# Patient Record
Sex: FEMALE | Race: WHITE | NOT HISPANIC OR LATINO | Employment: FULL TIME | URBAN - METROPOLITAN AREA
[De-identification: names, ages, dates, MRNs, and addresses within clinical notes are randomized per-mention and may not be internally consistent; named-entity substitution may affect disease eponyms.]

---

## 2017-03-06 ENCOUNTER — HOSPITAL ENCOUNTER (OUTPATIENT)
Dept: ONCOLOGY | Facility: CLINIC | Age: 62
Discharge: HOME | End: 2017-03-06
Attending: NURSE PRACTITIONER | Admitting: NURSE PRACTITIONER

## 2017-03-06 LAB
ALBUMIN SERPL-MCNC: 3.9 G/DL (ref 3.5–4.8)
ALBUMIN/GLOB SERPL: 1.2 {RATIO} (ref 1–1.7)
ALP SERPL-CCNC: 62 IU/L (ref 32–91)
ALT SERPL-CCNC: 37 IU/L (ref 14–54)
ANION GAP SERPL CALC-SCNC: 15.8 MMOL/L (ref 10–20)
AST SERPL-CCNC: 28 IU/L (ref 15–41)
BILIRUB SERPL-MCNC: 0.4 MG/DL (ref 0.3–1.2)
BUN SERPL-MCNC: 22 MG/DL (ref 8–20)
BUN/CREAT SERPL: 44 (ref 5.4–26.2)
CALCIUM SERPL-MCNC: 9.4 MG/DL (ref 8.9–10.3)
CHLORIDE SERPL-SCNC: 103 MMOL/L (ref 101–111)
CONV CO2: 28 MMOL/L (ref 22–32)
CONV TOTAL PROTEIN: 7.2 G/DL (ref 6.1–7.9)
CREAT UR-MCNC: 0.5 MG/DL (ref 0.4–1)
GLOBULIN UR ELPH-MCNC: 3.3 G/DL (ref 2.5–3.8)
GLUCOSE SERPL-MCNC: 117 MG/DL (ref 65–99)
POTASSIUM SERPL-SCNC: 3.8 MMOL/L (ref 3.6–5.1)
SODIUM SERPL-SCNC: 143 MMOL/L (ref 136–144)

## 2017-03-07 LAB
KAPPA LC SERPL-MCNC: 0.35 MG/DL (ref 0.33–1.94)
KAPPA LC/LAMBDA SER: 0.06 {RATIO} (ref 0.26–1.65)
LAMBDA LC FREE SERPL-MCNC: 6.31 MG/DL (ref 0.57–2.63)

## 2017-06-05 ENCOUNTER — HOSPITAL ENCOUNTER (OUTPATIENT)
Dept: ONCOLOGY | Facility: CLINIC | Age: 62
Discharge: HOME | End: 2017-06-05
Attending: INTERNAL MEDICINE | Admitting: INTERNAL MEDICINE

## 2017-06-07 LAB
ANA SER QL IA: NORMAL
IGA1 MFR SER: 74 MG/DL (ref 50–400)
IGG1 SER-MCNC: 1580 MG/DL (ref 600–1500)
IGM SERPL-MCNC: 33 MG/DL (ref 40–300)

## 2017-10-02 ENCOUNTER — HOSPITAL ENCOUNTER (OUTPATIENT)
Dept: ONCOLOGY | Facility: HOSPITAL | Age: 62
Discharge: HOME | End: 2017-10-02
Attending: NURSE PRACTITIONER | Admitting: NURSE PRACTITIONER

## 2017-10-02 ENCOUNTER — HOSPITAL ENCOUNTER (OUTPATIENT)
Dept: ONCOLOGY | Facility: CLINIC | Age: 62
Setting detail: INFUSION SERIES
Discharge: HOME | End: 2017-10-02
Attending: NURSE PRACTITIONER | Admitting: NURSE PRACTITIONER

## 2017-10-02 ENCOUNTER — CLINICAL SUPPORT (OUTPATIENT)
Dept: ONCOLOGY | Facility: HOSPITAL | Age: 62
End: 2017-10-02

## 2017-10-02 NOTE — PROGRESS NOTES
PATIENTS ONCOLOGY RECORD LOCATED IN [facility]      Subjective     Name:  SHANNAN SIMENTAL     Date:  10/02/2017  Address:  [facility] [facility] [place] [address]  Home: [phone]  :  1955 AGE:  62 y.o.        RECORDS OBTAINED:  Patients Oncology Record is located in [facility]

## 2018-02-06 ENCOUNTER — HOSPITAL ENCOUNTER (OUTPATIENT)
Dept: ONCOLOGY | Facility: HOSPITAL | Age: 63
Discharge: HOME | End: 2018-02-06
Attending: NURSE PRACTITIONER | Admitting: NURSE PRACTITIONER

## 2018-02-06 ENCOUNTER — HOSPITAL ENCOUNTER (OUTPATIENT)
Dept: ONCOLOGY | Facility: CLINIC | Age: 63
Setting detail: INFUSION SERIES
Discharge: HOME | End: 2018-02-06
Attending: NURSE PRACTITIONER | Admitting: NURSE PRACTITIONER

## 2018-02-06 ENCOUNTER — CLINICAL SUPPORT (OUTPATIENT)
Dept: ONCOLOGY | Facility: HOSPITAL | Age: 63
End: 2018-02-06

## 2018-02-06 LAB
IRON SATN MFR SERPL: 21 % (ref 15–50)
IRON SERPL-MCNC: 90 UG/DL (ref 28–170)
MAGNESIUM UR-MCNC: 0.9 % (ref 0.5–1.5)
RETICS/RBC NFR MANUAL: 0.03 10*6/UL
TIBC SERPL-MCNC: 430 UG/DL (ref 228–428)

## 2018-02-06 NOTE — PROGRESS NOTES
PATIENTS ONCOLOGY RECORD LOCATED IN [facility]      Subjective     Name:  SHANNAN SIMENTAL     Date:  2018  Address:  [address] [place] [address] [place] [address]  Home: [phone]  :  1955 AGE:  62 y.o.        RECORDS OBTAINED:  Patients Oncology Record is located in [facility]

## 2018-02-07 LAB
HAPTOGLOB SERPL-MCNC: 139 MG/DL (ref 36–195)
IGA1 MFR SER: 67 MG/DL (ref 50–400)
IGG1 SER-MCNC: 1590 MG/DL (ref 600–1500)
IGM SERPL-MCNC: 35 MG/DL (ref 40–300)

## 2018-05-30 ENCOUNTER — HOSPITAL ENCOUNTER (OUTPATIENT)
Dept: RHEUMATOLOGY | Facility: CLINIC | Age: 63
Discharge: HOME | End: 2018-05-30
Attending: NURSE PRACTITIONER | Admitting: NURSE PRACTITIONER

## 2018-10-10 ENCOUNTER — HOSPITAL ENCOUNTER (OUTPATIENT)
Dept: ONCOLOGY | Facility: HOSPITAL | Age: 63
Discharge: HOME | End: 2018-10-10
Attending: INTERNAL MEDICINE | Admitting: INTERNAL MEDICINE

## 2018-10-10 ENCOUNTER — CLINICAL SUPPORT (OUTPATIENT)
Dept: ONCOLOGY | Facility: HOSPITAL | Age: 63
End: 2018-10-10

## 2018-10-10 ENCOUNTER — HOSPITAL ENCOUNTER (OUTPATIENT)
Dept: ONCOLOGY | Facility: CLINIC | Age: 63
Setting detail: INFUSION SERIES
Discharge: HOME | End: 2018-10-10
Attending: INTERNAL MEDICINE | Admitting: INTERNAL MEDICINE

## 2018-10-10 NOTE — PROGRESS NOTES
PATIENTS ONCOLOGY RECORD LOCATED IN [facility]      Subjective     Name:  SHANNAN SIMENTAL     Date:  10/10/2018  Address:  [facility] [facility] [place] [address]  Home: [phone]  :  1955 AGE:  63 y.o.        RECORDS OBTAINED:  Patients Oncology Record is located in [facility]

## 2018-10-15 LAB — INTERPRETATION UR IFE-IMP: NORMAL

## 2019-02-15 ENCOUNTER — CLINICAL SUPPORT (OUTPATIENT)
Dept: ONCOLOGY | Facility: HOSPITAL | Age: 64
End: 2019-02-15

## 2019-02-15 ENCOUNTER — HOSPITAL ENCOUNTER (OUTPATIENT)
Dept: ONCOLOGY | Facility: HOSPITAL | Age: 64
Discharge: HOME | End: 2019-02-15
Attending: NURSE PRACTITIONER | Admitting: NURSE PRACTITIONER

## 2019-02-15 ENCOUNTER — HOSPITAL ENCOUNTER (OUTPATIENT)
Dept: ONCOLOGY | Facility: CLINIC | Age: 64
Setting detail: INFUSION SERIES
Discharge: HOME | End: 2019-02-15
Attending: NURSE PRACTITIONER | Admitting: NURSE PRACTITIONER

## 2019-02-15 LAB
IGA1 MFR SER: 74 MG/DL (ref 50–400)
IGG1 SER-MCNC: 1650 MG/DL (ref 600–1500)
IGM SERPL-MCNC: 33 MG/DL (ref 40–300)

## 2019-02-15 NOTE — PROGRESS NOTES
PATIENTS ONCOLOGY RECORD LOCATED IN [facility]      Subjective     Name:  SHANNAN SIMENTAL     Date:  02/15/2019  Address:  [address] [place] [address]  Home: [unfilled]  :  1955 AGE:  64 y.o.        RECORDS OBTAINED:  Patients Oncology Record is located in [facility]

## 2019-02-19 LAB — PROT PATTERN SERPL IFE-IMP: NORMAL

## 2019-05-20 ENCOUNTER — HOSPITAL ENCOUNTER (OUTPATIENT)
Dept: RHEUMATOLOGY | Facility: CLINIC | Age: 64
Discharge: HOME | End: 2019-05-20
Attending: INTERNAL MEDICINE | Admitting: INTERNAL MEDICINE

## 2019-06-14 ENCOUNTER — HOSPITAL ENCOUNTER (OUTPATIENT)
Dept: ONCOLOGY | Facility: HOSPITAL | Age: 64
Discharge: HOME | End: 2019-06-14
Attending: NURSE PRACTITIONER | Admitting: NURSE PRACTITIONER

## 2019-06-14 LAB
ANION GAP SERPL CALC-SCNC: 13.8 MMOL/L (ref 10–20)
BUN SERPL-MCNC: 10 MG/DL (ref 8–20)
BUN/CREAT SERPL: 20 (ref 5.4–26.2)
CALCIUM SERPL-MCNC: 9.3 MG/DL (ref 8.9–10.3)
CHLORIDE SERPL-SCNC: 103 MMOL/L (ref 101–111)
CONV CO2: 27 MMOL/L (ref 22–32)
CREAT UR-MCNC: 0.5 MG/DL (ref 0.4–1)
GLUCOSE SERPL-MCNC: 113 MG/DL (ref 65–99)
POTASSIUM SERPL-SCNC: 3.8 MMOL/L (ref 3.6–5.1)
SODIUM SERPL-SCNC: 140 MMOL/L (ref 136–144)

## 2019-06-18 LAB
KAPPA LC SERPL-MCNC: 0.77 MG/DL (ref 0.33–1.94)
KAPPA LC/LAMBDA SER: 0.07 {RATIO} (ref 0.26–1.65)
LAMBDA LC FREE SERPL-MCNC: 11.1 MG/DL (ref 0.57–2.63)

## 2019-10-15 PROBLEM — M25.552 BILATERAL HIP PAIN: Status: ACTIVE | Noted: 2019-10-15

## 2019-10-15 PROBLEM — R76.8 ANA POSITIVE: Status: ACTIVE | Noted: 2019-10-15

## 2019-10-15 PROBLEM — M25.551 BILATERAL HIP PAIN: Status: ACTIVE | Noted: 2019-10-15

## 2019-10-15 PROBLEM — M25.571 RIGHT ANKLE PAIN: Status: ACTIVE | Noted: 2019-10-15

## 2019-10-15 PROBLEM — D72.819 LEUKOPENIA: Status: ACTIVE | Noted: 2019-10-15

## 2019-10-15 PROBLEM — D47.2 MGUS (MONOCLONAL GAMMOPATHY OF UNKNOWN SIGNIFICANCE): Status: ACTIVE | Noted: 2019-10-15

## 2019-10-15 NOTE — PROGRESS NOTES
Hematology/Oncology Outpatient Follow Up    PATIENT NAME:Phuong Altamirano  :1955  MRN: [id number]  PRIMARY CARE PHYSICIAN: Reva Jaeger MD  REFERRING PHYSICIAN: Reva Jaeger MD    Chief Complaint   Patient presents with   • Follow-up     IgG lambda monoclonal gammopathy        HISTORY OF PRESENT ILLNESS:   1. IgG lambda monoclonal gammopathy diagnosed in 2014.  • She was having significant migratory arthritis and was referred by her primary care physician Dread Yoder M.D. to Mohsen Ehsan, M.D. for a positive ALEE test.  Dr. Hickman’s further workup revealed her to have normal complements and chemistry profile.  The date of collection is 12/15/14.  CBC had revealed the hemoglobin of 11.6 g/dL with MCV 85.4 fl.  Serum protein electrophoresis and abnormal protein band in the gamma region.  Serum immunofixation revealed this to be an IgG lambda monoclonal immunoglobulin and hence the patient was referred here for further evaluation for the presence of multiple myeloma.  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  She denies having had any paresthesias, lightheadedness or chest pain, but has occasional sensation of passing out though she has never passed out personally.  She denies having had any nosebleed, gum bleed, blood in her urine or stool.  She has no family history of anemia.  She claims that she was anemic many years ago.  She is on normal diet and has not lost any weight.  • 2/9/15 - Patient was seen in initial consultation.  • 2/9/15 - Bone survey showed mild scattered degenerative change.  There were no lytic or blastic abnormalities.  • 2/9/15 - Haptoglobin 175 (N), IgA 125 (N), IgG 1398 (N), IgM 49 (N), reticulocyte count 1.34 (N), lambda free light chain 45.48 (H), lambda free light chain 5.37 (N), kappa lambda ratio 0.12 (L), ferritin 52.9 (N), folic acid 22.7 (N), iron 71 (N),  TIBC 380 (N), iron saturation 19% (N) vitamin B12 382 (N), peripheral blood smear showed hypochromia and microcytosis in the RBCs.  WBC and platelet count normal.  • 2/20/15 - Patient underwent bone marrow aspiration.  Pathology showed nondiagnostic morphologic findings due to sampling bias.  Trace clonal plasma cell population by flow cytometry (IgG lambda, less than 1% of total events).  Immunophenotypic analysis showed a trace monoclonal IgG lambda plasma cell (CD38 bright) population (<1%) was present with the polytypic background.  The B cells (1%) appeared polytypic.  Cytogenetics (N).  FISH negative.   • 3/6/17 - Kappa/lambda FLC ratio 0.06 (0.26-1.65). Comprehensive metabolic panel with no significant abnormality.    • 3/9/15 -  ().  Hemoglobin 13, MCV 89.1, WBC 5.4, platelet count 263,000.    • 1/25/16 - WBC 5.5, hemoglobin 12, MCV 90.5, platelet count 265,000. Discussed repeat of bone marrow with patient. She would rather wait six months from today. IgA 90 (), IgG 1740 (600-1500), IgM 41 (). Comprehensive metabolic panel with no significant abnormality. Immunoglobulin free light chain kappa/lambda ratio 0.15 (0.26-1.65). Serum immunofixation with IgG lambda monoclonal gammopathy.       • 4/20/16 - Patient had a DEXA scan that was normal.   • 8/8/16 - WBC 4.7, hemoglobin 12.2, platelet count 253,000. Beta-2 microglobulin 1.48 (<2.16).     • 8/16/16 - Skeletal survey revealed no suspicious lesions.   • 8/19/16 - Patient underwent bone marrow aspiration revealing limited normocellular bone marrow (50%) showing trilineage hematopoiesis and a 20% plasmacytosis with excess plasma cell expression suggestive of a plasma cell neoplasm. Flow cytometry had revealed 1% population of plasma cells exhibiting monoclonal restriction for immunocytoplasmic lambda immunoglobulin light chain. Cytogenetics revealed a normal karyotype. Myeloma profile (inter FISH) negative study.   • 9/7/16 - IgA 86  (), IgG 1660 (600-1500), IgM 39 (). Comprehensive metabolic panel with no significant abnormality and creatinine 0.6 (0.4-1.0).       • 12/5/16 - WBC 4.7, hemoglobin 12.6, platelet count 240,000. Patient complained of fatigue. Patient prescribed American Ginseng. TSH 1.86 (N).   • 3/6/17 - WBC 5.07, hemoglobin 12.2, MCV 93.1, platelets 257,000, ANC 3.16. Light chain ratio and CMP ordered. Patient ordered to start American Ginseng. Kappa/lambda FLC ration 0.06 (0.26-1.65). Comprehensive metabolic panel with no significant abnormality.    • 6/5/17 - ALEE negative. IgA 74 (), IgG 1580 (600-1500), IgM 33 ().   • 10/2/17 - WBC 4.51, hemoglobin 12.1, MCV 91.7, platelets 240,000, ANC 3.09.      • 1/25/18 - Labs performed by MARA Martinez through Dr. Hickman’s group showed ALEE positive with reflex homogenous associated with a SLE drug-induced lupus and juvenile idiopathic arthritis. ALEE titer 1:40 (H). Rheumatoid factor <14 (N). C-reactive protein was 1.7 (<8). Vitamin D 24 (insufficiency 20-29).     • 2/6/18 - Retic 0.9 (0.5-1.5), iron 90 (), TIBC 430 (228-428), iron saturation 21 (15-50), ferritin 44 (), haptoglobin 139 (). IgA  67 (), IgG 1590 (600-1500), IgM 35 ().          • 10/10/18 - WBC 3.8 with 60% neutrophils, hemoglobin 12.2, MCV 92.2. Low WBC thought secondary to Plaquenil and Methotrexate. Urine immunofixation showed IgG lambda monoclonal gammopathy.   • 2/15/19 - SIFE showed IgG lambda monoclonal gammopathy. IgA 74 (N), IgG 1650 (H), IgM 33 (L).   • 5/20/19 - Patient seen by Dr. Vick. Methotrexate was discontinued. Continued on Plaquenil and Mobic. Right hip x-ray showed osteoarthritis. Left hip x-ray showed early degenerative changes.   • 6/14/19 - Prescribed Zantac 150 mg p.o. daily for GE reflux disease. Free light chain, kappa 0.77 (0.33-1.94). Free lambda light chains 11.10 (0.57-2.63). Kappa/lambda ratio 0.07 (0.26-1.65).     Past Medical  History:   Diagnosis Date   • Arthritis 2010   • Depression 2010   • High blood pressure    • High cholesterol 2005   • Hypothyroidism    • Lupus (CMS/HCC) 2010       Past Surgical History:   Procedure Laterality Date   • BREAST LUMPECTOMY  1983    benign   • DILATATION AND CURETTAGE  1995   • LAPAROSCOPIC TUBAL LIGATION  1979   • OVARY SURGERY  1985    remoal of left ovary         Current Outpatient Medications:   •  amLODIPine (NORVASC) 10 MG tablet, Take 10 mg by mouth Daily., Disp: , Rfl: 1  •  atenolol (TENORMIN) 50 MG tablet, Take 50 mg by mouth 2 (Two) Times a Day., Disp: , Rfl: 1  •  cholecalciferol (VITAMIN D3) 400 units tablet, Take 400 Units by mouth Daily., Disp: , Rfl:   •  Cyanocobalamin (VITAMIN B-12 PO), Take  by mouth., Disp: , Rfl:   •  folic acid (FOLVITE) 1 MG tablet, Take 1,000 mcg by mouth Daily., Disp: , Rfl: 3  •  hydrALAZINE (APRESOLINE) 25 MG tablet, TAKE 1 TABLET BY MOUTH TWICE A DAY: CALL OFFICE NEED CHECK UP BY 10/2/19, Disp: , Rfl: 0  •  hydroCHLOROthiazide (HYDRODIURIL) 25 MG tablet, TAKE 1 TABLET BY MOUTH EVERY DAY: NEEDS APPT WILL BE A YEAR 10-2-18, Disp: , Rfl: 0  •  hydroxychloroquine (PLAQUENIL) 200 MG tablet, Every 12 (Twelve) Hours., Disp: , Rfl:   •  levothyroxine (SYNTHROID, LEVOTHROID) 100 MCG tablet, Take 100 mcg by mouth Every Morning Before Breakfast., Disp: , Rfl: 1  •  meloxicam (MOBIC) 15 MG tablet, TAKE ONE (1) TABLET BY MOUTH ONCE A DAY., Disp: , Rfl: 1  •  pravastatin (PRAVACHOL) 40 MG tablet, Take 40 mg by mouth every night at bedtime., Disp: , Rfl: 2  •  sertraline (ZOLOFT) 100 MG tablet, Take 100 mg by mouth Daily., Disp: , Rfl: 1  •  acetaminophen (TYLENOL) 325 MG tablet, Take 650 mg by mouth Every 6 (Six) Hours As Needed for Mild Pain ., Disp: , Rfl:   •  Aspirin-Acetaminophen-Caffeine (EXCEDRIN MIGRAINE PO), Take  by mouth., Disp: , Rfl:   •  ibuprofen (ADVIL,MOTRIN) 200 MG tablet, Take 200 mg by mouth Every 6 (Six) Hours As Needed for Mild Pain ., Disp: , Rfl:  "    No Known Allergies    Family History   Family history unknown: Yes       Family history is unknown by patient.    Social History     Tobacco Use   • Smoking status: Never Smoker   • Smokeless tobacco: Never Used   Substance Use Topics   • Alcohol use: Yes     Frequency: 2-4 times a month     Drinks per session: 1 or 2     Binge frequency: Never   • Drug use: No       I have reviewed the history of present illness, past medical history, family history, social history, lab results, all notes and other records since the patient was last seen on 6/14/19.    SUBJECTIVE: Patient is here to follow up on IgG la,bda MGUS, leukopenia, and ALEE positivity. She forgot to get her bone scan but states that [facility] will [facility]. She does see a new rheumatologist and she has been diagnosed with Lupus.         OSWALD Rodas present during office visit.     REVIEW OF SYSTEMS:  Review of Systems   Constitutional: Negative for chills and fever.   HENT: Negative for ear pain, mouth sores, nosebleeds and sore throat.    Eyes: Negative for photophobia and visual disturbance.   Respiratory: Negative for wheezing and stridor.    Cardiovascular: Negative for chest pain and palpitations.   Gastrointestinal: Negative for abdominal pain, diarrhea, nausea and vomiting.   Endocrine: Negative for cold intolerance and heat intolerance.   Genitourinary: Negative for dysuria and hematuria.   Musculoskeletal: Negative for joint swelling and neck stiffness.   Skin: Negative for color change and rash.   Neurological: Negative for seizures and syncope.   Hematological: Negative for adenopathy.        No obvious bleeding   Psychiatric/Behavioral: Negative for agitation, confusion and hallucinations.       OBJECTIVE:    Vitals:    10/17/19 0940   BP: 172/83   Pulse: 68   Resp: 18   Temp: 97.9 °F (36.6 °C)   Weight: 90.8 kg (200 lb 3.2 oz)   Height: 156.2 cm (61.5\")   PainSc: 0-No pain       ECOG  (0) Fully active, able to carry " on all predisease performance without restriction    Physical Exam   Constitutional: She is oriented to person, place, and time. No distress.   HENT:   Head: Normocephalic and atraumatic.   Eyes: Conjunctivae and EOM are normal. Right eye exhibits no discharge. Left eye exhibits no discharge. No scleral icterus.   Neck: Normal range of motion. Neck supple. No thyromegaly present.   Cardiovascular: Normal rate, regular rhythm and normal heart sounds. Exam reveals no gallop and no friction rub.   Pulmonary/Chest: Effort normal. No stridor. No respiratory distress. She has no wheezes.   Abdominal: Soft. Bowel sounds are normal. She exhibits no mass. There is no tenderness. There is no rebound and no guarding.   Musculoskeletal: Normal range of motion. She exhibits edema (trace BLE ). She exhibits no tenderness.   Lymphadenopathy:     She has no cervical adenopathy.   Neurological: She is alert and oriented to person, place, and time. She exhibits normal muscle tone.   Skin: Skin is warm. No rash noted. She is not diaphoretic. No erythema.   Psychiatric: She has a normal mood and affect. Her behavior is normal.   Nursing note and vitals reviewed.      RECENT LABS  WBC   Date Value Ref Range Status   05/20/2019 4.5 THOUSAND/UL 3.8 - 10.8 K/uL Final     RBC   Date Value Ref Range Status   05/20/2019 3.93 MILLION/UL 3.80 - 5.10 10*6/mm3 Final     Hemoglobin   Date Value Ref Range Status   05/20/2019 11.7 11.7 - 15.5 g/dL Final     Hematocrit   Date Value Ref Range Status   05/20/2019 34.4 (L) 35.0 - 45.0 % Final     MCV   Date Value Ref Range Status   05/20/2019 87.5 80.0 - 100.0 fL Final     MCH   Date Value Ref Range Status   05/20/2019 29.8 27.0 - 33.0 pg Final     MCHC   Date Value Ref Range Status   05/20/2019 34.0 G/DL 32.0 - 36.0 % Final     RDW   Date Value Ref Range Status   05/20/2019 14.3 11.0 - 15.0 % Final     MPV   Date Value Ref Range Status   05/20/2019 10.5 7.5 - 12.5 fL Final     Platelets   Date Value  Ref Range Status   05/20/2019 285 THOUSAND/ - 400 10*3/mm3 Final     Lymphocyte Rel %   Date Value Ref Range Status   05/20/2019 21.8 % Final     Monocyte Rel %   Date Value Ref Range Status   05/20/2019 9.4 % Final     Eosinophil Rel %   Date Value Ref Range Status   05/20/2019 1.8 % Final     Basophil Rel %   Date Value Ref Range Status   05/20/2019 1.1 % Final     Neutrophils Absolute   Date Value Ref Range Status   05/20/2019 2966 CELLS/UL 1500 - 7800 10*3/mm3 Final     Lymphocytes Absolute   Date Value Ref Range Status   05/20/2019 981 CELLS/ - 3900 10*3/mm3 Final     Monocytes Absolute   Date Value Ref Range Status   05/20/2019 423 CELLS/ - 950 10*3/microliter Final     Eosinophils Absolute   Date Value Ref Range Status   05/20/2019 81 CELLS/UL 15 - 500 10*3/mm3 Final     Basophils Absolute   Date Value Ref Range Status   05/20/2019 50 CELLS/UL 0 - 200 10*3/mm3 Final       Lab Results   Component Value Date    GLUCOSE 113 (H) 06/14/2019    BUN 10 06/14/2019    CREATININE 0.5 06/14/2019    EGFRIFNONA 116 05/20/2019    EGFRIFAFRI 100 05/20/2019    BCR 20.0 06/14/2019    K 3.8 06/14/2019    CO2 27 06/14/2019    CALCIUM 9.3 06/14/2019    ALBUMIN 4.0 05/20/2019    LABIL2 1.2 (calc) 05/20/2019    AST 17 05/20/2019    ALT 17 05/20/2019         Assessment/Plan     IgG lambda MGUS  - IgG  - IgM  - IgA  - CBC & Differential    Leukopenia, unspecified type    ALEE positive      ASSESSMENT:  Patient claims to have forgotten to get a skeletal survey performed again and I have encouraged her to get that done.  She has been taken off the methotrexate by Dr. Hickman and is seeing a new rheumatologist now.  I have asked her to see if she can come off the folic acid now that she is not taking methotrexate.  She is also taking oral vitamin B12 replacement but does not remember history of low vitamin B12.  Her WBC had been running low because of methotrexate and Plaquenil and this will be repeated  today.      PLAN:  Skeletal survey.   CBC today.  Immunoglobulins next visit.         I have reviewed labs results, imaging, vitals, and medications with the patient today. Will follow up in four and eight months with the nurse practitioner and a CBC.     Patient verbalized understanding and is in agreement of the above plan.    I have reviewed and validated the information above.   Arcenio Ferreira M.D., F.A.C.P.    Much of the above report is an electronic transcription/translation of the spoken language to printed text using Dragon Software. As such, the subtleties and finesse of the spoken language may permit erroneous, or at times, nonsensical words or phrases to be inadvertently transcribed; thus changes may be made at a later date to rectify these errors.

## 2019-10-17 ENCOUNTER — OFFICE VISIT (OUTPATIENT)
Dept: ONCOLOGY | Facility: CLINIC | Age: 64
End: 2019-10-17

## 2019-10-17 ENCOUNTER — OFFICE VISIT (OUTPATIENT)
Dept: LAB | Facility: HOSPITAL | Age: 64
End: 2019-10-17

## 2019-10-17 VITALS
BODY MASS INDEX: 36.84 KG/M2 | HEART RATE: 68 BPM | DIASTOLIC BLOOD PRESSURE: 83 MMHG | SYSTOLIC BLOOD PRESSURE: 172 MMHG | TEMPERATURE: 97.9 F | RESPIRATION RATE: 18 BRPM | WEIGHT: 200.2 LBS | HEIGHT: 62 IN

## 2019-10-17 DIAGNOSIS — D47.2 MGUS (MONOCLONAL GAMMOPATHY OF UNKNOWN SIGNIFICANCE): ICD-10-CM

## 2019-10-17 DIAGNOSIS — D47.2 MGUS (MONOCLONAL GAMMOPATHY OF UNKNOWN SIGNIFICANCE): Primary | ICD-10-CM

## 2019-10-17 DIAGNOSIS — D72.819 LEUKOPENIA, UNSPECIFIED TYPE: ICD-10-CM

## 2019-10-17 DIAGNOSIS — R76.8 ANA POSITIVE: ICD-10-CM

## 2019-10-17 LAB
BASOPHILS # BLD AUTO: 0.02 10*3/MM3 (ref 0–0.2)
BASOPHILS NFR BLD AUTO: 0.5 % (ref 0–1.5)
DEPRECATED RDW RBC AUTO: 41.5 FL (ref 37–54)
EOSINOPHIL # BLD AUTO: 0.1 10*3/MM3 (ref 0–0.4)
EOSINOPHIL NFR BLD AUTO: 2.3 % (ref 0.3–6.2)
ERYTHROCYTE [DISTWIDTH] IN BLOOD BY AUTOMATED COUNT: 13.4 % (ref 12.3–15.4)
HCT VFR BLD AUTO: 35.8 % (ref 34–46.6)
HGB BLD-MCNC: 12.1 G/DL (ref 12–15.9)
IGA1 MFR SER: 73 MG/DL (ref 70–400)
IGG1 SER-MCNC: 1602 MG/DL (ref 700–1600)
IGM SERPL-MCNC: 28 MG/DL (ref 40–230)
LYMPHOCYTES # BLD AUTO: 1.04 10*3/MM3 (ref 0.7–3.1)
LYMPHOCYTES NFR BLD AUTO: 24.2 % (ref 19.6–45.3)
MCH RBC QN AUTO: 29.6 PG (ref 26.6–33)
MCHC RBC AUTO-ENTMCNC: 33.8 G/DL (ref 31.5–35.7)
MCV RBC AUTO: 87.5 FL (ref 79–97)
MONOCYTES # BLD AUTO: 0.39 10*3/MM3 (ref 0.1–0.9)
MONOCYTES NFR BLD AUTO: 9.1 % (ref 5–12)
NEUTROPHILS # BLD AUTO: 2.75 10*3/MM3 (ref 1.7–7)
NEUTROPHILS NFR BLD AUTO: 63.9 % (ref 42.7–76)
PLATELET # BLD AUTO: 232 10*3/MM3 (ref 140–450)
PMV BLD AUTO: 9.1 FL (ref 6–12)
RBC # BLD AUTO: 4.09 10*6/MM3 (ref 3.77–5.28)
WBC NRBC COR # BLD: 4.3 10*3/MM3 (ref 3.4–10.8)

## 2019-10-17 PROCEDURE — 36415 COLL VENOUS BLD VENIPUNCTURE: CPT

## 2019-10-17 PROCEDURE — 82784 ASSAY IGA/IGD/IGG/IGM EACH: CPT | Performed by: INTERNAL MEDICINE

## 2019-10-17 PROCEDURE — 99214 OFFICE O/P EST MOD 30 MIN: CPT | Performed by: INTERNAL MEDICINE

## 2019-10-17 PROCEDURE — G0463 HOSPITAL OUTPT CLINIC VISIT: HCPCS | Performed by: INTERNAL MEDICINE

## 2019-10-17 PROCEDURE — 85025 COMPLETE CBC W/AUTO DIFF WBC: CPT

## 2019-10-17 RX ORDER — AMLODIPINE BESYLATE 10 MG/1
10 TABLET ORAL DAILY
Refills: 1 | COMMUNITY
Start: 2019-10-01

## 2019-10-17 RX ORDER — PRAVASTATIN SODIUM 40 MG
40 TABLET ORAL
Refills: 2 | COMMUNITY
Start: 2019-07-29

## 2019-10-17 RX ORDER — IBUPROFEN 200 MG
200 TABLET ORAL EVERY 6 HOURS PRN
COMMUNITY

## 2019-10-17 RX ORDER — ACETAMINOPHEN 325 MG/1
650 TABLET ORAL EVERY 6 HOURS PRN
COMMUNITY

## 2019-10-17 RX ORDER — OMEGA-3S/DHA/EPA/FISH OIL/D3 300MG-1000
400 CAPSULE ORAL DAILY
COMMUNITY

## 2019-10-17 RX ORDER — HYDROXYCHLOROQUINE SULFATE 200 MG/1
TABLET, FILM COATED ORAL EVERY 12 HOURS
COMMUNITY
Start: 2014-12-29

## 2019-10-17 RX ORDER — FOLIC ACID 1 MG/1
1000 TABLET ORAL DAILY
Refills: 3 | COMMUNITY
Start: 2019-08-31 | End: 2021-06-08

## 2019-10-17 RX ORDER — ATENOLOL 50 MG/1
50 TABLET ORAL 2 TIMES DAILY
Refills: 1 | COMMUNITY
Start: 2019-08-12

## 2019-10-17 RX ORDER — LEVOTHYROXINE SODIUM 0.1 MG/1
100 TABLET ORAL
Refills: 1 | COMMUNITY
Start: 2019-09-15

## 2019-10-17 RX ORDER — HYDROCHLOROTHIAZIDE 25 MG/1
TABLET ORAL
Refills: 0 | COMMUNITY
Start: 2019-09-21 | End: 2021-06-08

## 2019-10-17 RX ORDER — MELOXICAM 15 MG/1
TABLET ORAL
Refills: 1 | COMMUNITY
Start: 2019-08-24

## 2019-10-17 RX ORDER — HYDRALAZINE HYDROCHLORIDE 25 MG/1
TABLET, FILM COATED ORAL
Refills: 0 | COMMUNITY
Start: 2019-09-10

## 2020-01-21 ENCOUNTER — TELEPHONE (OUTPATIENT)
Dept: ONCOLOGY | Facility: CLINIC | Age: 65
End: 2020-01-21

## 2020-01-21 NOTE — TELEPHONE ENCOUNTER
Called and spoke with patient. She stated to cancel her appts and that she was going to follow martha

## 2020-04-30 RX ORDER — HYDROXYCHLOROQUINE SULFATE 200 MG/1
TABLET, FILM COATED ORAL
Qty: 180 TABLET | Refills: 1 | OUTPATIENT
Start: 2020-04-30

## 2020-08-19 ENCOUNTER — ON CAMPUS - OUTPATIENT (OUTPATIENT)
Dept: URBAN - METROPOLITAN AREA HOSPITAL 2 | Facility: HOSPITAL | Age: 65
End: 2020-08-19

## 2020-08-19 ENCOUNTER — OFFICE (OUTPATIENT)
Dept: URBAN - METROPOLITAN AREA PATHOLOGY 4 | Facility: PATHOLOGY | Age: 65
End: 2020-08-19

## 2020-08-19 VITALS
HEART RATE: 80 BPM | SYSTOLIC BLOOD PRESSURE: 129 MMHG | TEMPERATURE: 97.7 F | RESPIRATION RATE: 18 BRPM | OXYGEN SATURATION: 99 % | HEART RATE: 82 BPM | DIASTOLIC BLOOD PRESSURE: 79 MMHG | HEART RATE: 77 BPM | DIASTOLIC BLOOD PRESSURE: 64 MMHG | SYSTOLIC BLOOD PRESSURE: 134 MMHG | OXYGEN SATURATION: 97 % | SYSTOLIC BLOOD PRESSURE: 157 MMHG | DIASTOLIC BLOOD PRESSURE: 84 MMHG | DIASTOLIC BLOOD PRESSURE: 75 MMHG | DIASTOLIC BLOOD PRESSURE: 52 MMHG | WEIGHT: 199 LBS | OXYGEN SATURATION: 96 % | OXYGEN SATURATION: 100 % | DIASTOLIC BLOOD PRESSURE: 60 MMHG | SYSTOLIC BLOOD PRESSURE: 117 MMHG | RESPIRATION RATE: 16 BRPM | SYSTOLIC BLOOD PRESSURE: 115 MMHG | HEART RATE: 86 BPM | HEART RATE: 81 BPM | HEIGHT: 61 IN | DIASTOLIC BLOOD PRESSURE: 82 MMHG | SYSTOLIC BLOOD PRESSURE: 123 MMHG

## 2020-08-19 DIAGNOSIS — K22.2 ESOPHAGEAL OBSTRUCTION: ICD-10-CM

## 2020-08-19 DIAGNOSIS — K29.50 UNSPECIFIED CHRONIC GASTRITIS WITHOUT BLEEDING: ICD-10-CM

## 2020-08-19 DIAGNOSIS — B96.81 HELICOBACTER PYLORI [H. PYLORI] AS THE CAUSE OF DISEASES CLA: ICD-10-CM

## 2020-08-19 DIAGNOSIS — R13.10 DYSPHAGIA, UNSPECIFIED: ICD-10-CM

## 2020-08-19 PROBLEM — K31.89 OTHER DISEASES OF STOMACH AND DUODENUM: Status: ACTIVE | Noted: 2020-08-19

## 2020-08-19 LAB
GI HISTOLOGY: A. SELECT: (no result)
GI HISTOLOGY: PDF REPORT: (no result)

## 2020-08-19 PROCEDURE — 43239 EGD BIOPSY SINGLE/MULTIPLE: CPT | Performed by: INTERNAL MEDICINE

## 2020-08-19 PROCEDURE — 88342 IMHCHEM/IMCYTCHM 1ST ANTB: CPT | Performed by: INTERNAL MEDICINE

## 2020-08-19 PROCEDURE — 43450 DILATE ESOPHAGUS 1/MULT PASS: CPT | Performed by: INTERNAL MEDICINE

## 2020-08-19 PROCEDURE — 88305 TISSUE EXAM BY PATHOLOGIST: CPT | Performed by: INTERNAL MEDICINE

## 2020-08-19 RX ORDER — OMEPRAZOLE 40 MG/1
40 CAPSULE, DELAYED RELEASE ORAL
Qty: 30 | Refills: 11 | Status: ACTIVE
Start: 2020-08-19

## 2021-06-08 ENCOUNTER — OFFICE VISIT (OUTPATIENT)
Dept: CARDIOLOGY | Facility: CLINIC | Age: 66
End: 2021-06-08

## 2021-06-08 VITALS
HEART RATE: 77 BPM | OXYGEN SATURATION: 97 % | DIASTOLIC BLOOD PRESSURE: 71 MMHG | SYSTOLIC BLOOD PRESSURE: 143 MMHG | BODY MASS INDEX: 37.21 KG/M2 | HEIGHT: 62 IN

## 2021-06-08 DIAGNOSIS — E78.00 PURE HYPERCHOLESTEROLEMIA: ICD-10-CM

## 2021-06-08 DIAGNOSIS — I10 ESSENTIAL HYPERTENSION: Primary | ICD-10-CM

## 2021-06-08 DIAGNOSIS — R01.1 HEART MURMUR: ICD-10-CM

## 2021-06-08 DIAGNOSIS — E78.5 HYPERLIPIDEMIA LDL GOAL <100: ICD-10-CM

## 2021-06-08 DIAGNOSIS — R06.02 SHORTNESS OF BREATH: ICD-10-CM

## 2021-06-08 PROCEDURE — 99204 OFFICE O/P NEW MOD 45 MIN: CPT | Performed by: INTERNAL MEDICINE

## 2021-06-08 RX ORDER — OMEPRAZOLE 20 MG/1
20 CAPSULE, DELAYED RELEASE ORAL DAILY
COMMUNITY

## 2021-06-08 RX ORDER — FUROSEMIDE 20 MG/1
20 TABLET ORAL 2 TIMES DAILY
COMMUNITY

## 2021-06-08 NOTE — PROGRESS NOTES
"    Subjective:     Encounter Date:06/08/2021      Patient ID: Phuong Altamirano is a 66 y.o. female.    Chief Complaint:  History of Present Illness 66-year-old white female with history of hypertension hyperlipidemia and strong family stay of coronary disease presents to my office for new consultation.  Patient has been having occasional shortness of breath with exertion.  No complaint of any chest pain.  No PND orthopnea.  She has occasional palpitation.  No dizziness syncope she has some swelling swelling of the feet.  She does not know if she has sleep apnea but she snores at nighttime.  She is taking her medicine regularly.  She is also started on diuretics.    The following portions of the patient's history were reviewed and updated as appropriate: allergies, current medications, past family history, past medical history, past social history, past surgical history and problem list.  Past Medical History:   Diagnosis Date   • Arthritis 2010   • Depression 2010   • High blood pressure    • High cholesterol 2005   • Hypothyroidism    • Lupus (CMS/HCC) 2010     Past Surgical History:   Procedure Laterality Date   • BREAST LUMPECTOMY  1983    benign   • DILATATION AND CURETTAGE  1995   • LAPAROSCOPIC TUBAL LIGATION  1979   • OVARY SURGERY  1985    remoal of left ovary     /71   Pulse 77   Ht 156.2 cm (61.5\")   LMP 10/17/2014   SpO2 97%   BMI 37.21 kg/m²   Family History   Problem Relation Age of Onset   • Heart disease Father        Current Outpatient Medications:   •  acetaminophen (TYLENOL) 325 MG tablet, Take 650 mg by mouth Every 6 (Six) Hours As Needed for Mild Pain ., Disp: , Rfl:   •  amLODIPine (NORVASC) 10 MG tablet, Take 10 mg by mouth Daily., Disp: , Rfl: 1  •  atenolol (TENORMIN) 50 MG tablet, Take 50 mg by mouth 2 (Two) Times a Day., Disp: , Rfl: 1  •  cholecalciferol (VITAMIN D3) 400 units tablet, Take 400 Units by mouth Daily., Disp: , Rfl:   •  Cyanocobalamin (VITAMIN B-12 PO), Take  by " mouth., Disp: , Rfl:   •  furosemide (LASIX) 20 MG tablet, Take 20 mg by mouth 2 (Two) Times a Day., Disp: , Rfl:   •  hydrALAZINE (APRESOLINE) 25 MG tablet, TAKE 1 TABLET BY MOUTH TWICE A DAY: CALL OFFICE NEED CHECK UP BY 10/2/19, Disp: , Rfl: 0  •  hydroxychloroquine (PLAQUENIL) 200 MG tablet, Every 12 (Twelve) Hours., Disp: , Rfl:   •  ibuprofen (ADVIL,MOTRIN) 200 MG tablet, Take 200 mg by mouth Every 6 (Six) Hours As Needed for Mild Pain ., Disp: , Rfl:   •  levothyroxine (SYNTHROID, LEVOTHROID) 100 MCG tablet, Take 100 mcg by mouth Every Morning Before Breakfast., Disp: , Rfl: 1  •  meloxicam (MOBIC) 15 MG tablet, TAKE ONE (1) TABLET BY MOUTH ONCE A DAY., Disp: , Rfl: 1  •  omeprazole (priLOSEC) 20 MG capsule, Take 20 mg by mouth Daily., Disp: , Rfl:   •  pravastatin (PRAVACHOL) 40 MG tablet, Take 40 mg by mouth every night at bedtime., Disp: , Rfl: 2  •  sertraline (ZOLOFT) 50 MG tablet, Take 50 mg by mouth Daily., Disp: , Rfl: 1  •  Aspirin-Acetaminophen-Caffeine (EXCEDRIN MIGRAINE PO), Take  by mouth., Disp: , Rfl:   •  folic acid (FOLVITE) 1 MG tablet, Take 1,000 mcg by mouth Daily., Disp: , Rfl: 3  •  hydroCHLOROthiazide (HYDRODIURIL) 25 MG tablet, TAKE 1 TABLET BY MOUTH EVERY DAY: NEEDS APPT WILL BE A YEAR 10-2-18, Disp: , Rfl: 0  No Known Allergies  Social History     Socioeconomic History   • Marital status:      Spouse name: Not on file   • Number of children: Not on file   • Years of education: Not on file   • Highest education level: Not on file   Tobacco Use   • Smoking status: Former Smoker   • Smokeless tobacco: Never Used   Substance and Sexual Activity   • Alcohol use: Yes   • Drug use: No   • Sexual activity: Defer     Review of Systems   Constitutional: Negative for fever and malaise/fatigue.   HENT: Negative for ear pain and nosebleeds.    Eyes: Negative for blurred vision and double vision.   Cardiovascular: Positive for leg swelling and palpitations. Negative for chest pain and  dyspnea on exertion.   Respiratory: Positive for shortness of breath. Negative for cough.    Skin: Negative for rash.   Musculoskeletal: Negative for joint pain.   Gastrointestinal: Negative for abdominal pain, nausea and vomiting.   Neurological: Positive for numbness. Negative for focal weakness, headaches and light-headedness.   Psychiatric/Behavioral: Negative for depression. The patient is not nervous/anxious.    All other systems reviewed and are negative.             Objective:     Constitutional:       Appearance: Well-developed.   Eyes:      General: No scleral icterus.     Conjunctiva/sclera: Conjunctivae normal.      Pupils: Pupils are equal, round, and reactive to light.   HENT:      Head: Normocephalic and atraumatic.   Neck:      Vascular: No carotid bruit or JVD.   Pulmonary:      Effort: Pulmonary effort is normal.      Breath sounds: Normal breath sounds. No wheezing. No rales.   Cardiovascular:      Normal rate. Regular rhythm.      Murmurs: There is a systolic murmur.   Pulses:     Intact distal pulses.   Abdominal:      General: Bowel sounds are normal.      Palpations: Abdomen is soft.   Musculoskeletal: Normal range of motion.      Cervical back: Normal range of motion and neck supple. Skin:     General: Skin is warm and dry.      Findings: No rash.   Neurological:      Mental Status: Alert.      Comments: No focal deficits       Procedures    Lab Review:         MDM  1.  Shortness of breath with swelling of the feet  Patient has been having the symptoms and hence I will perform an echocardiogram to assess her LV function valvular abnormalities  Patient also should have a sleep study done  2.  Heart murmur  Patient has a systolic murmur and hence I will perform an echocardiogram  3.  Hypertension  Patient blood pressure currently stable on medications  4.  Hyperlipidemia  Patient lipid levels are followed with the primary care doctor

## 2021-06-11 PROCEDURE — 93306 TTE W/DOPPLER COMPLETE: CPT | Performed by: INTERNAL MEDICINE

## 2021-06-15 ENCOUNTER — OUTSIDE FACILITY SERVICE (OUTPATIENT)
Dept: CARDIOLOGY | Facility: CLINIC | Age: 66
End: 2021-06-15

## 2021-06-17 ENCOUNTER — TELEPHONE (OUTPATIENT)
Dept: CARDIOLOGY | Facility: CLINIC | Age: 66
End: 2021-06-17

## 2024-01-19 ENCOUNTER — OFFICE (OUTPATIENT)
Dept: URBAN - METROPOLITAN AREA CLINIC 64 | Facility: CLINIC | Age: 69
End: 2024-01-19

## 2024-01-19 VITALS
DIASTOLIC BLOOD PRESSURE: 103 MMHG | SYSTOLIC BLOOD PRESSURE: 174 MMHG | WEIGHT: 203 LBS | HEART RATE: 75 BPM | HEIGHT: 61 IN

## 2024-01-19 DIAGNOSIS — R13.10 DYSPHAGIA, UNSPECIFIED: ICD-10-CM

## 2024-01-19 PROCEDURE — 99203 OFFICE O/P NEW LOW 30 MIN: CPT

## 2024-02-28 ENCOUNTER — ON CAMPUS - OUTPATIENT (OUTPATIENT)
Dept: URBAN - METROPOLITAN AREA HOSPITAL 2 | Facility: HOSPITAL | Age: 69
End: 2024-02-28

## 2024-02-28 VITALS
SYSTOLIC BLOOD PRESSURE: 119 MMHG | RESPIRATION RATE: 16 BRPM | OXYGEN SATURATION: 99 % | DIASTOLIC BLOOD PRESSURE: 61 MMHG | SYSTOLIC BLOOD PRESSURE: 137 MMHG | DIASTOLIC BLOOD PRESSURE: 62 MMHG | DIASTOLIC BLOOD PRESSURE: 72 MMHG | SYSTOLIC BLOOD PRESSURE: 110 MMHG | HEART RATE: 95 BPM | RESPIRATION RATE: 18 BRPM | HEART RATE: 76 BPM | HEART RATE: 71 BPM | SYSTOLIC BLOOD PRESSURE: 135 MMHG | WEIGHT: 200 LBS | DIASTOLIC BLOOD PRESSURE: 66 MMHG | TEMPERATURE: 97.1 F | DIASTOLIC BLOOD PRESSURE: 69 MMHG | HEART RATE: 75 BPM | HEIGHT: 61 IN | SYSTOLIC BLOOD PRESSURE: 114 MMHG | OXYGEN SATURATION: 100 % | OXYGEN SATURATION: 98 % | RESPIRATION RATE: 14 BRPM | HEART RATE: 80 BPM | HEART RATE: 74 BPM | RESPIRATION RATE: 17 BRPM

## 2024-02-28 DIAGNOSIS — R13.10 DYSPHAGIA, UNSPECIFIED: ICD-10-CM

## 2024-02-28 DIAGNOSIS — K22.2 ESOPHAGEAL OBSTRUCTION: ICD-10-CM

## 2024-02-28 DIAGNOSIS — K44.9 DIAPHRAGMATIC HERNIA WITHOUT OBSTRUCTION OR GANGRENE: ICD-10-CM

## 2024-02-28 PROCEDURE — 43450 DILATE ESOPHAGUS 1/MULT PASS: CPT | Performed by: INTERNAL MEDICINE

## 2024-02-28 PROCEDURE — 43235 EGD DIAGNOSTIC BRUSH WASH: CPT | Performed by: INTERNAL MEDICINE

## 2025-03-20 ENCOUNTER — APPOINTMENT (OUTPATIENT)
Dept: LAB | Facility: HOSPITAL | Age: 70
End: 2025-03-20
Payer: MEDICARE

## 2025-03-20 ENCOUNTER — CONSULT (OUTPATIENT)
Dept: ONCOLOGY | Facility: CLINIC | Age: 70
End: 2025-03-20
Payer: MEDICARE

## 2025-03-20 ENCOUNTER — TELEPHONE (OUTPATIENT)
Dept: ONCOLOGY | Facility: CLINIC | Age: 70
End: 2025-03-20
Payer: MEDICARE

## 2025-03-20 VITALS
RESPIRATION RATE: 17 BRPM | BODY MASS INDEX: 37.69 KG/M2 | OXYGEN SATURATION: 95 % | HEIGHT: 61 IN | TEMPERATURE: 98.4 F | DIASTOLIC BLOOD PRESSURE: 72 MMHG | HEART RATE: 78 BPM | SYSTOLIC BLOOD PRESSURE: 124 MMHG | WEIGHT: 199.6 LBS

## 2025-03-20 DIAGNOSIS — D47.2 MONOCLONAL GAMMOPATHY: ICD-10-CM

## 2025-03-20 DIAGNOSIS — D47.2 MGUS (MONOCLONAL GAMMOPATHY OF UNKNOWN SIGNIFICANCE): Primary | ICD-10-CM

## 2025-03-20 LAB
ALBUMIN SERPL-MCNC: 4.1 G/DL (ref 3.5–5.2)
ALBUMIN/GLOB SERPL: 1.1 G/DL
ALP SERPL-CCNC: 73 U/L (ref 39–117)
ALT SERPL W P-5'-P-CCNC: 17 U/L (ref 1–33)
ANION GAP SERPL CALCULATED.3IONS-SCNC: 9.8 MMOL/L (ref 5–15)
AST SERPL-CCNC: 17 U/L (ref 1–32)
BILIRUB SERPL-MCNC: 0.3 MG/DL (ref 0–1.2)
BUN SERPL-MCNC: 17 MG/DL (ref 8–23)
BUN/CREAT SERPL: 29.3 (ref 7–25)
CALCIUM SPEC-SCNC: 9.6 MG/DL (ref 8.6–10.5)
CHLORIDE SERPL-SCNC: 101 MMOL/L (ref 98–107)
CO2 SERPL-SCNC: 30.2 MMOL/L (ref 22–29)
CREAT SERPL-MCNC: 0.58 MG/DL (ref 0.57–1)
EGFRCR SERPLBLD CKD-EPI 2021: 97.5 ML/MIN/1.73
GLOBULIN UR ELPH-MCNC: 3.7 GM/DL
GLUCOSE SERPL-MCNC: 107 MG/DL (ref 65–99)
POTASSIUM SERPL-SCNC: 3.9 MMOL/L (ref 3.5–5.2)
PROT SERPL-MCNC: 7.8 G/DL (ref 6–8.5)
SODIUM SERPL-SCNC: 141 MMOL/L (ref 136–145)

## 2025-03-20 PROCEDURE — 1160F RVW MEDS BY RX/DR IN RCRD: CPT | Performed by: INTERNAL MEDICINE

## 2025-03-20 PROCEDURE — 36415 COLL VENOUS BLD VENIPUNCTURE: CPT | Performed by: INTERNAL MEDICINE

## 2025-03-20 PROCEDURE — 1126F AMNT PAIN NOTED NONE PRSNT: CPT | Performed by: INTERNAL MEDICINE

## 2025-03-20 PROCEDURE — 99204 OFFICE O/P NEW MOD 45 MIN: CPT | Performed by: INTERNAL MEDICINE

## 2025-03-20 PROCEDURE — 80053 COMPREHEN METABOLIC PANEL: CPT | Performed by: INTERNAL MEDICINE

## 2025-03-20 PROCEDURE — 1159F MED LIST DOCD IN RCRD: CPT | Performed by: INTERNAL MEDICINE

## 2025-03-20 RX ORDER — LISINOPRIL 10 MG/1
1 TABLET ORAL DAILY
COMMUNITY
Start: 2023-04-18

## 2025-03-20 RX ORDER — HYDROCHLOROTHIAZIDE 12.5 MG/1
1 TABLET ORAL DAILY
COMMUNITY
Start: 2023-04-18

## 2025-03-20 NOTE — PROGRESS NOTES
HEMATOLOGY ONCOLOGY OUTPATIENT CONSULTATION       Patient name: Phuong Altamirano  : 1955  MRN: [id number]  Primary Care Physician: Reva Jaeger MD  Referring Physician: Chula Sanchez APRN  Reason For Consult: Monoclonal gammopathy.     History of Present Illness:    History of Present Illness  3/20/2025: Ms. 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 In early , she had a bone marrow biopsy and aspiration that revealed equivocal results, leading to a repeat bone marrow biopsy in . This disclosed a normocellular bone marrow with trilineage hematopoiesis and 20% plasma cells. On flow cytometry, only 1% of the plasma cells revealed monoclonal restriction. Continued observation at [facility] showed no evidence of progression. She was last seen sometime in  without new problems.Today, she reports being largely asymptomatic. She remains active and energetic, has a good appetite, and her weight has been stable. She is not experiencing fevers or diaphoresis. She does not report any chest pain. She does experience dyspnea with exertion, which she attributes to her lack of physical activity. She is not experiencing abdominal pain, diarrhea, or dysuria. On exam she is conversant and oriented. No jaundice and no distress but she appears chronically ill. No oral lesions. Respirations not labored. Lungs clear and heart regular. Abdomen protuberant and soft; the liver and spleen don't seem enlarged. No edema. Reviewed all the records and all laboratory exams. Discussed with her. 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 She might need a bone marrow  aspiration and biopsy. She will have additional studies today and will see me with the results.     Past Medical History:   Diagnosis Date    Arthritis 2010    Depression 2010    High blood pressure     High cholesterol 2005    Hypothyroidism     Lupus 2010     Past Surgical History:   Procedure Laterality Date    BREAST LUMPECTOMY Right 1983    benign    DILATATION AND CURETTAGE  1995    LAPAROSCOPIC TUBAL LIGATION  1979    OVARY SURGERY  1985    remoal of left ovary       Current Outpatient Medications:     amLODIPine (NORVASC) 10 MG tablet, Take 1 tablet by mouth Daily., Disp: , Rfl: 1    atenolol (TENORMIN) 50 MG tablet, Take 1 tablet by mouth 2 (Two) Times a Day., Disp: , Rfl: 1    cholecalciferol (VITAMIN D3) 400 units tablet, Take 1 tablet by mouth Daily., Disp: , Rfl:     hydrALAZINE (APRESOLINE) 25 MG tablet, TAKE 1 TABLET BY MOUTH TWICE A DAY: CALL OFFICE NEED CHECK UP BY 10/2/19, Disp: , Rfl: 0    hydroCHLOROthiazide 12.5 MG tablet, Take 1 tablet by mouth Daily., Disp: , Rfl:     hydroxychloroquine (PLAQUENIL) 200 MG tablet, Every 12 (Twelve) Hours., Disp: , Rfl:     ibuprofen (ADVIL,MOTRIN) 200 MG tablet, Take 1 tablet by mouth Every 6 (Six) Hours As Needed for Mild Pain., Disp: , Rfl:     levothyroxine (SYNTHROID, LEVOTHROID) 100 MCG tablet, Take 1 tablet by mouth Every Morning Before Breakfast., Disp: , Rfl: 1    lisinopril (PRINIVIL,ZESTRIL) 10 MG tablet, Take 1 tablet by mouth Daily., Disp: , Rfl:     meloxicam (MOBIC) 15 MG tablet, TAKE ONE (1) TABLET BY MOUTH ONCE A DAY., Disp: , Rfl: 1    metFORMIN (GLUCOPHAGE) 500 MG tablet, , Disp: , Rfl:     omeprazole (priLOSEC) 20 MG capsule, Take 1 capsule by mouth Daily., Disp: , Rfl:     sertraline (ZOLOFT) 50 MG tablet, Take 1 tablet by mouth Daily., Disp: , Rfl: 1    acetaminophen (TYLENOL) 325 MG tablet, Take 2 tablets by mouth Every 6 (Six) Hours As Needed for Mild Pain., Disp: , Rfl:     No Known Allergies    Family History   Problem Relation Age of  Onset    Heart disease Father     Pancreatic cancer Sister 59    Heart disease Sister     Stroke Sister     Pneumonia Brother      Cancer-related family history includes Pancreatic cancer (age of onset: 59) in her sister.    Social History     Tobacco Use    Smoking status: Former     Current packs/day: 0.00     Average packs/day: 1 pack/day for 21.0 years (21.0 ttl pk-yrs)     Types: Cigarettes     Start date:      Quit date:      Years since quittin.2    Smokeless tobacco: Never   Substance Use Topics    Alcohol use: Yes    Drug use: No     Social History     Social History Narrative    Not on file     ROS:   Review of Systems   Constitutional:  Positive for fatigue. Negative for activity change, appetite change, chills, diaphoresis, fever and unexpected weight change.   HENT:  Negative for congestion, dental problem, drooling, ear discharge, ear pain, facial swelling, hearing loss, mouth sores, nosebleeds, postnasal drip, rhinorrhea, sinus pressure, sinus pain, sneezing, sore throat, tinnitus, trouble swallowing and voice change.    Eyes:  Negative for photophobia, pain, discharge, redness, itching and visual disturbance.   Respiratory:  Positive for shortness of breath. Negative for apnea, cough, choking, chest tightness, wheezing and stridor.    Cardiovascular:  Negative for chest pain, palpitations and leg swelling.   Gastrointestinal:  Negative for abdominal distention, abdominal pain, anal bleeding, blood in stool, constipation, diarrhea, nausea, rectal pain and vomiting.   Endocrine: Negative for cold intolerance, heat intolerance, polydipsia and polyuria.   Genitourinary:  Negative for decreased urine volume, difficulty urinating, dysuria, flank pain, frequency, genital sores, hematuria and urgency.   Musculoskeletal:  Negative for arthralgias, back pain, gait problem, joint swelling, myalgias, neck pain and neck stiffness.   Skin:  Negative for color change, pallor and rash.   Neurological:   "Negative for dizziness, tremors, seizures, syncope, facial asymmetry, speech difficulty, weakness, light-headedness, numbness and headaches.   Hematological:  Negative for adenopathy. Does not bruise/bleed easily.   Psychiatric/Behavioral:  Negative for agitation, behavioral problems, confusion, decreased concentration, hallucinations, self-injury, sleep disturbance and suicidal ideas. The patient is not nervous/anxious.      Objective:    Vital Signs:  Vitals:    03/20/25 1227   BP: 124/72   Pulse: 78   Resp: 17   Temp: 98.4 °F (36.9 °C)   SpO2: 95%   Weight: 90.5 kg (199 lb 9.6 oz)   Height: 154.9 cm (61\")   PainSc: 0-No pain     Body mass index is 37.71 kg/m².    ECOG  (0) Fully active, able to carry on all predisease performance without restriction    Physical Exam:   Physical Exam  Constitutional:       General: She is not in acute distress.     Appearance: She is ill-appearing. She is not toxic-appearing or diaphoretic.   HENT:      Head: Normocephalic and atraumatic.      Right Ear: External ear normal.      Left Ear: External ear normal.      Nose: Nose normal.      Mouth/Throat:      Mouth: Mucous membranes are moist.      Pharynx: Oropharynx is clear. No oropharyngeal exudate or posterior oropharyngeal erythema.   Eyes:      General: No scleral icterus.        Right eye: No discharge.         Left eye: No discharge.      Conjunctiva/sclera: Conjunctivae normal.      Pupils: Pupils are equal, round, and reactive to light.   Cardiovascular:      Rate and Rhythm: Normal rate and regular rhythm.      Pulses: Normal pulses.      Heart sounds: No murmur heard.     No friction rub. No gallop.   Pulmonary:      Effort: No respiratory distress.      Breath sounds: No stridor. No wheezing, rhonchi or rales.   Abdominal:      General: Bowel sounds are normal. There is no distension.      Palpations: Abdomen is soft. There is no mass.      Tenderness: There is no abdominal tenderness. There is no right CVA tenderness, " left CVA tenderness, guarding or rebound.      Hernia: No hernia is present.      Comments: Protuberant, soft and not tender. No hepatomegaly or splenomegaly.    Musculoskeletal:         General: No tenderness, deformity or signs of injury.      Cervical back: No rigidity.      Right lower leg: No edema.      Left lower leg: No edema.   Lymphadenopathy:      Cervical: No cervical adenopathy.   Skin:     Coloration: Skin is not jaundiced or pale.      Findings: No bruising, lesion or rash.   Neurological:      General: No focal deficit present.      Mental Status: She is alert and oriented to person, place, and time.      Cranial Nerves: No cranial nerve deficit.   Psychiatric:         Mood and Affect: Mood normal.         Behavior: Behavior normal.         Thought Content: Thought content normal.         Judgment: Judgment normal.     BRADLEY Barrett MD performed the physical exam on 3/20/2025 as documented above.     Lab Results - Last 18 Months   Lab Units 03/20/25  1221   WBC 10*3/mm3 5.79   HEMOGLOBIN g/dL 11.5*   HEMATOCRIT % 35.7   PLATELETS 10*3/mm3 219   MCV fL 91.8     Lab Results - Last 18 Months   Lab Units 03/20/25  1329   SODIUM mmol/L 141   POTASSIUM mmol/L 3.9   CHLORIDE mmol/L 101   CO2 mmol/L 30.2*   BUN mg/dL 17   CREATININE mg/dL 0.58   CALCIUM mg/dL 9.6   BILIRUBIN mg/dL 0.3   ALK PHOS U/L 73   ALT (SGPT) U/L 17   AST (SGOT) U/L 17   GLUCOSE mg/dL 107*     Lab Results   Component Value Date    GLUCOSE 107 (H) 03/20/2025    BUN 17 03/20/2025    CREATININE 0.58 03/20/2025    EGFRIFNONA 116 05/20/2019    EGFRIFAFRI 100 05/20/2019    BCR 29.3 (H) 03/20/2025    K 3.9 03/20/2025    CO2 30.2 (H) 03/20/2025    CALCIUM 9.6 03/20/2025    ALBUMIN 4.1 03/20/2025    AST 17 03/20/2025    ALT 17 03/20/2025     Lab Results   Component Value Date    IRON 90 02/06/2018    TIBC 430 (H) 02/06/2018     Lab Results   Component Value Date    RETICCTPCT 0.90 02/06/2018     Lab Results   Component Value Date    ALEE   06/05/2017     NEGATIVE This result is designed to aid in the diagnosis of many of the    ALEE  06/05/2017     systemic autoimmune disorders and is not diagnostic by itself.  Test results    ALEE  06/05/2017     should be interpreted in conjunction with the clinical evaluation of the    ALEE  06/05/2017     patient.  SLE patients undergoing steroid therapy may have negative results.    SEDRATE 19 02/18/2019     Lab Results   Component Value Date    HAPTOGLOBIN 139 02/06/2018     Lab Results   Component Value Date    SEDRATE 19 02/18/2019      Assessment & Plan     Assessment & Plan  1. Smoldering myeloma.  She was previously diagnosed with monoclonal gammopathy of undetermined significance, but current findings suggest smoldering myeloma. 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 Recent blood count shows mild anemia, which is new. The most recent protein electrophoresis from 2019 showed slightly increased free lambda light chains and an elevated immunoglobulin with a monoclonal band. Additional investigations will be obtained to confirm the diagnosis.  2. I have reviewed all the records, including notes from [facility] and [facility]. I have reviewed all the laboratory exams and discussed with her.   3. I've asked her to see me with laboratory exams and new imaging of bones and skull.     Neftali Barrett MD on 3/20/2025 at 16:20

## 2025-03-21 LAB
KAPPA LC FREE SER-MCNC: 6.6 MG/L (ref 3.3–19.4)
KAPPA LC FREE/LAMBDA FREE SER: 0.02 {RATIO} (ref 0.26–1.65)
LAMBDA LC FREE SERPL-MCNC: 309 MG/L (ref 5.7–26.3)

## 2025-03-24 LAB
ALBUMIN SERPL ELPH-MCNC: 3.4 G/DL (ref 2.9–4.4)
ALBUMIN/GLOB SERPL: 0.9 {RATIO} (ref 0.7–1.7)
ALPHA1 GLOB SERPL ELPH-MCNC: 0.3 G/DL (ref 0–0.4)
ALPHA2 GLOB SERPL ELPH-MCNC: 0.9 G/DL (ref 0.4–1)
B-GLOBULIN SERPL ELPH-MCNC: 1 G/DL (ref 0.7–1.3)
GAMMA GLOB SERPL ELPH-MCNC: 1.7 G/DL (ref 0.4–1.8)
GLOBULIN SER-MCNC: 3.9 G/DL (ref 2.2–3.9)
IGA SERPL-MCNC: 50 MG/DL (ref 87–352)
IGG SERPL-MCNC: 1761 MG/DL (ref 586–1602)
IGM SERPL-MCNC: 35 MG/DL (ref 26–217)
INTERPRETATION SERPL IEP-IMP: ABNORMAL
LABORATORY COMMENT REPORT: ABNORMAL
M PROTEIN SERPL ELPH-MCNC: 1.4 G/DL
PROT SERPL-MCNC: 7.3 G/DL (ref 6–8.5)

## 2025-03-26 ENCOUNTER — TELEPHONE (OUTPATIENT)
Dept: ONCOLOGY | Facility: CLINIC | Age: 70
End: 2025-03-26
Payer: MEDICARE

## 2025-03-26 NOTE — TELEPHONE ENCOUNTER
Called to offer Pt an appt today at 1 as Dr Barrett wants Pt to come in in 2 wks but Pt is not able to today. I v/u and will find another option

## 2025-04-01 NOTE — PROGRESS NOTES
HEMATOLOGY ONCOLOGY OUTPATIENT FOLLOW UP       Patient name: Phuong Altamirano  : 1955  MRN: [id number]  Primary Care Physician: Chula Sanchez APRN  Referring Physician: Reva Jaeger MD  Reason For Consult: Monoclonal gammopathy.     History of Present Illness:    History of Present Illness  3/20/2025: Ms. 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 In early , she had a bone marrow biopsy and aspiration that revealed equivocal results, leading to a repeat bone marrow biopsy in . This disclosed a normocellular bone marrow with trilineage hematopoiesis and 20% plasma cells. On flow cytometry, only 1% of the plasma cells revealed monoclonal restriction. Continued observation at [facility] showed no evidence of progression. She was last seen sometime in  without new problems.Today, she reports being largely asymptomatic. She remains active and energetic, has a good appetite, and her weight has been stable. She is not experiencing fevers or diaphoresis. She does not report any chest pain. She does experience dyspnea with exertion, which she attributes to her lack of physical activity. She is not experiencing abdominal pain, diarrhea, or dysuria. On exam she is conversant and oriented. No jaundice and no distress but she appears chronically ill. No oral lesions. Respirations not labored. Lungs clear and heart regular. Abdomen protuberant and soft; the liver and spleen don't seem enlarged. No edema. Reviewed all the records and all laboratory exams. Discussed with her. 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 She might need a bone marrow  aspiration and biopsy. She will have additional studies today and will see me with the results.     4/4/2025: In the office sooner than scheduled.  Her protein electrophoresis revealed a small increase in the M spike from 1.2 g/dL at the previous measurement available to 1.4 g/dL.  In addition the free lambda light chains increased from 11.1 mg/L to 309 mg/L, changing the ratio significantly.  She feels as well as she did at the time of the last visit.  On exam she is well-oriented and conversant.  She does not seem in any distress and she is not jaundiced.  The lungs are clear bilaterally and the heart regular.  The abdomen is protuberant and soft.  There is no edema.  Reviewed and discussed the laboratory exams with her.  I have asked her to allow me to obtain a bone marrow biopsy and aspiration as well as long bone survey and a 24-hour urine protein quantification and electrophoresis.  She will see me with results.    Past Medical History:   Diagnosis Date    Arthritis 2010    Depression 2010    High blood pressure     High cholesterol 2005    Hypothyroidism     Lupus 2010     Past Surgical History:   Procedure Laterality Date    BREAST LUMPECTOMY Right 1983    benign    DILATATION AND CURETTAGE  1995    LAPAROSCOPIC TUBAL LIGATION  1979    OVARY SURGERY  1985    remoal of left ovary       Current Outpatient Medications:     acetaminophen (TYLENOL) 325 MG tablet, Take 2 tablets by mouth Every 6 (Six) Hours As Needed for Mild Pain., Disp: , Rfl:     amLODIPine (NORVASC) 10 MG tablet, Take 1 tablet by mouth Daily., Disp: , Rfl: 1    atenolol (TENORMIN) 50 MG tablet, Take 1 tablet by mouth 2 (Two) Times a Day., Disp: , Rfl: 1    cholecalciferol (VITAMIN D3) 400 units tablet, Take 1 tablet by mouth Daily., Disp: , Rfl:     hydrALAZINE (APRESOLINE) 25 MG tablet, TAKE 1 TABLET BY MOUTH TWICE A DAY: CALL OFFICE NEED CHECK UP BY 10/2/19, Disp: , Rfl: 0    hydroCHLOROthiazide 12.5 MG tablet, Take 1 tablet by mouth Daily.,  Disp: , Rfl:     hydroxychloroquine (PLAQUENIL) 200 MG tablet, Every 12 (Twelve) Hours., Disp: , Rfl:     ibuprofen (ADVIL,MOTRIN) 200 MG tablet, Take 1 tablet by mouth Every 6 (Six) Hours As Needed for Mild Pain., Disp: , Rfl:     levothyroxine (SYNTHROID, LEVOTHROID) 100 MCG tablet, Take 1 tablet by mouth Every Morning Before Breakfast., Disp: , Rfl: 1    lisinopril (PRINIVIL,ZESTRIL) 10 MG tablet, Take 1 tablet by mouth Daily., Disp: , Rfl:     meloxicam (MOBIC) 15 MG tablet, TAKE ONE (1) TABLET BY MOUTH ONCE A DAY., Disp: , Rfl: 1    metFORMIN (GLUCOPHAGE) 500 MG tablet, , Disp: , Rfl:     omeprazole (priLOSEC) 20 MG capsule, Take 1 capsule by mouth Daily., Disp: , Rfl:     pravastatin (PRAVACHOL) 40 MG tablet, Take 1 tablet by mouth every night at bedtime., Disp: , Rfl:     sertraline (ZOLOFT) 50 MG tablet, Take 1 tablet by mouth Daily., Disp: , Rfl: 1    No Known Allergies    Family History   Problem Relation Age of Onset    Heart disease Father     Pancreatic cancer Sister 59    Heart disease Sister     Stroke Sister     Pneumonia Brother      Cancer-related family history includes Pancreatic cancer (age of onset: 59) in her sister.    Social History     Tobacco Use    Smoking status: Former     Current packs/day: 0.00     Average packs/day: 1 pack/day for 21.0 years (21.0 ttl pk-yrs)     Types: Cigarettes     Start date:      Quit date:      Years since quittin.2    Smokeless tobacco: Never   Substance Use Topics    Alcohol use: Yes    Drug use: No     Social History     Social History Narrative    Not on file     ROS:   Review of Systems   Constitutional:  Positive for fatigue. Negative for activity change, appetite change, chills, diaphoresis, fever and unexpected weight change.   HENT:  Negative for congestion, dental problem, drooling, ear discharge, ear pain, facial swelling, hearing loss, mouth sores, nosebleeds, postnasal drip, rhinorrhea, sinus pressure, sinus pain, sneezing, sore  "throat, tinnitus, trouble swallowing and voice change.    Eyes:  Negative for photophobia, pain, discharge, redness, itching and visual disturbance.   Respiratory:  Positive for shortness of breath. Negative for apnea, cough, choking, chest tightness, wheezing and stridor.    Cardiovascular:  Negative for chest pain, palpitations and leg swelling.   Gastrointestinal:  Negative for abdominal distention, abdominal pain, anal bleeding, blood in stool, constipation, diarrhea, nausea, rectal pain and vomiting.   Endocrine: Negative for cold intolerance, heat intolerance, polydipsia and polyuria.   Genitourinary:  Negative for decreased urine volume, difficulty urinating, dysuria, flank pain, frequency, genital sores, hematuria and urgency.   Musculoskeletal:  Negative for arthralgias, back pain, gait problem, joint swelling, myalgias, neck pain and neck stiffness.   Skin:  Negative for color change, pallor and rash.   Neurological:  Negative for dizziness, tremors, seizures, syncope, facial asymmetry, speech difficulty, weakness, light-headedness, numbness and headaches.   Hematological:  Negative for adenopathy. Does not bruise/bleed easily.   Psychiatric/Behavioral:  Negative for agitation, behavioral problems, confusion, decreased concentration, hallucinations, self-injury, sleep disturbance and suicidal ideas. The patient is not nervous/anxious.      Objective:    Vital Signs:  Vitals:    04/04/25 0845   BP: 136/80   Pulse: 85   Resp: 17   Temp: 98.7 °F (37.1 °C)   SpO2: 93%   Weight: 91.5 kg (201 lb 12.8 oz)   Height: 154.9 cm (61\")   PainSc: 0-No pain     Body mass index is 38.13 kg/m².    ECOG  (0) Fully active, able to carry on all predisease performance without restriction    Physical Exam:   Physical Exam  Constitutional:       General: She is not in acute distress.     Appearance: She is ill-appearing. She is not toxic-appearing or diaphoretic.   HENT:      Head: Normocephalic and atraumatic.      Right Ear: " External ear normal.      Left Ear: External ear normal.      Nose: Nose normal.      Mouth/Throat:      Mouth: Mucous membranes are moist.      Pharynx: Oropharynx is clear. No oropharyngeal exudate or posterior oropharyngeal erythema.   Eyes:      General: No scleral icterus.        Right eye: No discharge.         Left eye: No discharge.      Conjunctiva/sclera: Conjunctivae normal.      Pupils: Pupils are equal, round, and reactive to light.   Cardiovascular:      Rate and Rhythm: Normal rate and regular rhythm.      Pulses: Normal pulses.      Heart sounds: No murmur heard.     No friction rub. No gallop.   Pulmonary:      Effort: No respiratory distress.      Breath sounds: No stridor. No wheezing, rhonchi or rales.   Abdominal:      General: Bowel sounds are normal. There is no distension.      Palpations: Abdomen is soft. There is no mass.      Tenderness: There is no abdominal tenderness. There is no right CVA tenderness, left CVA tenderness, guarding or rebound.      Hernia: No hernia is present.      Comments: Protuberant, soft and not tender. No hepatomegaly or splenomegaly.    Musculoskeletal:         General: No tenderness, deformity or signs of injury.      Cervical back: No rigidity.      Right lower leg: No edema.      Left lower leg: No edema.   Lymphadenopathy:      Cervical: No cervical adenopathy.   Skin:     Coloration: Skin is not jaundiced or pale.      Findings: No bruising, lesion or rash.   Neurological:      General: No focal deficit present.      Mental Status: She is alert and oriented to person, place, and time.      Cranial Nerves: No cranial nerve deficit.   Psychiatric:         Mood and Affect: Mood normal.         Behavior: Behavior normal.         Thought Content: Thought content normal.         Judgment: Judgment normal.     I Neftali Barrett MD performed the physical exam on 4/4/2025 as documented above.    Lab Results - Last 18 Months   Lab Units 04/04/25  0834 03/20/25  1221    WBC 10*3/mm3 5.04 5.79   HEMOGLOBIN g/dL 11.3* 11.5*   HEMATOCRIT % 36.4 35.7   PLATELETS 10*3/mm3 238 219   MCV fL 93.1 91.8     Lab Results - Last 18 Months   Lab Units 03/20/25  1329   SODIUM mmol/L 141   POTASSIUM mmol/L 3.9   CHLORIDE mmol/L 101   CO2 mmol/L 30.2*   BUN mg/dL 17   CREATININE mg/dL 0.58   CALCIUM mg/dL 9.6   BILIRUBIN mg/dL 0.3   ALK PHOS U/L 73   ALT (SGPT) U/L 17   AST (SGOT) U/L 17   GLUCOSE mg/dL 107*     Lab Results   Component Value Date    GLUCOSE 107 (H) 03/20/2025    BUN 17 03/20/2025    CREATININE 0.58 03/20/2025    EGFRIFNONA 116 05/20/2019    EGFRIFAFRI 100 05/20/2019    BCR 29.3 (H) 03/20/2025    K 3.9 03/20/2025    CO2 30.2 (H) 03/20/2025    CALCIUM 9.6 03/20/2025    ALBUMIN 4.1 03/20/2025    ALBUMIN 3.4 03/20/2025    AST 17 03/20/2025    ALT 17 03/20/2025     Lab Results   Component Value Date    IRON 90 02/06/2018    TIBC 430 (H) 02/06/2018     Lab Results   Component Value Date    RETICCTPCT 0.90 02/06/2018     Lab Results   Component Value Date    ALEE  06/05/2017     NEGATIVE This result is designed to aid in the diagnosis of many of the    ALEE  06/05/2017     systemic autoimmune disorders and is not diagnostic by itself.  Test results    ALEE  06/05/2017     should be interpreted in conjunction with the clinical evaluation of the    ALEE  06/05/2017     patient.  SLE patients undergoing steroid therapy may have negative results.    SEDRATE 19 02/18/2019     Lab Results   Component Value Date    HAPTOGLOBIN 139 02/06/2018     Lab Results   Component Value Date    SEDRATE 19 02/18/2019      Assessment & Plan     Assessment & Plan  1. Smoldering myeloma.  She was previously diagnosed with monoclonal gammopathy of undetermined significance, but current findings suggest smoldering myeloma.  Laboratory exams reviewed and discussed with her.  To have additional testing and see me with results.  2.  I reviewed the blood counts, the chemistry, the protein studies and light chains.  3.   She will see me with additional results.    Neftali Barrett MD on 4/4/2025 at 0909 AM

## 2025-04-04 ENCOUNTER — OFFICE VISIT (OUTPATIENT)
Dept: ONCOLOGY | Facility: CLINIC | Age: 70
End: 2025-04-04
Payer: MEDICARE

## 2025-04-04 ENCOUNTER — LAB (OUTPATIENT)
Dept: LAB | Facility: HOSPITAL | Age: 70
End: 2025-04-04
Payer: MEDICARE

## 2025-04-04 VITALS
OXYGEN SATURATION: 93 % | SYSTOLIC BLOOD PRESSURE: 136 MMHG | RESPIRATION RATE: 17 BRPM | TEMPERATURE: 98.7 F | HEART RATE: 85 BPM | BODY MASS INDEX: 38.1 KG/M2 | DIASTOLIC BLOOD PRESSURE: 80 MMHG | HEIGHT: 61 IN | WEIGHT: 201.8 LBS

## 2025-04-04 DIAGNOSIS — D47.2 MGUS (MONOCLONAL GAMMOPATHY OF UNKNOWN SIGNIFICANCE): Primary | ICD-10-CM

## 2025-04-04 DIAGNOSIS — D47.2 MONOCLONAL GAMMOPATHY: ICD-10-CM

## 2025-04-04 LAB
BASOPHILS # BLD AUTO: 0.03 10*3/MM3 (ref 0–0.2)
BASOPHILS NFR BLD AUTO: 0.6 % (ref 0–1.5)
DEPRECATED RDW RBC AUTO: 43 FL (ref 37–54)
EOSINOPHIL # BLD AUTO: 0.01 10*3/MM3 (ref 0–0.4)
EOSINOPHIL NFR BLD AUTO: 0.2 % (ref 0.3–6.2)
ERYTHROCYTE [DISTWIDTH] IN BLOOD BY AUTOMATED COUNT: 13 % (ref 12.3–15.4)
HCT VFR BLD AUTO: 36.4 % (ref 34–46.6)
HGB BLD-MCNC: 11.3 G/DL (ref 12–15.9)
HOLD SPECIMEN: NORMAL
LYMPHOCYTES # BLD AUTO: 0.93 10*3/MM3 (ref 0.7–3.1)
LYMPHOCYTES NFR BLD AUTO: 18.5 % (ref 19.6–45.3)
MCH RBC QN AUTO: 28.9 PG (ref 26.6–33)
MCHC RBC AUTO-ENTMCNC: 31 G/DL (ref 31.5–35.7)
MCV RBC AUTO: 93.1 FL (ref 79–97)
MONOCYTES # BLD AUTO: 0.41 10*3/MM3 (ref 0.1–0.9)
MONOCYTES NFR BLD AUTO: 8.1 % (ref 5–12)
NEUTROPHILS NFR BLD AUTO: 3.66 10*3/MM3 (ref 1.7–7)
NEUTROPHILS NFR BLD AUTO: 72.6 % (ref 42.7–76)
PLATELET # BLD AUTO: 238 10*3/MM3 (ref 140–450)
PMV BLD AUTO: 9.6 FL (ref 6–12)
RBC # BLD AUTO: 3.91 10*6/MM3 (ref 3.77–5.28)
WBC NRBC COR # BLD AUTO: 5.04 10*3/MM3 (ref 3.4–10.8)

## 2025-04-04 PROCEDURE — 1160F RVW MEDS BY RX/DR IN RCRD: CPT | Performed by: INTERNAL MEDICINE

## 2025-04-04 PROCEDURE — 99214 OFFICE O/P EST MOD 30 MIN: CPT | Performed by: INTERNAL MEDICINE

## 2025-04-04 PROCEDURE — 1159F MED LIST DOCD IN RCRD: CPT | Performed by: INTERNAL MEDICINE

## 2025-04-04 PROCEDURE — 1126F AMNT PAIN NOTED NONE PRSNT: CPT | Performed by: INTERNAL MEDICINE

## 2025-04-04 PROCEDURE — 85025 COMPLETE CBC W/AUTO DIFF WBC: CPT | Performed by: INTERNAL MEDICINE

## 2025-04-04 PROCEDURE — 36415 COLL VENOUS BLD VENIPUNCTURE: CPT | Performed by: INTERNAL MEDICINE

## 2025-04-04 RX ORDER — PRAVASTATIN SODIUM 40 MG
1 TABLET ORAL
COMMUNITY
Start: 2025-02-11

## 2025-04-11 ENCOUNTER — HOSPITAL ENCOUNTER (OUTPATIENT)
Dept: CT IMAGING | Facility: HOSPITAL | Age: 70
Discharge: HOME | End: 2025-04-11
Payer: MEDICARE

## 2025-04-11 VITALS
WEIGHT: 201 LBS | DIASTOLIC BLOOD PRESSURE: 39 MMHG | TEMPERATURE: 98.6 F | HEIGHT: 61 IN | HEART RATE: 75 BPM | OXYGEN SATURATION: 96 % | SYSTOLIC BLOOD PRESSURE: 93 MMHG | RESPIRATION RATE: 16 BRPM | BODY MASS INDEX: 37.95 KG/M2

## 2025-04-11 DIAGNOSIS — D47.2 MGUS (MONOCLONAL GAMMOPATHY OF UNKNOWN SIGNIFICANCE): ICD-10-CM

## 2025-04-11 DIAGNOSIS — D47.2 MONOCLONAL GAMMOPATHY: ICD-10-CM

## 2025-04-11 LAB
APTT PPP: 24.9 SECONDS (ref 22.7–35.4)
BASOPHILS # BLD AUTO: 0.04 10*3/MM3 (ref 0–0.2)
BASOPHILS NFR BLD AUTO: 0.6 % (ref 0–1.5)
DEPRECATED RDW RBC AUTO: 43.4 FL (ref 37–54)
EOSINOPHIL # BLD AUTO: 0.03 10*3/MM3 (ref 0–0.4)
EOSINOPHIL NFR BLD AUTO: 0.4 % (ref 0.3–6.2)
ERYTHROCYTE [DISTWIDTH] IN BLOOD BY AUTOMATED COUNT: 13.2 % (ref 12.3–15.4)
HCT VFR BLD AUTO: 34.7 % (ref 34–46.6)
HGB BLD-MCNC: 11.1 G/DL (ref 12–15.9)
IMM GRANULOCYTES # BLD AUTO: 0.01 10*3/MM3 (ref 0–0.05)
IMM GRANULOCYTES NFR BLD AUTO: 0.1 % (ref 0–0.5)
INR PPP: 1.1 (ref 0.9–1.1)
LYMPHOCYTES # BLD AUTO: 0.8 10*3/MM3 (ref 0.7–3.1)
LYMPHOCYTES NFR BLD AUTO: 11.1 % (ref 19.6–45.3)
MCH RBC QN AUTO: 28.7 PG (ref 26.6–33)
MCHC RBC AUTO-ENTMCNC: 32 G/DL (ref 31.5–35.7)
MCV RBC AUTO: 89.7 FL (ref 79–97)
MONOCYTES # BLD AUTO: 0.64 10*3/MM3 (ref 0.1–0.9)
MONOCYTES NFR BLD AUTO: 8.9 % (ref 5–12)
NEUTROPHILS NFR BLD AUTO: 5.69 10*3/MM3 (ref 1.7–7)
NEUTROPHILS NFR BLD AUTO: 78.9 % (ref 42.7–76)
NRBC BLD AUTO-RTO: 0 /100 WBC (ref 0–0.2)
PLATELET # BLD AUTO: 219 10*3/MM3 (ref 140–450)
PMV BLD AUTO: 10.1 FL (ref 6–12)
PROTHROMBIN TIME: 14.2 SECONDS (ref 11.7–14.2)
RBC # BLD AUTO: 3.87 10*6/MM3 (ref 3.77–5.28)
WBC NRBC COR # BLD AUTO: 7.21 10*3/MM3 (ref 3.4–10.8)

## 2025-04-11 PROCEDURE — C1830 POWER BONE MARROW BX NEEDLE: HCPCS

## 2025-04-11 PROCEDURE — 99152 MOD SED SAME PHYS/QHP 5/>YRS: CPT

## 2025-04-11 PROCEDURE — 85730 THROMBOPLASTIN TIME PARTIAL: CPT | Performed by: RADIOLOGY

## 2025-04-11 PROCEDURE — 77012 CT SCAN FOR NEEDLE BIOPSY: CPT

## 2025-04-11 PROCEDURE — 25810000003 SODIUM CHLORIDE 0.9 % SOLUTION: Performed by: RADIOLOGY

## 2025-04-11 PROCEDURE — 85025 COMPLETE CBC W/AUTO DIFF WBC: CPT | Performed by: RADIOLOGY

## 2025-04-11 PROCEDURE — 25010000002 MIDAZOLAM PER 1 MG

## 2025-04-11 PROCEDURE — 25010000002 FENTANYL CITRATE (PF) 50 MCG/ML SOLUTION

## 2025-04-11 PROCEDURE — 85610 PROTHROMBIN TIME: CPT | Performed by: RADIOLOGY

## 2025-04-11 PROCEDURE — 25010000002 LIDOCAINE 1 % SOLUTION

## 2025-04-11 PROCEDURE — 25010000002 ONDANSETRON PER 1 MG

## 2025-04-11 RX ORDER — SODIUM CHLORIDE 9 MG/ML
75 INJECTION, SOLUTION INTRAVENOUS CONTINUOUS
Status: DISPENSED | OUTPATIENT
Start: 2025-04-11 | End: 2025-04-11

## 2025-04-11 RX ORDER — SODIUM CHLORIDE 0.9 % (FLUSH) 0.9 %
10 SYRINGE (ML) INJECTION AS NEEDED
Status: DISCONTINUED | OUTPATIENT
Start: 2025-04-11 | End: 2025-04-12 | Stop reason: HOSPADM

## 2025-04-11 RX ORDER — LIDOCAINE HYDROCHLORIDE 10 MG/ML
INJECTION, SOLUTION INFILTRATION; PERINEURAL AS NEEDED
Status: COMPLETED | OUTPATIENT
Start: 2025-04-11 | End: 2025-04-11

## 2025-04-11 RX ORDER — SODIUM CHLORIDE 0.9 % (FLUSH) 0.9 %
10 SYRINGE (ML) INJECTION EVERY 12 HOURS SCHEDULED
Status: DISCONTINUED | OUTPATIENT
Start: 2025-04-11 | End: 2025-04-12 | Stop reason: HOSPADM

## 2025-04-11 RX ORDER — ONDANSETRON 2 MG/ML
INJECTION INTRAMUSCULAR; INTRAVENOUS AS NEEDED
Status: COMPLETED | OUTPATIENT
Start: 2025-04-11 | End: 2025-04-11

## 2025-04-11 RX ORDER — FENTANYL CITRATE 50 UG/ML
INJECTION, SOLUTION INTRAMUSCULAR; INTRAVENOUS AS NEEDED
Status: COMPLETED | OUTPATIENT
Start: 2025-04-11 | End: 2025-04-11

## 2025-04-11 RX ORDER — MIDAZOLAM HYDROCHLORIDE 1 MG/ML
INJECTION, SOLUTION INTRAMUSCULAR; INTRAVENOUS AS NEEDED
Status: COMPLETED | OUTPATIENT
Start: 2025-04-11 | End: 2025-04-11

## 2025-04-11 RX ADMIN — SODIUM CHLORIDE 75 ML/HR: 9 INJECTION, SOLUTION INTRAVENOUS at 08:18

## 2025-04-11 RX ADMIN — MIDAZOLAM 1 MG: 1 INJECTION INTRAMUSCULAR; INTRAVENOUS at 09:00

## 2025-04-11 RX ADMIN — FENTANYL CITRATE 100 MCG: 50 INJECTION, SOLUTION INTRAMUSCULAR; INTRAVENOUS at 09:00

## 2025-04-11 RX ADMIN — Medication 10 ML: at 08:18

## 2025-04-11 RX ADMIN — FENTANYL CITRATE 100 MCG: 50 INJECTION, SOLUTION INTRAMUSCULAR; INTRAVENOUS at 08:50

## 2025-04-11 RX ADMIN — ONDANSETRON 4 MG: 2 INJECTION INTRAMUSCULAR; INTRAVENOUS at 08:49

## 2025-04-11 RX ADMIN — LIDOCAINE HYDROCHLORIDE 10 ML: 10 INJECTION, SOLUTION INFILTRATION; PERINEURAL at 09:02

## 2025-04-11 RX ADMIN — MIDAZOLAM 1 MG: 1 INJECTION INTRAMUSCULAR; INTRAVENOUS at 08:50

## 2025-04-11 NOTE — H&P
[facility]   Interventional Radiology H&P    Patient Name: Phuong Altamirano  : 1955  MRN: [id number]  Primary Care Physician:  Chula Sanchez APRN  Referring Physician: Neftali Barrett MD  Date of admission: 2025    Subjective   Subjective     HPI:  Phuong Altamirano is a 70 y.o. female with monoclonal gammopathy.    Review of Systems:   Constitutional no fever,  no weight loss       Otolaryngeal no difficulty swallowing   Cardiovascular no chest pain   Pulmonary no cough, no sputum production   Gastrointestinal no constipation, no diarrhea                         Personal History       Past Medical/Surgical History:   Past Medical History:   Diagnosis Date    Arthritis     Depression     High blood pressure     High cholesterol     Hypothyroidism     Lupus      Past Surgical History:   Procedure Laterality Date    BREAST LUMPECTOMY Right     benign    DILATATION AND CURETTAGE      LAPAROSCOPIC TUBAL LIGATION  1979    OVARY SURGERY      remoal of left ovary       Social History:  reports that she quit smoking about 30 years ago. Her smoking use included cigarettes. She started smoking about 51 years ago. She has a 21 pack-year smoking history. She has never used smokeless tobacco. She reports current alcohol use. She reports that she does not use drugs.    Medications:  (Not in a hospital admission)    Current medications:  sodium chloride, 10 mL, Intravenous, Q12H      Current IV drips:  sodium chloride, 75 mL/hr, Last Rate: 75 mL/hr (2518)        Allergies:  No Known Allergies    Objective    Objective     Vitals:   Temp:  [98.6 °F (37 °C)] 98.6 °F (37 °C)  Heart Rate:  [77-79] 79  Resp:  [13-18] 13  BP: (111-122)/(63) 111/63      Physical Exam:   Constitutional: Awake, alert, No acute distress    Respiratory: Clear to auscultation bilaterally, nonlabored respirations    Cardiovascular: RRR, no murmurs, rubs, or gallops, palpable pedal pulses  "bilaterally   Gastrointestinal: Positive bowel sounds, soft, nontender, nondistended        ASA SCALE ASSESSMENT:  2-Mild to moderate systemic disease, medically well controlled, with no functional limitation    MALLAMPATI CLASSIFICATION:  2-Able to visualize the soft palate, fauces, uvula. The anterior & posterior tonsilar pillars are hidden by the tongue.       Result Review        Result Review:     No results found for: \"NA\"    No results found for: \"K\"    No results found for: \"CL\"    No results found for: \"PLASMABICARB\"    No results found for: \"BUN\"    No results found for: \"CREATININE\"    No results found for: \"CALCIUM\"        No components found for: \"GLUCOSE.*\"  Results from last 7 days   Lab Units 04/11/25  0759   WBC 10*3/mm3 7.21   HEMOGLOBIN g/dL 11.1*   HEMATOCRIT % 34.7   PLATELETS 10*3/mm3 219      Results from last 7 days   Lab Units 04/11/25  0759   INR  1.10           Assessment / Plan     Assesment:   Monoclonal gammopathy.      Plan:   CT guided bone marrow aspiration and biopsy.     The risks and benefits of the procedure were discussed with the patient.    Electronically signed by STANLEY Clayton, 04/11/25, 8:54 AM EDT.  "

## 2025-04-12 LAB — Lab: NORMAL

## 2025-04-18 LAB
CYTOGENETICS RESULT: NORMAL
PLASMA CELL MYELOMA TARGETGENE PANEL RESULT: NORMAL

## 2025-04-21 LAB
CYTO UR: NORMAL
LAB AP CASE REPORT: NORMAL
LAB AP FLOW CYTOMETRY SUMMARY: NORMAL
LAB AP SPECIAL STAINS: NORMAL
PATH REPORT.FINAL DX SPEC: NORMAL
PATH REPORT.GROSS SPEC: NORMAL

## 2025-04-23 LAB — CCV RESULT: NORMAL

## 2025-04-23 NOTE — PROGRESS NOTES
HEMATOLOGY ONCOLOGY OUTPATIENT FOLLOW UP       Patient name: Phuong Altamirano  : 1955  MRN: [id number]  Primary Care Physician: Chula Sanchez APRN  Referring Physician: Chula Sanchez APRN  Reason For Consult: Monoclonal gammopathy.     History of Present Illness:    History of Present Illness  3/20/2025: Ms. 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 In early , she had a bone marrow biopsy and aspiration that revealed equivocal results, leading to a repeat bone marrow biopsy in . This disclosed a normocellular bone marrow with trilineage hematopoiesis and 20% plasma cells. On flow cytometry, only 1% of the plasma cells revealed monoclonal restriction. Continued observation at [facility] showed no evidence of progression. She was last seen sometime in  without new problems.Today, she reports being largely asymptomatic. She remains active and energetic, has a good appetite, and her weight has been stable. She is not experiencing fevers or diaphoresis. She does not report any chest pain. She does experience dyspnea with exertion, which she attributes to her lack of physical activity. She is not experiencing abdominal pain, diarrhea, or dysuria. On exam she is conversant and oriented. No jaundice and no distress but she appears chronically ill. No oral lesions. Respirations not labored. Lungs clear and heart regular. Abdomen protuberant and soft; the liver and spleen don't seem enlarged. No edema. Reviewed all the records and all laboratory exams. Discussed with her. 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 She might need a bone marrow  aspiration and biopsy. She will have additional studies today and will see me with the results.     4/4/2025: In the office sooner than scheduled.  Her protein electrophoresis revealed a small increase in the M spike from 1.2 g/dL at the previous measurement available to 1.4 g/dL.  In addition the free lambda light chains increased from 11.1 mg/L to 309 mg/L, changing the ratio significantly.  She feels as well as she did at the time of the last visit.  On exam she is well-oriented and conversant.  She does not seem in any distress and she is not jaundiced.  The lungs are clear bilaterally and the heart regular.  The abdomen is protuberant and soft.  There is no edema.  Reviewed and discussed the laboratory exams with her.  I have asked her to allow me to obtain a bone marrow biopsy and aspiration as well as long bone survey and a 24-hour urine protein quantification and electrophoresis.  She will see me with results.    4/28/2025: She underwent a bone marrow biopsy and aspiration without any complications. The results indicate that her bone marrow is hypercellular, with 65% of nucleated precursors being plasma cells exhibiting an abnormal immunophenotype and lambda light chain restriction. The karyotype reveals a normal female complement; however, the FISH panel indicates a complex picture with gain of chromosome 1q21, deletion of 13q, loss of MAF/16q, and aneuploidy with gain of chromosomes 7, 9, and 15.On the basis of the above, Ms. Altamirano can be considered to have multiple myeloma, despite the relatively lower monoclonal protein in the blood. In addition the gain of chromosome 1q and the loss of MAF/16q ae considered poor prognostic markers. Under the new guidelines her condition can no longer be considered only smoldering myeloma and treatment is well justified. She's to have a PET scan for staging and new immunofixation and electrophoresis as well as light chain quantification. She's to see me with results.      Past Medical History:   Diagnosis Date    Arthritis 2010    Depression 2010    High blood pressure     High cholesterol 2005    Hypothyroidism     Lupus 2010     Past Surgical History:   Procedure Laterality Date    BREAST LUMPECTOMY Right 1983    benign    DILATATION AND CURETTAGE  1995    LAPAROSCOPIC TUBAL LIGATION  1979    OVARY SURGERY  1985    remoal of left ovary       Current Outpatient Medications:     acetaminophen (TYLENOL) 325 MG tablet, Take 2 tablets by mouth Every 6 (Six) Hours As Needed for Mild Pain., Disp: , Rfl:     amLODIPine (NORVASC) 10 MG tablet, Take 1 tablet by mouth Daily., Disp: , Rfl: 1    atenolol (TENORMIN) 50 MG tablet, Take 1 tablet by mouth 2 (Two) Times a Day., Disp: , Rfl: 1    cholecalciferol (VITAMIN D3) 400 units tablet, Take 1 tablet by mouth Daily., Disp: , Rfl:     hydrALAZINE (APRESOLINE) 25 MG tablet, TAKE 1 TABLET BY MOUTH TWICE A DAY: CALL OFFICE NEED CHECK UP BY 10/2/19, Disp: , Rfl: 0    hydroCHLOROthiazide 12.5 MG tablet, Take 1 tablet by mouth Daily., Disp: , Rfl:     hydroxychloroquine (PLAQUENIL) 200 MG tablet, Every 12 (Twelve) Hours., Disp: , Rfl:     ibuprofen (ADVIL,MOTRIN) 200 MG tablet, Take 1 tablet by mouth Every 6 (Six) Hours As Needed for Mild Pain., Disp: , Rfl:     levothyroxine (SYNTHROID, LEVOTHROID) 100 MCG tablet, Take 1 tablet by mouth Every Morning Before Breakfast., Disp: , Rfl: 1    lisinopril (PRINIVIL,ZESTRIL) 10 MG tablet, Take 1 tablet by mouth Daily., Disp: , Rfl:     meloxicam (MOBIC) 15 MG tablet, TAKE ONE (1) TABLET BY MOUTH ONCE A DAY., Disp: , Rfl: 1    metFORMIN (GLUCOPHAGE) 500 MG tablet, , Disp: , Rfl:     nitrofurantoin, macrocrystal-monohydrate, (MACROBID) 100 MG capsule, , Disp: , Rfl:     omeprazole (priLOSEC) 20 MG capsule, Take 1 capsule by mouth Daily., Disp: , Rfl:     pravastatin (PRAVACHOL) 40 MG tablet, Take 1 tablet by mouth every night at bedtime., Disp: , Rfl:     sertraline (ZOLOFT) 50 MG tablet, Take 1 tablet by  mouth Daily., Disp: , Rfl: 1    No Known Allergies    Family History   Problem Relation Age of Onset    Heart disease Father     Pancreatic cancer Sister 59    Heart disease Sister     Stroke Sister     Pneumonia Brother      Cancer-related family history includes Pancreatic cancer (age of onset: 59) in her sister.    Social History     Tobacco Use    Smoking status: Former     Current packs/day: 0.00     Average packs/day: 1 pack/day for 21.0 years (21.0 ttl pk-yrs)     Types: Cigarettes     Start date:      Quit date:      Years since quittin.3    Smokeless tobacco: Never   Substance Use Topics    Alcohol use: Yes    Drug use: No     Social History     Social History Narrative    Not on file     ROS:   Review of Systems   Constitutional:  Positive for fatigue. Negative for activity change, appetite change, chills, diaphoresis, fever and unexpected weight change.   HENT:  Negative for congestion, dental problem, drooling, ear discharge, ear pain, facial swelling, hearing loss, mouth sores, nosebleeds, postnasal drip, rhinorrhea, sinus pressure, sinus pain, sneezing, sore throat, tinnitus, trouble swallowing and voice change.    Eyes:  Negative for photophobia, pain, discharge, redness, itching and visual disturbance.   Respiratory:  Positive for shortness of breath. Negative for apnea, cough, choking, chest tightness, wheezing and stridor.    Cardiovascular:  Negative for chest pain, palpitations and leg swelling.   Gastrointestinal:  Negative for abdominal distention, abdominal pain, anal bleeding, blood in stool, constipation, diarrhea, nausea, rectal pain and vomiting.   Endocrine: Negative for cold intolerance, heat intolerance, polydipsia and polyuria.   Genitourinary:  Negative for decreased urine volume, difficulty urinating, dysuria, flank pain, frequency, genital sores, hematuria and urgency.   Musculoskeletal:  Negative for arthralgias, back pain, gait problem, joint swelling, myalgias, neck  "pain and neck stiffness.   Skin:  Negative for color change, pallor and rash.   Neurological:  Negative for dizziness, tremors, seizures, syncope, facial asymmetry, speech difficulty, weakness, light-headedness, numbness and headaches.   Hematological:  Negative for adenopathy. Does not bruise/bleed easily.   Psychiatric/Behavioral:  Negative for agitation, behavioral problems, confusion, decreased concentration, hallucinations, self-injury, sleep disturbance and suicidal ideas. The patient is not nervous/anxious.      Objective:    Vital Signs:  Vitals:    04/28/25 1341   BP: 120/71   Pulse: 85   Resp: 19   Temp: 98.2 °F (36.8 °C)   SpO2: 94%   Weight: 88 kg (194 lb)   Height: 154.9 cm (61\")   PainSc: 0-No pain     Body mass index is 36.66 kg/m².    ECOG  (0) Fully active, able to carry on all predisease performance without restriction    Physical Exam:   Physical Exam  Constitutional:       General: She is not in acute distress.     Appearance: She is ill-appearing. She is not toxic-appearing or diaphoretic.   HENT:      Head: Normocephalic and atraumatic.      Right Ear: External ear normal.      Left Ear: External ear normal.      Nose: Nose normal.      Mouth/Throat:      Mouth: Mucous membranes are moist.      Pharynx: Oropharynx is clear. No oropharyngeal exudate or posterior oropharyngeal erythema.   Eyes:      General: No scleral icterus.        Right eye: No discharge.         Left eye: No discharge.      Conjunctiva/sclera: Conjunctivae normal.      Pupils: Pupils are equal, round, and reactive to light.   Cardiovascular:      Rate and Rhythm: Normal rate and regular rhythm.      Pulses: Normal pulses.      Heart sounds: No murmur heard.     No friction rub. No gallop.   Pulmonary:      Effort: No respiratory distress.      Breath sounds: No stridor. No wheezing, rhonchi or rales.   Abdominal:      General: Bowel sounds are normal. There is no distension.      Palpations: Abdomen is soft. There is no " mass.      Tenderness: There is no abdominal tenderness. There is no right CVA tenderness, left CVA tenderness, guarding or rebound.      Hernia: No hernia is present.      Comments: Protuberant, soft and not tender. No hepatomegaly or splenomegaly.    Musculoskeletal:         General: No tenderness, deformity or signs of injury.      Cervical back: No rigidity.      Right lower leg: No edema.      Left lower leg: No edema.   Lymphadenopathy:      Cervical: No cervical adenopathy.   Skin:     Coloration: Skin is not jaundiced or pale.      Findings: No bruising, lesion or rash.   Neurological:      General: No focal deficit present.      Mental Status: She is alert and oriented to person, place, and time.      Cranial Nerves: No cranial nerve deficit.   Psychiatric:         Mood and Affect: Mood normal.         Behavior: Behavior normal.         Thought Content: Thought content normal.         Judgment: Judgment normal.     BRADLEY Barrett MD performed the physical exam on 4/28/2025 as documented above.     Lab Results - Last 18 Months   Lab Units 04/28/25  1317 04/11/25  0759 04/04/25  0834   WBC 10*3/mm3 5.93 7.21 5.04   HEMOGLOBIN g/dL 11.2* 11.1* 11.3*   HEMATOCRIT % 35.9 34.7 36.4   PLATELETS 10*3/mm3 233 219 238   MCV fL 91.8 89.7 93.1     Lab Results - Last 18 Months   Lab Units 04/28/25  1439 03/20/25  1329   SODIUM mmol/L 140 141   POTASSIUM mmol/L 3.9 3.9   CHLORIDE mmol/L 101 101   CO2 mmol/L 30.2* 30.2*   BUN mg/dL 18 17   CREATININE mg/dL 0.53* 0.58   CALCIUM mg/dL 9.8 9.6   BILIRUBIN mg/dL 0.2 0.3   ALK PHOS U/L 75 73   ALT (SGPT) U/L 19 17   AST (SGOT) U/L 18 17   GLUCOSE mg/dL 99 107*     Lab Results   Component Value Date    GLUCOSE 99 04/28/2025    BUN 18 04/28/2025    CREATININE 0.53 (L) 04/28/2025    EGFRIFNONA 116 05/20/2019    EGFRIFAFRI 100 05/20/2019    BCR 34.0 (H) 04/28/2025    K 3.9 04/28/2025    CO2 30.2 (H) 04/28/2025    CALCIUM 9.8 04/28/2025    ALBUMIN 4.2 04/28/2025    AST 18  04/28/2025    ALT 19 04/28/2025     Lab Results   Component Value Date    IRON 90 02/06/2018    TIBC 430 (H) 02/06/2018     Lab Results   Component Value Date    RETICCTPCT 0.90 02/06/2018     Lab Results   Component Value Date    ALEE  06/05/2017     NEGATIVE This result is designed to aid in the diagnosis of many of the    ALEE  06/05/2017     systemic autoimmune disorders and is not diagnostic by itself.  Test results    ALEE  06/05/2017     should be interpreted in conjunction with the clinical evaluation of the    ALEE  06/05/2017     patient.  SLE patients undergoing steroid therapy may have negative results.    SEDRATE 19 02/18/2019     Lab Results   Component Value Date    HAPTOGLOBIN 139 02/06/2018     Lab Results   Component Value Date    SEDRATE 19 02/18/2019      Assessment & Plan     Assessment & Plan  1. Multiple myeloma. Greater than 60% plasma cells is enough to change the diagnosis to multiple myeloma, despite the relatively low levels of monoclonal protein. As well, given the high risk factors in her cytogenetics I believe that treatment is well justified.   2.  I have reviewed and discussed with her the results of the bone marrow biopsy and aspiration as well as the cytogenetics and karyotype.  Discussed laboratory exams.  3.  PET scan and new protein electrophoresis and immunofixation.  See me with results.    Neftali Barrett MD on 4/28/2025 at 1646.

## 2025-04-28 ENCOUNTER — OFFICE VISIT (OUTPATIENT)
Dept: ONCOLOGY | Facility: CLINIC | Age: 70
End: 2025-04-28
Payer: MEDICARE

## 2025-04-28 ENCOUNTER — APPOINTMENT (OUTPATIENT)
Dept: LAB | Facility: HOSPITAL | Age: 70
End: 2025-04-28
Payer: MEDICARE

## 2025-04-28 VITALS
SYSTOLIC BLOOD PRESSURE: 120 MMHG | HEIGHT: 61 IN | HEART RATE: 85 BPM | OXYGEN SATURATION: 94 % | BODY MASS INDEX: 36.63 KG/M2 | DIASTOLIC BLOOD PRESSURE: 71 MMHG | WEIGHT: 194 LBS | RESPIRATION RATE: 19 BRPM | TEMPERATURE: 98.2 F

## 2025-04-28 DIAGNOSIS — D47.2 MGUS (MONOCLONAL GAMMOPATHY OF UNKNOWN SIGNIFICANCE): ICD-10-CM

## 2025-04-28 DIAGNOSIS — C90.00 MULTIPLE MYELOMA, REMISSION STATUS UNSPECIFIED: Primary | ICD-10-CM

## 2025-04-28 DIAGNOSIS — D47.2 MONOCLONAL GAMMOPATHY: ICD-10-CM

## 2025-04-28 LAB
ALBUMIN SERPL-MCNC: 4.2 G/DL (ref 3.5–5.2)
ALBUMIN/GLOB SERPL: 1.1 G/DL
ALP SERPL-CCNC: 75 U/L (ref 39–117)
ALT SERPL W P-5'-P-CCNC: 19 U/L (ref 1–33)
ANION GAP SERPL CALCULATED.3IONS-SCNC: 8.8 MMOL/L (ref 5–15)
AST SERPL-CCNC: 18 U/L (ref 1–32)
BASOPHILS # BLD AUTO: 0.02 10*3/MM3 (ref 0–0.2)
BASOPHILS NFR BLD AUTO: 0.3 % (ref 0–1.5)
BILIRUB SERPL-MCNC: 0.2 MG/DL (ref 0–1.2)
BUN SERPL-MCNC: 18 MG/DL (ref 8–23)
BUN/CREAT SERPL: 34 (ref 7–25)
CALCIUM SPEC-SCNC: 9.8 MG/DL (ref 8.6–10.5)
CHLORIDE SERPL-SCNC: 101 MMOL/L (ref 98–107)
CO2 SERPL-SCNC: 30.2 MMOL/L (ref 22–29)
CREAT SERPL-MCNC: 0.53 MG/DL (ref 0.57–1)
DEPRECATED RDW RBC AUTO: 42.2 FL (ref 37–54)
EGFRCR SERPLBLD CKD-EPI 2021: 99.6 ML/MIN/1.73
EOSINOPHIL # BLD AUTO: 0.03 10*3/MM3 (ref 0–0.4)
EOSINOPHIL NFR BLD AUTO: 0.5 % (ref 0.3–6.2)
ERYTHROCYTE [DISTWIDTH] IN BLOOD BY AUTOMATED COUNT: 12.9 % (ref 12.3–15.4)
GLOBULIN UR ELPH-MCNC: 3.9 GM/DL
GLUCOSE SERPL-MCNC: 99 MG/DL (ref 65–99)
HCT VFR BLD AUTO: 35.9 % (ref 34–46.6)
HGB BLD-MCNC: 11.2 G/DL (ref 12–15.9)
LDH SERPL-CCNC: 200 U/L (ref 135–214)
LYMPHOCYTES # BLD AUTO: 1.11 10*3/MM3 (ref 0.7–3.1)
LYMPHOCYTES NFR BLD AUTO: 18.7 % (ref 19.6–45.3)
MCH RBC QN AUTO: 28.6 PG (ref 26.6–33)
MCHC RBC AUTO-ENTMCNC: 31.2 G/DL (ref 31.5–35.7)
MCV RBC AUTO: 91.8 FL (ref 79–97)
MONOCYTES # BLD AUTO: 0.45 10*3/MM3 (ref 0.1–0.9)
MONOCYTES NFR BLD AUTO: 7.6 % (ref 5–12)
NEUTROPHILS NFR BLD AUTO: 4.32 10*3/MM3 (ref 1.7–7)
NEUTROPHILS NFR BLD AUTO: 72.9 % (ref 42.7–76)
PLATELET # BLD AUTO: 233 10*3/MM3 (ref 140–450)
PMV BLD AUTO: 10.1 FL (ref 6–12)
POTASSIUM SERPL-SCNC: 3.9 MMOL/L (ref 3.5–5.2)
PROT SERPL-MCNC: 8.1 G/DL (ref 6–8.5)
RBC # BLD AUTO: 3.91 10*6/MM3 (ref 3.77–5.28)
SODIUM SERPL-SCNC: 140 MMOL/L (ref 136–145)
WBC NRBC COR # BLD AUTO: 5.93 10*3/MM3 (ref 3.4–10.8)

## 2025-04-28 PROCEDURE — 85025 COMPLETE CBC W/AUTO DIFF WBC: CPT | Performed by: INTERNAL MEDICINE

## 2025-04-28 PROCEDURE — 80053 COMPREHEN METABOLIC PANEL: CPT | Performed by: INTERNAL MEDICINE

## 2025-04-28 PROCEDURE — 1160F RVW MEDS BY RX/DR IN RCRD: CPT | Performed by: INTERNAL MEDICINE

## 2025-04-28 PROCEDURE — 1126F AMNT PAIN NOTED NONE PRSNT: CPT | Performed by: INTERNAL MEDICINE

## 2025-04-28 PROCEDURE — 1159F MED LIST DOCD IN RCRD: CPT | Performed by: INTERNAL MEDICINE

## 2025-04-28 PROCEDURE — 36415 COLL VENOUS BLD VENIPUNCTURE: CPT | Performed by: INTERNAL MEDICINE

## 2025-04-28 PROCEDURE — 83615 LACTATE (LD) (LDH) ENZYME: CPT | Performed by: INTERNAL MEDICINE

## 2025-04-28 PROCEDURE — 99214 OFFICE O/P EST MOD 30 MIN: CPT | Performed by: INTERNAL MEDICINE

## 2025-04-28 RX ORDER — NITROFURANTOIN 25; 75 MG/1; MG/1
CAPSULE ORAL
COMMUNITY
Start: 2025-04-23 | End: 2025-04-30

## 2025-04-29 LAB
KAPPA LC FREE SER-MCNC: 7.7 MG/L (ref 3.3–19.4)
KAPPA LC FREE/LAMBDA FREE SER: 0.02 {RATIO} (ref 0.26–1.65)
LAMBDA LC FREE SERPL-MCNC: 322 MG/L (ref 5.7–26.3)

## 2025-04-30 LAB
ALBUMIN 24H MFR UR ELPH: 58.5 %
ALBUMIN SERPL ELPH-MCNC: 3.4 G/DL (ref 2.9–4.4)
ALBUMIN/GLOB SERPL: 0.9 {RATIO} (ref 0.7–1.7)
ALPHA1 GLOB 24H MFR UR ELPH: 2.8 %
ALPHA1 GLOB SERPL ELPH-MCNC: 0.3 G/DL (ref 0–0.4)
ALPHA2 GLOB 24H MFR UR ELPH: 5.6 %
ALPHA2 GLOB SERPL ELPH-MCNC: 1 G/DL (ref 0.4–1)
B-GLOBULIN MFR UR ELPH: 9.7 %
B-GLOBULIN SERPL ELPH-MCNC: 1.1 G/DL (ref 0.7–1.3)
CREAT 24H UR-MRATE: 1250 MG/24 HR (ref 800–1800)
CREAT UR-MCNC: 113.6 MG/DL
GAMMA GLOB 24H MFR UR ELPH: 23.4 %
GAMMA GLOB SERPL ELPH-MCNC: 1.8 G/DL (ref 0.4–1.8)
GLOBULIN SER-MCNC: 4.2 G/DL (ref 2.2–3.9)
IGA SERPL-MCNC: 53 MG/DL (ref 87–352)
IGG SERPL-MCNC: 1949 MG/DL (ref 586–1602)
IGM SERPL-MCNC: 25 MG/DL (ref 26–217)
INTERPRETATION SERPL IEP-IMP: ABNORMAL
INTERPRETATION UR IFE-IMP: ABNORMAL
LABORATORY COMMENT REPORT: ABNORMAL
Lab: ABNORMAL
M PROTEIN 24H MFR UR ELPH: 19.9 %
M PROTEIN SERPL ELPH-MCNC: 1.5 G/DL
PROT SERPL-MCNC: 7.6 G/DL (ref 6–8.5)
PROT UR-MCNC: 136.5 MG/DL

## 2025-05-06 ENCOUNTER — HOSPITAL ENCOUNTER (OUTPATIENT)
Dept: PET IMAGING | Facility: HOSPITAL | Age: 70
Discharge: HOME | End: 2025-05-06
Admitting: INTERNAL MEDICINE
Payer: MEDICARE

## 2025-05-06 DIAGNOSIS — C90.00 MULTIPLE MYELOMA, REMISSION STATUS UNSPECIFIED: ICD-10-CM

## 2025-05-06 DIAGNOSIS — D47.2 MGUS (MONOCLONAL GAMMOPATHY OF UNKNOWN SIGNIFICANCE): ICD-10-CM

## 2025-05-06 LAB — GLUCOSE BLDC GLUCOMTR-MCNC: 108 MG/DL (ref 70–105)

## 2025-05-06 PROCEDURE — 78816 PET IMAGE W/CT FULL BODY: CPT

## 2025-05-06 PROCEDURE — A9552 F18 FDG: HCPCS | Performed by: INTERNAL MEDICINE

## 2025-05-06 PROCEDURE — 82948 REAGENT STRIP/BLOOD GLUCOSE: CPT

## 2025-05-06 PROCEDURE — 34310000005 FLUDEOXYGLUCOSE F18 SOLUTION: Performed by: INTERNAL MEDICINE

## 2025-05-06 RX ADMIN — FLUDEOXYGLUCOSE F 18 1 DOSE: 200 INJECTION, SOLUTION INTRAVENOUS at 10:33

## 2025-05-13 ENCOUNTER — TELEPHONE (OUTPATIENT)
Dept: ONCOLOGY | Facility: CLINIC | Age: 70
End: 2025-05-13

## 2025-05-13 ENCOUNTER — TELEPHONE (OUTPATIENT)
Dept: ONCOLOGY | Facility: CLINIC | Age: 70
End: 2025-05-13
Payer: MEDICARE

## 2025-05-13 NOTE — TELEPHONE ENCOUNTER
Called No answer, left v/m. Dr. Barrett would like to bring this patient in earlier than currently scheduled. I believe we have openings on Monday.

## 2025-05-13 NOTE — TELEPHONE ENCOUNTER
Caller: Phuong Altamirano    Relationship: Self    Best call back number: [phone]     What is the best time to reach you: ASAP    Who are you requesting to speak with (clinical staff, provider,  specific staff member): SCHEDULING        What was the call regarding: PATIENT RETURNED CALL TO [facility] TO RESCHEDULE, PER [facility]'S NOTE DR FULLER WANTS TO SEE THE PATIENT NEXT WEEK, HUB UNABLE TO WARM TRANSFER, PLEASE CALL PT BACK WHEN AVAILABLE TO RESCHEDULE AS NEEDED.

## 2025-05-18 ENCOUNTER — APPOINTMENT (OUTPATIENT)
Dept: GENERAL RADIOLOGY | Facility: HOSPITAL | Age: 70
End: 2025-05-18
Payer: MEDICARE

## 2025-05-18 ENCOUNTER — APPOINTMENT (OUTPATIENT)
Dept: OTHER | Facility: HOSPITAL | Age: 70
End: 2025-05-18
Payer: MEDICARE

## 2025-05-18 ENCOUNTER — APPOINTMENT (OUTPATIENT)
Dept: CARDIOLOGY | Facility: HOSPITAL | Age: 70
End: 2025-05-18
Payer: MEDICARE

## 2025-05-18 ENCOUNTER — HOSPITAL ENCOUNTER (INPATIENT)
Facility: HOSPITAL | Age: 70
LOS: 2 days | Discharge: HOME | End: 2025-05-20
Attending: FAMILY MEDICINE | Admitting: FAMILY MEDICINE
Payer: MEDICARE

## 2025-05-18 DIAGNOSIS — J44.1 COPD WITH ACUTE EXACERBATION: Primary | ICD-10-CM

## 2025-05-18 LAB
ALBUMIN SERPL-MCNC: 3.8 G/DL (ref 3.5–5.2)
ALBUMIN/GLOB SERPL: 1 G/DL
ALP SERPL-CCNC: 77 U/L (ref 39–117)
ALT SERPL W P-5'-P-CCNC: 23 U/L (ref 1–33)
ANION GAP SERPL CALCULATED.3IONS-SCNC: 14.3 MMOL/L (ref 5–15)
AORTIC DIMENSIONLESS INDEX: 0.77 (DI)
AST SERPL-CCNC: 19 U/L (ref 1–32)
AV MEAN PRESS GRAD SYS DOP V1V2: 7.1 MMHG
AV VMAX SYS DOP: 179.7 CM/SEC
BH CV ECHO LEFT VENTRICLE GLOBAL LONGITUDINAL STRAIN: -14.3 %
BH CV ECHO MEAS - ACS: 1.61 CM
BH CV ECHO MEAS - AO MAX PG: 12.9 MMHG
BH CV ECHO MEAS - AO ROOT DIAM: 2.8 CM
BH CV ECHO MEAS - AO V2 VTI: 36.7 CM
BH CV ECHO MEAS - AVA(I,D): 2.33 CM2
BH CV ECHO MEAS - EDV(CUBED): 114.8 ML
BH CV ECHO MEAS - EDV(MOD-SP4): 83.6 ML
BH CV ECHO MEAS - EF(MOD-SP4): 56.9 %
BH CV ECHO MEAS - ESV(CUBED): 30.1 ML
BH CV ECHO MEAS - ESV(MOD-SP4): 36 ML
BH CV ECHO MEAS - FS: 36 %
BH CV ECHO MEAS - IVS/LVPW: 1.04 CM
BH CV ECHO MEAS - IVSD: 1 CM
BH CV ECHO MEAS - LA DIMENSION: 4 CM
BH CV ECHO MEAS - LAT PEAK E' VEL: 8.8 CM/SEC
BH CV ECHO MEAS - LV DIASTOLIC VOL/BSA (35-75): 40.7 CM2
BH CV ECHO MEAS - LV MASS(C)D: 169.4 GRAMS
BH CV ECHO MEAS - LV MAX PG: 7.5 MMHG
BH CV ECHO MEAS - LV MEAN PG: 4.3 MMHG
BH CV ECHO MEAS - LV SYSTOLIC VOL/BSA (12-30): 17.5 CM2
BH CV ECHO MEAS - LV V1 MAX: 137.3 CM/SEC
BH CV ECHO MEAS - LV V1 VTI: 28.2 CM
BH CV ECHO MEAS - LVIDD: 4.9 CM
BH CV ECHO MEAS - LVIDS: 3.1 CM
BH CV ECHO MEAS - LVOT AREA: 3 CM2
BH CV ECHO MEAS - LVOT DIAM: 1.97 CM
BH CV ECHO MEAS - LVPWD: 0.96 CM
BH CV ECHO MEAS - MED PEAK E' VEL: 8.4 CM/SEC
BH CV ECHO MEAS - MR MAX PG: 112.1 MMHG
BH CV ECHO MEAS - MR MAX VEL: 529.4 CM/SEC
BH CV ECHO MEAS - MR MEAN PG: 71.5 MMHG
BH CV ECHO MEAS - MR MEAN VEL: 410.5 CM/SEC
BH CV ECHO MEAS - MR VTI: 134.4 CM
BH CV ECHO MEAS - MV A MAX VEL: 61.3 CM/SEC
BH CV ECHO MEAS - MV DEC SLOPE: 583.8 CM/SEC2
BH CV ECHO MEAS - MV DEC TIME: 0.15 SEC
BH CV ECHO MEAS - MV E MAX VEL: 109.5 CM/SEC
BH CV ECHO MEAS - MV E/A: 1.79
BH CV ECHO MEAS - MV MAX PG: 8.6 MMHG
BH CV ECHO MEAS - MV MEAN PG: 3 MMHG
BH CV ECHO MEAS - MV P1/2T: 75.4 MSEC
BH CV ECHO MEAS - MV V2 VTI: 31.2 CM
BH CV ECHO MEAS - MVA(P1/2T): 2.9 CM2
BH CV ECHO MEAS - MVA(VTI): 2.7 CM2
BH CV ECHO MEAS - PA ACC TIME: 0.03 SEC
BH CV ECHO MEAS - PA V2 MAX: 125.5 CM/SEC
BH CV ECHO MEAS - PI END-D VEL: 139.7 CM/SEC
BH CV ECHO MEAS - PULM A REVS DUR: 0.08 SEC
BH CV ECHO MEAS - PULM A REVS VEL: 32.8 CM/SEC
BH CV ECHO MEAS - PULM DIAS VEL: 70.5 CM/SEC
BH CV ECHO MEAS - PULM S/D: 0.73
BH CV ECHO MEAS - PULM SYS VEL: 51.4 CM/SEC
BH CV ECHO MEAS - QP/QS: 0.72
BH CV ECHO MEAS - RAP SYSTOLE: 3 MMHG
BH CV ECHO MEAS - RV MAX PG: 3.6 MMHG
BH CV ECHO MEAS - RV V1 MAX: 94.9 CM/SEC
BH CV ECHO MEAS - RV V1 VTI: 20.9 CM
BH CV ECHO MEAS - RVDD: 2.6 CM
BH CV ECHO MEAS - RVOT DIAM: 1.93 CM
BH CV ECHO MEAS - RVSP: 39 MMHG
BH CV ECHO MEAS - SV(LVOT): 85.6 ML
BH CV ECHO MEAS - SV(MOD-SP4): 47.6 ML
BH CV ECHO MEAS - SV(RVOT): 61.3 ML
BH CV ECHO MEAS - SVI(LVOT): 41.7 ML/M2
BH CV ECHO MEAS - SVI(MOD-SP4): 23.2 ML/M2
BH CV ECHO MEAS - TAPSE (>1.6): 2.24 CM
BH CV ECHO MEAS - TR MAX PG: 35.9 MMHG
BH CV ECHO MEAS - TR MAX VEL: 299.4 CM/SEC
BH CV ECHO MEASUREMENTS AVERAGE E/E' RATIO: 12.73
BH CV XLRA - RV BASE: 3.5 CM
BH CV XLRA - RV MID: 2.6 CM
BH CV XLRA - TDI S': 16.5 CM/SEC
BILIRUB SERPL-MCNC: 0.4 MG/DL (ref 0–1.2)
BUN SERPL-MCNC: 28 MG/DL (ref 8–23)
BUN/CREAT SERPL: 32.9 (ref 7–25)
CALCIUM SPEC-SCNC: 9.3 MG/DL (ref 8.6–10.5)
CHLORIDE SERPL-SCNC: 97 MMOL/L (ref 98–107)
CO2 SERPL-SCNC: 27.7 MMOL/L (ref 22–29)
CREAT SERPL-MCNC: 0.85 MG/DL (ref 0.57–1)
EGFRCR SERPLBLD CKD-EPI 2021: 73.8 ML/MIN/1.73
GLOBULIN UR ELPH-MCNC: 3.8 GM/DL
GLUCOSE BLDC GLUCOMTR-MCNC: 171 MG/DL (ref 70–105)
GLUCOSE BLDC GLUCOMTR-MCNC: 172 MG/DL (ref 70–105)
GLUCOSE BLDC GLUCOMTR-MCNC: 204 MG/DL (ref 70–105)
GLUCOSE SERPL-MCNC: 206 MG/DL (ref 65–99)
LEFT ATRIUM VOLUME INDEX: 24.9 ML/M2
LV EF BIPLANE MOD: 56.9 %
NT-PROBNP SERPL-MCNC: 2912 PG/ML (ref 0–900)
POTASSIUM SERPL-SCNC: 4.1 MMOL/L (ref 3.5–5.2)
PROT SERPL-MCNC: 7.6 G/DL (ref 6–8.5)
SINUS: 2.7 CM
SODIUM SERPL-SCNC: 139 MMOL/L (ref 136–145)
STJ: 1.87 CM

## 2025-05-18 PROCEDURE — 93356 MYOCRD STRAIN IMG SPCKL TRCK: CPT | Performed by: INTERNAL MEDICINE

## 2025-05-18 PROCEDURE — 94799 UNLISTED PULMONARY SVC/PX: CPT

## 2025-05-18 PROCEDURE — 71045 X-RAY EXAM CHEST 1 VIEW: CPT

## 2025-05-18 PROCEDURE — 93005 ELECTROCARDIOGRAM TRACING: CPT | Performed by: NURSE PRACTITIONER

## 2025-05-18 PROCEDURE — 25010000002 FUROSEMIDE PER 20 MG: Performed by: INTERNAL MEDICINE

## 2025-05-18 PROCEDURE — 82948 REAGENT STRIP/BLOOD GLUCOSE: CPT

## 2025-05-18 PROCEDURE — 93356 MYOCRD STRAIN IMG SPCKL TRCK: CPT

## 2025-05-18 PROCEDURE — 25010000002 METHYLPREDNISOLONE PER 40 MG

## 2025-05-18 PROCEDURE — 94664 DEMO&/EVAL PT USE INHALER: CPT

## 2025-05-18 PROCEDURE — 94761 N-INVAS EAR/PLS OXIMETRY MLT: CPT

## 2025-05-18 PROCEDURE — 80053 COMPREHEN METABOLIC PANEL: CPT | Performed by: NURSE PRACTITIONER

## 2025-05-18 PROCEDURE — 63710000001 INSULIN LISPRO (HUMAN) PER 5 UNITS

## 2025-05-18 PROCEDURE — 93010 ELECTROCARDIOGRAM REPORT: CPT | Performed by: INTERNAL MEDICINE

## 2025-05-18 PROCEDURE — 94640 AIRWAY INHALATION TREATMENT: CPT

## 2025-05-18 PROCEDURE — 93306 TTE W/DOPPLER COMPLETE: CPT

## 2025-05-18 PROCEDURE — 83880 ASSAY OF NATRIURETIC PEPTIDE: CPT | Performed by: NURSE PRACTITIONER

## 2025-05-18 PROCEDURE — 93306 TTE W/DOPPLER COMPLETE: CPT | Performed by: INTERNAL MEDICINE

## 2025-05-18 RX ORDER — FUROSEMIDE 10 MG/ML
20 INJECTION INTRAMUSCULAR; INTRAVENOUS EVERY 12 HOURS
Status: DISCONTINUED | OUTPATIENT
Start: 2025-05-18 | End: 2025-05-20 | Stop reason: HOSPADM

## 2025-05-18 RX ORDER — AMOXICILLIN 250 MG
2 CAPSULE ORAL 2 TIMES DAILY PRN
Status: DISCONTINUED | OUTPATIENT
Start: 2025-05-18 | End: 2025-05-20 | Stop reason: HOSPADM

## 2025-05-18 RX ORDER — AZITHROMYCIN 250 MG/1
500 TABLET, FILM COATED ORAL EVERY 24 HOURS
Status: DISCONTINUED | OUTPATIENT
Start: 2025-05-18 | End: 2025-05-20 | Stop reason: HOSPADM

## 2025-05-18 RX ORDER — GUAIFENESIN 600 MG/1
1200 TABLET, EXTENDED RELEASE ORAL EVERY 12 HOURS SCHEDULED
Status: DISCONTINUED | OUTPATIENT
Start: 2025-05-18 | End: 2025-05-20 | Stop reason: HOSPADM

## 2025-05-18 RX ORDER — INSULIN LISPRO 100 [IU]/ML
2-7 INJECTION, SOLUTION INTRAVENOUS; SUBCUTANEOUS
Status: DISCONTINUED | OUTPATIENT
Start: 2025-05-18 | End: 2025-05-20 | Stop reason: HOSPADM

## 2025-05-18 RX ORDER — ATENOLOL 50 MG/1
50 TABLET ORAL EVERY 12 HOURS SCHEDULED
Status: DISCONTINUED | OUTPATIENT
Start: 2025-05-18 | End: 2025-05-20 | Stop reason: HOSPADM

## 2025-05-18 RX ORDER — NICOTINE POLACRILEX 4 MG
15 LOZENGE BUCCAL
Status: DISCONTINUED | OUTPATIENT
Start: 2025-05-18 | End: 2025-05-20 | Stop reason: HOSPADM

## 2025-05-18 RX ORDER — FUROSEMIDE 20 MG/1
20 TABLET ORAL DAILY
Status: DISCONTINUED | OUTPATIENT
Start: 2025-05-18 | End: 2025-05-18

## 2025-05-18 RX ORDER — ACETAMINOPHEN 325 MG/1
650 TABLET ORAL EVERY 4 HOURS PRN
Status: DISCONTINUED | OUTPATIENT
Start: 2025-05-18 | End: 2025-05-20 | Stop reason: HOSPADM

## 2025-05-18 RX ORDER — ACETAMINOPHEN 160 MG/5ML
650 SOLUTION ORAL EVERY 4 HOURS PRN
Status: DISCONTINUED | OUTPATIENT
Start: 2025-05-18 | End: 2025-05-20 | Stop reason: HOSPADM

## 2025-05-18 RX ORDER — IPRATROPIUM BROMIDE AND ALBUTEROL SULFATE 2.5; .5 MG/3ML; MG/3ML
3 SOLUTION RESPIRATORY (INHALATION)
Status: DISCONTINUED | OUTPATIENT
Start: 2025-05-18 | End: 2025-05-20 | Stop reason: HOSPADM

## 2025-05-18 RX ORDER — ALBUTEROL SULFATE 0.83 MG/ML
2.5 SOLUTION RESPIRATORY (INHALATION) EVERY 6 HOURS PRN
Status: DISCONTINUED | OUTPATIENT
Start: 2025-05-18 | End: 2025-05-20 | Stop reason: HOSPADM

## 2025-05-18 RX ORDER — BISACODYL 10 MG
10 SUPPOSITORY, RECTAL RECTAL DAILY PRN
Status: DISCONTINUED | OUTPATIENT
Start: 2025-05-18 | End: 2025-05-20 | Stop reason: HOSPADM

## 2025-05-18 RX ORDER — SODIUM CHLORIDE 9 MG/ML
40 INJECTION, SOLUTION INTRAVENOUS AS NEEDED
Status: DISCONTINUED | OUTPATIENT
Start: 2025-05-18 | End: 2025-05-20 | Stop reason: HOSPADM

## 2025-05-18 RX ORDER — IBUPROFEN 600 MG/1
1 TABLET ORAL
Status: DISCONTINUED | OUTPATIENT
Start: 2025-05-18 | End: 2025-05-20 | Stop reason: HOSPADM

## 2025-05-18 RX ORDER — BISACODYL 5 MG/1
5 TABLET, DELAYED RELEASE ORAL DAILY PRN
Status: DISCONTINUED | OUTPATIENT
Start: 2025-05-18 | End: 2025-05-20 | Stop reason: HOSPADM

## 2025-05-18 RX ORDER — METHYLPREDNISOLONE SODIUM SUCCINATE 40 MG/ML
40 INJECTION, POWDER, LYOPHILIZED, FOR SOLUTION INTRAMUSCULAR; INTRAVENOUS EVERY 12 HOURS
Status: DISCONTINUED | OUTPATIENT
Start: 2025-05-19 | End: 2025-05-20 | Stop reason: HOSPADM

## 2025-05-18 RX ORDER — NITROGLYCERIN 0.4 MG/1
0.4 TABLET SUBLINGUAL
Status: DISCONTINUED | OUTPATIENT
Start: 2025-05-18 | End: 2025-05-20 | Stop reason: HOSPADM

## 2025-05-18 RX ORDER — ACETAMINOPHEN 650 MG/1
650 SUPPOSITORY RECTAL EVERY 4 HOURS PRN
Status: DISCONTINUED | OUTPATIENT
Start: 2025-05-18 | End: 2025-05-20 | Stop reason: HOSPADM

## 2025-05-18 RX ORDER — SODIUM CHLORIDE 0.9 % (FLUSH) 0.9 %
10 SYRINGE (ML) INJECTION EVERY 12 HOURS SCHEDULED
Status: DISCONTINUED | OUTPATIENT
Start: 2025-05-18 | End: 2025-05-20 | Stop reason: HOSPADM

## 2025-05-18 RX ORDER — POLYETHYLENE GLYCOL 3350 17 G/17G
17 POWDER, FOR SOLUTION ORAL DAILY PRN
Status: DISCONTINUED | OUTPATIENT
Start: 2025-05-18 | End: 2025-05-20 | Stop reason: HOSPADM

## 2025-05-18 RX ORDER — DEXTROSE MONOHYDRATE 25 G/50ML
25 INJECTION, SOLUTION INTRAVENOUS
Status: DISCONTINUED | OUTPATIENT
Start: 2025-05-18 | End: 2025-05-20 | Stop reason: HOSPADM

## 2025-05-18 RX ADMIN — ATENOLOL 50 MG: 50 TABLET ORAL at 21:38

## 2025-05-18 RX ADMIN — INSULIN LISPRO 3 UNITS: 100 INJECTION, SOLUTION INTRAVENOUS; SUBCUTANEOUS at 18:06

## 2025-05-18 RX ADMIN — Medication 10 ML: at 21:40

## 2025-05-18 RX ADMIN — INSULIN LISPRO 2 UNITS: 100 INJECTION, SOLUTION INTRAVENOUS; SUBCUTANEOUS at 21:52

## 2025-05-18 RX ADMIN — IPRATROPIUM BROMIDE AND ALBUTEROL SULFATE 3 ML: .5; 3 SOLUTION RESPIRATORY (INHALATION) at 18:43

## 2025-05-18 RX ADMIN — Medication 10 ML: at 14:32

## 2025-05-18 RX ADMIN — GUAIFENESIN 1200 MG: 600 TABLET, EXTENDED RELEASE ORAL at 14:32

## 2025-05-18 RX ADMIN — METHYLPREDNISOLONE SODIUM SUCCINATE 40 MG: 40 INJECTION, POWDER, FOR SOLUTION INTRAMUSCULAR; INTRAVENOUS at 23:58

## 2025-05-18 RX ADMIN — FUROSEMIDE 20 MG: 10 INJECTION, SOLUTION INTRAMUSCULAR; INTRAVENOUS at 21:38

## 2025-05-18 RX ADMIN — MUPIROCIN 1 APPLICATION: 20 OINTMENT TOPICAL at 21:38

## 2025-05-18 RX ADMIN — IPRATROPIUM BROMIDE AND ALBUTEROL SULFATE 3 ML: .5; 3 SOLUTION RESPIRATORY (INHALATION) at 15:10

## 2025-05-18 RX ADMIN — AZITHROMYCIN 500 MG: 250 TABLET, FILM COATED ORAL at 21:37

## 2025-05-18 RX ADMIN — GUAIFENESIN 1200 MG: 600 TABLET, EXTENDED RELEASE ORAL at 21:38

## 2025-05-18 RX ADMIN — MUPIROCIN 1 APPLICATION: 20 OINTMENT TOPICAL at 14:32

## 2025-05-18 NOTE — H&P
[facility] Medicine Services  History & Physical    Patient Name: Phuong Altamirano  : 1955  MRN: [id number]  Primary Care Physician:  Chula Sanchez APRN  Date of admission: 2025  Date and Time of Service: 2025 at 1230    Subjective      Chief Complaint: Shortness of breath    History of Present Illness: Phuong Altamirano is a 70 y.o. female with a CMH of hypertension, hyperlipidemia, diabetes type 2, hypothyroidism, MGUS, multiple myeloma, rheumatoid arthritis, lupus, GERD, constipation, anxiety, depression who transferred to [facility] from [place] ED on 2025 with new onset COPD with acute exacerbation and new onset congestive heart failure.     At [place] ED: patient hemodynamically stable, afebrile. VBG showed pO2 venous 44. Initial oxygen saturations were in the 70s on room air.  Patient was placed supplemental oxygen of 4 L via nasal cannula with oxygen saturation improvement to 96%. Pertinent labs include D-dimer 1.16, HS trop 16->26, , , Hb 11.1. EKG showed sinus tachycardia, septal infarct with age undetermined, rate 110, QTC of 433 ms. CXR showed moderate pulmonary edema. CTA chest showed no evidence of pulmonary embolism; pulmonary vascular congestion, mild diffuse patchy groundglass opacities; tiny bilateral pleural effusions, no pneumothorax; 7 mm subpleural left upper lobe nodule. Patient received Solu-Medrol 125 mg IV, Lasix 40 mg IV. Hospitalist service accepted transfer of patient.     Admission orders placed upon patient arrival to [facility] room 3111.  Patient seen and examined at bedside.  Patient reported she has been feeling much better since receiving Solu-Medrol and Lasix at [place].  Oxygen saturation currently 99% on 2 L via nasal cannula.  Patient currently denied shortness of breath, chest pain.  Patient reported she has been experiencing dry heaves all week but has not vomited and has not run a fever.  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  Patient says she has started experiencing shortness of breath this morning at 0430.  Patient reported she does not use home oxygen.  Patient reported she has never been diagnosed with COPD or CHF. Patient reported she has had intermittent swelling in her legs for years.  Patient endorses constipation, which she reported is baseline for her.  Patient reported she does not get any relief with MiraLAX.  Patient reported she was treated last week for a urinary tract infection and has finished a round of macrobid, denied any urinary symptoms.  Patient and family informed of current plan and possibility of adding additional tests, labs, etc as the clinical picture develops, understanding verbalized.  All questions and concerns addressed.    Review of Systems   Constitutional:  Negative for chills and fever.   Respiratory:  Negative for cough and shortness of breath.    Cardiovascular:  Negative for chest pain, palpitations and leg swelling.   Gastrointestinal:  Positive for constipation. Negative for abdominal pain, nausea and vomiting.   Genitourinary:  Negative for dysuria and hematuria.   Neurological:  Negative for headaches.       Personal History     Past Medical History:   Diagnosis Date    Arthritis 2010    Depression 2010    High blood pressure     High cholesterol 2005    Hypothyroidism     Lupus 2010       Past Surgical History:   Procedure Laterality Date    BREAST LUMPECTOMY Right 1983    benign    DILATATION AND CURETTAGE  1995    LAPAROSCOPIC TUBAL LIGATION  1979    OVARY SURGERY  1985    remoal of left ovary       Family History: family history includes Heart disease in her father and sister; Pancreatic cancer (age of onset: 59) in her sister; Pneumonia in her brother; Stroke in her sister. Otherwise pertinent FHx was reviewed and not pertinent to current issue.    Social History:  reports that she quit smoking  about 30 years ago. Her smoking use included cigarettes. She started smoking about 51 years ago. She has a 21 pack-year smoking history. She has never used smokeless tobacco. She reports current alcohol use. She reports that she does not use drugs.    Home Medications:  Prior to Admission Medications       Prescriptions Last Dose Informant Patient Reported? Taking?    acetaminophen (TYLENOL) 325 MG tablet   Yes No    Take 2 tablets by mouth Every 6 (Six) Hours As Needed for Mild Pain.    amLODIPine (NORVASC) 10 MG tablet   Yes No    Take 1 tablet by mouth Daily.    atenolol (TENORMIN) 50 MG tablet   Yes No    Take 1 tablet by mouth 2 (Two) Times a Day.    cholecalciferol (VITAMIN D3) 400 units tablet   Yes No    Take 1 tablet by mouth Daily.    hydrALAZINE (APRESOLINE) 25 MG tablet   Yes No    TAKE 1 TABLET BY MOUTH TWICE A DAY: CALL OFFICE NEED CHECK UP BY 10/2/19    hydroCHLOROthiazide 12.5 MG tablet   Yes No    Take 1 tablet by mouth Daily.    hydroxychloroquine (PLAQUENIL) 200 MG tablet   Yes No    Every 12 (Twelve) Hours.    ibuprofen (ADVIL,MOTRIN) 200 MG tablet   Yes No    Take 1 tablet by mouth Every 6 (Six) Hours As Needed for Mild Pain.    levothyroxine (SYNTHROID, LEVOTHROID) 100 MCG tablet   Yes No    Take 1 tablet by mouth Every Morning Before Breakfast.    lisinopril (PRINIVIL,ZESTRIL) 10 MG tablet   Yes No    Take 1 tablet by mouth Daily.    meloxicam (MOBIC) 15 MG tablet   Yes No    TAKE ONE (1) TABLET BY MOUTH ONCE A DAY.    metFORMIN (GLUCOPHAGE) 500 MG tablet   Yes No    omeprazole (priLOSEC) 20 MG capsule   Yes No    Take 1 capsule by mouth Daily.    pravastatin (PRAVACHOL) 40 MG tablet   Yes No    Take 1 tablet by mouth every night at bedtime.    sertraline (ZOLOFT) 50 MG tablet   Yes No    Take 1 tablet by mouth Daily.              Allergies:  No Known Allergies    Objective      Vitals:   Temp:  [98.1 °F (36.7 °C)] 98.1 °F (36.7 °C)  Heart Rate:  [87] 87  Resp:  [30] 30  BP: (109)/(60)  109/60  Body mass index is 26.32 kg/m².  Physical Exam  Constitutional:       Appearance: She is overweight.   HENT:      Head: Normocephalic and atraumatic.      Nose: Nose normal.      Mouth/Throat:      Mouth: Mucous membranes are moist.      Pharynx: Oropharynx is clear.   Eyes:      Extraocular Movements: Extraocular movements intact.      Conjunctiva/sclera: Conjunctivae normal.      Pupils: Pupils are equal, round, and reactive to light.   Cardiovascular:      Rate and Rhythm: Normal rate and regular rhythm.      Pulses: Normal pulses.      Heart sounds: Murmur heard.   Pulmonary:      Effort: Pulmonary effort is normal.      Breath sounds: Normal breath sounds.   Abdominal:      General: Abdomen is protuberant. Bowel sounds are normal.      Palpations: Abdomen is soft.   Musculoskeletal:         General: Normal range of motion.      Cervical back: Neck supple.      Right lower le+ Pitting Edema present.      Left lower le+ Pitting Edema present.   Skin:     General: Skin is warm and dry.   Neurological:      General: No focal deficit present.      Mental Status: She is alert and oriented to person, place, and time.   Psychiatric:         Mood and Affect: Mood normal.         Behavior: Behavior normal.         Thought Content: Thought content normal.         Judgment: Judgment normal.         Diagnostic Data:  Lab Results (last 24 hours)       Procedure Component Value Units Date/Time    POC Glucose Once [[id number]]  (Abnormal) Collected: 25 1153    Specimen: Blood Updated: 25 1155     Glucose 171 mg/dL      Comment: Serial Number: [id number]:  [id number]                Imaging Results (Last 24 Hours)       ** No results found for the last 24 hours. **              Assessment & Plan        This is a 70 y.o. female with:    Active and Resolved Problems  Active Hospital Problems    Diagnosis  POA    **COPD with acute exacerbation [J44.1]  Yes      Resolved Hospital Problems   No  resolved problems to display.       Acute respiratory distress, likely secondary to new-onset COPD with exacerbation  - CXR: moderate pulmonary edema; D-dimer 1.16; ; HS trop 16->26; no leukocytosis  - CTA chest: no evidence of pulmonary embolism; pulmonary vascular congestion, mild diffuse patchy groundglass opacities; tiny bilateral pleural effusions, no pneumothorax; 7 mm subpleural left upper lobe nodule  - Per chart review, 4 mm left upper lobe pulmonary nodule was noted on 5/6/25 PET scan  - Bronchodilators: duoneb Q6H, albuterol neb Q4H PRN  - Steroid: methylprednisolone 40mg IV Q12H x 5 days  - Azithromycin for COPD exacerbation  - Guaifenesin 1200 mg BID  - Titrate O2 to keep O2 sat 90-95%  - Continuous pulse ox  - Pulmonology consult for new-onset COPD    CHF, new onset  - CTA chest: pulmonary vascular congestion  - TTE: ordered  - Diuretic: lasix 40 mg given at OLF, will continue 20 mg Q12H  - Replace electrolytes per protocol  - Low Na diet, fluid restriction <2L/day  - Continuous cardiac monitor  - Strict I/Os, daily weight  - Heart failure pathway initiated  - Cardiology consult for new-onset CHF    Hypertension  - Continue home medications  - Monitor BP per order    Hyperlipidemia  - Continue statin  - Will check AM lipid panel     Diabetes, Type 2  - Holding oral medications while inpatient  - SSI and accuchecks AC/HS   - Hypoglycemia protocol  - Will check Hgb A1c  - Diabetic/consistent carb/HH diet    MGUS  Multiple myeloma  - Follows outpatient with MD Barrett, supposed to follow up regarding PET scan tomorrow  - Per chart review - PET scan on 5/6/2025: no evidence of hypermetabolic or suspicious lytic osseous lesions; mildly prominent left axillary lymph nodes without associated hyper metabolic activity; small 4 mm left upper lobe pulmonary nodule without associated hypermetabolic activity  - Consult heme/onc while inpatient if condition warrants    Rheumatoid arthritis  - Managed  outpatient by PCPSTANLEY  - Long-term use of plaquenil, will continue  - Per chart review, last eye exam on 10/15/2024    Hypothyroidism  - Continue home dose of levothyroxine  - Will check AM TSH, free T4    GERD  - Continue PPI    Constipation  - PRN bowel regimen per MAR     Anxiety  Depression  - Continue home medications  - Continue supportive care     Home medications for chronic conditions will be restarted as appropriate when verified by pharmacy.     VTE Prophylaxis:  Mechanical VTE prophylaxis orders are present.        The patient desires to be as follows:    CODE STATUS:    Code Status (Patient has no pulse and is not breathing): CPR (Attempt to Resuscitate)  Medical Interventions (Patient has pulse or is breathing): Full Support  Level Of Support Discussed With: Patient        Hans Harvey, who can be contacted at [phone], is the designated person to make medical decisions on the patient's behalf if She is incapable of doing so. This was clarified with patient and/or next of kin on 5/18/2025 during the course of this H&P.    Admission Status:  I believe this patient meets inpatient status.    Expected Length of Stay: Greater than 2 midnights    PDMP and Medication Dispenses via Sidebar reviewed and consistent with patient reported medications.    I discussed the patient's findings and my recommendations with patient, family.      Signature:     This document has been electronically signed by STANLEY Garcia on May 18, 2025 14:22 EDT   [facility] Hospitalist Team

## 2025-05-18 NOTE — CONSULTS
[facility] CARDIOLOGY CONSULT  [facility]        Subjective:     Encounter Date:2025      Patient ID: Phuong Altamirano is a 70 y.o. female.    Chief Complaint:       HPI:  Phuong Altamirano is a 70 y.o. female known to Dr. Sims with a past cardiac history of valvular heart disease.  2D echo in  showed an EF of 60% with moderate MR mild to moderate TR.  PMH includes HTN, HLD, hypothyroidism, lupus, and multiple myeloma without chemo or radiation.    Patient presents to outside hospital with complaints of progressively worsened dyspnea and found with CHF and AE COPD and transferred here to [facility].  Patient further complains of lower extremity edema, fatigue, and orthopnea in which she is sleeping in a recliner.  She denies chest pain.  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    Past Medical History:   Diagnosis Date    Arthritis     Depression     High blood pressure     High cholesterol     Hypothyroidism     Lupus          Past Surgical History:   Procedure Laterality Date    BREAST LUMPECTOMY Right     benign    DILATATION AND CURETTAGE      LAPAROSCOPIC TUBAL LIGATION  1979    OVARY SURGERY  1985    remoal of left ovary         Social History     Socioeconomic History    Marital status:    Tobacco Use    Smoking status: Former     Current packs/day: 0.00     Average packs/day: 1 pack/day for 21.0 years (21.0 ttl pk-yrs)     Types: Cigarettes     Start date:      Quit date:      Years since quittin.3    Smokeless tobacco: Never   Vaping Use    Vaping status: Never Used   Substance and Sexual Activity    Alcohol use: Yes    Drug use: No    Sexual activity: Defer       Family History   Problem Relation Age of Onset    Heart disease Father     Pancreatic cancer Sister 59    Heart disease Sister     Stroke Sister     Pneumonia Brother   "        No Known Allergies    Current Medications:   Scheduled Meds:guaiFENesin, 1,200 mg, Oral, Q12H  insulin lispro, 2-7 Units, Subcutaneous, 4x Daily AC & at Bedtime  ipratropium-albuterol, 3 mL, Nebulization, 4x Daily - RT  [START ON 5/19/2025] methylPREDNISolone sodium succinate, 40 mg, Intravenous, Q12H  mupirocin, 1 Application, Each Nare, BID  sodium chloride, 10 mL, Intravenous, Q12H      Continuous Infusions:     ROS  All other systems reviewed and are negative.       Objective:         /58   Pulse 85   Temp 98.1 °F (36.7 °C) (Oral)   Resp 18   Ht 180.3 cm (71\")   Wt 85.3 kg (188 lb)   LMP 10/17/2014   SpO2 100%   BMI 26.22 kg/m²       General: Well-developed in NAD.  Neuro: AAOx3. No gross deficits.  HEENT: Sclerae clear, no xanthelasmas.  CV: S1S2, RRR. No murmurs or gallops.  Resp: Breathing is unlabored. Lungs crackles throughout.  GI: BS+. Abdomen soft and NTTP.  Ext: Pedal pulses are palpable. Extremities are with pitting BLE edema.  MS: moves all extremities, no weakness.  Skin: warm, dry.  Psych: calm and cooperative.            Lab Review:           Invalid input(s): \"ALKPO4\"                        Invalid input(s): \"LDLCALC\"            Recent Radiology:  Imaging Results (Most Recent)       None              ECHOCARDIOGRAM:              Assessment:         Active Hospital Problems    Diagnosis  POA    **COPD with acute exacerbation [J44.1]  Yes     1) Acute on chronic diastolic heart failure  - 2D echo in 2021 showed an EF of 60% with moderate MR mild to moderate TR.    - appears hypervolemic    2) AE COPD    3) HTN    4) HLD    5) hypothyroidism    6) lupus    7) multiple myeloma without chemo or radiation           Plan:   I cannot locate patient's paperwork from [employer]\"" or find in CareEverywhere.  Will request records.  She appear hypervolemic on exam.  Will check 2D echo to follow-up on MV.  I will check chemistry and BNP now and tentatively plan to start IV lasix 40 mg bid " after review with appropriate K supplementation.  Monitor daily weights, strict I/Os, renal function, and electrolytes closely.  Recommendations per attending cardiologist.         Electronically signed by STANLEY Ivory, 05/18/25, 4:51 PM EDT.

## 2025-05-19 PROBLEM — R06.02 SHORTNESS OF BREATH: Status: ACTIVE | Noted: 2025-05-19

## 2025-05-19 LAB
ANION GAP SERPL CALCULATED.3IONS-SCNC: 9 MMOL/L (ref 5–15)
BACTERIA UR QL AUTO: ABNORMAL /HPF
BASOPHILS # BLD AUTO: 0.01 10*3/MM3 (ref 0–0.2)
BASOPHILS NFR BLD AUTO: 0.2 % (ref 0–1.5)
BILIRUB UR QL STRIP: NEGATIVE
BUN SERPL-MCNC: 29 MG/DL (ref 8–23)
BUN/CREAT SERPL: 43.3 (ref 7–25)
CALCIUM SPEC-SCNC: 9.2 MG/DL (ref 8.6–10.5)
CHLORIDE SERPL-SCNC: 100 MMOL/L (ref 98–107)
CHOLEST SERPL-MCNC: 163 MG/DL (ref 0–200)
CLARITY UR: CLEAR
CO2 SERPL-SCNC: 31 MMOL/L (ref 22–29)
COLOR UR: YELLOW
CREAT SERPL-MCNC: 0.67 MG/DL (ref 0.57–1)
DEPRECATED RDW RBC AUTO: 43.4 FL (ref 37–54)
EGFRCR SERPLBLD CKD-EPI 2021: 94.2 ML/MIN/1.73
EOSINOPHIL # BLD AUTO: 0 10*3/MM3 (ref 0–0.4)
EOSINOPHIL NFR BLD AUTO: 0 % (ref 0.3–6.2)
ERYTHROCYTE [DISTWIDTH] IN BLOOD BY AUTOMATED COUNT: 13.2 % (ref 12.3–15.4)
GLUCOSE BLDC GLUCOMTR-MCNC: 204 MG/DL (ref 70–105)
GLUCOSE BLDC GLUCOMTR-MCNC: 206 MG/DL (ref 70–105)
GLUCOSE BLDC GLUCOMTR-MCNC: 207 MG/DL (ref 70–105)
GLUCOSE BLDC GLUCOMTR-MCNC: 228 MG/DL (ref 70–105)
GLUCOSE SERPL-MCNC: 181 MG/DL (ref 65–99)
GLUCOSE UR STRIP-MCNC: NEGATIVE MG/DL
HBA1C MFR BLD: 6.06 % (ref 4.8–5.6)
HCT VFR BLD AUTO: 32 % (ref 34–46.6)
HDLC SERPL-MCNC: 31 MG/DL (ref 40–60)
HGB BLD-MCNC: 9.9 G/DL (ref 12–15.9)
HGB UR QL STRIP.AUTO: NEGATIVE
HYALINE CASTS UR QL AUTO: ABNORMAL /LPF
IMM GRANULOCYTES # BLD AUTO: 0.03 10*3/MM3 (ref 0–0.05)
IMM GRANULOCYTES NFR BLD AUTO: 0.5 % (ref 0–0.5)
KETONES UR QL STRIP: ABNORMAL
LDLC SERPL CALC-MCNC: 114 MG/DL (ref 0–100)
LDLC/HDLC SERPL: 3.63 {RATIO}
LEUKOCYTE ESTERASE UR QL STRIP.AUTO: ABNORMAL
LYMPHOCYTES # BLD AUTO: 0.69 10*3/MM3 (ref 0.7–3.1)
LYMPHOCYTES NFR BLD AUTO: 12.3 % (ref 19.6–45.3)
MAGNESIUM SERPL-MCNC: 2.3 MG/DL (ref 1.6–2.4)
MCH RBC QN AUTO: 28 PG (ref 26.6–33)
MCHC RBC AUTO-ENTMCNC: 30.9 G/DL (ref 31.5–35.7)
MCV RBC AUTO: 90.4 FL (ref 79–97)
MONOCYTES # BLD AUTO: 0.23 10*3/MM3 (ref 0.1–0.9)
MONOCYTES NFR BLD AUTO: 4.1 % (ref 5–12)
NEUTROPHILS NFR BLD AUTO: 4.64 10*3/MM3 (ref 1.7–7)
NEUTROPHILS NFR BLD AUTO: 82.9 % (ref 42.7–76)
NITRITE UR QL STRIP: POSITIVE
NRBC BLD AUTO-RTO: 0 /100 WBC (ref 0–0.2)
PH UR STRIP.AUTO: 5.5 [PH] (ref 5–8)
PLATELET # BLD AUTO: 180 10*3/MM3 (ref 140–450)
PMV BLD AUTO: 10.3 FL (ref 6–12)
POTASSIUM SERPL-SCNC: 4.7 MMOL/L (ref 3.5–5.2)
PROCALCITONIN SERPL-MCNC: 0.18 NG/ML (ref 0–0.25)
PROT UR QL STRIP: ABNORMAL
QT INTERVAL: 363 MS
QTC INTERVAL: 463 MS
RBC # BLD AUTO: 3.54 10*6/MM3 (ref 3.77–5.28)
RBC # UR STRIP: ABNORMAL /HPF
REF LAB TEST METHOD: ABNORMAL
SODIUM SERPL-SCNC: 140 MMOL/L (ref 136–145)
SP GR UR STRIP: 1.02 (ref 1–1.03)
SQUAMOUS #/AREA URNS HPF: ABNORMAL /HPF
TRIGL SERPL-MCNC: 98 MG/DL (ref 0–150)
TSH SERPL DL<=0.05 MIU/L-ACNC: 0.7 UIU/ML (ref 0.27–4.2)
UROBILINOGEN UR QL STRIP: ABNORMAL
VLDLC SERPL-MCNC: 18 MG/DL (ref 5–40)
WBC # UR STRIP: ABNORMAL /HPF
WBC NRBC COR # BLD AUTO: 5.6 10*3/MM3 (ref 3.4–10.8)

## 2025-05-19 PROCEDURE — 94664 DEMO&/EVAL PT USE INHALER: CPT

## 2025-05-19 PROCEDURE — 84145 PROCALCITONIN (PCT): CPT | Performed by: INTERNAL MEDICINE

## 2025-05-19 PROCEDURE — 83036 HEMOGLOBIN GLYCOSYLATED A1C: CPT

## 2025-05-19 PROCEDURE — 25010000002 METHYLPREDNISOLONE PER 40 MG

## 2025-05-19 PROCEDURE — 87186 SC STD MICRODIL/AGAR DIL: CPT | Performed by: HOSPITALIST

## 2025-05-19 PROCEDURE — 82948 REAGENT STRIP/BLOOD GLUCOSE: CPT

## 2025-05-19 PROCEDURE — 80061 LIPID PANEL: CPT

## 2025-05-19 PROCEDURE — 80048 BASIC METABOLIC PNL TOTAL CA: CPT

## 2025-05-19 PROCEDURE — 99221 1ST HOSP IP/OBS SF/LOW 40: CPT | Performed by: INTERNAL MEDICINE

## 2025-05-19 PROCEDURE — 84443 ASSAY THYROID STIM HORMONE: CPT

## 2025-05-19 PROCEDURE — 85025 COMPLETE CBC W/AUTO DIFF WBC: CPT

## 2025-05-19 PROCEDURE — 63710000001 INSULIN LISPRO (HUMAN) PER 5 UNITS

## 2025-05-19 PROCEDURE — 81001 URINALYSIS AUTO W/SCOPE: CPT | Performed by: HOSPITALIST

## 2025-05-19 PROCEDURE — 94799 UNLISTED PULMONARY SVC/PX: CPT

## 2025-05-19 PROCEDURE — 25010000002 FUROSEMIDE PER 20 MG: Performed by: INTERNAL MEDICINE

## 2025-05-19 PROCEDURE — 87086 URINE CULTURE/COLONY COUNT: CPT | Performed by: HOSPITALIST

## 2025-05-19 PROCEDURE — 94761 N-INVAS EAR/PLS OXIMETRY MLT: CPT

## 2025-05-19 PROCEDURE — 83735 ASSAY OF MAGNESIUM: CPT

## 2025-05-19 PROCEDURE — 87077 CULTURE AEROBIC IDENTIFY: CPT | Performed by: HOSPITALIST

## 2025-05-19 RX ORDER — HYDROXYCHLOROQUINE SULFATE 200 MG/1
200 TABLET, FILM COATED ORAL EVERY 12 HOURS SCHEDULED
Status: DISCONTINUED | OUTPATIENT
Start: 2025-05-19 | End: 2025-05-20 | Stop reason: HOSPADM

## 2025-05-19 RX ORDER — LEVOTHYROXINE SODIUM 100 UG/1
100 TABLET ORAL
Status: DISCONTINUED | OUTPATIENT
Start: 2025-05-19 | End: 2025-05-20 | Stop reason: HOSPADM

## 2025-05-19 RX ORDER — PANTOPRAZOLE SODIUM 40 MG/1
40 TABLET, DELAYED RELEASE ORAL DAILY
Status: DISCONTINUED | OUTPATIENT
Start: 2025-05-19 | End: 2025-05-20 | Stop reason: HOSPADM

## 2025-05-19 RX ORDER — ATORVASTATIN CALCIUM 10 MG/1
10 TABLET, FILM COATED ORAL DAILY
Status: DISCONTINUED | OUTPATIENT
Start: 2025-05-19 | End: 2025-05-20 | Stop reason: HOSPADM

## 2025-05-19 RX ORDER — SERTRALINE HYDROCHLORIDE 25 MG/1
25 TABLET, FILM COATED ORAL DAILY
Status: DISCONTINUED | OUTPATIENT
Start: 2025-05-19 | End: 2025-05-20 | Stop reason: HOSPADM

## 2025-05-19 RX ADMIN — IPRATROPIUM BROMIDE AND ALBUTEROL SULFATE 3 ML: .5; 3 SOLUTION RESPIRATORY (INHALATION) at 06:44

## 2025-05-19 RX ADMIN — AZITHROMYCIN 500 MG: 250 TABLET, FILM COATED ORAL at 20:22

## 2025-05-19 RX ADMIN — FUROSEMIDE 20 MG: 10 INJECTION, SOLUTION INTRAMUSCULAR; INTRAVENOUS at 20:22

## 2025-05-19 RX ADMIN — Medication 10 ML: at 10:09

## 2025-05-19 RX ADMIN — INSULIN LISPRO 3 UNITS: 100 INJECTION, SOLUTION INTRAVENOUS; SUBCUTANEOUS at 17:43

## 2025-05-19 RX ADMIN — ATENOLOL 50 MG: 50 TABLET ORAL at 20:22

## 2025-05-19 RX ADMIN — ATENOLOL 50 MG: 50 TABLET ORAL at 10:09

## 2025-05-19 RX ADMIN — SERTRALINE HYDROCHLORIDE 25 MG: 25 TABLET, FILM COATED ORAL at 12:09

## 2025-05-19 RX ADMIN — LEVOTHYROXINE SODIUM 100 MCG: 100 TABLET ORAL at 12:09

## 2025-05-19 RX ADMIN — FUROSEMIDE 20 MG: 10 INJECTION, SOLUTION INTRAMUSCULAR; INTRAVENOUS at 10:08

## 2025-05-19 RX ADMIN — GUAIFENESIN 1200 MG: 600 TABLET, EXTENDED RELEASE ORAL at 10:08

## 2025-05-19 RX ADMIN — PANTOPRAZOLE SODIUM 40 MG: 40 TABLET, DELAYED RELEASE ORAL at 12:10

## 2025-05-19 RX ADMIN — HYDROXYCHLOROQUINE SULFATE 200 MG: 200 TABLET, FILM COATED ORAL at 12:09

## 2025-05-19 RX ADMIN — INSULIN LISPRO 3 UNITS: 100 INJECTION, SOLUTION INTRAVENOUS; SUBCUTANEOUS at 12:09

## 2025-05-19 RX ADMIN — INSULIN LISPRO 3 UNITS: 100 INJECTION, SOLUTION INTRAVENOUS; SUBCUTANEOUS at 21:55

## 2025-05-19 RX ADMIN — HYDROXYCHLOROQUINE SULFATE 200 MG: 200 TABLET, FILM COATED ORAL at 21:55

## 2025-05-19 RX ADMIN — ATORVASTATIN CALCIUM 10 MG: 10 TABLET ORAL at 12:09

## 2025-05-19 RX ADMIN — INSULIN LISPRO 3 UNITS: 100 INJECTION, SOLUTION INTRAVENOUS; SUBCUTANEOUS at 10:09

## 2025-05-19 RX ADMIN — MUPIROCIN 1 APPLICATION: 20 OINTMENT TOPICAL at 10:07

## 2025-05-19 RX ADMIN — GUAIFENESIN 1200 MG: 600 TABLET, EXTENDED RELEASE ORAL at 20:22

## 2025-05-19 RX ADMIN — MUPIROCIN 1 APPLICATION: 20 OINTMENT TOPICAL at 20:22

## 2025-05-19 RX ADMIN — METHYLPREDNISOLONE SODIUM SUCCINATE 40 MG: 40 INJECTION, POWDER, FOR SOLUTION INTRAMUSCULAR; INTRAVENOUS at 12:10

## 2025-05-19 NOTE — PROGRESS NOTES
CARDIOLOGY PROGRESS NOTE:    Phuong Altamirano  70 y.o.  female  1955  [id number]      Referring Provider: Hospitalist    Reason for follow-up: Shortness of breath     Patient Care Team:  Chula Sanchez APRN as PCP - General (Nurse Practitioner)  Reva Jaeger MD as PCP - Family Medicine  Neftali Barrett MD as Consulting Physician (Hematology and Oncology)    Subjective .  Patient has some shortness of breath today    Objective lying in bed comfortably     Review of Systems   Constitutional: Negative for malaise/fatigue.   Cardiovascular:  Negative for chest pain, dyspnea on exertion, leg swelling and palpitations.   Respiratory:  Positive for shortness of breath. Negative for cough.    Gastrointestinal:  Negative for abdominal pain, nausea and vomiting.   Neurological:  Negative for dizziness, headaches, light-headedness, numbness and weakness.   All other systems reviewed and are negative.      Allergies: Patient has no known allergies.    Scheduled Meds:atenolol, 50 mg, Oral, Q12H  atorvastatin, 10 mg, Oral, Daily  azithromycin, 500 mg, Oral, Q24H  furosemide, 20 mg, Intravenous, Q12H  guaiFENesin, 1,200 mg, Oral, Q12H  hydroxychloroquine, 200 mg, Oral, Q12H  insulin lispro, 2-7 Units, Subcutaneous, 4x Daily AC & at Bedtime  ipratropium-albuterol, 3 mL, Nebulization, 4x Daily - RT  levothyroxine, 100 mcg, Oral, QAM AC  methylPREDNISolone sodium succinate, 40 mg, Intravenous, Q12H  mupirocin, 1 Application, Each Nare, BID  pantoprazole, 40 mg, Oral, Daily  sertraline, 25 mg, Oral, Daily  sodium chloride, 10 mL, Intravenous, Q12H      Continuous Infusions:   PRN Meds:.  acetaminophen **OR** acetaminophen **OR** acetaminophen    albuterol    senna-docusate sodium **AND** polyethylene glycol **AND** bisacodyl **AND** bisacodyl    Calcium Replacement - Follow Nurse / BPA Driven Protocol    dextrose    dextrose    glucagon (human recombinant)    Magnesium Standard Dose Replacement - Follow Nurse / BPA  "Driven Protocol    nitroglycerin    Phosphorus Replacement - Follow Nurse / BPA Driven Protocol    Potassium Replacement - Follow Nurse / BPA Driven Protocol    sodium chloride        VITAL SIGNS  Vitals:    05/19/25 0644 05/19/25 0648 05/19/25 0751 05/19/25 1154   BP:   130/73 121/59   BP Location:   Right arm Right arm   Patient Position:   Sitting Sitting   Pulse: 76 88     Resp: 20  15 16   Temp:   97.6 °F (36.4 °C) 97.7 °F (36.5 °C)   TempSrc:   Oral Oral   SpO2: 98%  95%    Weight:       Height:           Flowsheet Rows      Flowsheet Row First Filed Value   Admission Height 180.3 cm (71\") Documented at 05/18/2025 1142   Admission Weight 85.6 kg (188 lb 11.4 oz) Documented at 05/18/2025 1142             TELEMETRY: Sinus rhythm    Physical Exam:  Constitutional:       Appearance: Well-developed.   Eyes:      General: No scleral icterus.     Conjunctiva/sclera: Conjunctivae normal.   HENT:      Head: Normocephalic and atraumatic.   Neck:      Vascular: No carotid bruit or JVD.   Pulmonary:      Effort: Pulmonary effort is normal.      Breath sounds: Normal breath sounds. No wheezing. No rales.   Cardiovascular:      Normal rate. Regular rhythm.   Pulses:     Intact distal pulses.   Abdominal:      General: Bowel sounds are normal.      Palpations: Abdomen is soft.   Musculoskeletal:      Cervical back: Normal range of motion and neck supple. Skin:     General: Skin is warm and dry.      Findings: No rash.   Neurological:      Mental Status: Alert.          Results Review:   I reviewed the patient's new clinical results.  Lab Results (last 24 hours)       Procedure Component Value Units Date/Time    Urinalysis With Culture If Indicated - Urine, Clean Catch [[id number]]  (Abnormal) Collected: 05/19/25 1454    Specimen: Urine, Clean Catch Updated: 05/19/25 1510     Color, UA Yellow     Appearance, UA Clear     pH, UA 5.5     Specific Gravity, UA 1.024     Glucose, UA Negative     Ketones, UA Trace     Bilirubin, UA " Negative     Blood, UA Negative     Protein, UA >=300 mg/dL (3+)     Leuk Esterase, UA Moderate (2+)     Nitrite, UA Positive     Urobilinogen, UA 1.0 E.U./dL    Narrative:      In absence of clinical symptoms, the presence of pyuria, bacteria, and/or nitrites on the urinalysis result does not correlate with infection.    Urinalysis, Microscopic Only - Urine, Clean Catch [[id number]]  (Abnormal) Collected: 05/19/25 1454    Specimen: Urine, Clean Catch Updated: 05/19/25 1510     RBC, UA 0-2 /HPF      WBC, UA Too Numerous to Count /HPF      Bacteria, UA 4+ /HPF      Squamous Epithelial Cells, UA 0-2 /HPF      Hyaline Casts, UA 3-6 /LPF      Methodology Automated Microscopy    Urine Culture - Urine, Urine, Clean Catch [[id number]] Collected: 05/19/25 1454    Specimen: Urine, Clean Catch Updated: 05/19/25 1510    POC Glucose 4x Daily Before Meals & at Bedtime [[id number]]  (Abnormal) Collected: 05/19/25 1152    Specimen: Blood Updated: 05/19/25 1154     Glucose 207 mg/dL      Comment: Serial Number: [id number]:  [id number]       POC Glucose 4x Daily Before Meals & at Bedtime [[id number]]  (Abnormal) Collected: 05/19/25 0748    Specimen: Blood Updated: 05/19/25 0754     Glucose 206 mg/dL      Comment: Serial Number: [id number]:  [id number]       Basic Metabolic Panel [[id number]]  (Abnormal) Collected: 05/19/25 0443    Specimen: Blood from Arm, Right Updated: 05/19/25 0535     Glucose 181 mg/dL      BUN 29 mg/dL      Creatinine 0.67 mg/dL      Sodium 140 mmol/L      Potassium 4.7 mmol/L      Chloride 100 mmol/L      CO2 31.0 mmol/L      Calcium 9.2 mg/dL      BUN/Creatinine Ratio 43.3     Anion Gap 9.0 mmol/L      eGFR 94.2 mL/min/1.73     Narrative:      GFR Categories in Chronic Kidney Disease (CKD)              GFR Category          GFR (mL/min/1.73)    Interpretation  G1                    90 or greater        Normal or high (1)  G2                    60-89                Mild decrease (1)  G3a                  "  45-59                Mild to moderate decrease  G3b                   30-44                Moderate to severe decrease  G4                    15-29                Severe decrease  G5                    14 or less           Kidney failure    (1)In the absence of evidence of kidney disease, neither GFR category G1 or G2 fulfill the criteria for CKD.    eGFR calculation 2021 CKD-EPI creatinine equation, which does not include race as a factor    Magnesium [[id number]]  (Normal) Collected: 05/19/25 0443    Specimen: Blood from Arm, Right Updated: 05/19/25 0535     Magnesium 2.3 mg/dL     Procalcitonin [[id number]]  (Normal) Collected: 05/19/25 0443    Specimen: Blood from Arm, Right Updated: 05/19/25 0535     Procalcitonin 0.18 ng/mL     Narrative:      As a Marker for Sepsis (Non-Neonates):    1. <0.5 ng/mL represents a low risk of severe sepsis and/or septic shock.  2. >2 ng/mL represents a high risk of severe sepsis and/or septic shock.    As a Marker for Lower Respiratory Tract Infections that require antibiotic therapy:    PCT on Admission    Antibiotic Therapy       6-12 Hrs later    >0.5                Strongly Recommended  >0.25 - <0.5        Recommended   0.1 - 0.25          Discouraged              Remeasure/reassess PCT  <0.1                Strongly Discouraged     Remeasure/reassess PCT    As 28 day mortality risk marker: \"Change in Procalcitonin Result\" (>80% or <=80%) if Day 0 (or Day 1) and Day 4 values are available. Refer to [email or web address]    Change in PCT <=80%  A decrease of PCT levels below or equal to 80% defines a positive change in PCT test result representing a higher risk for 28-day all-cause mortality of patients diagnosed with severe sepsis for septic shock.    Change in PCT >80%  A decrease of PCT levels of more than 80% defines a negative change in PCT result representing a lower risk for 28-day all-cause mortality of patients diagnosed with severe sepsis or septic " shock.       TSH Rfx On Abnormal To Free T4 [[id number]]  (Normal) Collected: 05/19/25 0443    Specimen: Blood from Arm, Right Updated: 05/19/25 0535     TSH 0.701 uIU/mL     Lipid Panel [[id number]]  (Abnormal) Collected: 05/19/25 0443    Specimen: Blood from Arm, Right Updated: 05/19/25 0535     Total Cholesterol 163 mg/dL      Triglycerides 98 mg/dL      HDL Cholesterol 31 mg/dL      LDL Cholesterol  114 mg/dL      VLDL Cholesterol 18 mg/dL      LDL/HDL Ratio 3.63    Narrative:      Cholesterol Reference Ranges  (U.S. Department of Health and Human Services ATP III Classifications)    Desirable          <200 mg/dL  Borderline High    200-239 mg/dL  High Risk          >240 mg/dL      Triglyceride Reference Ranges  (U.S. Department of Health and Human Services ATP III Classifications)    Normal           <150 mg/dL  Borderline High  150-199 mg/dL  High             200-499 mg/dL  Very High        >500 mg/dL    HDL Reference Ranges  (U.S. Department of Health and Human Services ATP III Classifications)    Low     <40 mg/dl (major risk factor for CHD)  High    >60 mg/dl ('negative' risk factor for CHD)        LDL Reference Ranges  (U.S. Department of Health and Human Services ATP III Classifications)    Optimal          <100 mg/dL  Near Optimal     100-129 mg/dL  Borderline High  130-159 mg/dL  High             160-189 mg/dL  Very High        >189 mg/dL    LDL is calculated using the NIH LDL-C calculation.      Hemoglobin A1c [[id number]]  (Abnormal) Collected: 05/19/25 0443    Specimen: Blood from Arm, Right Updated: 05/19/25 0517     Hemoglobin A1C 6.06 %     Narrative:      Hemoglobin A1C Ranges:    Increased Risk for Diabetes  5.7% to 6.4%  Diabetes                     >= 6.5%  Diabetic Goal                < 7.0%    CBC & Differential [[id number]]  (Abnormal) Collected: 05/19/25 0443    Specimen: Blood from Arm, Right Updated: 05/19/25 0457    Narrative:      The following orders were created for panel order CBC &  Differential.  Procedure                               Abnormality         Status                     ---------                               -----------         ------                     CBC Auto Differential[[id number]]        Abnormal            Final result                 Please view results for these tests on the individual orders.    CBC Auto Differential [[id number]]  (Abnormal) Collected: 05/19/25 0443    Specimen: Blood from Arm, Right Updated: 05/19/25 0457     WBC 5.60 10*3/mm3      RBC 3.54 10*6/mm3      Hemoglobin 9.9 g/dL      Hematocrit 32.0 %      MCV 90.4 fL      MCH 28.0 pg      MCHC 30.9 g/dL      RDW 13.2 %      RDW-SD 43.4 fl      MPV 10.3 fL      Platelets 180 10*3/mm3      Neutrophil % 82.9 %      Lymphocyte % 12.3 %      Monocyte % 4.1 %      Eosinophil % 0.0 %      Basophil % 0.2 %      Immature Grans % 0.5 %      Neutrophils, Absolute 4.64 10*3/mm3      Lymphocytes, Absolute 0.69 10*3/mm3      Monocytes, Absolute 0.23 10*3/mm3      Eosinophils, Absolute 0.00 10*3/mm3      Basophils, Absolute 0.01 10*3/mm3      Immature Grans, Absolute 0.03 10*3/mm3      nRBC 0.0 /100 WBC     POC Glucose Once [[id number]]  (Abnormal) Collected: 05/18/25 2141    Specimen: Blood Updated: 05/18/25 2143     Glucose 172 mg/dL      Comment: Serial Number: [id number]:  [id number]       Comprehensive Metabolic Panel [[id number]]  (Abnormal) Collected: 05/18/25 1818    Specimen: Blood Updated: 05/18/25 1844     Glucose 206 mg/dL      BUN 28 mg/dL      Creatinine 0.85 mg/dL      Sodium 139 mmol/L      Potassium 4.1 mmol/L      Chloride 97 mmol/L      CO2 27.7 mmol/L      Calcium 9.3 mg/dL      Total Protein 7.6 g/dL      Albumin 3.8 g/dL      ALT (SGPT) 23 U/L      AST (SGOT) 19 U/L      Alkaline Phosphatase 77 U/L      Total Bilirubin 0.4 mg/dL      Globulin 3.8 gm/dL      A/G Ratio 1.0 g/dL      BUN/Creatinine Ratio 32.9     Anion Gap 14.3 mmol/L      eGFR 73.8 mL/min/1.73     Narrative:      GFR Categories in  Chronic Kidney Disease (CKD)              GFR Category          GFR (mL/min/1.73)    Interpretation  G1                    90 or greater        Normal or high (1)  G2                    60-89                Mild decrease (1)  G3a                   45-59                Mild to moderate decrease  G3b                   30-44                Moderate to severe decrease  G4                    15-29                Severe decrease  G5                    14 or less           Kidney failure    (1)In the absence of evidence of kidney disease, neither GFR category G1 or G2 fulfill the criteria for CKD.    eGFR calculation 2021 CKD-EPI creatinine equation, which does not include race as a factor    BNP [[id number]]  (Abnormal) Collected: 05/18/25 1818    Specimen: Blood Updated: 05/18/25 1844     proBNP 2,912.0 pg/mL     Narrative:      This assay is used as an aid in the diagnosis of individuals suspected of having heart failure. It can be used as an aid in the diagnosis of acute decompensated heart failure (ADHF) in patients presenting with signs and symptoms of ADHF to the emergency department (ED). In addition, NT-proBNP of <300 pg/mL indicates ADHF is not likely.    Age Range Result Interpretation  NT-proBNP Concentration (pg/mL:      <50             Positive            >450                   Gray                 300-450                    Negative             <300    50-75           Positive            >900                  Gray                300-900                  Negative            <300      >75             Positive            >1800                  Gray                300-1800                  Negative            <300            Imaging Results (Last 24 Hours)       Procedure Component Value Units Date/Time    XR Outside Chest [[id number]] Resulted: 05/19/25 0933     Updated: 05/19/25 0933    Narrative:      This procedure was auto-finalized with no dictation required.    CT Outside Chest [[id number]] Resulted:  05/19/25 0933     Updated: 05/19/25 0933    Narrative:      This procedure was auto-finalized with no dictation required.    XR Chest 1 View [[id number]] Collected: 05/19/25 0828     Updated: 05/19/25 0831    Narrative:      XR CHEST 1 VW    Date of Exam: 5/18/2025 8:49 PM EDT    Indication: gomez    Comparison: Whole-body PET/CT 5/6/2025, no prior chest x-ray for comparison at this institution.    Findings:  Heart size is upper limits of normal. Pulmonary vascular distribution is normal. There is no acute airspace disease, pleural effusion, pneumothorax or acute osseous abnormality. Benign calcified granulomatous changes are present.      Impression:      Impression:  Borderline cardiac enlargement.  No acute chest finding.      Electronically Signed: Tita Garcia MD    5/19/2025 8:29 AM EDT    Workstation ID: [id number]            EKG      I personally viewed and interpreted the patient's EKG/Telemetry data:    ECHOCARDIOGRAM:  Results for orders placed during the hospital encounter of 05/18/25    Adult Transthoracic Echo Complete w/ Color, Spectral and Contrast if necessary per protocol    Interpretation Summary    Left ventricular systolic function is normal. Left ventricular ejection fraction appears to be 56 - 60%.    Left ventricular diastolic function was indeterminate.    Estimated right ventricular systolic pressure from tricuspid regurgitation is normal (<35 mmHg).       STRESS MYOVIEW:       CARDIAC CATHETERIZATION:  No results found for this or any previous visit.       OTHER:         Assessment & Plan     Shortness of breath/COPD/HFpEF  Patient presents with shortness of breath and is ruled out for MI by EKG and enzymes  Patient has moderate MR in the past and his echocardiogram showed mild to moderate MR but has LV diastolic dysfunction and a normal LV systolic function and Manchi be managed with medical therapy    COPD exacerbation  Patient has COPD exacerbation is on oxygen and is followed by the  pulmonologist.    Hypertension  Patient blood pressure currently stable on medical therapy    Hyperlipidemia  Patient is on statins and the lipid levels are followed by primary care doctor    Diabetes  Patient is on sliding scale insulin.    History of multiple myeloma  Patient is followed by the hematologist.    No further cardiac workup      Daniele Sims MD  05/19/25  16:25 EDT

## 2025-05-19 NOTE — CASE MANAGEMENT/SOCIAL WORK
Discharge Planning Assessment   Albert     Patient Name: Phuong Altamirano  MRN: [id number]  Today's Date: 5/19/2025    Admit Date: 5/18/2025    Plan: LENARD Plan: Anticipate routine home alone. Family to provide transport.   Discharge Needs Assessment       Row Name 05/19/25 1522       Living Environment    People in Home alone    Current Living Arrangements home    Potentially Unsafe Housing Conditions none    In the past 12 months has the electric, gas, oil, or water company threatened to shut off services in your home? No    Primary Care Provided by self    Provides Primary Care For no one    Family Caregiver if Needed other (see comments)  Ramses Maxwell    Quality of Family Relationships supportive;helpful    Able to Return to Prior Arrangements yes       Resource/Environmental Concerns    Resource/Environmental Concerns none    Transportation Concerns none       Transportation Needs    In the past 12 months, has lack of transportation kept you from medical appointments or from getting medications? no    In the past 12 months, has lack of transportation kept you from meetings, work, or from getting things needed for daily living? No       Food Insecurity    Within the past 12 months, you worried that your food would run out before you got the money to buy more. Never true    Within the past 12 months, the food you bought just didn't last and you didn't have money to get more. Never true       Transition Planning    Patient/Family Anticipates Transition to home    Patient/Family Anticipated Services at Transition none    Transportation Anticipated car, drives self;family or friend will provide       Discharge Needs Assessment    Readmission Within the Last 30 Days no previous admission in last 30 days    Equipment Currently Used at Home none    Concerns to be Addressed discharge planning    Do you want help finding or keeping work or a job? I do not need or want help    Do you want help with school or training? For  example, starting or completing job training or getting a high school diploma, GED or equivalent No    Anticipated Changes Related to Illness none    Equipment Needed After Discharge none    Provided Post Acute Provider List? N/A    Provided Post Acute Provider Quality & Resource List? N/A    Current Discharge Risk lives alone                   Discharge Plan       Row Name 05/19/25 1523       Plan    Plan DC Plan: Anticipate routine home alone. Family to provide transport.    Patient/Family in Agreement with Plan yes    Provided Post Acute Provider List? N/A    Provided Post Acute Provider Quality & Resource List? N/A    Plan Comments CM spoke with patient at bedside to discuss admission assessment and discharge planning. Patient confirms PCP and pharmacy. Patient has declined meds to bed program at this time. Patient denies any difficulty affording medications or food at this time. Patient denies any additional needs for services or DME at this time. Patient reports independent with ADL's and drives. Patient family will provide transportation when ready for DC.CM spoke with Hospitalist and nursing for morning rounds and reviewed chart for clinical updates. MD anticipates DC in 2 days.CM will continue to follow for any additional needs. DC Barriers: Cardiac monitoring, IV steroids/IV Lasix, glucose control, and monitor labs.                    Expected Discharge Date and Time       Expected Discharge Date Expected Discharge Time    May 21, 2025            Demographic Summary       Row Name 05/19/25 1521       General Information    Admission Type inpatient    Arrived From [facility]    Required Notices Provided Important Message from Medicare    Referral Source admission list    Reason for Consult discharge planning    Preferred Language English       Contact Information    Permission Granted to Share Info With                    Functional Status       Row Name 05/19/25 1520        Functional Status    Usual Activity Tolerance excellent    Current Activity Tolerance good       Physical Activity    On average, how many days per week do you engage in moderate to strenuous exercise (like a brisk walk)? 0 days    On average, how many minutes do you engage in exercise at this level? 0 min    Number of minutes of exercise per week 0       Functional Status, IADL    Medications independent    Meal Preparation independent    Housekeeping independent    Laundry independent    Shopping independent    If for any reason you need help with day-to-day activities such as bathing, preparing meals, shopping, managing finances, etc., do you get the help you need? I don't need any help       Mental Status    General Appearance WDL WDL       Mental Status Summary    Recent Changes in Mental Status/Cognitive Functioning no changes       Employment/    Employment Status employed full-time    Current or Previous Occupation healthcare    Employment/ Comments [employer] [facility] [employer]               Ghazala Alvarez RN     Office Phone: [phone]  Office Cell:     [phone]

## 2025-05-19 NOTE — CONSULTS
Group: [facility]         CONSULT NOTE    Patient Identification:  Phuong Altamirano  70 y.o.  female  1955  [id number]            Requesting physician: Attending physician    Reason for Consultation: dyspnea        History of Present Illness:  70-year-old female admitted on 5/17/2025 with increasing shortness of breath lower extremity edema and orthopnea      Assessment:    Hypoxic respiratory insufficiency  CT scan with diffuse groundglass opacities     Possible COPD   although CAT scan is not suggestive of COPD, possible interstitial lung disease  Quit smoking 1995  20-pack-year history  No PFTs  Not on bronchodilators      2D echo 5/18/2025    Left ventricular systolic function is normal. Left ventricular ejection fraction appears to be 56 - 60%.    Left ventricular diastolic function was indeterminate.    Estimated right ventricular systolic pressure from tricuspid regurgitation is normal (<35 mmHg).         Recommendations:    Check respiratory viral panel    PFTs    Patient currently on treatment for COPD exacerbation including azithromycin/IV steroids/DuoNeb                  Review of Sytems:  Review of Systems   Respiratory:  Positive for cough and shortness of breath. Negative for wheezing and stridor.    Cardiovascular:  Positive for palpitations. Negative for chest pain and leg swelling.       Past Medical History:  Past Medical History:   Diagnosis Date    Arthritis 2010    Depression 2010    High blood pressure     High cholesterol 2005    Hypothyroidism     Lupus 2010       Past Surgical History:  Past Surgical History:   Procedure Laterality Date    BREAST LUMPECTOMY Right 1983    benign    DILATATION AND CURETTAGE  1995    LAPAROSCOPIC TUBAL LIGATION  1979    OVARY SURGERY  1985    remoal of left ovary        Home Meds:  Medications Prior to Admission   Medication Sig Dispense Refill Last Dose/Taking    acetaminophen (TYLENOL) 325 MG tablet Take 2 tablets by mouth Every 6 (Six) Hours  As Needed for Mild Pain.   2025    amLODIPine (NORVASC) 10 MG tablet Take 1 tablet by mouth Daily.  1 2025    atenolol (TENORMIN) 50 MG tablet Take 1 tablet by mouth 2 (Two) Times a Day.  1 2025    cholecalciferol (VITAMIN D3) 400 units tablet Take 1 tablet by mouth Daily.   2025    hydrALAZINE (APRESOLINE) 25 MG tablet TAKE 1 TABLET BY MOUTH TWICE A DAY: CALL OFFICE NEED CHECK UP BY 10/2/19  0 2025    hydroCHLOROthiazide 12.5 MG tablet Take 1 tablet by mouth Daily.   2025    hydroxychloroquine (PLAQUENIL) 200 MG tablet Every 12 (Twelve) Hours.   2025    levothyroxine (SYNTHROID, LEVOTHROID) 100 MCG tablet Take 1 tablet by mouth Every Morning Before Breakfast.  1 2025    meloxicam (MOBIC) 15 MG tablet TAKE ONE (1) TABLET BY MOUTH ONCE A DAY.  1 2025    metFORMIN (GLUCOPHAGE) 500 MG tablet    2025    pravastatin (PRAVACHOL) 40 MG tablet Take 1 tablet by mouth every night at bedtime.   2025    sertraline (ZOLOFT) 50 MG tablet Take 1 tablet by mouth Daily.  1 2025    ibuprofen (ADVIL,MOTRIN) 200 MG tablet Take 1 tablet by mouth Every 6 (Six) Hours As Needed for Mild Pain.   Unknown    lisinopril (PRINIVIL,ZESTRIL) 10 MG tablet Take 1 tablet by mouth Daily.       omeprazole (priLOSEC) 20 MG capsule Take 1 capsule by mouth Daily.   2025       Allergies:  No Known Allergies    Social History:   Social History     Socioeconomic History    Marital status:    Tobacco Use    Smoking status: Former     Current packs/day: 0.00     Average packs/day: 1 pack/day for 21.0 years (21.0 ttl pk-yrs)     Types: Cigarettes     Start date:      Quit date:      Years since quittin.4    Smokeless tobacco: Never   Vaping Use    Vaping status: Never Used   Substance and Sexual Activity    Alcohol use: Yes    Drug use: No    Sexual activity: Defer       Family History:  Family History   Problem Relation Age of Onset    Heart disease Father     Pancreatic  "cancer Sister 59    Heart disease Sister     Stroke Sister     Pneumonia Brother        Physical Exam:  /73 (BP Location: Right arm, Patient Position: Sitting)   Pulse 88   Temp 97.6 °F (36.4 °C) (Oral)   Resp 15   Ht 154.9 cm (61\")   Wt 86.3 kg (190 lb 4.1 oz)   LMP 10/17/2014   SpO2 95%   BMI 35.95 kg/m²  Body mass index is 35.95 kg/m². 95% 86.3 kg (190 lb 4.1 oz)  Physical Exam  Cardiovascular:      Heart sounds: Murmur heard.      No gallop.   Pulmonary:      Effort: No respiratory distress.      Breath sounds: No stridor. Rhonchi and rales present. No wheezing.   Chest:      Chest wall: No tenderness.         LABS:  Lab Results   Component Value Date    CALCIUM 9.2 05/19/2025     Results from last 7 days   Lab Units 05/19/25  0443 05/18/25  1818   MAGNESIUM mg/dL 2.3  --    SODIUM mmol/L 140 139   POTASSIUM mmol/L 4.7 4.1   CHLORIDE mmol/L 100 97*   CO2 mmol/L 31.0* 27.7   BUN mg/dL 29* 28*   CREATININE mg/dL 0.67 0.85   GLUCOSE mg/dL 181* 206*   CALCIUM mg/dL 9.2 9.3   WBC 10*3/mm3 5.60  --    HEMOGLOBIN g/dL 9.9*  --    PLATELETS 10*3/mm3 180  --    ALT (SGPT) U/L  --  23   AST (SGOT) U/L  --  19   PROBNP pg/mL  --  2,912.0*   PROCALCITONIN ng/mL 0.18  --      No results found for: \"CKTOTAL\", \"CKMB\", \"CKMBINDEX\", \"TROPONINI\", \"TROPONINT\"          Results from last 7 days   Lab Units 05/19/25  0443   PROCALCITONIN ng/mL 0.18                     Lab Results   Component Value Date    TSH 0.701 05/19/2025     Estimated Creatinine Clearance: 78 mL/min (by C-G formula based on SCr of 0.67 mg/dL).         Imaging:  Imaging Results (Last 24 Hours)       Procedure Component Value Units Date/Time    XR Chest 1 View [[id number]] Resulted: 05/18/25 2049     Updated: 05/18/25 2050              Current Meds:   SCHEDULE  atenolol, 50 mg, Oral, Q12H  azithromycin, 500 mg, Oral, Q24H  furosemide, 20 mg, Intravenous, Q12H  guaiFENesin, 1,200 mg, Oral, Q12H  insulin lispro, 2-7 Units, Subcutaneous, 4x Daily AC & at " Bedtime  ipratropium-albuterol, 3 mL, Nebulization, 4x Daily - RT  methylPREDNISolone sodium succinate, 40 mg, Intravenous, Q12H  mupirocin, 1 Application, Each Nare, BID  sodium chloride, 10 mL, Intravenous, Q12H      Infusions     PRNs    acetaminophen **OR** acetaminophen **OR** acetaminophen    albuterol    senna-docusate sodium **AND** polyethylene glycol **AND** bisacodyl **AND** bisacodyl    Calcium Replacement - Follow Nurse / BPA Driven Protocol    dextrose    dextrose    glucagon (human recombinant)    Magnesium Standard Dose Replacement - Follow Nurse / BPA Driven Protocol    nitroglycerin    Phosphorus Replacement - Follow Nurse / BPA Driven Protocol    Potassium Replacement - Follow Nurse / BPA Driven Protocol    sodium chloride        Aida Leroy MD  5/19/2025  08:25 EDT      Much of this encounter note is an electronic transcription/translation of spoken language to printed text using Dragon Software.

## 2025-05-19 NOTE — CONSULTS
Hematology/Oncology Inpatient Consultation    Patient name: Phuong Altamirano  : 1955  MRN: [id number]  Referring Provider: Dr. Aden  Reason for Consultation: Multiple myeloma    Chief complaint: Shortness of breath    History of present illness:    Phuong Altamirano is a 70 y.o. female who presented to [facility] on 2025 with complaints of shortness of breath.  Past medical history of hypertension, hyperlipidemia, type 2 diabetes mellitus, hypothyroidism, multiple myeloma, rheumatoid arthritis, lupus, GERD, constipation, anxiety, depression.  Patient presented as a transfer to [facility] from [facility] ED with new onset of COPD with acute exacerbation and new onset congestive heart failure.    At [facility] ED the patient presented with initial oxygen saturations in the 70s on room air.  A CTA of the chest showed no evidence of pulmonary embolism; pulmonary vascular congestion; mild diffuse patchy groundglass opacities; tiny bilateral pleural effusions; no pneumothorax; 7 mm subpleural left upper lobe lung nodule.  Patient received Solu-Medrol 125 mg IV, Lasix 40 mg IV and was transferred to [facility].    25  Hematology/Oncology was consulted on an existing patient.  Oncological history from the chart as follows:    3/20/2025: Ms. 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 In early , she had a bone marrow biopsy and aspiration that revealed equivocal results, leading to a repeat bone marrow biopsy in . This disclosed a normocellular bone marrow with trilineage hematopoiesis and 20% plasma cells. On flow cytometry, only 1% of the plasma cells revealed monoclonal restriction. Contin[facility] showed no  evidence of progression. She was last seen sometime in 2024 without new problems.Today, she reports being largely asymptomatic. She remains active and energetic, has a good appetite, and her weight has been stable. She is not experiencing fevers or diaphoresis. She does not report any chest pain. She does experience dyspnea with exertion, which she attributes to her lack of physical activity. She is not experiencing abdominal pain, diarrhea, or dysuria. On exam she is conversant and oriented. No jaundice and no distress but she appears chronically ill. No oral lesions. Respirations not labored. Lungs clear and heart regular. Abdomen protuberant and soft; the liver and spleen don't seem enlarged. No edema. Reviewed all the records and all laboratory exams. Discussed with her. 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 She might need a bone marrow aspiration and biopsy. She will have additional studies today and will see me with the results.      4/4/2025: In the office sooner than scheduled.  Her protein electrophoresis revealed a small increase in the M spike from 1.2 g/dL at the previous measurement available to 1.4 g/dL.  In addition the free lambda light chains increased from 11.1 mg/L to 309 mg/L, changing the ratio significantly.  She feels as well as she did at the time of the last visit.  On exam she is well-oriented and conversant.  She does not seem in any distress and she is not jaundiced.  The lungs are clear bilaterally and the heart regular.  The abdomen is protuberant and soft.  There is no edema.  Reviewed and discussed the laboratory exams with her.  I have asked her to allow me to obtain a bone marrow biopsy and aspiration as well as long bone survey and a 24-hour urine protein quantification and electrophoresis.  She will see me with results.     4/28/2025: She underwent a bone marrow biopsy and  aspiration without any complications. The results indicate that her bone marrow is hypercellular, with 65% of nucleated precursors being plasma cells exhibiting an abnormal immunophenotype and lambda light chain restriction. The karyotype reveals a normal female complement; however, the FISH panel indicates a complex picture with gain of chromosome 1q21, deletion of 13q, loss of MAF/16q, and aneuploidy with gain of chromosomes 7, 9, and 15.On the basis of the above, Ms. Altamirano can be considered to have multiple myeloma, despite the relatively lower monoclonal protein in the blood. In addition the gain of chromosome 1q and the loss of MAF/16q ae considered poor prognostic markers. Under the new guidelines her condition can no longer be considered only smoldering myeloma and treatment is well justified. She's to have a PET scan for staging and new immunofixation and electrophoresis as well as light chain quantification. She's to see me with results.     He/She  has a past medical history of Arthritis (2010), Depression (2010), High blood pressure, High cholesterol (2005), Hypothyroidism, and Lupus (2010).    PCP: Chula Sanchez APRN    History:  Past Medical History:   Diagnosis Date    Arthritis 2010    Depression 2010    High blood pressure     High cholesterol 2005    Hypothyroidism     Lupus 2010   ,   Past Surgical History:   Procedure Laterality Date    BREAST LUMPECTOMY Right 1983    benign    DILATATION AND CURETTAGE  1995    LAPAROSCOPIC TUBAL LIGATION  1979    OVARY SURGERY  1985    remoal of left ovary   ,   Family History   Problem Relation Age of Onset    Heart disease Father     Pancreatic cancer Sister 59    Heart disease Sister     Stroke Sister     Pneumonia Brother    ,   Social History     Tobacco Use    Smoking status: Former     Current packs/day: 0.00     Average packs/day: 1 pack/day for 21.0 years (21.0 ttl pk-yrs)     Types: Cigarettes     Start date: 1974     Quit date: 1995     Years  since quittin.4    Smokeless tobacco: Never   Vaping Use    Vaping status: Never Used   Substance Use Topics    Alcohol use: Yes    Drug use: No   ,   Medications Prior to Admission   Medication Sig Dispense Refill Last Dose/Taking    acetaminophen (TYLENOL) 325 MG tablet Take 2 tablets by mouth Every 6 (Six) Hours As Needed for Mild Pain.   2025    amLODIPine (NORVASC) 10 MG tablet Take 1 tablet by mouth Daily.  1 2025    atenolol (TENORMIN) 50 MG tablet Take 1 tablet by mouth 2 (Two) Times a Day.  1 2025    cholecalciferol (VITAMIN D3) 400 units tablet Take 1 tablet by mouth Daily.   2025    hydrALAZINE (APRESOLINE) 25 MG tablet TAKE 1 TABLET BY MOUTH TWICE A DAY: CALL OFFICE NEED CHECK UP BY 10/2/19  0 2025    hydroCHLOROthiazide 12.5 MG tablet Take 1 tablet by mouth Daily.   2025    hydroxychloroquine (PLAQUENIL) 200 MG tablet Every 12 (Twelve) Hours.   2025    levothyroxine (SYNTHROID, LEVOTHROID) 100 MCG tablet Take 1 tablet by mouth Every Morning Before Breakfast.  1 2025    meloxicam (MOBIC) 15 MG tablet TAKE ONE (1) TABLET BY MOUTH ONCE A DAY.  1 2025    metFORMIN (GLUCOPHAGE) 500 MG tablet    2025    pravastatin (PRAVACHOL) 40 MG tablet Take 1 tablet by mouth every night at bedtime.   2025    sertraline (ZOLOFT) 25 MG tablet Take 1 tablet by mouth Daily.  1 2025    ibuprofen (ADVIL,MOTRIN) 200 MG tablet Take 1 tablet by mouth Every 6 (Six) Hours As Needed for Mild Pain.   Unknown    lisinopril (PRINIVIL,ZESTRIL) 10 MG tablet Take 1 tablet by mouth Daily.       omeprazole (priLOSEC) 20 MG capsule Take 1 capsule by mouth Daily.   2025   , Scheduled Meds:  atenolol, 50 mg, Oral, Q12H  atorvastatin, 10 mg, Oral, Daily  azithromycin, 500 mg, Oral, Q24H  furosemide, 20 mg, Intravenous, Q12H  guaiFENesin, 1,200 mg, Oral, Q12H  hydroxychloroquine, 200 mg, Oral, Q12H  insulin lispro, 2-7 Units, Subcutaneous, 4x Daily AC & at  "Bedtime  ipratropium-albuterol, 3 mL, Nebulization, 4x Daily - RT  levothyroxine, 100 mcg, Oral, QAM AC  methylPREDNISolone sodium succinate, 40 mg, Intravenous, Q12H  mupirocin, 1 Application, Each Nare, BID  pantoprazole, 40 mg, Oral, Daily  sertraline, 25 mg, Oral, Daily  sodium chloride, 10 mL, Intravenous, Q12H    , Continuous Infusions:   , PRN Meds:    acetaminophen **OR** acetaminophen **OR** acetaminophen    albuterol    senna-docusate sodium **AND** polyethylene glycol **AND** bisacodyl **AND** bisacodyl    Calcium Replacement - Follow Nurse / BPA Driven Protocol    dextrose    dextrose    glucagon (human recombinant)    Magnesium Standard Dose Replacement - Follow Nurse / BPA Driven Protocol    nitroglycerin    Phosphorus Replacement - Follow Nurse / BPA Driven Protocol    Potassium Replacement - Follow Nurse / BPA Driven Protocol    sodium chloride   Allergies:  Patient has no known allergies.    Subjective     ROS:  Review of Systems     Objective   Vital Signs:   /59 (BP Location: Right arm, Patient Position: Sitting)   Pulse 88   Temp 97.7 °F (36.5 °C) (Oral)   Resp 16   Ht 154.9 cm (61\")   Wt 86.3 kg (190 lb 4.1 oz)   LMP 10/17/2014   SpO2 95%   BMI 35.95 kg/m²     Physical Exam: (performed by MD)  Physical Exam    Results Review:  Lab Results (last 48 hours)       Procedure Component Value Units Date/Time    POC Glucose 4x Daily Before Meals & at Bedtime [[id number]]  (Abnormal) Collected: 05/19/25 0748    Specimen: Blood Updated: 05/19/25 0754     Glucose 206 mg/dL      Comment: Serial Number: [id number]:  [id number]       Basic Metabolic Panel [[id number]]  (Abnormal) Collected: 05/19/25 0443    Specimen: Blood from Arm, Right Updated: 05/19/25 0535     Glucose 181 mg/dL      BUN 29 mg/dL      Creatinine 0.67 mg/dL      Sodium 140 mmol/L      Potassium 4.7 mmol/L      Chloride 100 mmol/L      CO2 31.0 mmol/L      Calcium 9.2 mg/dL      BUN/Creatinine Ratio 43.3     Anion Gap 9.0 " "mmol/L      eGFR 94.2 mL/min/1.73     Narrative:      GFR Categories in Chronic Kidney Disease (CKD)              GFR Category          GFR (mL/min/1.73)    Interpretation  G1                    90 or greater        Normal or high (1)  G2                    60-89                Mild decrease (1)  G3a                   45-59                Mild to moderate decrease  G3b                   30-44                Moderate to severe decrease  G4                    15-29                Severe decrease  G5                    14 or less           Kidney failure    (1)In the absence of evidence of kidney disease, neither GFR category G1 or G2 fulfill the criteria for CKD.    eGFR calculation 2021 CKD-EPI creatinine equation, which does not include race as a factor    Magnesium [[id number]]  (Normal) Collected: 05/19/25 0443    Specimen: Blood from Arm, Right Updated: 05/19/25 0535     Magnesium 2.3 mg/dL     Procalcitonin [[id number]]  (Normal) Collected: 05/19/25 0443    Specimen: Blood from Arm, Right Updated: 05/19/25 0535     Procalcitonin 0.18 ng/mL     Narrative:      As a Marker for Sepsis (Non-Neonates):    1. <0.5 ng/mL represents a low risk of severe sepsis and/or septic shock.  2. >2 ng/mL represents a high risk of severe sepsis and/or septic shock.    As a Marker for Lower Respiratory Tract Infections that require antibiotic therapy:    PCT on Admission    Antibiotic Therapy       6-12 Hrs later    >0.5                Strongly Recommended  >0.25 - <0.5        Recommended   0.1 - 0.25          Discouraged              Remeasure/reassess PCT  <0.1                Strongly Discouraged     Remeasure/reassess PCT    As 28 day mortality risk marker: \"Change in Procalcitonin Result\" (>80% or <=80%) if Day 0 (or Day 1) and Day 4 values are available. Refer to http://www.[employer]-pct-calculator.com    Change in PCT <=80%  A decrease of PCT levels below or equal to 80% defines a positive change in PCT test result representing a " higher risk for 28-day all-cause mortality of patients diagnosed with severe sepsis for septic shock.    Change in PCT >80%  A decrease of PCT levels of more than 80% defines a negative change in PCT result representing a lower risk for 28-day all-cause mortality of patients diagnosed with severe sepsis or septic shock.       TSH Rfx On Abnormal To Free T4 [[id number]]  (Normal) Collected: 05/19/25 0443    Specimen: Blood from Arm, Right Updated: 05/19/25 0535     TSH 0.701 uIU/mL     Lipid Panel [[id number]]  (Abnormal) Collected: 05/19/25 0443    Specimen: Blood from Arm, Right Updated: 05/19/25 0535     Total Cholesterol 163 mg/dL      Triglycerides 98 mg/dL      HDL Cholesterol 31 mg/dL      LDL Cholesterol  114 mg/dL      VLDL Cholesterol 18 mg/dL      LDL/HDL Ratio 3.63    Narrative:      Cholesterol Reference Ranges  (U.S. Department of Health and Human Services ATP III Classifications)    Desirable          <200 mg/dL  Borderline High    200-239 mg/dL  High Risk          >240 mg/dL      Triglyceride Reference Ranges  (U.S. Department of Health and Human Services ATP III Classifications)    Normal           <150 mg/dL  Borderline High  150-199 mg/dL  High             200-499 mg/dL  Very High        >500 mg/dL    HDL Reference Ranges  (U.S. Department of Health and Human Services ATP III Classifications)    Low     <40 mg/dl (major risk factor for CHD)  High    >60 mg/dl ('negative' risk factor for CHD)        LDL Reference Ranges  (U.S. Department of Health and Human Services ATP III Classifications)    Optimal          <100 mg/dL  Near Optimal     100-129 mg/dL  Borderline High  130-159 mg/dL  High             160-189 mg/dL  Very High        >189 mg/dL    LDL is calculated using the NIH LDL-C calculation.      Hemoglobin A1c [[id number]]  (Abnormal) Collected: 05/19/25 0443    Specimen: Blood from Arm, Right Updated: 05/19/25 0517     Hemoglobin A1C 6.06 %     Narrative:      Hemoglobin A1C Ranges:    Increased  Risk for Diabetes  5.7% to 6.4%  Diabetes                     >= 6.5%  Diabetic Goal                < 7.0%    CBC & Differential [[id number]]  (Abnormal) Collected: 05/19/25 0443    Specimen: Blood from Arm, Right Updated: 05/19/25 0457    Narrative:      The following orders were created for panel order CBC & Differential.  Procedure                               Abnormality         Status                     ---------                               -----------         ------                     CBC Auto Differential[[id number]]        Abnormal            Final result                 Please view results for these tests on the individual orders.    CBC Auto Differential [[id number]]  (Abnormal) Collected: 05/19/25 0443    Specimen: Blood from Arm, Right Updated: 05/19/25 0457     WBC 5.60 10*3/mm3      RBC 3.54 10*6/mm3      Hemoglobin 9.9 g/dL      Hematocrit 32.0 %      MCV 90.4 fL      MCH 28.0 pg      MCHC 30.9 g/dL      RDW 13.2 %      RDW-SD 43.4 fl      MPV 10.3 fL      Platelets 180 10*3/mm3      Neutrophil % 82.9 %      Lymphocyte % 12.3 %      Monocyte % 4.1 %      Eosinophil % 0.0 %      Basophil % 0.2 %      Immature Grans % 0.5 %      Neutrophils, Absolute 4.64 10*3/mm3      Lymphocytes, Absolute 0.69 10*3/mm3      Monocytes, Absolute 0.23 10*3/mm3      Eosinophils, Absolute 0.00 10*3/mm3      Basophils, Absolute 0.01 10*3/mm3      Immature Grans, Absolute 0.03 10*3/mm3      nRBC 0.0 /100 WBC     POC Glucose Once [[id number]]  (Abnormal) Collected: 05/18/25 2141    Specimen: Blood Updated: 05/18/25 2143     Glucose 172 mg/dL      Comment: Serial Number: [id number]:  [id number]       Comprehensive Metabolic Panel [[id number]]  (Abnormal) Collected: 05/18/25 1818    Specimen: Blood Updated: 05/18/25 1844     Glucose 206 mg/dL      BUN 28 mg/dL      Creatinine 0.85 mg/dL      Sodium 139 mmol/L      Potassium 4.1 mmol/L      Chloride 97 mmol/L      CO2 27.7 mmol/L      Calcium 9.3 mg/dL      Total Protein  7.6 g/dL      Albumin 3.8 g/dL      ALT (SGPT) 23 U/L      AST (SGOT) 19 U/L      Alkaline Phosphatase 77 U/L      Total Bilirubin 0.4 mg/dL      Globulin 3.8 gm/dL      A/G Ratio 1.0 g/dL      BUN/Creatinine Ratio 32.9     Anion Gap 14.3 mmol/L      eGFR 73.8 mL/min/1.73     Narrative:      GFR Categories in Chronic Kidney Disease (CKD)              GFR Category          GFR (mL/min/1.73)    Interpretation  G1                    90 or greater        Normal or high (1)  G2                    60-89                Mild decrease (1)  G3a                   45-59                Mild to moderate decrease  G3b                   30-44                Moderate to severe decrease  G4                    15-29                Severe decrease  G5                    14 or less           Kidney failure    (1)In the absence of evidence of kidney disease, neither GFR category G1 or G2 fulfill the criteria for CKD.    eGFR calculation 2021 CKD-EPI creatinine equation, which does not include race as a factor    BNP [[id number]]  (Abnormal) Collected: 05/18/25 1818    Specimen: Blood Updated: 05/18/25 1844     proBNP 2,912.0 pg/mL     Narrative:      This assay is used as an aid in the diagnosis of individuals suspected of having heart failure. It can be used as an aid in the diagnosis of acute decompensated heart failure (ADHF) in patients presenting with signs and symptoms of ADHF to the emergency department (ED). In addition, NT-proBNP of <300 pg/mL indicates ADHF is not likely.    Age Range Result Interpretation  NT-proBNP Concentration (pg/mL:      <50             Positive            >450                   Gray                 300-450                    Negative             <300    50-75           Positive            >900                  Gray                300-900                  Negative            <300      >75             Positive            >1800                  Gray                300-1800                  Negative             <300    POC Glucose Once [[id number]]  (Abnormal) Collected: 05/18/25 1612    Specimen: Blood Updated: 05/18/25 1614     Glucose 204 mg/dL      Comment: Serial Number: [id number]:  [id number]       POC Glucose Once [[id number]]  (Abnormal) Collected: 05/18/25 1153    Specimen: Blood Updated: 05/18/25 1155     Glucose 171 mg/dL      Comment: Serial Number: [id number]:  [id number]                Pending Results:     Imaging Reviewed:   XR Chest 1 View  Result Date: 5/19/2025  Impression: Borderline cardiac enlargement. No acute chest finding. Electronically Signed: Tita Garcia MD  5/19/2025 8:29 AM EDT  Workstation ID: [id number]           Assessment & Plan   ASSESSMENT  Multiple myeloma; greater than 60% plasma cells on her bone marrow biopsy.  This is sufficient to diagnose her with multiple myeloma despite the relatively low levels of monoclonal protein.  Due to this and the high risk factors and her cytogenetics treatment is recommended.  PET/CT reviewed from 5/6/2025 with no evidence of hypermetabolic or suspicious lytic osseous lesions and no evidence of soft tissue plasmacytoma.  Mildly prominent left axillary lymph nodes without associated hypermetabolic activity.  Small 4 mm left upper lobe pulmonary nodule without associated hypermetabolic activity.  New onset COPD with exacerbation: Pulmonology has been consulted per the primary team and supportive care in place.  New onset congestive heart failure: 2D echocardiogram pending.  Cardiology has been consulted by the primary team.    PLAN  As above    Note prepared for Dr. Barrett.  Further assessment and planning per the MD.  Electronically signed by STANLEY Bee, 05/19/25, 8:14 AM EDT.    5/19/2025: Ms. Altamirano is an established patient of mine. For some time she had been followed for what had been diagnosed with monoclonal gammopathy of undetermined significance. 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 At the time of the first visit with me she had an increase in the monoclonal bland and a decision was made to obtain staging studies again. This time her bone marrow contains at least 60% abnormal plasma cells. There is no suggestion of end organ damage and her condition can still be classified as smoldering myeloma but she is at high risk of developing complications and it is reasonable at this time to start treatment.  I had a long discussion with her regarding the options available. On exam she is alert, conversant and oriented. No distress. No jaundice. No oral lesions. Respirations not labored. Lungs diminished and heart regular. Abdomen soft and minimal edema at this time. Reviewed the laboratory exams. To continue treatment for her congestive heart failure and to see me in the office after discharge.   I reviewed the records with Ms. Minaya and concur with her note. I formulated the analysis and the plans.     Neftali Barrett MD on 5/19/2025 at 17:58

## 2025-05-19 NOTE — PROGRESS NOTES
[facility] MEDICINE SERVICE  DAILY PROGRESS NOTE    NAME: Phuong Altamirano  : 1955  MRN: [id number]      LOS: 1 day     PROVIDER OF SERVICE: Subhash Aden MD    Chief Complaint: COPD with acute exacerbation    Subjective:     Interval History:  History taken from: patient chart family RN    Shortness of breath lower extremity swelling and oxygen requirements improved since yesterday        Review of Systems:   Review of Systems  All negative except as above  Objective:     Vital Signs  Temp:  [97.4 °F (36.3 °C)-98.1 °F (36.7 °C)] 97.6 °F (36.4 °C)  Heart Rate:  [] 88  Resp:  [15-30] 15  BP: ()/(56-83) 130/73  Flow (L/min) (Oxygen Therapy):  [2-3] 2   Body mass index is 35.95 kg/m².    Physical Exam  Physical Exam  AO x 3 NAD  RRR S1-S2 audible  Lungs with improved air entry  Abdomen soft nontender nondistended     Diagnostic Data    Results from last 7 days   Lab Units 25  0443 25  1818   WBC 10*3/mm3 5.60  --    HEMOGLOBIN g/dL 9.9*  --    HEMATOCRIT % 32.0*  --    PLATELETS 10*3/mm3 180  --    GLUCOSE mg/dL 181* 206*   CREATININE mg/dL 0.67 0.85   BUN mg/dL 29* 28*   SODIUM mmol/L 140 139   POTASSIUM mmol/L 4.7 4.1   AST (SGOT) U/L  --  19   ALT (SGPT) U/L  --  23   ALK PHOS U/L  --  77   BILIRUBIN mg/dL  --  0.4   ANION GAP mmol/L 9.0 14.3       XR Chest 1 View  Result Date: 2025  Impression: Borderline cardiac enlargement. No acute chest finding. Electronically Signed: Tita Garcia MD  2025 8:29 AM EDT  Workstation ID: [id number]        I reviewed the patient's new clinical results.  I reviewed the patient's new imaging results and agree with the interpretation.    Assessment/Plan:     Active and Resolved Problems  Active Hospital Problems    Diagnosis  POA    **COPD with acute exacerbation [J44.1]  Yes      Resolved Hospital Problems   No resolved problems to display.       70-year-old female with history of hypertension, hyperlipidemia, DM2,  hypothyroidism, MGUS, MM, rheumatoid arthritis, lupus, GERD, anxiety/depression admitted to [facility] 5/18 with worsening dyspnea and lower extremity swelling    #Acute hypoxic respiratory failure secondary to acute HFpEF  #Suspected COPD-undiagnosed   #Left upper lobe subpleural nodule  #Hypertension  Patient recalls not being diagnosed of congestive heart failure in the past  She has history of tobacco abuse quit 30 years ago smoked for 25 years.  Not formally diagnosed of COPD in the past  CTA chest no PE.  Pulmonary vascular congestion.  Mild diffuse patchy groundglass opacities.  7 mm subpleural left upper lobe nodule  TTE with EF 56 to 60%.    Pulmonary cardiology following  On IV Lasix 20 mg twice daily monitor I's and O's  On IV Solu-Medrol 40 every 12  Will benefit from outpatient PFTs  Continue current nebs  Azithromycin 500 mg for total of 3 days  Continue supplemental oxygen as needed to keep SpO2 more than 90 wean off as tolerated  Continue atenolol 50 every 12  Amlodipine and hydrochlorothiazide on hold  Restart lisinopril as BP permits  Continue statins    #DM2  Hold OHA  A1c 6.06  Continue ISS lispro  Anticipate high blood glucose readings while patient on steroids    #History of MGUS  #MM  Outpatient PET scan 5/6 reviewed  Oncology following consulted on admission will follow    #History of rheumatoid arthritis  Resume home dose of Plaquenil    #Hypothyroidism  TSH within normal limits  Resume home dose of levothyroxine 100 mcg daily    #GERD  Continue PPI    #Anxiety/depression  Resume home Zoloft    VTE Prophylaxis:  Mechanical VTE prophylaxis orders are present.             Disposition Planning:     Barriers to Discharge:medical workup   Anticipated Date of Discharge: 5/21  Place of Discharge: home      Time: 50 minutes     Code Status and Medical Interventions: CPR (Attempt to Resuscitate); Full Support   Ordered at: 05/18/25 1422     Code Status (Patient has no pulse and is not breathing):     CPR (Attempt to Resuscitate)     Medical Interventions (Patient has pulse or is breathing):    Full Support     Level Of Support Discussed With:    Patient       Signature: Electronically signed by Subhash Aden MD, 05/19/25, 08:37 EDT.  [facility]

## 2025-05-19 NOTE — PROGRESS NOTES
COPD Education  COPD Navigator  Discharge Planning    Patient Name:Phuong Altamirano  :1955  Pulmonologist: Never seen pulm   Current Admission Date: 2025   Previous Admission: None  Admission frequency: 1 admissions in 6 months      Symptoms on admission:Shortness of breath      Smoking History: quit smoking 30 years ago.   21 PY    Current Home Medications:  Prior to Admission medications    Medication Sig Start Date End Date Taking? Authorizing Provider   acetaminophen (TYLENOL) 325 MG tablet Take 2 tablets by mouth Every 6 (Six) Hours As Needed for Mild Pain.   Yes Jagruti Reynoso MD   amLODIPine (NORVASC) 10 MG tablet Take 1 tablet by mouth Daily. 10/1/19  Yes Jagruti Reynoso MD   atenolol (TENORMIN) 50 MG tablet Take 1 tablet by mouth 2 (Two) Times a Day. 19  Yes Jagruti Reynoso MD   cholecalciferol (VITAMIN D3) 400 units tablet Take 1 tablet by mouth Daily.   Yes Jagruti Reynoso MD   hydrALAZINE (APRESOLINE) 25 MG tablet TAKE 1 TABLET BY MOUTH TWICE A DAY: CALL OFFICE NEED CHECK UP BY 10/2/19 9/10/19  Yes Jagruti Reynoso MD   hydroCHLOROthiazide 12.5 MG tablet Take 1 tablet by mouth Daily. 23  Yes Jagruti Reynoso MD   hydroxychloroquine (PLAQUENIL) 200 MG tablet Every 12 (Twelve) Hours. 14  Yes Jagruti Reynoso MD   levothyroxine (SYNTHROID, LEVOTHROID) 100 MCG tablet Take 1 tablet by mouth Every Morning Before Breakfast. 9/15/19  Yes Jagruti Reynoso MD   meloxicam (MOBIC) 15 MG tablet TAKE ONE (1) TABLET BY MOUTH ONCE A DAY. 19  Yes Jagruti Reynoso MD   metFORMIN (GLUCOPHAGE) 500 MG tablet    Yes Jagruti Reynoso MD   pravastatin (PRAVACHOL) 40 MG tablet Take 1 tablet by mouth every night at bedtime. 25  Yes Jagruti Reynoso MD   sertraline (ZOLOFT) 25 MG tablet Take 1 tablet by mouth Daily. 19  Yes Jagruti Reynoso MD   ibuprofen (ADVIL,MOTRIN) 200 MG tablet Take 1 tablet by mouth Every 6 (Six)  "Hours As Needed for Mild Pain.    Provider, MD Jagruti   lisinopril (PRINIVIL,ZESTRIL) 10 MG tablet Take 1 tablet by mouth Daily. 4/18/23   Jagruti Reynoso MD   omeprazole (priLOSEC) 20 MG capsule Take 1 capsule by mouth Daily.    ProviderJagruti MD       Social history:   Pt lives home alone. She is employed full time.     Diagnostics Testing:  XR CHEST 1 VW     Date of Exam: 5/18/2025 8:49 PM EDT     Indication: gomez     Comparison: Whole-body PET/CT 5/6/2025, no prior chest x-ray for comparison at this institution.     Findings:  Heart size is upper limits of normal. Pulmonary vascular distribution is normal. There is no acute airspace disease, pleural effusion, pneumothorax or acute osseous abnormality. Benign calcified granulomatous changes are present.     IMPRESSION:  Impression:  Borderline cardiac enlargement.  No acute chest finding.        Electronically Signed: Tita Garcia MD    5/19/2025 8:29 AM EDT    Workstation ID: [id number]  Last PFT/when/where?  None  FVC:   FEV1:   FEV1/FVC:   Classification of Airflow Limitation Severity in COPD (Based on Post-Bronchodilator FEV1)  Gold 1: Mild FEV1 >= 80% predicted   Gold 2:  Moderate 50% <= FEV1 < 80% predicted   Gold 3: Severe 30% >= FEV1 < 50% predicted   Gold 4: Very Severe FEV1 < 30% predicted     Patient's Last ABG:  No results found for: \"SITE\", \"ALLENTEST\", \"PHART\", \"TSF2AYV\", \"PO2ART\", \"SZY6NZQ\", \"BASEEXCESS\", \"W4JSXMLW\", \"HGBBG\", \"HCTABG\", \"OXYHEMOGLOBI\", \"METHHGBN\", \"CARBOXYHGB\", \"CO2CT\", \"BAROMETRIC\", \"MODALITY\", \"FIO2\"     Patient Assessment:   Pt sitting up in chair. Resp even and unlabored.     SpO2 SpO2: 95 % (05/19/25 0751)  Device Device (Oxygen Therapy): room air (05/19/25 1200)  Flow Flow (L/min) (Oxygen Therapy): 2 (05/19/25 0400)  Home NIPPV Compliance: No  Home Equipment Used:  Provided by:      Low-Dose CT Lung Cancer Screening  Smoker > 20 PY NO   Age > 50 NO   Smoker quit within the last 15 years. NO     Action " Plan:  PFT YES  According to 2025 guidelines, the gold standard for diagnosing COPD is spirometry.    Pulmonary Rehab NO   Follow up in Pulmonary Clinic YES   Smoking Cessation     NO   Low-Dose CT Cancer Screening                                                NO    Identified needs/barriers:   Pt request to see Dr. Leroy at [place] office. Pt will call to set up an appointment. She also prefers to do PFT at [place] as well. Spoke with Dr. Leroy and he will order PFT at outpatient visit.     Maida Harris, RRT  COPD Navigator  05/19/25  15:35 EDT

## 2025-05-20 ENCOUNTER — TELEPHONE (OUTPATIENT)
Dept: CARDIOLOGY | Facility: CLINIC | Age: 70
End: 2025-05-20

## 2025-05-20 ENCOUNTER — TELEPHONE (OUTPATIENT)
Dept: CARDIOLOGY | Facility: CLINIC | Age: 70
End: 2025-05-20
Payer: MEDICARE

## 2025-05-20 VITALS
TEMPERATURE: 97.6 F | OXYGEN SATURATION: 94 % | WEIGHT: 190.26 LBS | RESPIRATION RATE: 22 BRPM | SYSTOLIC BLOOD PRESSURE: 128 MMHG | DIASTOLIC BLOOD PRESSURE: 55 MMHG | HEIGHT: 61 IN | BODY MASS INDEX: 35.92 KG/M2 | HEART RATE: 64 BPM

## 2025-05-20 LAB
ANION GAP SERPL CALCULATED.3IONS-SCNC: 7.7 MMOL/L (ref 5–15)
B PARAPERT DNA SPEC QL NAA+PROBE: NOT DETECTED
B PERT DNA SPEC QL NAA+PROBE: NOT DETECTED
BASOPHILS # BLD AUTO: 0 10*3/MM3 (ref 0–0.2)
BASOPHILS NFR BLD AUTO: 0 % (ref 0–1.5)
BUN SERPL-MCNC: 33 MG/DL (ref 8–23)
BUN/CREAT SERPL: 50 (ref 7–25)
C PNEUM DNA NPH QL NAA+NON-PROBE: NOT DETECTED
CALCIUM SPEC-SCNC: 9.3 MG/DL (ref 8.6–10.5)
CHLORIDE SERPL-SCNC: 97 MMOL/L (ref 98–107)
CO2 SERPL-SCNC: 30.3 MMOL/L (ref 22–29)
CREAT SERPL-MCNC: 0.66 MG/DL (ref 0.57–1)
DEPRECATED RDW RBC AUTO: 43.7 FL (ref 37–54)
EGFRCR SERPLBLD CKD-EPI 2021: 94.5 ML/MIN/1.73
EOSINOPHIL # BLD AUTO: 0 10*3/MM3 (ref 0–0.4)
EOSINOPHIL NFR BLD AUTO: 0 % (ref 0.3–6.2)
ERYTHROCYTE [DISTWIDTH] IN BLOOD BY AUTOMATED COUNT: 13.3 % (ref 12.3–15.4)
FLUAV SUBTYP SPEC NAA+PROBE: NOT DETECTED
FLUBV RNA ISLT QL NAA+PROBE: NOT DETECTED
GLUCOSE BLDC GLUCOMTR-MCNC: 208 MG/DL (ref 70–105)
GLUCOSE BLDC GLUCOMTR-MCNC: 242 MG/DL (ref 70–105)
GLUCOSE SERPL-MCNC: 243 MG/DL (ref 65–99)
HADV DNA SPEC NAA+PROBE: NOT DETECTED
HCOV 229E RNA SPEC QL NAA+PROBE: NOT DETECTED
HCOV HKU1 RNA SPEC QL NAA+PROBE: NOT DETECTED
HCOV NL63 RNA SPEC QL NAA+PROBE: NOT DETECTED
HCOV OC43 RNA SPEC QL NAA+PROBE: NOT DETECTED
HCT VFR BLD AUTO: 32.8 % (ref 34–46.6)
HGB BLD-MCNC: 10.3 G/DL (ref 12–15.9)
HMPV RNA NPH QL NAA+NON-PROBE: NOT DETECTED
HPIV1 RNA ISLT QL NAA+PROBE: NOT DETECTED
HPIV2 RNA SPEC QL NAA+PROBE: NOT DETECTED
HPIV3 RNA NPH QL NAA+PROBE: NOT DETECTED
HPIV4 P GENE NPH QL NAA+PROBE: NOT DETECTED
IMM GRANULOCYTES # BLD AUTO: 0.06 10*3/MM3 (ref 0–0.05)
IMM GRANULOCYTES NFR BLD AUTO: 0.8 % (ref 0–0.5)
LYMPHOCYTES # BLD AUTO: 0.72 10*3/MM3 (ref 0.7–3.1)
LYMPHOCYTES NFR BLD AUTO: 9.4 % (ref 19.6–45.3)
M PNEUMO IGG SER IA-ACNC: NOT DETECTED
MAGNESIUM SERPL-MCNC: 2.3 MG/DL (ref 1.6–2.4)
MCH RBC QN AUTO: 28.1 PG (ref 26.6–33)
MCHC RBC AUTO-ENTMCNC: 31.4 G/DL (ref 31.5–35.7)
MCV RBC AUTO: 89.6 FL (ref 79–97)
MONOCYTES # BLD AUTO: 0.46 10*3/MM3 (ref 0.1–0.9)
MONOCYTES NFR BLD AUTO: 6 % (ref 5–12)
NEUTROPHILS NFR BLD AUTO: 6.42 10*3/MM3 (ref 1.7–7)
NEUTROPHILS NFR BLD AUTO: 83.8 % (ref 42.7–76)
NRBC BLD AUTO-RTO: 0 /100 WBC (ref 0–0.2)
PHOSPHATE SERPL-MCNC: 3.6 MG/DL (ref 2.5–4.5)
PLATELET # BLD AUTO: 214 10*3/MM3 (ref 140–450)
PMV BLD AUTO: 10.1 FL (ref 6–12)
POTASSIUM SERPL-SCNC: 4.5 MMOL/L (ref 3.5–5.2)
RBC # BLD AUTO: 3.66 10*6/MM3 (ref 3.77–5.28)
RHINOVIRUS RNA SPEC NAA+PROBE: NOT DETECTED
RSV RNA NPH QL NAA+NON-PROBE: NOT DETECTED
SARS-COV-2 RNA RESP QL NAA+PROBE: NOT DETECTED
SODIUM SERPL-SCNC: 135 MMOL/L (ref 136–145)
WBC NRBC COR # BLD AUTO: 7.66 10*3/MM3 (ref 3.4–10.8)

## 2025-05-20 PROCEDURE — 99232 SBSQ HOSP IP/OBS MODERATE 35: CPT | Performed by: INTERNAL MEDICINE

## 2025-05-20 PROCEDURE — 25010000002 FUROSEMIDE PER 20 MG: Performed by: INTERNAL MEDICINE

## 2025-05-20 PROCEDURE — 83735 ASSAY OF MAGNESIUM: CPT | Performed by: HOSPITALIST

## 2025-05-20 PROCEDURE — 63710000001 INSULIN LISPRO (HUMAN) PER 5 UNITS

## 2025-05-20 PROCEDURE — 25010000002 METHYLPREDNISOLONE PER 40 MG

## 2025-05-20 PROCEDURE — 94618 PULMONARY STRESS TESTING: CPT

## 2025-05-20 PROCEDURE — 84100 ASSAY OF PHOSPHORUS: CPT | Performed by: HOSPITALIST

## 2025-05-20 PROCEDURE — 80048 BASIC METABOLIC PNL TOTAL CA: CPT | Performed by: HOSPITALIST

## 2025-05-20 PROCEDURE — 94761 N-INVAS EAR/PLS OXIMETRY MLT: CPT

## 2025-05-20 PROCEDURE — 82948 REAGENT STRIP/BLOOD GLUCOSE: CPT

## 2025-05-20 PROCEDURE — 85025 COMPLETE CBC W/AUTO DIFF WBC: CPT | Performed by: HOSPITALIST

## 2025-05-20 PROCEDURE — 94799 UNLISTED PULMONARY SVC/PX: CPT

## 2025-05-20 PROCEDURE — 0202U NFCT DS 22 TRGT SARS-COV-2: CPT | Performed by: INTERNAL MEDICINE

## 2025-05-20 RX ORDER — CIPROFLOXACIN 500 MG/1
500 TABLET, FILM COATED ORAL 2 TIMES DAILY
Qty: 10 TABLET | Refills: 0 | Status: SHIPPED | OUTPATIENT
Start: 2025-05-20 | End: 2025-05-25

## 2025-05-20 RX ORDER — PREDNISONE 20 MG/1
30 TABLET ORAL DAILY
Qty: 3 TABLET | Refills: 0 | Status: SHIPPED | OUTPATIENT
Start: 2025-05-21 | End: 2025-05-23

## 2025-05-20 RX ORDER — FUROSEMIDE 20 MG/1
40 TABLET ORAL DAILY
Qty: 60 TABLET | Refills: 0 | Status: SHIPPED | OUTPATIENT
Start: 2025-05-20 | End: 2025-06-19

## 2025-05-20 RX ADMIN — GUAIFENESIN 1200 MG: 600 TABLET, EXTENDED RELEASE ORAL at 08:52

## 2025-05-20 RX ADMIN — METHYLPREDNISOLONE SODIUM SUCCINATE 40 MG: 40 INJECTION, POWDER, FOR SOLUTION INTRAMUSCULAR; INTRAVENOUS at 11:52

## 2025-05-20 RX ADMIN — Medication 10 ML: at 09:48

## 2025-05-20 RX ADMIN — ATENOLOL 50 MG: 50 TABLET ORAL at 08:53

## 2025-05-20 RX ADMIN — Medication 10 ML: at 01:06

## 2025-05-20 RX ADMIN — SERTRALINE HYDROCHLORIDE 25 MG: 25 TABLET, FILM COATED ORAL at 08:53

## 2025-05-20 RX ADMIN — PANTOPRAZOLE SODIUM 40 MG: 40 TABLET, DELAYED RELEASE ORAL at 08:52

## 2025-05-20 RX ADMIN — METHYLPREDNISOLONE SODIUM SUCCINATE 40 MG: 40 INJECTION, POWDER, FOR SOLUTION INTRAMUSCULAR; INTRAVENOUS at 01:05

## 2025-05-20 RX ADMIN — HYDROXYCHLOROQUINE SULFATE 200 MG: 200 TABLET, FILM COATED ORAL at 09:47

## 2025-05-20 RX ADMIN — MUPIROCIN 1 APPLICATION: 20 OINTMENT TOPICAL at 08:53

## 2025-05-20 RX ADMIN — LEVOTHYROXINE SODIUM 100 MCG: 100 TABLET ORAL at 06:18

## 2025-05-20 RX ADMIN — FUROSEMIDE 20 MG: 10 INJECTION, SOLUTION INTRAMUSCULAR; INTRAVENOUS at 08:53

## 2025-05-20 RX ADMIN — ATORVASTATIN CALCIUM 10 MG: 10 TABLET ORAL at 08:52

## 2025-05-20 RX ADMIN — INSULIN LISPRO 3 UNITS: 100 INJECTION, SOLUTION INTRAVENOUS; SUBCUTANEOUS at 08:52

## 2025-05-20 RX ADMIN — INSULIN LISPRO 3 UNITS: 100 INJECTION, SOLUTION INTRAVENOUS; SUBCUTANEOUS at 11:52

## 2025-05-20 NOTE — PROCEDURES
Exercise Oximetry    Patient Name:Phuong Altamirano   MRN: [id number]   Date: 05/20/25             ROOM AIR BASELINE   SpO2% 95   Heart Rate 97   Blood Pressure      EXERCISE ON ROOM AIR SpO2% EXERCISE ON O2 @ LPM SpO2%   1 MINUTE 95 1 MINUTE    2 MINUTES 94 2 MINUTES    3 MINUTES 93 3 MINUTES    4 MINUTES 93 4 MINUTES    5 MINUTES 92 5 MINUTES    6 MINUTES 89 6 MINUTES               Distance Walked   Distance Walked   Dyspnea (Caitlin Scale)  Dyspnea (Caitlin Scale)   Fatigue (Caitlin Scale)   Fatigue (Caitlin Scale)   SpO2% Post Exercise   SpO2% Post Exercise   HR Post Exercise   HR Post Exercise   Time to Recovery   Time to Recovery     Comments: Patient does not need 02 for home

## 2025-05-20 NOTE — CASE MANAGEMENT/SOCIAL WORK
Case Management Discharge Note      Final Note: CM reviewed chart documentation for clinical updates. DC orders in place. No additional service needs identified. On room air. No new high cost meds. No barriers.      Transportation Services  Private: Car (with family)    Final Discharge Disposition Code: 01 - home or self-care    Ghazala Alvarez RN    Office Phone: [phone]  Office Cell:     [phone]

## 2025-05-20 NOTE — PROGRESS NOTES
Enter Query Response Below      Query Response: Patient has HFpEF which is secondary to hypertension      Electronically signed by Daniele Sims MD, 05/20/25, 10:57 AM EDT.               If applicable, please update the problem list.

## 2025-05-20 NOTE — CONSULTS
"Heart Failure Program  Nurse Navigator  Discharge Planning    Patient Name:Phuong Altamirano  :1955  Cardiologist:Levi  Current Admission Date: 2025   Previous Admission:    Admission frequency: 1 admissions in 6 months    Heart Failure history per record:    Symptoms on admission:c/o SOA, low O2 sats per pt. Denies increase edema, ankles puffy per pt but better than usual.      Admission Weight:  Flowsheet Rows      Flowsheet Row First Filed Value   Admission Height 180.3 cm (71\") Documented at 2025 1142   Admission Weight 85.6 kg (188 lb 11.4 oz) Documented at 2025 1142              Current Home Medications:  Prior to Admission medications    Medication Sig Start Date End Date Taking? Authorizing Provider   atenolol (TENORMIN) 50 MG tablet Take 1 tablet by mouth 2 (Two) Times a Day. 19  Yes Jagruti Reynoso MD   cholecalciferol (VITAMIN D3) 400 units tablet Take 1 tablet by mouth Daily.   Yes Jagruti Reynoso MD   hydroxychloroquine (PLAQUENIL) 200 MG tablet Every 12 (Twelve) Hours. 14  Yes Jagruti Reynoso MD   levothyroxine (SYNTHROID, LEVOTHROID) 100 MCG tablet Take 1 tablet by mouth Every Morning Before Breakfast. 9/15/19  Yes Jagruti Reynoso MD   lisinopril (PRINIVIL,ZESTRIL) 10 MG tablet Take 1 tablet by mouth Daily. 23  Yes Jagruti Reynoso MD   metFORMIN (GLUCOPHAGE) 500 MG tablet    Yes Jagruti Reynoso MD   pravastatin (PRAVACHOL) 40 MG tablet Take 1 tablet by mouth every night at bedtime. 25  Yes Jagruti Reynoso MD   sertraline (ZOLOFT) 25 MG tablet Take 1 tablet by mouth Daily. 19  Yes Jagruti Reynoso MD   amLODIPine (NORVASC) 10 MG tablet Take 1 tablet by mouth Daily. 10/1/19 5/20/25 Yes Jagruti Reynoso MD   hydrALAZINE (APRESOLINE) 25 MG tablet TAKE 1 TABLET BY MOUTH TWICE A DAY: CALL OFFICE NEED CHECK UP BY 10/2/19 9/10/19 5/20/25 Yes Jagruti Reynoso MD   hydroCHLOROthiazide 12.5 MG tablet Take " 1 tablet by mouth Daily. 4/18/23 5/20/25 Yes Jagruti Reynoso MD   meloxicam (MOBIC) 15 MG tablet TAKE ONE (1) TABLET BY MOUTH ONCE A DAY. 8/24/19 5/20/25 Yes Jagruti Reynoso MD   acetaminophen (TYLENOL) 325 MG tablet Take 2 tablets by mouth Every 6 (Six) Hours As Needed for Mild Pain.    Jagruti Reynoso MD   ciprofloxacin (Cipro) 500 MG tablet Take 1 tablet by mouth 2 (Two) Times a Day for 5 days. 5/20/25 5/25/25  Subhash Aden MD   furosemide (Lasix) 20 MG tablet Take 2 tablets by mouth Daily for 30 days. 5/20/25 6/19/25  Subhash Aden MD   ibuprofen (ADVIL,MOTRIN) 200 MG tablet Take 1 tablet by mouth Every 6 (Six) Hours As Needed for Mild Pain.    Jagruti Reynoso MD   omeprazole (priLOSEC) 20 MG capsule Take 1 capsule by mouth Daily.    Jagruti Reynoso MD   predniSONE (DELTASONE) 20 MG tablet Take 1.5 tablets by mouth Daily for 2 days. 5/21/25 5/23/25  Subhash Aden MD       Social history:   Pt from home, lives alone.      Smoking status:     Diagnostics Testing:  proBNP level: 2912    Echocardiogram:Results for orders placed during the hospital encounter of 05/18/25    Adult Transthoracic Echo Complete w/ Color, Spectral and Contrast if necessary per protocol    Interpretation Summary    Left ventricular systolic function is normal. Left ventricular ejection fraction appears to be 56 - 60%.    Left ventricular diastolic function was indeterminate.    Estimated right ventricular systolic pressure from tricuspid regurgitation is normal (<35 mmHg).        Patient Assessment:   Sitting up in chair. No soa with conversation, resp even and unlabored. Ankles slight edema bilaterally pt states improved since arrival.     Current O2:    Home O2:      Education provided to patient:  yes- Heart Failure disease education  yes -Symptom identification/management  yes -Daily Weights  yes- Diet education  yes- Fluid restriction (if ordered)  yes- Activity education  yes-  Medication education   - Smoking cessation  yes- Follow-up Appointments  -Provided information on how to access [facility] My HF Guide/Heart Failure Interactive workbook    Acceptance of learning: acceptance, cooperative    Heart Failure education interactive teaching session time: 20 minutes    GWTG: EF 56-60%    Identified needs/barriers:   Volume status, pt needs 7 day follow up and cardiology follow up    Intervention:   Education, HF clinic information provided. Pt request make her own cardiology apt .

## 2025-05-20 NOTE — PROGRESS NOTES
CARDIOLOGY PROGRESS NOTE:    Phuong Altamirano  70 y.o.  female  1955  [id number]      Referring Provider: Hospitalist    Reason for follow-up: Shortness of breath     Patient Care Team:  Chula Sanchez APRN as PCP - General (Nurse Practitioner)  Reva Jaeger MD as PCP - Family Medicine  Neftali Barrett MD as Consulting Physician (Hematology and Oncology)    Subjective .  Patient doing well without any symptoms    Objective lying comfortably     Review of Systems   Constitutional: Negative for malaise/fatigue.   Cardiovascular:  Negative for chest pain, dyspnea on exertion, leg swelling and palpitations.   Respiratory:  Negative for cough and shortness of breath.    Gastrointestinal:  Negative for abdominal pain, nausea and vomiting.   Neurological:  Negative for dizziness, headaches, light-headedness, numbness and weakness.   All other systems reviewed and are negative.      Allergies: Patient has no known allergies.    Scheduled Meds:atenolol, 50 mg, Oral, Q12H  atorvastatin, 10 mg, Oral, Daily  azithromycin, 500 mg, Oral, Q24H  furosemide, 20 mg, Intravenous, Q12H  guaiFENesin, 1,200 mg, Oral, Q12H  hydroxychloroquine, 200 mg, Oral, Q12H  insulin lispro, 2-7 Units, Subcutaneous, 4x Daily AC & at Bedtime  ipratropium-albuterol, 3 mL, Nebulization, 4x Daily - RT  levothyroxine, 100 mcg, Oral, QAM AC  methylPREDNISolone sodium succinate, 40 mg, Intravenous, Q12H  mupirocin, 1 Application, Each Nare, BID  pantoprazole, 40 mg, Oral, Daily  sertraline, 25 mg, Oral, Daily  sodium chloride, 10 mL, Intravenous, Q12H      Continuous Infusions:   PRN Meds:.  acetaminophen **OR** acetaminophen **OR** acetaminophen    albuterol    senna-docusate sodium **AND** polyethylene glycol **AND** bisacodyl **AND** bisacodyl    Calcium Replacement - Follow Nurse / BPA Driven Protocol    dextrose    dextrose    glucagon (human recombinant)    Magnesium Standard Dose Replacement - Follow Nurse / BPA Driven Protocol     "nitroglycerin    Phosphorus Replacement - Follow Nurse / BPA Driven Protocol    Potassium Replacement - Follow Nurse / BPA Driven Protocol    sodium chloride        VITAL SIGNS  Vitals:    05/20/25 0500 05/20/25 0600 05/20/25 0710 05/20/25 0743   BP: 140/73 129/65  143/70   BP Location:    Right arm   Patient Position:    Sitting   Pulse: 74 65 97 72   Resp:    14   Temp:    97.7 °F (36.5 °C)   TempSrc:    Oral   SpO2: 94% 93% 91% 94%   Weight:       Height:           Flowsheet Rows      Flowsheet Row First Filed Value   Admission Height 180.3 cm (71\") Documented at 05/18/2025 1142   Admission Weight 85.6 kg (188 lb 11.4 oz) Documented at 05/18/2025 1142             TELEMETRY: Sinus rhythm    Physical Exam:  Constitutional:       Appearance: Well-developed.   Eyes:      General: No scleral icterus.     Conjunctiva/sclera: Conjunctivae normal.   HENT:      Head: Normocephalic and atraumatic.   Neck:      Vascular: No carotid bruit or JVD.   Pulmonary:      Effort: Pulmonary effort is normal.      Breath sounds: Normal breath sounds. No wheezing. No rales.   Cardiovascular:      Normal rate. Regular rhythm.   Pulses:     Intact distal pulses.   Abdominal:      General: Bowel sounds are normal.      Palpations: Abdomen is soft.   Musculoskeletal:      Cervical back: Normal range of motion and neck supple. Skin:     General: Skin is warm and dry.      Findings: No rash.   Neurological:      Mental Status: Alert.          Results Review:   I reviewed the patient's new clinical results.  Lab Results (last 24 hours)       Procedure Component Value Units Date/Time    Respiratory Panel PCR w/COVID-19(SARS-CoV-2) [facility] In-House, NP Swab in UTM/VTM, 2 HR TAT - Swab, Nasopharynx [[id number]] Collected: 05/20/25 1004    Specimen: Swab from Nasopharynx Updated: 05/20/25 1007    POC Glucose 4x Daily Before Meals & at Bedtime [[id number]]  (Abnormal) Collected: 05/20/25 0738    Specimen: Blood Updated: 05/20/25 0739 "     Glucose 208 mg/dL      Comment: Serial Number: [id number]:  [id number]       Basic Metabolic Panel [[id number]]  (Abnormal) Collected: 05/20/25 0454    Specimen: Blood from Arm, Left Updated: 05/20/25 0525     Glucose 243 mg/dL      BUN 33 mg/dL      Creatinine 0.66 mg/dL      Sodium 135 mmol/L      Potassium 4.5 mmol/L      Chloride 97 mmol/L      CO2 30.3 mmol/L      Calcium 9.3 mg/dL      BUN/Creatinine Ratio 50.0     Anion Gap 7.7 mmol/L      eGFR 94.5 mL/min/1.73     Narrative:      GFR Categories in Chronic Kidney Disease (CKD)              GFR Category          GFR (mL/min/1.73)    Interpretation  G1                    90 or greater        Normal or high (1)  G2                    60-89                Mild decrease (1)  G3a                   45-59                Mild to moderate decrease  G3b                   30-44                Moderate to severe decrease  G4                    15-29                Severe decrease  G5                    14 or less           Kidney failure    (1)In the absence of evidence of kidney disease, neither GFR category G1 or G2 fulfill the criteria for CKD.    eGFR calculation 2021 CKD-EPI creatinine equation, which does not include race as a factor    Magnesium [[id number]]  (Normal) Collected: 05/20/25 0454    Specimen: Blood from Arm, Left Updated: 05/20/25 0525     Magnesium 2.3 mg/dL     Phosphorus [[id number]]  (Normal) Collected: 05/20/25 0454    Specimen: Blood from Arm, Left Updated: 05/20/25 0525     Phosphorus 3.6 mg/dL     CBC & Differential [[id number]]  (Abnormal) Collected: 05/20/25 0454    Specimen: Blood from Arm, Left Updated: 05/20/25 0502    Narrative:      The following orders were created for panel order CBC & Differential.  Procedure                               Abnormality         Status                     ---------                               -----------         ------                     CBC Auto Differential[[id number]]        Abnormal             Final result                 Please view results for these tests on the individual orders.    CBC Auto Differential [[id number]]  (Abnormal) Collected: 05/20/25 0454    Specimen: Blood from Arm, Left Updated: 05/20/25 0502     WBC 7.66 10*3/mm3      RBC 3.66 10*6/mm3      Hemoglobin 10.3 g/dL      Hematocrit 32.8 %      MCV 89.6 fL      MCH 28.1 pg      MCHC 31.4 g/dL      RDW 13.3 %      RDW-SD 43.7 fl      MPV 10.1 fL      Platelets 214 10*3/mm3      Neutrophil % 83.8 %      Lymphocyte % 9.4 %      Monocyte % 6.0 %      Eosinophil % 0.0 %      Basophil % 0.0 %      Immature Grans % 0.8 %      Neutrophils, Absolute 6.42 10*3/mm3      Lymphocytes, Absolute 0.72 10*3/mm3      Monocytes, Absolute 0.46 10*3/mm3      Eosinophils, Absolute 0.00 10*3/mm3      Basophils, Absolute 0.00 10*3/mm3      Immature Grans, Absolute 0.06 10*3/mm3      nRBC 0.0 /100 WBC     POC Glucose Once [[id number]]  (Abnormal) Collected: 05/19/25 2113    Specimen: Blood Updated: 05/19/25 2115     Glucose 204 mg/dL      Comment: Serial Number: [id number]:  [id number]       POC Glucose 4x Daily Before Meals & at Bedtime [[id number]]  (Abnormal) Collected: 05/19/25 1739    Specimen: Blood Updated: 05/19/25 1741     Glucose 228 mg/dL      Comment: Serial Number: [id number]:  [id number]       Urinalysis With Culture If Indicated - Urine, Clean Catch [[id number]]  (Abnormal) Collected: 05/19/25 1454    Specimen: Urine, Clean Catch Updated: 05/19/25 1510     Color, UA Yellow     Appearance, UA Clear     pH, UA 5.5     Specific Gravity, UA 1.024     Glucose, UA Negative     Ketones, UA Trace     Bilirubin, UA Negative     Blood, UA Negative     Protein, UA >=300 mg/dL (3+)     Leuk Esterase, UA Moderate (2+)     Nitrite, UA Positive     Urobilinogen, UA 1.0 E.U./dL    Narrative:      In absence of clinical symptoms, the presence of pyuria, bacteria, and/or nitrites on the urinalysis result does not correlate with infection.    Urinalysis,  Microscopic Only - Urine, Clean Catch [[id number]]  (Abnormal) Collected: 05/19/25 1454    Specimen: Urine, Clean Catch Updated: 05/19/25 1510     RBC, UA 0-2 /HPF      WBC, UA Too Numerous to Count /HPF      Bacteria, UA 4+ /HPF      Squamous Epithelial Cells, UA 0-2 /HPF      Hyaline Casts, UA 3-6 /LPF      Methodology Automated Microscopy    Urine Culture - Urine, Urine, Clean Catch [[id number]] Collected: 05/19/25 1454    Specimen: Urine, Clean Catch Updated: 05/19/25 1510    POC Glucose 4x Daily Before Meals & at Bedtime [[id number]]  (Abnormal) Collected: 05/19/25 1152    Specimen: Blood Updated: 05/19/25 1154     Glucose 207 mg/dL      Comment: Serial Number: [id number]:  [id number]               Imaging Results (Last 24 Hours)       ** No results found for the last 24 hours. **            EKG      I personally viewed and interpreted the patient's EKG/Telemetry data:    ECHOCARDIOGRAM:  Results for orders placed during the hospital encounter of 05/18/25    Adult Transthoracic Echo Complete w/ Color, Spectral and Contrast if necessary per protocol    Interpretation Summary    Left ventricular systolic function is normal. Left ventricular ejection fraction appears to be 56 - 60%.    Left ventricular diastolic function was indeterminate.    Estimated right ventricular systolic pressure from tricuspid regurgitation is normal (<35 mmHg).       STRESS MYOVIEW:       CARDIAC CATHETERIZATION:  No results found for this or any previous visit.       OTHER:         Assessment & Plan     Shortness of breath/COPD/HFpEF  Patient prior with shortness of breath and is ruled out for MI by EKG and enzymes  Patient had an echocardiogram which showed normal LV systolic function but had LV diastolic dysfunction  Patient also has mild to moderate mitral regurgitation and is currently on medical therapy.    COPD exacerbation  Patient is currently being evaluated by the pulmonologist.    Hypertension  Patient blood pressure  currently stable on medications including beta-blockers    Hyperlipidemia  Patient on statins and the lipids are followed by the primary care doctor    Diabetes  Patient is on sliding scale insulin.        Daniele Sims MD  05/20/25  10:48 EDT

## 2025-05-20 NOTE — PROGRESS NOTES
Daily Progress Note        Shortness of breath      Assessment:    Hypoxic respiratory insufficiency  CT scan with diffuse groundglass opacities     Possible COPD   although CAT scan is not suggestive of COPD, possible interstitial lung disease  Quit smoking 1995  20-pack-year history  No PFTs  Not on bronchodilators      2D echo 5/18/2025    Left ventricular systolic function is normal. Left ventricular ejection fraction appears to be 56 - 60%.    Left ventricular diastolic function was indeterminate.    Estimated right ventricular systolic pressure from tricuspid regurgitation is normal (<35 mmHg).         Recommendations:    Check respiratory viral panel    PFTs as oupt    S/p Azithromycin  Steroids wean  DuoNeb                     LOS: 2 days     Subjective         Objective     Vital signs for last 24 hours:  Vitals:    05/20/25 0500 05/20/25 0600 05/20/25 0710 05/20/25 0743   BP: 140/73 129/65  143/70   BP Location:    Right arm   Patient Position:    Sitting   Pulse: 74 65 97 72   Resp:    14   Temp:    97.7 °F (36.5 °C)   TempSrc:    Oral   SpO2: 94% 93% 91% 94%   Weight:       Height:           Intake/Output last 3 shifts:  I/O last 3 completed shifts:  In: 2660 [P.O.:2660]  Out: 2800 [Urine:2800]  Intake/Output this shift:  No intake/output data recorded.      Radiology  Imaging Results (Last 24 Hours)       Procedure Component Value Units Date/Time    XR Outside Chest [[id number]] Resulted: 05/19/25 0933     Updated: 05/19/25 0933    Narrative:      This procedure was auto-finalized with no dictation required.    CT Outside Chest [[id number]] Resulted: 05/19/25 0933     Updated: 05/19/25 0933    Narrative:      This procedure was auto-finalized with no dictation required.    XR Chest 1 View [[id number]] Collected: 05/19/25 0828     Updated: 05/19/25 0831    Narrative:      XR CHEST 1 VW    Date of Exam: 5/18/2025 8:49 PM EDT    Indication: gomez    Comparison: Whole-body PET/CT 5/6/2025, no prior chest x-ray  for comparison at this institution.    Findings:  Heart size is upper limits of normal. Pulmonary vascular distribution is normal. There is no acute airspace disease, pleural effusion, pneumothorax or acute osseous abnormality. Benign calcified granulomatous changes are present.      Impression:      Impression:  Borderline cardiac enlargement.  No acute chest finding.      Electronically Signed: Tita Garcia MD    5/19/2025 8:29 AM EDT    Workstation ID: [id number]            Labs:  Results from last 7 days   Lab Units 05/20/25  0454   WBC 10*3/mm3 7.66   HEMOGLOBIN g/dL 10.3*   HEMATOCRIT % 32.8*   PLATELETS 10*3/mm3 214     Results from last 7 days   Lab Units 05/20/25  0454 05/19/25  0443 05/18/25  1818   SODIUM mmol/L 135*   < > 139   POTASSIUM mmol/L 4.5   < > 4.1   CHLORIDE mmol/L 97*   < > 97*   CO2 mmol/L 30.3*   < > 27.7   BUN mg/dL 33*   < > 28*   CREATININE mg/dL 0.66   < > 0.85   CALCIUM mg/dL 9.3   < > 9.3   BILIRUBIN mg/dL  --   --  0.4   ALK PHOS U/L  --   --  77   ALT (SGPT) U/L  --   --  23   AST (SGOT) U/L  --   --  19   GLUCOSE mg/dL 243*   < > 206*    < > = values in this interval not displayed.         Results from last 7 days   Lab Units 05/18/25  1818   ALBUMIN g/dL 3.8             Results from last 7 days   Lab Units 05/20/25  0454   MAGNESIUM mg/dL 2.3         Results from last 7 days   Lab Units 05/19/25  0443   TSH uIU/mL 0.701           Meds:   SCHEDULE  atenolol, 50 mg, Oral, Q12H  atorvastatin, 10 mg, Oral, Daily  azithromycin, 500 mg, Oral, Q24H  furosemide, 20 mg, Intravenous, Q12H  guaiFENesin, 1,200 mg, Oral, Q12H  hydroxychloroquine, 200 mg, Oral, Q12H  insulin lispro, 2-7 Units, Subcutaneous, 4x Daily AC & at Bedtime  ipratropium-albuterol, 3 mL, Nebulization, 4x Daily - RT  levothyroxine, 100 mcg, Oral, QAM AC  methylPREDNISolone sodium succinate, 40 mg, Intravenous, Q12H  mupirocin, 1 Application, Each Nare, BID  pantoprazole, 40 mg, Oral, Daily  sertraline, 25 mg, Oral,  Daily  sodium chloride, 10 mL, Intravenous, Q12H      Infusions     PRNs    acetaminophen **OR** acetaminophen **OR** acetaminophen    albuterol    senna-docusate sodium **AND** polyethylene glycol **AND** bisacodyl **AND** bisacodyl    Calcium Replacement - Follow Nurse / BPA Driven Protocol    dextrose    dextrose    glucagon (human recombinant)    Magnesium Standard Dose Replacement - Follow Nurse / BPA Driven Protocol    nitroglycerin    Phosphorus Replacement - Follow Nurse / BPA Driven Protocol    Potassium Replacement - Follow Nurse / BPA Driven Protocol    sodium chloride    Physical Exam:  Physical Exam  Cardiovascular:      Heart sounds: No murmur heard.     No gallop.   Pulmonary:      Effort: No respiratory distress.      Breath sounds: No stridor. No wheezing, rhonchi or rales.   Chest:      Chest wall: No tenderness.         ROS  Review of Systems   Respiratory:  Positive for cough. Negative for shortness of breath, wheezing and stridor.    Cardiovascular:  Negative for chest pain, palpitations and leg swelling.             Total critical care time spent with patient greater than: 45 Minutes

## 2025-05-20 NOTE — TELEPHONE ENCOUNTER
Caller: AMANDA BROWN    Relationship: Emergency Contact    Best call back number: [phone]    Who is your current provider: MAGGIE    Is your current provider offboarding? NO    Who would you like your new provider to be: DR MAHAN    What are your reasons for transferring care: HOSPITAL CONSULT / PREFERENCE    Additional notes: PT DAUGHTER CALLING TO SCHEDULE PT FOR HFU AS SHE IS CURRENTLY GETTING DISCHARGED - SHE STATES SHE SEEN DR SERRANO AND DR MAHAN IN HOSPITAL AND WOULD LIKE TO CONTINUE WITH DR MAHAN - UNABLE TO LOCATE DR MAHAN CONSULT NOTE BUT SHE WOULD LIKE TO SCHEDULE HER POST HOSPITAL VISIT WITH [facility] - PT DAUGHTER NOTES THAT THEY ARE GOING ON VACATION ON 06.08.25 AND ASKING IF POSSIBLE FOR PT TO BE SEEN BEFORE THEN - PT NOTES SHE WAS ADVISED A 1 MONTH FU WITH [facility] -

## 2025-05-20 NOTE — DISCHARGE SUMMARY
[facility]  Discharge Summary    Date of Service: 2025  Patient Name: Phuong Altamirano  : 1955  MRN: [id number]    Date of Admission: 2025  Discharge Diagnosis: #Acute hypoxic respiratory failure secondary to acute HFpEF  #Suspected COPDE-undiagnosed   #Left upper lobe subpleural nodule    Date of Discharge: 2025  Primary Care Physician: Chula Sanchez APRN      Presenting Problem:   COPD with acute exacerbation [J44.1]    Active and Resolved Hospital Problems:  Active Hospital Problems    Diagnosis POA   • **Shortness of breath [R06.02] Yes      Resolved Hospital Problems   No resolved problems to display.         Hospital Course     HPI:  Admitting team HPI   Phuong Altamirano is a 70 y.o. female with a CMH of hypertension, hyperlipidemia, diabetes type 2, hypothyroidism, MGUS, multiple myeloma, rheumatoid arthritis, lupus, GERD, constipation, anxiety, depression who transferred to [facility] from [facility] on 2025 with new onset COPD with acute exacerbation and new onset congestive heart failure.         Hospital Course:  70-year-old female with history of hypertension, hyperlipidemia, DM2, hypothyroidism, MGUS, MM, rheumatoid arthritis, lupus, GERD, anxiety/depression admitted to [facility]  with worsening dyspnea and lower extremity swelling     #Acute hypoxic respiratory failure secondary to acute HFpEF  #Suspected COPDE-undiagnosed   #Left upper lobe subpleural nodule  #Hypertension  Patient recalls not being diagnosed of congestive heart failure in the past  She has history of tobacco abuse quit 30 years ago smoked for 25 years.  Not formally diagnosed of COPD in the past  CTA chest no PE.  Pulmonary vascular congestion.  Mild diffuse patchy groundglass opacities.  7 mm subpleural left upper lobe nodule  TTE with EF 56 to 60%.     Seen by pulmonary and cardiology  Started on IV diuretics responded well transition to p.o.  Lasix 20 mg daily on discharge per cardiology  Started on IV Solu-Medrol wheezing resolved changed to p.o. prednisone to finish total 5 days  Patient will benefit from outpatient PFTs  Status post 3 days of azithromycin  Has been weaned off supplemental oxygen currently saturating well on room air  Continue atenolol 50 every 12  Amlodipine and hydrochlorothiazide on hold. Resume out patient as bp permits  Restart lisinopril   Continue statins     #DM2  Continue home regimen  A1c 6.06    #UTI  UA with pyuria   Start ciprofloxacin 500 mg BID x5 days  Follow Ucx as out patient      #History of MGUS  #MM  Outpatient PET scan 5/6 reviewed  Seen by oncology plan to follow-up in outpatient to consider initiation of treatment     #History of rheumatoid arthritis  Continue home dose of Plaquenil     #Hypothyroidism  TSH within normal limits  Continue home dose of levothyroxine 100 mcg daily     #GERD  Continue PPI     #Anxiety/depression  Continue home Zoloft        DISCHARGE Follow Up Recommendations for labs and diagnostics:   Follow-up with pulmonology and cardiology  Follow-up with oncology  Urine culture pending on discharge follow-up with PCP  Follow-up with PCP in 5 to 7 days      Day of Discharge     Vital Signs:  Temp:  [97.5 °F (36.4 °C)-98 °F (36.7 °C)] 97.7 °F (36.5 °C)  Heart Rate:  [65-97] 72  Resp:  [14-26] 14  BP: (121-145)/(59-77) 143/70  Flow (L/min) (Oxygen Therapy):  [2] 2    Physical Exam:  Physical Exam   AO x 3 NAD  RRR S1-S2 audible  Lungs CTA  Abdomen soft nontender nondistended         Pertinent  and/or Most Recent Results     LAB RESULTS:      Lab 05/20/25 0454 05/19/25 0443   WBC 7.66 5.60   HEMOGLOBIN 10.3* 9.9*   HEMATOCRIT 32.8* 32.0*   PLATELETS 214 180   NEUTROS ABS 6.42 4.64   IMMATURE GRANS (ABS) 0.06* 0.03   LYMPHS ABS 0.72 0.69*   MONOS ABS 0.46 0.23   EOS ABS 0.00 0.00   MCV 89.6 90.4   PROCALCITONIN  --  0.18         Lab 05/20/25  0454 05/19/25  0443 05/18/25  1818   SODIUM 135* 140  139   POTASSIUM 4.5 4.7 4.1   CHLORIDE 97* 100 97*   CO2 30.3* 31.0* 27.7   ANION GAP 7.7 9.0 14.3   BUN 33* 29* 28*   CREATININE 0.66 0.67 0.85   EGFR 94.5 94.2 73.8   GLUCOSE 243* 181* 206*   CALCIUM 9.3 9.2 9.3   MAGNESIUM 2.3 2.3  --    PHOSPHORUS 3.6  --   --    HEMOGLOBIN A1C  --  6.06*  --    TSH  --  0.701  --          Lab 05/18/25  1818   TOTAL PROTEIN 7.6   ALBUMIN 3.8   GLOBULIN 3.8   ALT (SGPT) 23   AST (SGOT) 19   BILIRUBIN 0.4   ALK PHOS 77         Lab 05/18/25  1818   PROBNP 2,912.0*         Lab 05/19/25  0443   CHOLESTEROL 163   LDL CHOL 114*   HDL CHOL 31*   TRIGLYCERIDES 98             Brief Urine Lab Results  (Last result in the past 365 days)        Color   Clarity   Blood   Leuk Est   Nitrite   Protein   CREAT   Urine HCG        05/19/25 1454 Yellow   Clear   Negative   Moderate (2+)   Positive   >=300 mg/dL (3+)                 Microbiology Results (last 10 days)       ** No results found for the last 240 hours. **            XR Chest 1 View  Result Date: 5/19/2025  Impression: Impression: Borderline cardiac enlargement. No acute chest finding. Electronically Signed: Tita Garcia MD  5/19/2025 8:29 AM EDT  Workstation ID: [id number]    NM PET/CT Whole Body  Result Date: 5/9/2025  Impression: Impression: 1.No evidence of hypermetabolic or suspicious lytic osseous lesions. No evidence of soft tissue plasmacytoma. 2.Mildly prominent left axillary lymph nodes without associated hypermetabolic activity. 3.Small 4 mm left upper lobe pulmonary nodule without associated hypermetabolic activity. Electronically Signed: Braden Ramirez MD  5/9/2025 11:26 AM EDT  Workstation ID: [id number]              Results for orders placed during the hospital encounter of 05/18/25    Adult Transthoracic Echo Complete w/ Color, Spectral and Contrast if necessary per protocol    Interpretation Summary  •  Left ventricular systolic function is normal. Left ventricular ejection fraction appears to be 56 - 60%.  •  Left  ventricular diastolic function was indeterminate.  •  Estimated right ventricular systolic pressure from tricuspid regurgitation is normal (<35 mmHg).      Labs Pending at Discharge:  Pending Results       Procedure [Order ID] Specimen - Date/Time    Respiratory Panel PCR w/COVID-19(SARS-CoV-2) MEGHAN/ULYSSES/WIN/PAD/COR/DEBBIE In-House, NP Swab in UTM/VTM, 2 HR TAT - Swab, Nasopharynx [[id number]] Collected: 05/20/25 1004    Specimen: Swab from Nasopharynx Updated: 05/20/25 1007    Urine Culture - Urine, Urine, Clean Catch [[id number]] Collected: 05/19/25 1454    Specimen: Urine, Clean Catch Updated: 05/19/25 1510            Procedures Performed    05/20 0706 Note By: Connie Valverde, RRT      Consults:   Consults       Date and Time Order Name Status Description    5/18/2025  7:39 PM Hematology & Oncology Inpatient Consult Completed     5/18/2025  7:38 PM Inpatient Pulmonology Consult Completed     5/18/2025  7:38 PM Inpatient Hospitalist Consult      5/18/2025 12:55 PM Inpatient Cardiology Consult Completed               Discharge Details        Discharge Medications        New Medications        Instructions Start Date   ciprofloxacin 500 MG tablet  Commonly known as: Cipro   500 mg, Oral, 2 Times Daily      furosemide 20 MG tablet  Commonly known as: Lasix   40 mg, Oral, Daily      predniSONE 20 MG tablet  Commonly known as: DELTASONE   30 mg, Oral, Daily   Start Date: May 21, 2025            Continue These Medications        Instructions Start Date   atenolol 50 MG tablet  Commonly known as: TENORMIN   50 mg, 2 Times Daily      cholecalciferol 10 MCG (400 UNIT) tablet  Commonly known as: VITAMIN D3   400 Units, Daily      ibuprofen 200 MG tablet  Commonly known as: ADVIL,MOTRIN   200 mg, Every 6 Hours PRN      levothyroxine 100 MCG tablet  Commonly known as: SYNTHROID, LEVOTHROID   100 mcg, Every Morning Before Breakfast      lisinopril 10 MG tablet  Commonly known as: PRINIVIL,ZESTRIL   1 tablet, Daily      metFORMIN  500 MG tablet  Commonly known as: GLUCOPHAGE       omeprazole 20 MG capsule  Commonly known as: priLOSEC   20 mg, Daily      Plaquenil 200 MG tablet  Generic drug: hydroxychloroquine   Every 12 Hours      pravastatin 40 MG tablet  Commonly known as: PRAVACHOL   1 tablet, Every Night at Bedtime      sertraline 25 MG tablet  Commonly known as: ZOLOFT   25 mg, Oral, Daily      Tylenol 325 MG tablet  Generic drug: acetaminophen   650 mg, Every 6 Hours PRN             Stop These Medications      amLODIPine 10 MG tablet  Commonly known as: NORVASC     hydrALAZINE 25 MG tablet  Commonly known as: APRESOLINE     hydroCHLOROthiazide 12.5 MG tablet     meloxicam 15 MG tablet  Commonly known as: MOBIC              No Known Allergies      Discharge Disposition:   Home or Self Care    Diet:  Hospital:  Diet Order   Procedures   • Diet: Cardiac, Diabetic, Fluid Restriction (240 mL/tray); Low Sodium (2g); Consistent Carbohydrate; 2000 mL/day; Fluid Consistency: Thin (IDDSI 0)         Discharge Activity:         CODE STATUS:  Code Status and Medical Interventions: CPR (Attempt to Resuscitate); Full Support   Ordered at: 05/18/25 1422     Code Status (Patient has no pulse and is not breathing):    CPR (Attempt to Resuscitate)     Medical Interventions (Patient has pulse or is breathing):    Full Support     Level Of Support Discussed With:    Patient         No future appointments.        Time spent on Discharge including face to face service:  45 minutes    Signature: Electronically signed by Subhash Aden MD, 05/20/25, 10:42 EDT.  [facility] Hospitalist Team

## 2025-05-20 NOTE — TELEPHONE ENCOUNTER
Caller: Phuong Altamirano    Relationship: Self    Best call back number: [phone]    Who is your current provider: MAGGIE    Is your current provider offboarding? NO    Who would you like your new provider to be: DR MAHAN    What are your reasons for transferring care: HOSPITAL CONSULT / PREFERENCE    Additional notes: PT DAUGHTER CALLING TO SCHEDULE PT FOR HFU AS SHE IS CURRENTLY GETTING DISCHARGED - SHE STATES SHE SEEN DR SERRANO AND DR MAHAN IN HOSPITAL AND WOULD LIKE TO CONTINUE WITH DR MAHAN - UNABLE TO LOCATE DR MAHAN CONSULT NOTE BUT SHE WOULD LIKE TO SCHEDULE HER POST HOSPITAL VISIT WITH [facility] - PT DAUGHTER NOTES THAT THEY ARE GOING ON VACATION ON 06.08.25 AND ASKING IF POSSIBLE FOR PT TO BE SEEN BEFORE THEN - PT NOTES SHE WAS ADVISED A 1 MONTH FU WITH [facility] -

## 2025-05-21 ENCOUNTER — READMISSION MANAGEMENT (OUTPATIENT)
Dept: CALL CENTER | Facility: HOSPITAL | Age: 70
End: 2025-05-21
Payer: MEDICARE

## 2025-05-21 LAB — BACTERIA SPEC AEROBE CULT: ABNORMAL

## 2025-05-21 NOTE — OUTREACH NOTE
Prep Survey      Flowsheet Row Responses   [facility] facility patient discharged from? [facility]   Is LACE score < 7 ? No   Eligibility Readm Mgmt   Discharge diagnosis Shortness of breath   Does the patient have one of the following disease processes/diagnoses(primary or secondary)? Other   Does the patient have Home health ordered? No   Is there a DME ordered? No   Medication alerts for this patient see avs   Prep survey completed? Yes            Mariam RUIZ - Registered Nurse

## 2025-05-27 ENCOUNTER — OFFICE VISIT (OUTPATIENT)
Dept: ONCOLOGY | Facility: CLINIC | Age: 70
End: 2025-05-27
Payer: MEDICARE

## 2025-05-27 ENCOUNTER — TELEPHONE (OUTPATIENT)
Dept: CARDIOLOGY | Facility: CLINIC | Age: 70
End: 2025-05-27

## 2025-05-27 VITALS
RESPIRATION RATE: 17 BRPM | HEIGHT: 61 IN | OXYGEN SATURATION: 96 % | TEMPERATURE: 97.5 F | HEART RATE: 86 BPM | DIASTOLIC BLOOD PRESSURE: 70 MMHG | BODY MASS INDEX: 35.21 KG/M2 | WEIGHT: 186.5 LBS | SYSTOLIC BLOOD PRESSURE: 115 MMHG

## 2025-05-27 DIAGNOSIS — C90.00 MULTIPLE MYELOMA NOT HAVING ACHIEVED REMISSION: Primary | ICD-10-CM

## 2025-05-27 PROCEDURE — 1160F RVW MEDS BY RX/DR IN RCRD: CPT | Performed by: INTERNAL MEDICINE

## 2025-05-27 PROCEDURE — 1126F AMNT PAIN NOTED NONE PRSNT: CPT | Performed by: INTERNAL MEDICINE

## 2025-05-27 PROCEDURE — 99214 OFFICE O/P EST MOD 30 MIN: CPT | Performed by: INTERNAL MEDICINE

## 2025-05-27 PROCEDURE — 1159F MED LIST DOCD IN RCRD: CPT | Performed by: INTERNAL MEDICINE

## 2025-05-27 NOTE — TELEPHONE ENCOUNTER
Caller: AMANDA BROWN    Relationship to patient: Emergency Contact    Best call back number:  [phone]    Patient is needing: PATIENTS DAUGHTER CALLING TO SEE IF PATIENTS APPOINTMENT ON 6.6.25 IF THAT COULD BE MOVED TO AN EARLIER TIME . UNABLE TO FIND ANY FOLLOWS UP UNTIL JULY. PATIENT WAS DIAGNOSED WITH CANCER AND HAS A CLASS THAT DAY AT 10 THAT IS SUPPOSE TO LAST A FEW HOURS

## 2025-05-27 NOTE — PROGRESS NOTES
HEMATOLOGY ONCOLOGY OUTPATIENT FOLLOW UP       Patient name: Phuong Altamirano  : 1955  MRN: [id number]  Primary Care Physician: Chula Sanchez APRN  Referring Physician: Chula Sanchez APRN  Reason For Consult: Monoclonal gammopathy.     History of Present Illness:    History of Present Illness  3/20/2025: Ms. 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 In early , she had a bone marrow biopsy and aspiration that revealed equivocal results, leading to a repeat bone marrow biopsy in . This disclosed a normocellular bone marrow with trilineage hematopoiesis and 20% plasma cells. On flow cytometry, only 1% of the plasma cells revealed monoclonal restriction. Continued observation at [facility] showed no evidence of progression. She was last seen sometime in  without new problems.Today, she reports being largely asymptomatic. She remains active and energetic, has a good appetite, and her weight has been stable. She is not experiencing fevers or diaphoresis. She does not report any chest pain. She does experience dyspnea with exertion, which she attributes to her lack of physical activity. She is not experiencing abdominal pain, diarrhea, or dysuria. On exam she is conversant and oriented. No jaundice and no distress but she appears chronically ill. No oral lesions. Respirations not labored. Lungs clear and heart regular. Abdomen protuberant and soft; the liver and spleen don't seem enlarged. No edema. Reviewed all the records and all laboratory exams. Discussed with her. 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 She might need a bone marrow  aspiration and biopsy. She will have additional studies today and will see me with the results.     4/4/2025: In the office sooner than scheduled.  Her protein electrophoresis revealed a small increase in the M spike from 1.2 g/dL at the previous measurement available to 1.4 g/dL.  In addition the free lambda light chains increased from 11.1 mg/L to 309 mg/L, changing the ratio significantly.  She feels as well as she did at the time of the last visit.  On exam she is well-oriented and conversant.  She does not seem in any distress and she is not jaundiced.  The lungs are clear bilaterally and the heart regular.  The abdomen is protuberant and soft.  There is no edema.  Reviewed and discussed the laboratory exams with her.  I have asked her to allow me to obtain a bone marrow biopsy and aspiration as well as long bone survey and a 24-hour urine protein quantification and electrophoresis.  She will see me with results.    4/28/2025: She underwent a bone marrow biopsy and aspiration without any complications. The results indicate that her bone marrow is hypercellular, with 65% of nucleated precursors being plasma cells exhibiting an abnormal immunophenotype and lambda light chain restriction. The karyotype reveals a normal female complement; however, the FISH panel indicates a complex picture with gain of chromosome 1q21, deletion of 13q, loss of MAF/16q, and aneuploidy with gain of chromosomes 7, 9, and 15.On the basis of the above, Ms. Altamirano can be considered to have multiple myeloma, despite the relatively lower monoclonal protein in the blood. In addition the gain of chromosome 1q and the loss of MAF/16q ae considered poor prognostic markers. Under the new guidelines her condition can no longer be considered only smoldering myeloma and treatment is well justified. She's to have a PET scan for staging and new immunofixation and electrophoresis as well as light chain quantification. She's to see me with results.  "    5/19/2025: Ms. Altamirano is an established patient of mine. For some time she had been followed for what had been diagnosed with monoclonal gammopathy of undetermined significance. 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 At the time of the first visit with me she had an increase in the monoclonal bland and a decision was made to obtain staging studies again. This time her bone marrow contains at least 60% abnormal plasma cells. There is no suggestion of end organ damage and her condition can still be classified as smoldering myeloma but she is at high risk of developing complications and it is reasonable at this time to start treatment. I had a long discussion with her regarding the options available. On exam she is alert, conversant and oriented. No distress. No jaundice. No oral lesions. Respirations not labored. Lungs diminished and heart regular. Abdomen soft and minimal edema at this time. Reviewed the laboratory exams. To continue treatment for her congestive heart failure and to see me in the office after discharge.     5/27/2025: Was discharged from the hospital.  She was feeling better at the time of discharge.  Today she tells me she has not been feeling as well.  \"I do not have the umpf\".  Also frequently nauseated.  Her blood pressure has been much better controlled.  She has returned to her usual activities, including her work.  On exam she is oriented and conversant.  No distress.  No jaundice.  No edema.  Laboratory exams reviewed with her.  Treatment is well justified and discussed that with her despite the fact that she has no bone lesions and preserved renal function.  Her calcium has been within normal limits.  She does have mild normocytic anemia.  Discussed at length with her.  Explained the options of treatment.  Discussed with her the potential complications if treatment is not started.  " She is agreeable to commencement of treatment with the clarification that she could stop at any point if she so decides.  She wants to go on a vacation and will start after that vacation.        Past Medical History:   Diagnosis Date    Arthritis 2010    Depression 2010    High blood pressure     High cholesterol 2005    Hypothyroidism     Lupus 2010     Past Surgical History:   Procedure Laterality Date    BREAST LUMPECTOMY Right 1983    benign    DILATATION AND CURETTAGE  1995    LAPAROSCOPIC TUBAL LIGATION  1979    OVARY SURGERY  1985    remoal of left ovary       Current Outpatient Medications:     acetaminophen (TYLENOL) 325 MG tablet, Take 2 tablets by mouth Every 6 (Six) Hours As Needed for Mild Pain., Disp: , Rfl:     atenolol (TENORMIN) 50 MG tablet, Take 1 tablet by mouth 2 (Two) Times a Day., Disp: , Rfl: 1    cholecalciferol (VITAMIN D3) 400 units tablet, Take 1 tablet by mouth Daily., Disp: , Rfl:     furosemide (Lasix) 20 MG tablet, Take 2 tablets by mouth Daily for 30 days., Disp: 60 tablet, Rfl: 0    hydroxychloroquine (PLAQUENIL) 200 MG tablet, Every 12 (Twelve) Hours., Disp: , Rfl:     ibuprofen (ADVIL,MOTRIN) 200 MG tablet, Take 1 tablet by mouth Every 6 (Six) Hours As Needed for Mild Pain., Disp: , Rfl:     levothyroxine (SYNTHROID, LEVOTHROID) 100 MCG tablet, Take 1 tablet by mouth Every Morning Before Breakfast., Disp: , Rfl: 1    lisinopril (PRINIVIL,ZESTRIL) 10 MG tablet, Take 1 tablet by mouth Daily., Disp: , Rfl:     metFORMIN (GLUCOPHAGE) 500 MG tablet, , Disp: , Rfl:     omeprazole (priLOSEC) 20 MG capsule, Take 1 capsule by mouth Daily., Disp: , Rfl:     pravastatin (PRAVACHOL) 40 MG tablet, Take 1 tablet by mouth every night at bedtime., Disp: , Rfl:     sertraline (ZOLOFT) 25 MG tablet, Take 1 tablet by mouth Daily., Disp: , Rfl: 1    No Known Allergies    Family History   Problem Relation Age of Onset    Heart disease Father     Pancreatic cancer Sister 59    Heart disease Sister      Stroke Sister     Pneumonia Brother      Cancer-related family history includes Pancreatic cancer (age of onset: 59) in her sister.    Social History     Tobacco Use    Smoking status: Former     Current packs/day: 0.00     Average packs/day: 1 pack/day for 21.0 years (21.0 ttl pk-yrs)     Types: Cigarettes     Start date:      Quit date:      Years since quittin.4    Smokeless tobacco: Never   Vaping Use    Vaping status: Never Used   Substance Use Topics    Alcohol use: Yes    Drug use: No     Social History     Social History Narrative    Not on file     ROS:   Review of Systems   Constitutional:  Positive for fatigue. Negative for activity change, appetite change, chills, diaphoresis, fever and unexpected weight change.   HENT:  Negative for congestion, dental problem, drooling, ear discharge, ear pain, facial swelling, hearing loss, mouth sores, nosebleeds, postnasal drip, rhinorrhea, sinus pressure, sinus pain, sneezing, sore throat, tinnitus, trouble swallowing and voice change.    Eyes:  Negative for photophobia, pain, discharge, redness, itching and visual disturbance.   Respiratory:  Positive for shortness of breath. Negative for apnea, cough, choking, chest tightness, wheezing and stridor.    Cardiovascular:  Negative for chest pain, palpitations and leg swelling.   Gastrointestinal:  Negative for abdominal distention, abdominal pain, anal bleeding, blood in stool, constipation, diarrhea, nausea, rectal pain and vomiting.   Endocrine: Negative for cold intolerance, heat intolerance, polydipsia and polyuria.   Genitourinary:  Negative for decreased urine volume, difficulty urinating, dysuria, flank pain, frequency, genital sores, hematuria and urgency.   Musculoskeletal:  Negative for arthralgias, back pain, gait problem, joint swelling, myalgias, neck pain and neck stiffness.   Skin:  Negative for color change, pallor and rash.   Neurological:  Negative for dizziness, tremors, seizures,  "syncope, facial asymmetry, speech difficulty, weakness, light-headedness, numbness and headaches.   Hematological:  Negative for adenopathy. Does not bruise/bleed easily.   Psychiatric/Behavioral:  Negative for agitation, behavioral problems, confusion, decreased concentration, hallucinations, self-injury, sleep disturbance and suicidal ideas. The patient is not nervous/anxious.      Objective:    Vital Signs:  Vitals:    05/27/25 1215   BP: 115/70   Pulse: 86   Resp: 17   Temp: 97.5 °F (36.4 °C)   SpO2: 96%   Weight: 84.6 kg (186 lb 8 oz)   Height: 154.9 cm (61\")   PainSc: 0-No pain     Body mass index is 35.24 kg/m².    ECOG  (0) Fully active, able to carry on all predisease performance without restriction    Physical Exam:   Physical Exam  Constitutional:       General: She is not in acute distress.     Appearance: She is ill-appearing. She is not toxic-appearing or diaphoretic.   HENT:      Head: Normocephalic and atraumatic.      Right Ear: External ear normal.      Left Ear: External ear normal.      Nose: Nose normal.      Mouth/Throat:      Mouth: Mucous membranes are moist.      Pharynx: Oropharynx is clear. No oropharyngeal exudate or posterior oropharyngeal erythema.   Eyes:      General: No scleral icterus.        Right eye: No discharge.         Left eye: No discharge.      Conjunctiva/sclera: Conjunctivae normal.      Pupils: Pupils are equal, round, and reactive to light.   Cardiovascular:      Rate and Rhythm: Normal rate and regular rhythm.      Pulses: Normal pulses.      Heart sounds: No murmur heard.     No friction rub. No gallop.   Pulmonary:      Effort: No respiratory distress.      Breath sounds: No stridor. No wheezing, rhonchi or rales.   Abdominal:      General: Bowel sounds are normal. There is no distension.      Palpations: Abdomen is soft. There is no mass.      Tenderness: There is no abdominal tenderness. There is no right CVA tenderness, left CVA tenderness, guarding or rebound.    "   Hernia: No hernia is present.      Comments: Protuberant, soft and not tender. No hepatomegaly or splenomegaly.    Musculoskeletal:         General: No tenderness, deformity or signs of injury.      Cervical back: No rigidity.      Right lower leg: No edema.      Left lower leg: No edema.   Lymphadenopathy:      Cervical: No cervical adenopathy.   Skin:     Coloration: Skin is not jaundiced or pale.      Findings: No bruising, lesion or rash.   Neurological:      General: No focal deficit present.      Mental Status: She is alert and oriented to person, place, and time.      Cranial Nerves: No cranial nerve deficit.   Psychiatric:         Mood and Affect: Mood normal.         Behavior: Behavior normal.         Thought Content: Thought content normal.         Judgment: Judgment normal.     BRADLEY Barrett MD performed the physical exam on 5/27/2025 as documented above.    Lab Results - Last 18 Months   Lab Units 05/20/25  0454 05/19/25  0443 04/28/25  1317   WBC 10*3/mm3 7.66 5.60 5.93   HEMOGLOBIN g/dL 10.3* 9.9* 11.2*   HEMATOCRIT % 32.8* 32.0* 35.9   PLATELETS 10*3/mm3 214 180 233   MCV fL 89.6 90.4 91.8     Lab Results - Last 18 Months   Lab Units 05/20/25  0454 05/19/25  0443 05/18/25  1818 04/28/25  1439 03/20/25  1329   SODIUM mmol/L 135* 140 139 140 141   POTASSIUM mmol/L 4.5 4.7 4.1 3.9 3.9   CHLORIDE mmol/L 97* 100 97* 101 101   CO2 mmol/L 30.3* 31.0* 27.7 30.2* 30.2*   BUN mg/dL 33* 29* 28* 18 17   CREATININE mg/dL 0.66 0.67 0.85 0.53* 0.58   CALCIUM mg/dL 9.3 9.2 9.3 9.8 9.6   BILIRUBIN mg/dL  --   --  0.4 0.2 0.3   ALK PHOS U/L  --   --  77 75 73   ALT (SGPT) U/L  --   --  23 19 17   AST (SGOT) U/L  --   --  19 18 17   GLUCOSE mg/dL 243* 181* 206* 99 107*     Lab Results   Component Value Date    GLUCOSE 243 (H) 05/20/2025    BUN 33 (H) 05/20/2025    CREATININE 0.66 05/20/2025    EGFRIFNONA 116 05/20/2019    EGFRIFAFRI 100 05/20/2019    BCR 50.0 (H) 05/20/2025    K 4.5 05/20/2025    CO2 30.3 (H)  05/20/2025    CALCIUM 9.3 05/20/2025    ALBUMIN 3.8 05/18/2025    AST 19 05/18/2025    ALT 23 05/18/2025     Lab Results   Component Value Date    IRON 90 02/06/2018    TIBC 430 (H) 02/06/2018     Lab Results   Component Value Date    RETICCTPCT 0.90 02/06/2018     Lab Results   Component Value Date    ALEE  06/05/2017     NEGATIVE This result is designed to aid in the diagnosis of many of the    ALEE  06/05/2017     systemic autoimmune disorders and is not diagnostic by itself.  Test results    ALEE  06/05/2017     should be interpreted in conjunction with the clinical evaluation of the    ALEE  06/05/2017     patient.  SLE patients undergoing steroid therapy may have negative results.    SEDRATE 19 02/18/2019     Lab Results   Component Value Date    HAPTOGLOBIN 139 02/06/2018     Lab Results   Component Value Date    SEDRATE 19 02/18/2019      Assessment & Plan     Assessment & Plan  1. Multiple myeloma.  High risk based on gain of chromosome 1q and the loss of MAF/16q: To start a 4 drug regimen with daratumumab/bortezomib/dexamethasone/lenalidomide.  Discussed at length with her.  Placed the treatment plan.  Requested laboratory exams.  2. Reviewed the recent records from the hospital, laboratory exams and reports of pathology.  3. See me upon return from vacation.    Neftali Barrett MD on 5/27/2025 at 1304.

## 2025-06-03 NOTE — PROGRESS NOTES
Cardiology Office Follow Up Visit      Primary Care Provider:  Chula Sanchez APRN    Reason for f/u:     Hospital F/U      Subjective     CC:    Recent hospitalization for acute hypoxic respiratory failure, heart failure versus COPD    History of Present Illness       Phuong Altamirano is a 70 y.o. female previously seen by Dr. Sims and is transferring care to Dr. Felton.  She has a history of hypertension, hyperlipidemia, systolic murmur, type 2 diabetes, hypothyroidism, multiple myeloma, MGUS, rheumatoid arthritis, lupus, GERD, anxiety/depression and family history of CAD.    Patient presented to [facility] ED after being transferred from [place] on 5/18/2025 for progressively worsening dyspnea and lower extremity edema, fatigue, orthopnea with diagnosis with new onset COPD with acute exacerbation/new onset congestive heart failure.  Patient stated that her activity was greatly decreased from her norm, she was previously able to climb a flight of stairs.  CTA of chest showed no PE, positive for pulmonary vascular congestion.      From cardiac standpoint, she was diuresed during admission and d/c's on po Lasix. 2D echo in 2021 showed an EF of 60% with moderate MR mild to moderate TR. Echo on recent admit showed normal LV function, EF 56-60%, no wall motion abnormalities noted, grade 1-2 diastolic dysfunction suspected, mild to moderate mitral regurgitation, mild tricuspid regurgitation, no hemodynamically significant pulmonic valve regurgitation noted, trileaflet aortic valve structurally normal with no regurgitation or stenosis noted.     She was to restart norvasc/HTZ as BP permitted, however, she states BP at home runs 105-110/60-70. In office today, /76. Cont lisinopril only at this point.  Patient to keep home blood pressure logs.  Educated to report for consistent systolic less than 105 or greater than 130s. Patient has completed outpatient PFTs, and has been tested for sleep apnea-result pending.   She denies chest pain, palpitations, No unusual bleeding, headache, vision change,diaphoresis/dizziness/syncope, states her energy has improved about to her normal, wasn't eating good/nauseated until 2 days ago-now improved.  Follow-up in 2 months with labs prior to visit.    ASSESSMENT/PLAN:      Diagnoses and all orders for this visit:    1. Chronic diastolic congestive heart failure (Primary)  -     BNP; Future  -     Basic Metabolic Panel; Future  -     Magnesium; Future    2. Essential (primary) hypertension  -     BNP; Future    Other orders  -     furosemide (Lasix) 20 MG tablet; Take 1 tablet by mouth 2 (Two) Times a Day for 30 days.    3. HLD    4. Hypothyroid    5. DM2    6. COPD    7. MM/MGUS    MEDICAL DECISION MAKING:    Suspected Diastolic dysfunction  Shortness of breath  Admission to [facility] last month with shortness of breath, diagnosis new onset COPD and/or new onset CHF  BNP on admission 2912  Echocardiogram showed normal LV systolic function, LV diastolic function was indeterminate, however grade 1-2 diastolic function suspected  Atenolol 50 twice daily, Lasix 40 daily, lisinopril 10 mg daily  No SGLT2 or spironolactone or vasodilators currently    Hypertension   Atenolol 50 twice daily,Lisinopril 10mg continues, labs prior to next visit     Hyperlipidemia  Lipid panel during hospitalization , HDL 31, triglycerides 98  pravastatin 40 mg    Hypothyroidism  TSH last month 0.701    DM2  A1c 6.06 last month    Pulmonary disease  Per Dr. Leroy's note, possible COPD versus interstitial lung disease  CAT scan not suggestive of COPD, pt has 20-pack-year history smoking, quit 1995    Multiple myeloma/history MGUS   patient had outpatient PET scan 5/6  Chemo to start soon      Past Medical History:   Diagnosis Date    Arthritis 2010    Asthma 1 month    Cancer 12 years    Multiple mylenoma    Depression 2010    Diabetes mellitus 1.5 years    High blood pressure     High cholesterol 2005    Hypothyroidism      Lupus 2010    Sleep apnea Now       Past Surgical History:   Procedure Laterality Date    BREAST LUMPECTOMY Right     benign    DILATATION AND CURETTAGE      LAPAROSCOPIC TUBAL LIGATION  1979    OVARY SURGERY  1985    remoal of left ovary         Current Outpatient Medications:     acetaminophen (TYLENOL) 325 MG tablet, Take 2 tablets by mouth Every 6 (Six) Hours As Needed for Mild Pain., Disp: , Rfl:     atenolol (TENORMIN) 50 MG tablet, Take 1 tablet by mouth 2 (Two) Times a Day., Disp: , Rfl: 1    cholecalciferol (VITAMIN D3) 400 units tablet, Take 1 tablet by mouth Daily., Disp: , Rfl:     furosemide (Lasix) 20 MG tablet, Take 1 tablet by mouth 2 (Two) Times a Day for 30 days., Disp: , Rfl:     hydroxychloroquine (PLAQUENIL) 200 MG tablet, Every 12 (Twelve) Hours., Disp: , Rfl:     ibuprofen (ADVIL,MOTRIN) 200 MG tablet, Take 1 tablet by mouth Every 6 (Six) Hours As Needed for Mild Pain., Disp: , Rfl:     levothyroxine (SYNTHROID, LEVOTHROID) 100 MCG tablet, Take 1 tablet by mouth Every Morning Before Breakfast., Disp: , Rfl: 1    lisinopril (PRINIVIL,ZESTRIL) 10 MG tablet, Take 1 tablet by mouth Daily., Disp: , Rfl:     metFORMIN (GLUCOPHAGE) 500 MG tablet, , Disp: , Rfl:     omeprazole (priLOSEC) 20 MG capsule, Take 1 capsule by mouth Daily., Disp: , Rfl:     pravastatin (PRAVACHOL) 40 MG tablet, Take 1 tablet by mouth every night at bedtime., Disp: , Rfl:     sertraline (ZOLOFT) 25 MG tablet, Take 1 tablet by mouth Daily., Disp: , Rfl: 1    Social History     Socioeconomic History    Marital status:    Tobacco Use    Smoking status: Former     Current packs/day: 0.00     Average packs/day: 1 pack/day for 21.0 years (21.0 ttl pk-yrs)     Types: Cigarettes     Start date:      Quit date:      Years since quittin.4    Smokeless tobacco: Never   Vaping Use    Vaping status: Never Used   Substance and Sexual Activity    Alcohol use: Yes     Comment: Very occasionally    Drug use: No  "   Sexual activity: Not Currently     Partners: Male     Birth control/protection: Tubal ligation       Family History   Problem Relation Age of Onset    Heart disease Father     Pancreatic cancer Sister 59    Heart disease Sister     Stroke Sister     Pneumonia Brother        The following portions of the patient's history were reviewed and updated as appropriate: allergies, current medications, past family history, past medical history, past social history, past surgical history and problem list.    Review of Systems   Constitutional: Negative for diaphoresis and malaise/fatigue.   Cardiovascular:  Positive for leg swelling. Negative for chest pain, claudication, cyanosis, dyspnea on exertion, irregular heartbeat, near-syncope, palpitations, paroxysmal nocturnal dyspnea and syncope.   Respiratory:  Negative for cough, hemoptysis, shortness of breath and sleep disturbances due to breathing.    Musculoskeletal:  Positive for arthritis.   Gastrointestinal:  Negative for hematemesis, hematochezia and melena.   Genitourinary:  Negative for dysuria and hematuria.   Neurological:  Negative for dizziness, focal weakness, headaches, light-headedness, vertigo and weakness.   Psychiatric/Behavioral:  Negative for altered mental status.    All other systems reviewed and are negative.      Pertinent items are noted in HPI, all other systems reviewed and negative    /76 (BP Location: Right arm, Patient Position: Sitting, Cuff Size: Large Adult)   Pulse 73   Resp 16   Ht 154.9 cm (61\")   Wt 83.5 kg (184 lb)   LMP 10/17/2014   SpO2 95%   BMI 34.77 kg/m² .  Objective     Physical Exam  General Appearance: Alert, in no acute distress  Head:   Normocephalic, atraumatic  Eyes:    PERRLA, EOM intact, conjunctivae and sclerae normal, no  icterus  Throat: Oral mucosa moist  Neck:No carotid bruit or JVD  Lungs:  faint basilar crackles, respirations regular, even and unlabored   Heart:  Regular rhythm and normal rate, normal " "S1 and S2,  no gallop, no rub, no click  Chest Wall:  No abnormalities observed  Abdomen:  Soft,  non-tender, non-distended, no guarding, no rebound  tenderness  Extremities:trace ankle edema, no cyanosis or redness  Pulses:Pulses palpable and equal bilaterally in all extremities  Skin:  No bleeding, bruising or rash   Neurologic:  Normal mood, thought content and behavior    Procedures           proBNP   Date Value Ref Range Status   05/18/2025 2,912.0 (H) 0.0 - 900.0 pg/mL Final     CMP          5/18/2025    18:18 5/19/2025    04:43 5/20/2025    04:54   CMP   Glucose 206  181  243    BUN 28  29  33    Creatinine 0.85  0.67  0.66    EGFR 73.8  94.2  94.5    Sodium 139  140  135    Potassium 4.1  4.7  4.5    Chloride 97  100  97    Calcium 9.3  9.2  9.3    Total Protein 7.6      Albumin 3.8      Globulin 3.8      Total Bilirubin 0.4      Alkaline Phosphatase 77      AST (SGOT) 19      ALT (SGPT) 23      Albumin/Globulin Ratio 1.0      BUN/Creatinine Ratio 32.9  43.3  50.0    Anion Gap 14.3  9.0  7.7      CBC w/diff          4/28/2025    13:17 5/19/2025    04:43 5/20/2025    04:54   CBC w/Diff   WBC 5.93  5.60  7.66    RBC 3.91  3.54  3.66    Hemoglobin 11.2  9.9  10.3    Hematocrit 35.9  32.0  32.8    MCV 91.8  90.4  89.6    MCH 28.6  28.0  28.1    MCHC 31.2  30.9  31.4    RDW 12.9  13.2  13.3    Platelets 233  180  214    Neutrophil Rel % 72.9  82.9  83.8    Immature Granulocyte Rel %  0.5  0.8    Lymphocyte Rel % 18.7  12.3  9.4    Monocyte Rel % 7.6  4.1  6.0    Eosinophil Rel % 0.5  0.0  0.0    Basophil Rel % 0.3  0.2  0.0       Lab Results   Component Value Date    TSH 0.701 05/19/2025      No results found for: \"FREET4\"   No results found for: \"DDIMERQUANT\"  Magnesium   Date Value Ref Range Status   05/20/2025 2.3 1.6 - 2.4 mg/dL Final      No results found for: \"DIGOXIN\"   No results found for: \"TROPONINT\"        Lipid Panel          5/19/2025    04:43   Lipid Panel   Total Cholesterol 163    Triglycerides 98 " "   HDL Cholesterol 31    VLDL Cholesterol 18    LDL Cholesterol  114    LDL/HDL Ratio 3.63      No results found for: \"POCTROP\"    Results for orders placed during the hospital encounter of 05/18/25    Adult Transthoracic Echo Complete w/ Color, Spectral and Contrast if necessary per protocol    Interpretation Summary    Left ventricular systolic function is normal. Left ventricular ejection fraction appears to be 56 - 60%.    Left ventricular diastolic function was indeterminate.    Estimated right ventricular systolic pressure from tricuspid regurgitation is normal (<35 mmHg).     No results found for this or any previous visit.    Phuong CMGREGOR Altamirano  reports that she quit smoking about 30 years ago. Her smoking use included cigarettes. She started smoking about 51 years ago. She has a 21 pack-year smoking history. She has never used smokeless tobacco.   "

## 2025-06-06 ENCOUNTER — OFFICE VISIT (OUTPATIENT)
Dept: CARDIOLOGY | Facility: CLINIC | Age: 70
End: 2025-06-06
Payer: MEDICARE

## 2025-06-06 VITALS
SYSTOLIC BLOOD PRESSURE: 145 MMHG | OXYGEN SATURATION: 95 % | HEART RATE: 73 BPM | WEIGHT: 184 LBS | DIASTOLIC BLOOD PRESSURE: 76 MMHG | RESPIRATION RATE: 16 BRPM | BODY MASS INDEX: 34.74 KG/M2 | HEIGHT: 61 IN

## 2025-06-06 DIAGNOSIS — I50.32 CHRONIC DIASTOLIC CONGESTIVE HEART FAILURE: Primary | ICD-10-CM

## 2025-06-06 DIAGNOSIS — I10 ESSENTIAL (PRIMARY) HYPERTENSION: ICD-10-CM

## 2025-06-06 RX ORDER — FUROSEMIDE 20 MG/1
20 TABLET ORAL 2 TIMES DAILY
Start: 2025-06-06 | End: 2025-07-06

## 2025-06-11 ENCOUNTER — READMISSION MANAGEMENT (OUTPATIENT)
Dept: CALL CENTER | Facility: HOSPITAL | Age: 70
End: 2025-06-11
Payer: MEDICARE

## 2025-06-11 NOTE — OUTREACH NOTE
Medical Week 3 Survey      Flowsheet Row Responses   List of hospitals in [place] patient discharged from? [facility]   Does the patient have one of the following disease processes/diagnoses(primary or secondary)? Other   Week 3 attempt successful? Yes   Call start time 1506   Call end time 1512   Discharge diagnosis Shortness of breath   Is patient permission given to speak with other caregiver? Yes   List who call center can speak with dtr   Person spoke with today (if not patient) and relationship Chanla- dtr   Meds reviewed with patient/caregiver? Yes   Is the patient taking all medications as directed (includes completed medication regime)? Yes   Medication comments Pt taking Mylanta   Has the patient kept scheduled appointments due by today? Yes   DME comments Pt will have repeat sleep study per dtr 6-24-25.   Comments Per pt's dtr, pt having some edema in bilateral LE, she is elevating LE. Pt continues with abd pain, poor appetite and reduced intake, her dtr reports. The family/pt are [place] vacation at beach, pt is having difficulty walking without becoming SOA and fatigued quickly, her dtr reports. Pt remains with low stamina and weakness, per Chancla.   What is the patient's perception of their health status since discharge? Same   Is the patient/caregiver able to teach back signs and symptoms related to disease process for when to call PCP? Yes   Is the patient/caregiver able to teach back signs and symptoms related to disease process for when to call 911? Yes   Week 3 Call Completed? Yes   Graduated Yes   Call end time 1512            Susan PERRY - Registered Nurse  
Detail Level: Detailed
General Sunscreen Counseling: I recommended a broad spectrum sunscreen with an SPF of 30 or higher.  I explained that SPF 30 sunscreens block approximately 97 percent of the sun's harmful rays.  Sunscreens should be applied at least 15 minutes prior to expected sun exposure and then every 2 hours after that as long as sun exposure continues. If swimming or exercising sunscreen should be reapplied every 45 minutes to an hour after getting wet or sweating.  One ounce, or the equivalent of a shot glass full of sunscreen, is adequate to protect the skin not covered by a bathing suit. I also recommended a lip balm with an SPF 15 or higher sunscreen as well. Sun protective clothing can be used in lieu of sunscreen but must be worn the entire time you are exposed to the sun's rays.

## 2025-06-13 ENCOUNTER — HOSPITAL ENCOUNTER (EMERGENCY)
Facility: HOSPITAL | Age: 70
Discharge: HOME | End: 2025-06-14
Payer: MEDICARE

## 2025-06-13 ENCOUNTER — APPOINTMENT (OUTPATIENT)
Dept: CT IMAGING | Facility: HOSPITAL | Age: 70
End: 2025-06-13
Payer: MEDICARE

## 2025-06-13 VITALS
HEIGHT: 61 IN | HEART RATE: 96 BPM | OXYGEN SATURATION: 97 % | TEMPERATURE: 98.4 F | SYSTOLIC BLOOD PRESSURE: 146 MMHG | DIASTOLIC BLOOD PRESSURE: 74 MMHG | RESPIRATION RATE: 20 BRPM | WEIGHT: 181.66 LBS | BODY MASS INDEX: 34.3 KG/M2

## 2025-06-13 DIAGNOSIS — N39.0 UTI (URINARY TRACT INFECTION) WITH PYURIA: Primary | ICD-10-CM

## 2025-06-13 DIAGNOSIS — R11.0 NAUSEA: ICD-10-CM

## 2025-06-13 DIAGNOSIS — R10.13 EPIGASTRIC PAIN: ICD-10-CM

## 2025-06-13 LAB
ALBUMIN SERPL-MCNC: 3.8 G/DL (ref 3.5–5.2)
ALBUMIN/GLOB SERPL: 1.1 G/DL
ALP SERPL-CCNC: 94 U/L (ref 39–117)
ALT SERPL W P-5'-P-CCNC: 26 U/L (ref 1–33)
ANION GAP SERPL CALCULATED.3IONS-SCNC: 13.7 MMOL/L (ref 5–15)
AST SERPL-CCNC: 28 U/L (ref 1–32)
BACTERIA UR QL AUTO: ABNORMAL /HPF
BASOPHILS # BLD AUTO: 0.02 10*3/MM3 (ref 0–0.2)
BASOPHILS NFR BLD AUTO: 0.2 % (ref 0–1.5)
BILIRUB SERPL-MCNC: 0.5 MG/DL (ref 0–1.2)
BILIRUB UR QL STRIP: NEGATIVE
BUN SERPL-MCNC: 13.8 MG/DL (ref 8–23)
BUN/CREAT SERPL: 17.7 (ref 7–25)
CALCIUM SPEC-SCNC: 9.1 MG/DL (ref 8.6–10.5)
CHLORIDE SERPL-SCNC: 98 MMOL/L (ref 98–107)
CLARITY UR: CLEAR
CO2 SERPL-SCNC: 28.3 MMOL/L (ref 22–29)
COLOR UR: YELLOW
CREAT SERPL-MCNC: 0.78 MG/DL (ref 0.57–1)
DEPRECATED RDW RBC AUTO: 48.2 FL (ref 37–54)
EGFRCR SERPLBLD CKD-EPI 2021: 81.8 ML/MIN/1.73
EOSINOPHIL # BLD AUTO: 0 10*3/MM3 (ref 0–0.4)
EOSINOPHIL NFR BLD AUTO: 0 % (ref 0.3–6.2)
ERYTHROCYTE [DISTWIDTH] IN BLOOD BY AUTOMATED COUNT: 15.1 % (ref 12.3–15.4)
GLOBULIN UR ELPH-MCNC: 3.5 GM/DL
GLUCOSE SERPL-MCNC: 119 MG/DL (ref 65–99)
GLUCOSE UR STRIP-MCNC: NEGATIVE MG/DL
GRAN CASTS URNS QL MICRO: ABNORMAL /LPF
HCT VFR BLD AUTO: 33.7 % (ref 34–46.6)
HGB BLD-MCNC: 10.8 G/DL (ref 12–15.9)
HGB UR QL STRIP.AUTO: ABNORMAL
HOLD SPECIMEN: NORMAL
HYALINE CASTS UR QL AUTO: ABNORMAL /LPF
IMM GRANULOCYTES # BLD AUTO: 0.03 10*3/MM3 (ref 0–0.05)
IMM GRANULOCYTES NFR BLD AUTO: 0.3 % (ref 0–0.5)
KETONES UR QL STRIP: ABNORMAL
LEUKOCYTE ESTERASE UR QL STRIP.AUTO: ABNORMAL
LIPASE SERPL-CCNC: 41 U/L (ref 13–60)
LYMPHOCYTES # BLD AUTO: 3.45 10*3/MM3 (ref 0.7–3.1)
LYMPHOCYTES NFR BLD AUTO: 36.2 % (ref 19.6–45.3)
MCH RBC QN AUTO: 28.2 PG (ref 26.6–33)
MCHC RBC AUTO-ENTMCNC: 32 G/DL (ref 31.5–35.7)
MCV RBC AUTO: 88 FL (ref 79–97)
MONOCYTES # BLD AUTO: 0.62 10*3/MM3 (ref 0.1–0.9)
MONOCYTES NFR BLD AUTO: 6.5 % (ref 5–12)
NEUTROPHILS NFR BLD AUTO: 5.42 10*3/MM3 (ref 1.7–7)
NEUTROPHILS NFR BLD AUTO: 56.8 % (ref 42.7–76)
NITRITE UR QL STRIP: NEGATIVE
NRBC BLD AUTO-RTO: 0 /100 WBC (ref 0–0.2)
PH UR STRIP.AUTO: 5.5 [PH] (ref 5–8)
PLATELET # BLD AUTO: 242 10*3/MM3 (ref 140–450)
PMV BLD AUTO: 10.1 FL (ref 6–12)
POTASSIUM SERPL-SCNC: 3.3 MMOL/L (ref 3.5–5.2)
PROT SERPL-MCNC: 7.3 G/DL (ref 6–8.5)
PROT UR QL STRIP: ABNORMAL
RBC # BLD AUTO: 3.83 10*6/MM3 (ref 3.77–5.28)
RBC # UR STRIP: ABNORMAL /HPF
REF LAB TEST METHOD: ABNORMAL
SODIUM SERPL-SCNC: 140 MMOL/L (ref 136–145)
SP GR UR STRIP: 1.02 (ref 1–1.03)
SQUAMOUS #/AREA URNS HPF: ABNORMAL /HPF
UROBILINOGEN UR QL STRIP: ABNORMAL
WBC # UR STRIP: ABNORMAL /HPF
WBC NRBC COR # BLD AUTO: 9.54 10*3/MM3 (ref 3.4–10.8)
WHOLE BLOOD HOLD COAG: NORMAL

## 2025-06-13 PROCEDURE — 80053 COMPREHEN METABOLIC PANEL: CPT | Performed by: NURSE PRACTITIONER

## 2025-06-13 PROCEDURE — 81001 URINALYSIS AUTO W/SCOPE: CPT | Performed by: NURSE PRACTITIONER

## 2025-06-13 PROCEDURE — 36415 COLL VENOUS BLD VENIPUNCTURE: CPT

## 2025-06-13 PROCEDURE — 74177 CT ABD & PELVIS W/CONTRAST: CPT

## 2025-06-13 PROCEDURE — 25010000002 DROPERIDOL PER 5 MG: Performed by: NURSE PRACTITIONER

## 2025-06-13 PROCEDURE — 25010000002 FAMOTIDINE 10 MG/ML SOLUTION: Performed by: NURSE PRACTITIONER

## 2025-06-13 PROCEDURE — 96375 TX/PRO/DX INJ NEW DRUG ADDON: CPT

## 2025-06-13 PROCEDURE — 25510000001 IOPAMIDOL PER 1 ML: Performed by: NURSE PRACTITIONER

## 2025-06-13 PROCEDURE — 83690 ASSAY OF LIPASE: CPT | Performed by: NURSE PRACTITIONER

## 2025-06-13 PROCEDURE — 99285 EMERGENCY DEPT VISIT HI MDM: CPT

## 2025-06-13 PROCEDURE — 87086 URINE CULTURE/COLONY COUNT: CPT | Performed by: NURSE PRACTITIONER

## 2025-06-13 PROCEDURE — 85025 COMPLETE CBC W/AUTO DIFF WBC: CPT | Performed by: NURSE PRACTITIONER

## 2025-06-13 RX ORDER — LIDOCAINE HYDROCHLORIDE 20 MG/ML
10 SOLUTION OROPHARYNGEAL ONCE
Status: COMPLETED | OUTPATIENT
Start: 2025-06-13 | End: 2025-06-13

## 2025-06-13 RX ORDER — ALUMINA, MAGNESIA, AND SIMETHICONE 2400; 2400; 240 MG/30ML; MG/30ML; MG/30ML
15 SUSPENSION ORAL EVERY 6 HOURS PRN
Status: DISCONTINUED | OUTPATIENT
Start: 2025-06-13 | End: 2025-06-14 | Stop reason: HOSPADM

## 2025-06-13 RX ORDER — SODIUM CHLORIDE 0.9 % (FLUSH) 0.9 %
10 SYRINGE (ML) INJECTION AS NEEDED
Status: DISCONTINUED | OUTPATIENT
Start: 2025-06-13 | End: 2025-06-14 | Stop reason: HOSPADM

## 2025-06-13 RX ORDER — IOPAMIDOL 755 MG/ML
100 INJECTION, SOLUTION INTRAVASCULAR
Status: COMPLETED | OUTPATIENT
Start: 2025-06-13 | End: 2025-06-13

## 2025-06-13 RX ORDER — DROPERIDOL 2.5 MG/ML
1.25 INJECTION, SOLUTION INTRAMUSCULAR; INTRAVENOUS ONCE
Status: COMPLETED | OUTPATIENT
Start: 2025-06-13 | End: 2025-06-13

## 2025-06-13 RX ORDER — FAMOTIDINE 10 MG/ML
20 INJECTION, SOLUTION INTRAVENOUS ONCE
Status: COMPLETED | OUTPATIENT
Start: 2025-06-13 | End: 2025-06-13

## 2025-06-13 RX ORDER — POTASSIUM CHLORIDE 1500 MG/1
40 TABLET, EXTENDED RELEASE ORAL ONCE
Status: COMPLETED | OUTPATIENT
Start: 2025-06-13 | End: 2025-06-13

## 2025-06-13 RX ADMIN — ALUMINUM HYDROXIDE, MAGNESIUM HYDROXIDE, AND DIMETHICONE 15 ML: 400; 400; 40 SUSPENSION ORAL at 23:03

## 2025-06-13 RX ADMIN — DROPERIDOL 1.25 MG: 2.5 INJECTION, SOLUTION INTRAMUSCULAR; INTRAVENOUS at 23:03

## 2025-06-13 RX ADMIN — IOPAMIDOL 100 ML: 755 INJECTION, SOLUTION INTRAVENOUS at 23:32

## 2025-06-13 RX ADMIN — FAMOTIDINE 20 MG: 10 INJECTION INTRAVENOUS at 23:03

## 2025-06-13 RX ADMIN — POTASSIUM CHLORIDE 40 MEQ: 1500 TABLET, EXTENDED RELEASE ORAL at 23:03

## 2025-06-13 RX ADMIN — LIDOCAINE HYDROCHLORIDE 10 ML: 20 SOLUTION ORAL at 23:03

## 2025-06-14 PROCEDURE — 25010000002 CEFTRIAXONE PER 250 MG: Performed by: NURSE PRACTITIONER

## 2025-06-14 PROCEDURE — 96365 THER/PROPH/DIAG IV INF INIT: CPT

## 2025-06-14 RX ORDER — POTASSIUM CHLORIDE 1500 MG/1
20 TABLET, EXTENDED RELEASE ORAL ONCE
Status: DISCONTINUED | OUTPATIENT
Start: 2025-06-14 | End: 2025-06-14

## 2025-06-14 RX ORDER — ONDANSETRON 4 MG/1
4 TABLET, ORALLY DISINTEGRATING ORAL 4 TIMES DAILY PRN
Qty: 10 TABLET | Refills: 0 | Status: SHIPPED | OUTPATIENT
Start: 2025-06-14 | End: 2025-06-19 | Stop reason: DRUGHIGH

## 2025-06-14 RX ADMIN — CEFTRIAXONE 2000 MG: 2 INJECTION, POWDER, FOR SOLUTION INTRAMUSCULAR; INTRAVENOUS at 00:30

## 2025-06-14 NOTE — DISCHARGE INSTRUCTIONS
Use Zofran as needed for nausea    Take antibiotics till gone for urinary tract infection    Have your urine rechecked in 5 days to ensure the infection is gone    Follow-up with primary care for any further problems return to the emergency room if worse

## 2025-06-14 NOTE — ED PROVIDER NOTES
Subjective   History of Present Illness  Patient is a 70-year-old female who comes in with nausea that is been going on for about 3 weeks and some urinary frequency.      Review of Systems   Gastrointestinal:  Positive for abdominal pain and nausea.       Past Medical History:   Diagnosis Date    Arthritis 2010    Asthma 1 month    Cancer 12 years    Multiple mylenoma    Depression 2010    Diabetes mellitus 1.5 years    High blood pressure     High cholesterol 2005    Hypothyroidism     Lupus 2010    Sleep apnea Now       No Known Allergies    Past Surgical History:   Procedure Laterality Date    BREAST LUMPECTOMY Right 1983    benign    DILATATION AND CURETTAGE      LAPAROSCOPIC TUBAL LIGATION  1979    OVARY SURGERY  1985    remoal of left ovary       Family History   Problem Relation Age of Onset    Heart disease Father     Pancreatic cancer Sister 59    Heart disease Sister     Stroke Sister     Pneumonia Brother        Social History     Socioeconomic History    Marital status:    Tobacco Use    Smoking status: Former     Current packs/day: 0.00     Average packs/day: 1 pack/day for 21.0 years (21.0 ttl pk-yrs)     Types: Cigarettes     Start date:      Quit date:      Years since quittin.4    Smokeless tobacco: Never   Vaping Use    Vaping status: Never Used   Substance and Sexual Activity    Alcohol use: Yes     Comment: Very occasionally    Drug use: No    Sexual activity: Not Currently     Partners: Male     Birth control/protection: Tubal ligation           Objective   Physical Exam  Vitals reviewed.   Constitutional:       General: She is not in acute distress.     Appearance: She is well-developed. She is obese. She is not ill-appearing, toxic-appearing or diaphoretic.   HENT:      Head: Normocephalic and atraumatic.   Eyes:      Conjunctiva/sclera: Conjunctivae normal.      Pupils: Pupils are equal, round, and reactive to light.   Cardiovascular:      Rate and Rhythm: Normal rate  "and regular rhythm.      Heart sounds: Normal heart sounds.   Pulmonary:      Effort: Pulmonary effort is normal. No respiratory distress.      Breath sounds: Normal breath sounds. No wheezing.   Abdominal:      General: Bowel sounds are normal.      Palpations: Abdomen is soft.      Tenderness: There is abdominal tenderness in the epigastric area. There is no right CVA tenderness, left CVA tenderness, guarding or rebound.   Musculoskeletal:         General: No deformity. Normal range of motion.      Cervical back: Normal range of motion and neck supple.   Skin:     General: Skin is warm and dry.      Capillary Refill: Capillary refill takes less than 2 seconds.   Neurological:      Mental Status: She is alert and oriented to person, place, and time.      GCS: GCS eye subscore is 4. GCS verbal subscore is 5. GCS motor subscore is 6.      Motor: No weakness.   Psychiatric:         Mood and Affect: Mood normal.         Behavior: Behavior normal.         Procedures           ED Course  ED Course as of 06/14/25 0106   Fri Jun 13, 2025   2249 Marked ready for CT [KW]      ED Course User Index  [KW] Mere Blakely, APRN                                           /74   Pulse 96   Temp 98.4 °F (36.9 °C) (Oral)   Resp 20   Ht 154.9 cm (61\")   Wt 82.4 kg (181 lb 10.5 oz)   LMP 10/17/2014   SpO2 97%   BMI 34.32 kg/m²   Labs Reviewed   COMPREHENSIVE METABOLIC PANEL - Abnormal; Notable for the following components:       Result Value    Glucose 119 (*)     Potassium 3.3 (*)     All other components within normal limits    Narrative:     GFR Categories in Chronic Kidney Disease (CKD)              GFR Category          GFR (mL/min/1.73)    Interpretation  G1                    90 or greater        Normal or high (1)  G2                    60-89                Mild decrease (1)  G3a                   45-59                Mild to moderate decrease  G3b                   30-44                Moderate to severe " decrease  G4                    15-29                Severe decrease  G5                    14 or less           Kidney failure    (1)In the absence of evidence of kidney disease, neither GFR category G1 or G2 fulfill the criteria for CKD.    eGFR calculation 2021 CKD-EPI creatinine equation, which does not include race as a factor   URINALYSIS W/ CULTURE IF INDICATED - Abnormal; Notable for the following components:    Ketones, UA Trace (*)     Blood, UA Trace (*)     Protein, UA >=300 mg/dL (3+) (*)     Leuk Esterase, UA Small (1+) (*)     All other components within normal limits    Narrative:     In absence of clinical symptoms, the presence of pyuria, bacteria, and/or nitrites on the urinalysis result does not correlate with infection.   CBC WITH AUTO DIFFERENTIAL - Abnormal; Notable for the following components:    Hemoglobin 10.8 (*)     Hematocrit 33.7 (*)     Eosinophil % 0.0 (*)     Lymphocytes, Absolute 3.45 (*)     All other components within normal limits   URINALYSIS, MICROSCOPIC ONLY - Abnormal; Notable for the following components:    WBC, UA 11-20 (*)     Bacteria, UA 2+ (*)     Squamous Epithelial Cells, UA 3-6 (*)     All other components within normal limits   LIPASE - Normal   URINE CULTURE   CBC AND DIFFERENTIAL    Narrative:     The following orders were created for panel order CBC & Differential.  Procedure                               Abnormality         Status                     ---------                               -----------         ------                     CBC Auto Differential[[id number]]        Abnormal            Final result                 Please view results for these tests on the individual orders.   EXTRA TUBES    Narrative:     The following orders were created for panel order Extra Tubes.  Procedure                               Abnormality         Status                     ---------                               -----------         ------                     Gold Top -  SST[[id number]]                                   Final result               Light Blue Top[[id number]]                                   Final result                 Please view results for these tests on the individual orders.   GOLD TOP - SST   LIGHT BLUE TOP     Medications   sodium chloride 0.9 % flush 10 mL (has no administration in time range)   aluminum-magnesium hydroxide-simethicone (MAALOX MAX) 400-400-40 MG/5ML suspension 15 mL (15 mL Oral Given 6/13/25 2303)   potassium chloride (KLOR-CON M20) CR tablet 40 mEq (40 mEq Oral Given 6/13/25 2303)   Lidocaine Viscous HCl (XYLOCAINE) 2 % solution 10 mL (10 mL Mouth/Throat Given 6/13/25 2303)   famotidine (PEPCID) injection 20 mg (20 mg Intravenous Given 6/13/25 2303)   droperidol (INAPSINE) injection 1.25 mg (1.25 mg Intravenous Given 6/13/25 2303)   iopamidol (ISOVUE-370) 76 % injection 100 mL (100 mL Intravenous Given 6/13/25 2332)   cefTRIAXone (ROCEPHIN) 2,000 mg in sodium chloride 0.9 % 100 mL MBP (0 mg Intravenous Stopped 6/14/25 0103)     CT Abdomen Pelvis With Contrast  Result Date: 6/13/2025  Impression: 1.No acute intra-abdominal or intrapelvic process. 2.Ancillary findings as described above. Electronically Signed: Aleena Toussaint MD  6/13/2025 11:37 PM EDT  Workstation ID: [id number]                Medical Decision Making  Patient is an obese 70-year-old female who comes in with nausea for the last 3 weeks and some upper abdominal discomfort.  With some urinary frequency concerning for gastritis, pancreatitis, cholecystitis, this is not an all-inclusive list.  Patient had above exam and was found to have a urinary tract infection and was treated with Rocephin her CBC and chemistry were essentially normal she also was given some fluids and some pain medication including a GI cocktail which helped her pain significantly she was given some droperidol for nausea which resolved.  She be sent home with some Augmentin and told to follow-up to have the urine  checked in 3 to 5 days  To return for worsening symptoms she verbalized understood discharge instruction    Problems Addressed:  Epigastric pain: complicated acute illness or injury  Nausea: complicated acute illness or injury  UTI (urinary tract infection) with pyuria: complicated acute illness or injury    Amount and/or Complexity of Data Reviewed  Independent Historian: caregiver     Details: Daughter at bedside  Labs: ordered. Decision-making details documented in ED Course.  Radiology: ordered and independent interpretation performed. Decision-making details documented in ED Course.  ECG/medicine tests: ordered and independent interpretation performed. Decision-making details documented in ED Course.    Risk  OTC drugs.  Prescription drug management.        Final diagnoses:   Epigastric pain   Nausea   UTI (urinary tract infection) with pyuria       ED Disposition  ED Disposition       ED Disposition   Discharge    Condition   Stable    Comment   --               Chula Sanchez, STANLEY  [address] [facility] [place] [address] [phone]    In 5 days  for urine recheck         Medication List        New Prescriptions      amoxicillin-clavulanate 875-125 MG per tablet  Commonly known as: AUGMENTIN  Take 1 tablet by mouth 2 (Two) Times a Day for 5 days.     ondansetron ODT 4 MG disintegrating tablet  Commonly known as: ZOFRAN-ODT  Place 1 tablet under the tongue 4 (Four) Times a Day As Needed for Vomiting or Nausea.               Where to Get Your Medications        These medications were sent to [facility] [address] [place] [address] [place] - [address] [facility] [phone] [place] [phone]   [address] [facility] [place] [address]      Phone: [phone]   amoxicillin-clavulanate 875-125 MG per tablet  ondansetron ODT 4 MG disintegrating tablet            Mere Blakely, STANELY  06/14/25 0106

## 2025-06-15 LAB — BACTERIA SPEC AEROBE CULT: NORMAL

## 2025-06-16 NOTE — PROGRESS NOTES
TREATMENT  PREPARATION    Phuong Altamirano  [id number]  1955    Chief Complaint: Treatment preparation and needs assessment    History of present illness:  Phuong Altamirano is a 70 y.o. year old female who is here today for treatment preparation and needs assessment.  The patient has been diagnosed with   Encounter Diagnoses   Name Primary?    Multiple myeloma not having achieved remission     Encounter for screening for other viral diseases     Encounter for education Yes    and is scheduled to begin treatment with:     Oncology History:    Oncology/Hematology History   Multiple myeloma not having achieved remission   5/27/2025 Initial Diagnosis    Multiple myeloma not having achieved remission     6/18/2025 -  Chemotherapy    OP MULTIPLE MYELOMA Daratumumab / Lenalidomide / Bortezomib / Dexamethasone (INDUCTION)          The current medication list and allergy list were reviewed and reconciled.     Past Medical History, Past Surgical History, Social History, Family History have been reviewed and are without significant changes except as mentioned.    Physical Exam:    Vitals:    06/18/25 1258   BP: 138/80   Pulse: 75   Temp: 98.1 °F (36.7 °C)   SpO2: 95%     Vitals:    06/18/25 1258   PainSc: 0-No pain        ECOG score: 1             Physical Exam  Vitals reviewed.   Constitutional:       General: She is not in acute distress.     Appearance: Normal appearance. She is not toxic-appearing.   HENT:      Head: Normocephalic and atraumatic.   Eyes:      Conjunctiva/sclera: Conjunctivae normal.   Pulmonary:      Effort: Pulmonary effort is normal.   Musculoskeletal:         General: No swelling.   Skin:     Coloration: Skin is not jaundiced or pale.      Findings: No bruising, erythema, lesion or rash.   Neurological:      Mental Status: She is alert and oriented to person, place, and time.   Psychiatric:         Mood and Affect: Mood normal.         Behavior: Behavior normal.         Thought Content: Thought  content normal.         Judgment: Judgment normal.           NEEDS ASSESSMENTS    Genetics  The patient's new diagnosis and family history have been reviewed for genetic counseling needs. The patient will not be referred..     Psychosocial and Barriers to care  The patient has completed a PHQ-9 Depression Screening and the Distress Thermometer (DT) today.   PHQ-2 Total Score:0     The patient scored their distress today as Distress Level: 5 on a scale of 0-10 with 0 being no distress and 10 being extreme distress. Problems marked by the patient as being an issue for them within the last week include concern with treatment and potential adverse effects.    Results were reviewed along with psychosocial resources offered by our cancer center.  Our Supportive Oncology team will be flagged for a score of 4 or above, and a same day call will be made for a score of 9 or 10.  A mental health referral is offered at that time. Patients who score less than 4 have been educated on our support services and can be referred to our  upon request.  The patient will be referred to our .       Nutrition  The patient has completed the malnutrition screening today. They scored Malnutrition Screening Tool  Have you recently lost weight without trying?  If yes, how much weight have you lost?: 0--> No  Have you been eating poorly because of a decreased appetite?: 0--> No  MST score: 0   with a score of 0-1 meaning not at risk in a score of 2 or greater meaning at risk.  Patients with a score of 3 or higher will be referred to our oncology dietitian for support. Patients beginning at risk treatment regimens or who have dietary concerns will also be referred to our oncology dietitian. The patient will be referred.    Functional Assessment  Persons who are age 70 or greater will be screened for qualification of a comprehensive geriatric assessment by our survivorship nurse practitioner.  Older adults with cancer face  "unique challenges. These may include an increased risk of drug reactions, financial burdens, and caregiver stress.  NOT APPLICABLE    Intravenous Access Assessment  The patient and I discussed planned intravenous chemo/biotherapy as well as other IV treatments that are often needed throughout the course of treatment. These may include, but are not limited to blood transfusions, antibiotics, and IV hydration. Discussed that depending on selected treatment and vein assessment, patient may require venous access device (VAD) which could include but not limited to a Mediport or PICC line. Risks and benefits of VADs reviewed. The patient will be treated via PIV and oral treatment    Reproductive/Sexual Activity   People should avoid becoming pregnant and should not get a partner pregnant while undergoing chemo/biotherapy.  People of childbearing age should use effective contraception during active therapy. The best recommendation for all people is to use a barrier method for a minimum of 1 week after the last infusion of chemo/biotherapy to prevent your partner being exposed to byproducts from treatment medications in bodily fluids. Effective contraception should be discussed with your oncology team to make sure it is safe to take based on your diagnosis. Possible options include oral contraceptives, barrier methods. Chemo/biotherapy can change your ability to reproduce children in the future.  There are options for fertility preservation. NOT APPLICABLE    Advanced Care Planning  Advance Care Planning   The patient and I discussed advanced care planning, \"Conversations that Matter\".   This service is offered for development of advance directives with a certified ACP facilitator.  The patient does not have an up-to-date advanced directive. This document is not on file with our office. The patient is not interested in an appointment with one of our facilitators to create or update their advanced directives.           "     Smoking cessation  Tobacco Use: Medium Risk (6/18/2025)    Patient History     Smoking Tobacco Use: Former     Smokeless Tobacco Use: Never     Passive Exposure: Not on file       Patient and I discussed their tobacco use history. Referral will not be made for smoking cessation.      Palliative Care  When appropriate, the patient and I discussed the availability palliative care services and when appropriate Hospice care. Palliative care is not the same as Hospice care which was explained to the patient.The patient is not interested in additional information from our  on these services.    Survivorship   When appropriate, we discussed that we will refer the patient to survivorship clinic to discuss next steps following completion of planned treatment.  Reviewed this visit will include assessment of your physical, psychological, functional, and spiritual needs as a survivor and the need at attend this visit when scheduled.    TREATMENT EDUCATION    Today I met with the patient to discuss the chemo/biotherapy regimen recommended for treatment of Multiple myeloma not having achieved remission  - Daratumumab Type & Screen  - Hepatitis B Surface Antibody  - Hepatitis B Core Antibody, Total  - Hepatitis B Surface Antigen    Encounter for screening for other viral diseases  - Hepatitis B Surface Antibody  - Hepatitis B Core Antibody, Total  - Hepatitis B Surface Antigen    Encounter for education  .  The patient was given explanation of treatment premed side effects including office policy that prohibits patients to drive if sedating medications are administered, MD explanation given regarding benefits, side effects, toxicities and goals of treatment.  The patient received a Chemotherapy/Biotherapy Plan Summary including diagnosis and explanation of specific treatment plan.    SIDE EFFECTS:  Common side effects were discussed with the patient and/or significant other.  Discussion included where applicable  hair loss/discoloration, anemia/fatigue, infection/chills/fever, appetite, bleeding risk/precautions, constipation, diarrhea, mouth sores, taste alteration, loss of appetite, nausea/vomiting, peripheral neuropathy, skin/nail changes, rash, muscle aches/weakness, photosensitivity, weight gain/loss, hearing loss, dizziness, menopausal symptoms, menstrual irregularity, sterility, high blood pressure, heart damage, liver damage, lung damage, kidney damage, DVT/PE risk, fluid retention, pleural/pericardial effusion, somnolence, electrolyte/LFT imbalance, vein exercises and/or the possible need for vascular access/port placement.  The patient was advised that although uncommon, leakage of an infused medication from the vein or venous access device may lead to skin breakdown and/or other tissue damage.  The patient was advised that he/she may have pain, bleeding, and/or bruising from the insertion of a needle in their vein or venous access device (port).  The patient was further advised that, in spite of proper technique, infection with redness and irritation may rarely occur at the site where the needle was inserted.  The patient was advised that if complications occur, additional medical treatment is available.  Finally, where applicable we have reviewed rare but potential immune mediated side effects including shortness of breath, cough, chest pain (pneumonitis), abdominal pain, diarrhea (colitis), thyroiditis (hypothyroid or hyperthyroid), hepatitis and liver dysfunction, nephritis and renal dysfunction.    Discussion also included side effects specific to drugs in the treatment plan, specifically:    Treatment Plans       Name Type Hold Status Plan Dates Plan Provider       Active    OP MULTIPLE MYELOMA Daratumumab / Lenalidomide / Bortezomib / Dexamethasone (INDUCTION)  ONCOLOGY TREATMENT On Automatic Hold 6/9/2025 - Present Neftali Barrett MD                     Questions answered and additional information  discussed on topics including:  Anemia, Thrombocytopenia, Neutropenia, Nutrition and appetite changes, Constipation, Diarrhea, Nausea & vomiting, Mouth sores, Alopecia, Infertility, Intimate activity, Nervous system changes, Pain, Skin & nail changes, Organ toxicities, Survivorship, Home care, and Oral medication handling and disposal       Assessment and Plan:    Diagnoses and all orders for this visit:    1. Encounter for education (Primary)    2. Multiple myeloma not having achieved remission  -     Daratumumab Type & Screen; Future  -     Hepatitis B Surface Antibody; Future  -     Hepatitis B Core Antibody, Total; Future  -     Hepatitis B Surface Antigen; Future    3. Encounter for screening for other viral diseases  -     Hepatitis B Surface Antibody; Future  -     Hepatitis B Core Antibody, Total; Future  -     Hepatitis B Surface Antigen; Future      Orders Placed This Encounter   Procedures    Hepatitis B Surface Antibody     Standing Status:   Future     Expected Date:   6/18/2025     Expiration Date:   9/18/2026     Release to patient:   Routine Release [[id number]]    Hepatitis B Core Antibody, Total     Standing Status:   Future     Expected Date:   6/18/2025     Expiration Date:   9/18/2026     Release to patient:   Routine Release [[id number]]    Hepatitis B Surface Antigen     Standing Status:   Future     Expected Date:   6/18/2025     Expiration Date:   9/18/2026     Release to patient:   Routine Release [[id number]]    Daratumumab Type & Screen     Use for Darzalex or Sarclisa     Standing Status:   Future     Expected Date:   6/18/2025     Expiration Date:   6/18/2026     Is patient on Daratumumab or [facility]?:   Yes     Release to patient:   Routine Release [[id number]]         The patient and I have reviewed their diagnosis and scheduled treatment plan. Needs assessment was completed where applicable including genetics, psychosocial needs, barriers to care, VAD evaluation, advanced care  planning, survivorship, and palliative care services where indicated. Referrals have been ordered as appropriate based upon evaluation today and patient desires.   Chemo/biotherapy teaching was completed today and consent obtained. See separate documentation for further details.  Adequate time was given to answer questions.  Patient made aware of their care team members and contact information if they have questions or problems throughout the treatment course.  Discussion held and written information provided describing frequency of office visits and ongoing monitoring throughout the treatment plan.     Reviewed with patient any prescribed medication sent to pharmacy.  Education provided regarding proper storage, safe handling, and proper disposal of unused medication.  Proper handling of body fluids and waste discussed and written information provided.  If appropriate, patient had pretreatment labs drawn today.  Patient scheduled to begin treatment on 6/23/2025. Follow with Dr. Barrett as previously scheduled.    Learning assessment completed at initial patient encounter. See separate flowsheet. Chemo/biotherapy education comprehension assessed at today's visit.    I spent 60 minutes caring for Phuong on this date of service. This time includes time spent by me in the following activities: preparing for the visit, reviewing tests, obtaining and/or reviewing a separately obtained history, performing a medically appropriate examination and/or evaluation, counseling and educating the patient/family/caregiver, ordering medications, tests, or procedures, referring and communicating with other health care professionals, documenting information in the medical record, and care coordination.     Barbara Walters PA-C   06/18/25

## 2025-06-18 ENCOUNTER — SPECIALTY PHARMACY (OUTPATIENT)
Dept: PHARMACY | Facility: HOSPITAL | Age: 70
End: 2025-06-18
Payer: MEDICARE

## 2025-06-18 ENCOUNTER — DOCUMENTATION (OUTPATIENT)
Dept: ONCOLOGY | Facility: CLINIC | Age: 70
End: 2025-06-18
Payer: MEDICARE

## 2025-06-18 ENCOUNTER — OFFICE VISIT (OUTPATIENT)
Dept: ONCOLOGY | Facility: CLINIC | Age: 70
End: 2025-06-18
Payer: MEDICARE

## 2025-06-18 ENCOUNTER — LAB (OUTPATIENT)
Dept: LAB | Facility: HOSPITAL | Age: 70
End: 2025-06-18
Payer: MEDICARE

## 2025-06-18 ENCOUNTER — CLINICAL SUPPORT (OUTPATIENT)
Dept: ONCOLOGY | Facility: CLINIC | Age: 70
End: 2025-06-18
Payer: MEDICARE

## 2025-06-18 VITALS
DIASTOLIC BLOOD PRESSURE: 80 MMHG | WEIGHT: 182 LBS | BODY MASS INDEX: 34.36 KG/M2 | SYSTOLIC BLOOD PRESSURE: 138 MMHG | OXYGEN SATURATION: 95 % | HEART RATE: 75 BPM | TEMPERATURE: 98.1 F | HEIGHT: 61 IN

## 2025-06-18 DIAGNOSIS — Z71.9 ENCOUNTER FOR EDUCATION: Primary | ICD-10-CM

## 2025-06-18 DIAGNOSIS — C90.00 MULTIPLE MYELOMA NOT HAVING ACHIEVED REMISSION: ICD-10-CM

## 2025-06-18 DIAGNOSIS — Z11.59 ENCOUNTER FOR SCREENING FOR OTHER VIRAL DISEASES: ICD-10-CM

## 2025-06-18 DIAGNOSIS — C90.00 MULTIPLE MYELOMA NOT HAVING ACHIEVED REMISSION: Primary | ICD-10-CM

## 2025-06-18 LAB
ABO GROUP BLD: NORMAL
BLD GP AB SCN SERPL QL: NEGATIVE
HBV SURFACE AB SER RIA-ACNC: NORMAL
HBV SURFACE AG SERPL QL IA: NORMAL
KELL: NEGATIVE
RH BLD: POSITIVE
T&S EXPIRATION DATE: NORMAL

## 2025-06-18 PROCEDURE — 86900 BLOOD TYPING SEROLOGIC ABO: CPT | Performed by: PHYSICIAN ASSISTANT

## 2025-06-18 PROCEDURE — 87340 HEPATITIS B SURFACE AG IA: CPT | Performed by: PHYSICIAN ASSISTANT

## 2025-06-18 PROCEDURE — 86901 BLOOD TYPING SEROLOGIC RH(D): CPT | Performed by: PHYSICIAN ASSISTANT

## 2025-06-18 PROCEDURE — 36415 COLL VENOUS BLD VENIPUNCTURE: CPT

## 2025-06-18 PROCEDURE — 86706 HEP B SURFACE ANTIBODY: CPT | Performed by: PHYSICIAN ASSISTANT

## 2025-06-18 PROCEDURE — 86850 RBC ANTIBODY SCREEN: CPT | Performed by: PHYSICIAN ASSISTANT

## 2025-06-18 PROCEDURE — 86905 BLOOD TYPING RBC ANTIGENS: CPT | Performed by: PHYSICIAN ASSISTANT

## 2025-06-18 RX ORDER — LENALIDOMIDE 25 MG/1
25 CAPSULE ORAL DAILY
COMMUNITY
End: 2025-06-19 | Stop reason: SDUPTHER

## 2025-06-18 RX ORDER — ALBUTEROL SULFATE 90 UG/1
INHALANT RESPIRATORY (INHALATION)
COMMUNITY
Start: 2025-05-28

## 2025-06-18 NOTE — PROGRESS NOTES
Oral Chemotherapy - New Referral    Received a referral from Dr. 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  Relevant Past Treatments: none    Therapy Appropriate Based on Treatment Guidelines and FDA Labeling?: Yes  Therapeutic Goals: Continue treatment until progression or intolerable toxicity-currently planned 4 cycles  Patient Can Self-Administer Oral Medications?: Yes  Supportive care medications: Ondansetron     Drug-Drug Interactions: The current medication list was reviewed and there are some relevant drug-drug interactions with the oral specialty medication that will be discussed during education, including:     -Risk Rating D: Consider therapy modification:Summary DexAMETHasone (Systemic) may increase thrombogenic effects of Lenalidomide. Patient Management While the combination of lenalidomide and dexamethasone is recommended for the treatment of multiple myeloma, due to the increased risk of venous and arterial thromboembolism associated with this combination, thromboprophylaxis is recommended, and the choice of regimen should be based on an assessment of the patient's underlying risks. Monitor for and advise patients about the signs and symptoms of thromboembolism. Advise patients to seek immediate medical care if they develop symptoms such as shortness of breath, chest pain, or arm or leg swelling.  Medication Allergies: The patient has NKDA  Review of Labs/Dose Adjustments: The patient's most recent labs were reviewed and all are WNL to start treatment at this dose.     A prescription was released to [facility] for   Drug: Revlimid (lenalidomide)  Strength: 25 mg  Directions: Take 1 capsule by mouth daily ON days 1-14, OFF for 7 days of each 21 day  cycle  Quantity: 14  Refills: 0    Pharmacy education is scheduled for today.    Name/Credentials  Alisha MOORE PharmANN      6/18/2025  14:02 EDT

## 2025-06-18 NOTE — PROGRESS NOTES
Specialty Pharmacy Patient Management Program     Benefits Investigation Summary    Prescription: New Therapy Lenalidomide 25mg 14caps/21d     Dispensing pharmacy: [facility]    Copay amount:$0.00 using the [employer] Chin    PLAN: [employer]  BIN: [id number]  PCN: [id number]  GRP: [id number]  ID: [id number]    Prior Auth and Med Assistance notes:  The Prior Authorization for Lenalidomide 25mg is approved from 6/4/2025 until further notice    We have secured a chin with the [employer].    Satmex ID: [id number]  Amount: $8,0000  From 05/19/2025 to 05/19/2026  BIN:[id number]  PCN:[id number]  GRP: [id number]  Pharmacy Card ID: [id number]    This chin can be used to reduce the patient's co-pay to $0.    Ellen Vasquez - Pharmacy Care Coordinator   6/18/2025  15:14 EDT

## 2025-06-18 NOTE — PROGRESS NOTES
Distress Screening Follow-up    Diagnosis:     Location of Distress Screening: Medical Oncology    Distress Level: 5 (6/18/2025  1:00 PM)    Physical Concerns:       Practical Problems:       Emotional Concerns:       Family Concerns:       Spiritual Concerns:        Interventions:        Comments:     OSW met with pt to review DST.  Pt is having some anxiety as she begins treatment.  OSW normalized pt feelings.  We reviewed some techniques to deal with anxiety and resources were discussed.  Provided contact information and encouraged pt to reach out as needed.

## 2025-06-18 NOTE — PROGRESS NOTES
Specialty Pharmacy Patient Management Program  Oncology Initial Assessment     Phuong Altamirano was referred by their provider to the Oncology Patient Management program offered by [facility] for multiple myeloma on 06/18/25.  An initial outreach was conducted, including assessment of therapy appropriateness and specialty medication education for  Revlimid (lenalidomide). The patient was introduced to services offered by [facility], including: regular assessments, refill coordination, curbside pick-up or mail order delivery options, prior authorization maintenance, and financial assistance programs as applicable. The patient was also provided with contact information for the pharmacy team.     Goal of chemotherapy: disease control    Treatment Medication(s) / Frequency and Dosing    OP MULTIPLE MYELOMA Daratumumab / Lenalidomide / Bortezomib / Dexamethasone (INDUCTION) Specialty pharmacy education on lenalidomide 25 mg PO daily on days 1-14 then off 7 days of each 21 day cycle    Number of cycles: 4 planned cycles    Start date of oral specialty medication: 6/23/25    Follow-up Testing to be determined after TBD cycles by MD.     Items for home use: Imodium AD (for diarrhea)    Rx written for: [x] Nausea      ondansetron 8 mg by mouth every 8 hours as needed for nausea    Completing Pharmacist: Alisha Galloway             Date/time: 06/18/2025 16:18 EDT snd    Insurance Coverage & Financial Support  Patient has received a aurora for [employer] 2000ay     Relevant Past Medical History and Comorbidities  Relevant medical history and concomitant health conditions were discussed with the patient. The patient's chart has been reviewed for relevant past medical history and comorbid conditions and updated as necessary.  Past Medical History:   Diagnosis Date    Arthritis 2010    Asthma 1 month    Cancer 12 years    Multiple mylenoma    Depression 2010    Diabetes mellitus 1.5  years    High blood pressure     High cholesterol 2005    Hypothyroidism     Lupus 2010    Sleep apnea Now     Social History     Socioeconomic History    Marital status:    Tobacco Use    Smoking status: Former     Current packs/day: 0.00     Average packs/day: 1 pack/day for 21.0 years (21.0 ttl pk-yrs)     Types: Cigarettes     Start date:      Quit date:      Years since quittin.4    Smokeless tobacco: Never   Vaping Use    Vaping status: Never Used   Substance and Sexual Activity    Alcohol use: Yes     Comment: Very occasionally    Drug use: No    Sexual activity: Not Currently     Partners: Male     Birth control/protection: Tubal ligation       Problem list reviewed by Alisha Galloway on 2025 at  4:17 PM    Allergies  Known allergies and reactions were discussed with the patient. The patient's chart has been reviewed for  allergy information and updated as necessary.   No Known Allergies    Allergies reviewed by Alisha Galloway on 2025 at  3:02 PM    Relevant Laboratory Values  Relevant laboratory values were discussed with the patient. The following specialty medication dose adjustment(s) are recommended: No dose adjustments are needed on specialty medication based on lab values  Lab Results   Component Value Date    GLUCOSE 119 (H) 2025    CALCIUM 9.1 2025     2025    K 3.3 (L) 2025    CO2 28.3 2025    CL 98 2025    BUN 13.8 2025    CREATININE 0.78 2025    EGFRIFAFRI 100 2019    EGFRIFNONA 116 2019    BCR 17.7 2025    ANIONGAP 13.7 2025     Lab Results   Component Value Date    WBC 4.59 2025    RBC 3.74 (L) 2025    HGB 10.7 (L) 2025    HCT 33.9 (L) 2025    MCV 90.6 2025    MCH 28.6 2025    MCHC 31.6 2025    RDW 15.0 2025    RDWSD 47.8 2025    MPV 9.2 2025     2025    NEUTRORELPCT 42.7 2025    LYMPHORELPCT 44.0 2025     MONORELPCT 13.1 (H) 06/18/2025    EOSRELPCT 0.0 (L) 06/18/2025    BASORELPCT 0.2 06/18/2025    AUTOIGPER 0.3 06/13/2025    NEUTROABS 1.96 06/18/2025    LYMPHSABS 2.02 06/18/2025    MONOSABS 0.60 06/18/2025    EOSABS 0.00 06/18/2025    BASOSABS 0.01 06/18/2025    AUTOIGNUM 0.03 06/13/2025    NRBC 0.0 06/13/2025       Current Medication List  This medication list has been reviewed with the patient and evaluated for any interactions or necessary modifications/recommendations, and updated to include all prescription medications, OTC medications, and supplements the patient is currently taking.  This list reflects what is contained in the patient's profile, which has also been marked as reviewed to communicate to other providers it is the most up to date version of the patient's current medication therapy.     Current Outpatient Medications:     acetaminophen (TYLENOL) 325 MG tablet, Take 2 tablets by mouth Every 6 (Six) Hours As Needed for Mild Pain., Disp: , Rfl:     albuterol sulfate HFA (Ventolin HFA) 108 (90 Base) MCG/ACT inhaler, , Disp: , Rfl:     amoxicillin-clavulanate (AUGMENTIN) 875-125 MG per tablet, Take 1 tablet by mouth 2 (Two) Times a Day for 5 days., Disp: 10 tablet, Rfl: 0    atenolol (TENORMIN) 50 MG tablet, Take 1 tablet by mouth 2 (Two) Times a Day., Disp: , Rfl: 1    cholecalciferol (VITAMIN D3) 400 units tablet, Take 1 tablet by mouth Daily., Disp: , Rfl:     furosemide (Lasix) 20 MG tablet, Take 1 tablet by mouth 2 (Two) Times a Day for 30 days., Disp: , Rfl:     hydroxychloroquine (PLAQUENIL) 200 MG tablet, Every 12 (Twelve) Hours., Disp: , Rfl:     ibuprofen (ADVIL,MOTRIN) 200 MG tablet, Take 1 tablet by mouth Every 6 (Six) Hours As Needed for Mild Pain., Disp: , Rfl:     levothyroxine (SYNTHROID, LEVOTHROID) 100 MCG tablet, Take 1 tablet by mouth Every Morning Before Breakfast., Disp: , Rfl: 1    lisinopril (PRINIVIL,ZESTRIL) 20 MG tablet, Take 1 tablet by mouth Daily., Disp: , Rfl:      metFORMIN (GLUCOPHAGE) 500 MG tablet, Take 1 tablet by mouth Daily With Breakfast., Disp: , Rfl:     omeprazole (priLOSEC) 20 MG capsule, Take 1 capsule by mouth Daily., Disp: , Rfl:     pravastatin (PRAVACHOL) 40 MG tablet, Take 1 tablet by mouth every night at bedtime., Disp: , Rfl:     sertraline (ZOLOFT) 25 MG tablet, Take 1 tablet by mouth Daily., Disp: , Rfl: 1    lenalidomide (Revlimid) 25 MG capsule, Take 1 capsule by mouth Daily. On days 1-14 then off 7 days, Disp: , Rfl:     ondansetron ODT (ZOFRAN-ODT) 4 MG disintegrating tablet, Place 1 tablet under the tongue 4 (Four) Times a Day As Needed for Vomiting or Nausea. (Patient not taking: Reported on 6/18/2025), Disp: 10 tablet, Rfl: 0  No current facility-administered medications for this visit.    Medicines reviewed by Alisha Galloway on 6/18/2025 at  3:05 PM  Medicines reviewed by Alisha Galloway on 6/18/2025 at  4:15 PM    Drug Interactions  Reviewed concomitant medications, allergies, labs, comorbidities/medical history, quality of life, and immunization history.   Drug-drug interactions noted and discussed during education: category D interaction with lenalidomide and dexamethasone.  Both medications are part of treatment regimen and increased risk discussed. Recommend adding aspirin 81mg daily and awaiting response from provider. Reminded the patient to let us know before making any changes or starting any new prescription or OTC medications so we can first assess drug interactions.  Drug-food interactions noted and discussed during education: Patient was instructed to avoid none    Initial Education Provided for Specialty Medication  The patient has been provided with the following education and any applicable administration techniques (i.e. self-injection) have been demonstrated for the therapies indicated. All questions and concerns have been addressed prior to the patient receiving the medication, and the patient has verbalized comprehension of  the education and any materials provided. Additional patient education shall be provided and documented upon request by the patient, provider, or payer.    Provided patient with:   Chemo calendar to help improve adherence., Education sheets about the medication, 24-hour clinic phone number and my contact information and instructions to call should additional questions arise.     Medication Education Sheets Provided:   Oral Specialty Medication: Revlimid (lenalidomide)  Steroid: Dexamethasone    Other Education Sheets Provided:   Constipation, Diarrhea, Symptom Tracker Sheet, and Proper Disposal Information    TOPICS COMMENTS   Storage and Handling of Oral Specialty Medication Store in the original container, in a dry location out of direct sunlight, and out of reach of children or pets. Store at room temperature.  Discussed safe handling and what to do with any unused medication.   Administration of Oral Specialty Medication Take with or without food at the same time(s) each day. Do not open or dissolve capsules, unless otherwise instructed by the pharmacist.   Adherence to Oral Specialty Regimen and Handling Missed Doses Patient is likely to have good treatment adherence; reinforced the importance of adherence. Reviewed how to address missed doses and to let us know of any missed doses.   Anemia: role of RBC, cause, s/s, ways to manage, role of transfusion Reviewed the role of RBC and the use of transfusions if hemoglobin decreases too much.  Patient to notify us if they experience shortness of breath, dizziness, or palpitations.  Also let patient know they could feel more tired than usual and to try to stay active, but rest if they need to.    Thrombocytopenia: role of platelet, cause, s/s, ways to prevent bleeding, things to avoid, when to seek help Reviewed the role of platelets in blood clotting and when to call clinic (bloody nose that bleeds for 5 mins despite pressure, a cut that won't stop bleeding despite  pressure, gums that bleed excessively with brushing or flossing). Recommended using an electric razor, soft bristle toothbrush, and blowing your nose gently.    Neutropenia: role of WBC, cause, infection precautions, s/s of infection, when to call MD Reviewed the role of WBC, good infection prevention practices, and when to call the clinic (temperature 100.4F, sore throat, burning urination, etc)  COVID Vaccines: vaccinated and due for 2025 booster  Flu Vaccine: received 2024 vaccine   Nutrition and Appetite Changes:  importance of maintaining healthy diet & weight, ways to manage to improve intake, dietary consult, exercise regimen, electrolyte and/or blood glucose abnormalities Increased Appetite: Discussed the possibility of increased appetite with high-dose steroids. Also reviewed the importance of taking steroids with food to avoid stomach upset.   Steroid-Induced Heartburn: This can occur with high-dose steroids. Instructed the patient to notify the clinic if it becomes problematic. Patient already taking omeprazole for GI protection with high dose steroids and  was started to prevent heartburn. Increased Blood Sugar: This patient's oncology therapy can increase blood sugar.  Recommended that the patient monitor blood sugar more closely and contact their primary care provider for management recommendations if blood glucose values start trending upward.   Diarrhea: causes, s/s of dehydration, ways to manage, dietary changes, when to call MD Discussed the risk of diarrhea. Instructed patient on use OTC loperamide with diarrhea onset, but emphasized the importance of calling the clinic if 4-6 episodes in 24 hours not relieved by OTC loperamide.   Constipation: causes, ways to manage, dietary changes, when to call MD Provided supplementary handout with instructions for use of docusate and other OTC therapies to manage constipation.  Instructed to call us if medications aren't working.   Nausea/Vomiting: cause, use  of antiemetics, dietary changes, when to call MD Emetic risk: Moderate  PRN home meds: Ondansetron 8mg PO every 8 hours PRN nausea / vomiting  Pharmacy home meds sent to: [facility]  Instructed the patient to take a dose of the PRN medication at the first onset of nausea and if it's not working to call us for additional medications.  Also provided non-drug measures to mitigate nausea.   Mouth Sores: causes, oral care, ways to manage Mouth sores can be prevented by making a mouth wash mixture of salt, baking soda, and water. The patient was instructed to swish and spit four times daily after meals and before bedtime.  Use of a soft bristle toothbrush was recommended.  The patient was instructed to avoid alcohol-containing OTC mouthwashes.    Alopecia: cause, ways to manage, resources Discussed the possibility of hair loss with the patient. Informed patient that they could request a prescription for a wig if desired and most of the cost is usually covered by insurance. Recommended covering the head with a hat and/or protecting the skin on the head with SPF 30 or higher.    Nervous System Changes: causes, s/s, neuropathies, cognitive changes, ways to manage Peripheral Neuropathy: Discussed the adverse effect of peripheral neuropathy and signs/symptoms associated with this adverse effect. Instructed patient to call if symptoms become more frequent or worsen.  and Steroids: Discussed the impact of high-dose steroids on the nervous system, such as insomnia, agitation, and emotional liability.   Pain: causes, ways to manage Discussed muscle and joint aches/pains with therapy, and recommended the use of OTC pain relief with ibuprofen or acetaminophen if needed.   Skin/Nail Changes: cause, s/s, ways to manage Discussed changes in skin and to wear sun protection as well as sun screen       Organ Toxicities: cause, s/s, need for diagnostic tests, labs, when to notify MD Discussed potential effects on organ systems, monitoring,  diagnostic tests, labs, and when to notify their MD. Discussed the signs/symptoms of the following: hepatotoxicity and skin changes   Miscellaneous Financial Issues: aurora for medication  Lab Draws: On or before day 1 of each cycle, no sooner than 3 days early.  Miscellaneous:   Infertility and Sexuality:  causes, fertility preservation options, sexuality changes, ways to manage, importance of birth control Oral Oncology Therapy: Reviewed safe sex practices and the importance of minimizing exposure to body fluids while on oral oncology therapy. The patient is not of childbearing potential. Discussed the REMS Monitoring Program.   Home Care: how to manage bodily fluids Counseled on management of soiled linens and proper flush technique.  Discussed how to manage all the side effects at home and advised when to contact the MD office   Survivorship: distress, distress assessment, secondary malignancies, early/late effects, follow-up, social issues, social support Discussed the rare, but possible risk of secondary malignancies months to years after treatment, most commonly acute myeloid leukemia.       Adherence and Self-Administration  Adherence related to the patient's specialty therapy was discussed with the patient. The Adherence segment of this outreach has been reviewed and updated.     Is there a concern with patient's ability to self administer the medication correctly and without issue?: No  Were any potential barriers to adherence identified during the initial assessment or patient education?: No  Are there any concerns regarding the patient's understanding of the importance of medication adherence?: No  Methods for Supporting Patient Adherence and/or Self-Administration: Dosing calendar  Expected duration of therapy: 4 planned cycles    Open Medication Therapy Problems  No medication therapy recommendations to display    Goals of Therapy  Goals related to the patient's specialty therapy were discussed with the  patient. The Patient Goals segment of this outreach has been reviewed and updated.   Goals Addressed Today        Specialty Pharmacy General Goal      Clinical goal/therapeutic target: disease control, per the recent oncology clinic notes and labs.              Wrap up  Discussed aforementioned material with patient in person, face-to-face, in clinic.   Chemo consents/CCA were signed at today's visit.   Medication availability: patient is aware [facility] pharmacy will be contacting them to arrange delivery  Patient and daughter, present in today's appointment, expressed understanding.  Patient demonstrates ability to self-administer medication. No barriers to adherence identified.  All questions and concerns addressed.     Reassessment Plan & Follow-Up  1. Medication Therapy Changes: possible addition of aspirin, awaiting MD approval  2. Related Plans, Therapy Recommendations, or Therapy Problems to Be Addressed: VTE prophylaxis and medication delivery  3. Pharmacist to perform regular assessments no more than (6) months from the previous assessment.  4. Care Coordinator to set up future refill outreaches, coordinate prescription delivery, and escalate clinical questions to pharmacist.  5. Welcome information and patient satisfaction survey to be sent by specialty pharmacy team with patient's initial fill.    Attestation  Therapeutic appropriateness: Appropriate   I attest the patient was actively involved in and has agreed to the above plan of care. If the prescribed therapy is at any point deemed not appropriate based on the current or future assessments, a consultation will be initiated with the patient's specialty care provider to determine the best course of action. The revised plan of therapy will be documented along with any required assessments and/or additional patient education provided.     Alisha MOORE, PharmD  Clinical Specialty Pharmacist, Oncology  6/18/2025  16:18 EDT

## 2025-06-18 NOTE — PROGRESS NOTES
A prescription was released to [facility] for   Drug: Revlimid (lenalidomide)  Strength: 25 mg  Directions: Take 1 capsule by mouth daily ON days 1-14, OFF for 7 days of each 21 day cycle  Quantity: 14  Refills: 0    Completed independent double check on medication order/RX.  Katlyn Guerrero, TerrellD, BCPS

## 2025-06-18 NOTE — PROGRESS NOTES
OSW met with pt as part of the treatment preparation process.  Pt accompanied by her dtr Hans.  Introductions made and contact information provided.  Reviewed supports and services available at [facility].  Discussed advance directives. Pt has none in place currently but did have questions.  We discussed the HCR and LW.  All questions answered.  Pt will consider whether she would like to complete either or both and will reach out to OSW if so.  Pt does not identify any barriers to care.  She works as and  at a long term care facility.  She plans to continue working as long as she feels up to it.  Pt dtr lives next door and is the main support.  Pt also has a son nearby.  Pt admits to having some anxiety.  OSW assured pt that this is normal.  We discussed techniques to manage anxiety and pt v/u.  Pt was encouraged to reach out if her anxiety is unmanageable or if she needs additional resources.  No current needs identified.  Pt and dtr encouraged to reach out as needed.

## 2025-06-19 LAB — HBV CORE AB SERPL QL IA: NEGATIVE

## 2025-06-19 RX ORDER — SULFAMETHOXAZOLE AND TRIMETHOPRIM 800; 160 MG/1; MG/1
1 TABLET ORAL 3 TIMES WEEKLY
Qty: 12 TABLET | Refills: 3 | Status: SHIPPED | OUTPATIENT
Start: 2025-06-20

## 2025-06-19 RX ORDER — DEXAMETHASONE 4 MG/1
20 TABLET ORAL
Qty: 30 TABLET | Refills: 3 | Status: SHIPPED | OUTPATIENT
Start: 2025-06-19

## 2025-06-19 RX ORDER — LENALIDOMIDE 25 MG/1
25 CAPSULE ORAL SEE ADMIN INSTRUCTIONS
Qty: 14 CAPSULE | Refills: 0 | Status: SHIPPED | OUTPATIENT
Start: 2025-06-19

## 2025-06-19 RX ORDER — ACYCLOVIR 400 MG/1
400 TABLET ORAL 2 TIMES DAILY
Qty: 60 TABLET | Refills: 3 | Status: SHIPPED | OUTPATIENT
Start: 2025-06-19

## 2025-06-19 RX ORDER — ONDANSETRON 8 MG/1
8 TABLET, FILM COATED ORAL 3 TIMES DAILY PRN
Qty: 30 TABLET | Refills: 5 | Status: SHIPPED | OUTPATIENT
Start: 2025-06-19

## 2025-06-23 ENCOUNTER — SPECIALTY PHARMACY (OUTPATIENT)
Dept: PHARMACY | Facility: HOSPITAL | Age: 70
End: 2025-06-23
Payer: MEDICARE

## 2025-06-23 ENCOUNTER — HOSPITAL ENCOUNTER (OUTPATIENT)
Dept: ONCOLOGY | Facility: HOSPITAL | Age: 70
Discharge: HOME | End: 2025-06-23
Admitting: INTERNAL MEDICINE
Payer: MEDICARE

## 2025-06-23 ENCOUNTER — NUTRITION (OUTPATIENT)
Dept: ONCOLOGY | Facility: CLINIC | Age: 70
End: 2025-06-23
Payer: MEDICARE

## 2025-06-23 VITALS
OXYGEN SATURATION: 91 % | HEIGHT: 61 IN | HEART RATE: 85 BPM | BODY MASS INDEX: 34.25 KG/M2 | RESPIRATION RATE: 20 BRPM | SYSTOLIC BLOOD PRESSURE: 113 MMHG | DIASTOLIC BLOOD PRESSURE: 70 MMHG | TEMPERATURE: 98 F | WEIGHT: 181.4 LBS

## 2025-06-23 DIAGNOSIS — C90.00 MULTIPLE MYELOMA NOT HAVING ACHIEVED REMISSION: Primary | ICD-10-CM

## 2025-06-23 LAB
ALP BLD-CCNC: 68 U/L (ref 42–141)
BASOPHILS # BLD AUTO: 0.01 10*3/MM3 (ref 0–0.2)
BASOPHILS NFR BLD AUTO: 0.2 % (ref 0–1.5)
BUN BLDA-MCNC: 11 MG/DL (ref 7–22)
CALCIUM BLD QL: 9.7 MG/DL (ref 8–10.3)
CHLORIDE BLDA-SCNC: 103 MMOL/L (ref 98–108)
CO2 BLDA-SCNC: 30 MMOL/L (ref 18–33)
CREAT BLDA-MCNC: 0.7 MG/DL (ref 0.6–1.2)
DEPRECATED RDW RBC AUTO: 48.8 FL (ref 37–54)
EGFRCR SERPLBLD CKD-EPI 2021: 93.2 ML/MIN/1.73
EOSINOPHIL # BLD AUTO: 0 10*3/MM3 (ref 0–0.4)
EOSINOPHIL NFR BLD AUTO: 0 % (ref 0.3–6.2)
ERYTHROCYTE [DISTWIDTH] IN BLOOD BY AUTOMATED COUNT: 15.1 % (ref 12.3–15.4)
GLUCOSE BLDC GLUCOMTR-MCNC: 149 MG/DL (ref 73–118)
HCT VFR BLD AUTO: 33.3 % (ref 34–46.6)
HGB BLD-MCNC: 10.3 G/DL (ref 12–15.9)
LYMPHOCYTES # BLD AUTO: 1.34 10*3/MM3 (ref 0.7–3.1)
LYMPHOCYTES NFR BLD AUTO: 22 % (ref 19.6–45.3)
MCH RBC QN AUTO: 28.2 PG (ref 26.6–33)
MCHC RBC AUTO-ENTMCNC: 30.9 G/DL (ref 31.5–35.7)
MCV RBC AUTO: 91.2 FL (ref 79–97)
MONOCYTES # BLD AUTO: 0.21 10*3/MM3 (ref 0.1–0.9)
MONOCYTES NFR BLD AUTO: 3.5 % (ref 5–12)
NEUTROPHILS NFR BLD AUTO: 4.52 10*3/MM3 (ref 1.7–7)
NEUTROPHILS NFR BLD AUTO: 74.3 % (ref 42.7–76)
PLATELET # BLD AUTO: 197 10*3/MM3 (ref 140–450)
PMV BLD AUTO: 9.3 FL (ref 6–12)
POC ALBUMIN: 3.2 G/L (ref 3.3–5.5)
POC ALT (SGPT): 21 U/L (ref 10–47)
POC AST (SGOT): 29 U/L (ref 11–38)
POC TOTAL BILIRUBIN: 0.5 MG/DL (ref 0.2–1.6)
POC TOTAL PROTEIN: 7.5 G/DL (ref 6.4–8.1)
POTASSIUM BLDA-SCNC: 3.8 MMOL/L (ref 3.6–5.1)
RBC # BLD AUTO: 3.65 10*6/MM3 (ref 3.77–5.28)
SODIUM BLD-SCNC: 142 MMOL/L (ref 128–145)
T-UPTAKE NFR SERPL: 0.99 TBI (ref 0.8–1.3)
T4 SERPL-MCNC: 10.8 MCG/DL (ref 4.5–11.7)
TSH SERPL DL<=0.05 MIU/L-ACNC: 3.11 UIU/ML (ref 0.27–4.2)
WBC NRBC COR # BLD AUTO: 6.08 10*3/MM3 (ref 3.4–10.8)

## 2025-06-23 PROCEDURE — 96401 CHEMO ANTI-NEOPL SQ/IM: CPT

## 2025-06-23 PROCEDURE — 84479 ASSAY OF THYROID (T3 OR T4): CPT | Performed by: INTERNAL MEDICINE

## 2025-06-23 PROCEDURE — 85025 COMPLETE CBC W/AUTO DIFF WBC: CPT | Performed by: INTERNAL MEDICINE

## 2025-06-23 PROCEDURE — 25010000002 DIPHENHYDRAMINE PER 50 MG: Performed by: INTERNAL MEDICINE

## 2025-06-23 PROCEDURE — 84436 ASSAY OF TOTAL THYROXINE: CPT | Performed by: INTERNAL MEDICINE

## 2025-06-23 PROCEDURE — 25010000002 DARATUMUMAB-HYALURONIDASE-FIHJ 1800-30000 MG-UT/15ML SOLUTION: Performed by: INTERNAL MEDICINE

## 2025-06-23 PROCEDURE — 25010000002 METHYLPREDNISOLONE PER 40 MG: Performed by: INTERNAL MEDICINE

## 2025-06-23 PROCEDURE — 80053 COMPREHEN METABOLIC PANEL: CPT

## 2025-06-23 PROCEDURE — 96374 THER/PROPH/DIAG INJ IV PUSH: CPT

## 2025-06-23 PROCEDURE — 96375 TX/PRO/DX INJ NEW DRUG ADDON: CPT

## 2025-06-23 PROCEDURE — 84443 ASSAY THYROID STIM HORMONE: CPT | Performed by: INTERNAL MEDICINE

## 2025-06-23 PROCEDURE — 25810000003 SODIUM CHLORIDE 0.9 % SOLUTION: Performed by: INTERNAL MEDICINE

## 2025-06-23 PROCEDURE — 25010000002 BORTEZOMIB PER 0.1 MG: Performed by: INTERNAL MEDICINE

## 2025-06-23 RX ORDER — BORTEZOMIB 3.5 MG/1
1.3 INJECTION, POWDER, LYOPHILIZED, FOR SOLUTION INTRAVENOUS; SUBCUTANEOUS ONCE
Status: COMPLETED | OUTPATIENT
Start: 2025-06-23 | End: 2025-06-23

## 2025-06-23 RX ORDER — ACETAMINOPHEN 500 MG
1000 TABLET ORAL ONCE
OUTPATIENT
Start: 2025-06-30

## 2025-06-23 RX ORDER — SODIUM CHLORIDE 9 MG/ML
20 INJECTION, SOLUTION INTRAVENOUS ONCE
OUTPATIENT
Start: 2025-07-07

## 2025-06-23 RX ORDER — SODIUM CHLORIDE 9 MG/ML
20 INJECTION, SOLUTION INTRAVENOUS ONCE
Status: COMPLETED | OUTPATIENT
Start: 2025-06-23 | End: 2025-06-23

## 2025-06-23 RX ORDER — MONTELUKAST SODIUM 10 MG/1
10 TABLET ORAL ONCE
Status: COMPLETED | OUTPATIENT
Start: 2025-06-23 | End: 2025-06-23

## 2025-06-23 RX ORDER — SODIUM CHLORIDE 9 MG/ML
20 INJECTION, SOLUTION INTRAVENOUS ONCE
Status: CANCELLED | OUTPATIENT
Start: 2025-06-23

## 2025-06-23 RX ORDER — BORTEZOMIB 3.5 MG/1
1.3 INJECTION, POWDER, LYOPHILIZED, FOR SOLUTION INTRAVENOUS; SUBCUTANEOUS ONCE
Status: CANCELLED | OUTPATIENT
Start: 2025-06-23

## 2025-06-23 RX ORDER — BORTEZOMIB 3.5 MG/1
1.3 INJECTION, POWDER, LYOPHILIZED, FOR SOLUTION INTRAVENOUS; SUBCUTANEOUS ONCE
OUTPATIENT
Start: 2025-07-03

## 2025-06-23 RX ORDER — METHYLPREDNISOLONE SODIUM SUCCINATE 125 MG/2ML
100 INJECTION INTRAMUSCULAR; INTRAVENOUS ONCE
Status: CANCELLED | OUTPATIENT
Start: 2025-06-23

## 2025-06-23 RX ORDER — ACETAMINOPHEN 500 MG
1000 TABLET ORAL ONCE
Status: CANCELLED | OUTPATIENT
Start: 2025-06-23

## 2025-06-23 RX ORDER — MEPERIDINE HYDROCHLORIDE 25 MG/ML
25 INJECTION INTRAMUSCULAR; INTRAVENOUS; SUBCUTANEOUS
OUTPATIENT
Start: 2025-07-07

## 2025-06-23 RX ORDER — FAMOTIDINE 10 MG/ML
20 INJECTION, SOLUTION INTRAVENOUS AS NEEDED
Status: CANCELLED | OUTPATIENT
Start: 2025-06-23

## 2025-06-23 RX ORDER — FAMOTIDINE 10 MG/ML
20 INJECTION, SOLUTION INTRAVENOUS AS NEEDED
OUTPATIENT
Start: 2025-07-07

## 2025-06-23 RX ORDER — BORTEZOMIB 3.5 MG/1
1.3 INJECTION, POWDER, LYOPHILIZED, FOR SOLUTION INTRAVENOUS; SUBCUTANEOUS ONCE
OUTPATIENT
Start: 2025-06-30

## 2025-06-23 RX ORDER — DIPHENHYDRAMINE HYDROCHLORIDE 50 MG/ML
50 INJECTION, SOLUTION INTRAMUSCULAR; INTRAVENOUS AS NEEDED
OUTPATIENT
Start: 2025-06-30

## 2025-06-23 RX ORDER — MEPERIDINE HYDROCHLORIDE 25 MG/ML
25 INJECTION INTRAMUSCULAR; INTRAVENOUS; SUBCUTANEOUS
OUTPATIENT
Start: 2025-06-30

## 2025-06-23 RX ORDER — HYDROCORTISONE SODIUM SUCCINATE 100 MG/2ML
100 INJECTION INTRAMUSCULAR; INTRAVENOUS AS NEEDED
Status: CANCELLED | OUTPATIENT
Start: 2025-06-23

## 2025-06-23 RX ORDER — METHYLPREDNISOLONE SODIUM SUCCINATE 125 MG/2ML
60 INJECTION INTRAMUSCULAR; INTRAVENOUS ONCE
OUTPATIENT
Start: 2025-07-07

## 2025-06-23 RX ORDER — MEPERIDINE HYDROCHLORIDE 25 MG/ML
25 INJECTION INTRAMUSCULAR; INTRAVENOUS; SUBCUTANEOUS
Status: CANCELLED | OUTPATIENT
Start: 2025-06-23

## 2025-06-23 RX ORDER — ACETAMINOPHEN 500 MG
1000 TABLET ORAL ONCE
OUTPATIENT
Start: 2025-07-07

## 2025-06-23 RX ORDER — METHYLPREDNISOLONE SODIUM SUCCINATE 40 MG/ML
100 INJECTION, POWDER, LYOPHILIZED, FOR SOLUTION INTRAMUSCULAR; INTRAVENOUS ONCE
Status: COMPLETED | OUTPATIENT
Start: 2025-06-23 | End: 2025-06-23

## 2025-06-23 RX ORDER — FAMOTIDINE 10 MG/ML
20 INJECTION, SOLUTION INTRAVENOUS AS NEEDED
OUTPATIENT
Start: 2025-06-30

## 2025-06-23 RX ORDER — SODIUM CHLORIDE 9 MG/ML
20 INJECTION, SOLUTION INTRAVENOUS ONCE
OUTPATIENT
Start: 2025-06-30

## 2025-06-23 RX ORDER — HYDROCORTISONE SODIUM SUCCINATE 100 MG/2ML
100 INJECTION INTRAMUSCULAR; INTRAVENOUS AS NEEDED
OUTPATIENT
Start: 2025-07-07

## 2025-06-23 RX ORDER — DIPHENHYDRAMINE HYDROCHLORIDE 50 MG/ML
50 INJECTION, SOLUTION INTRAMUSCULAR; INTRAVENOUS ONCE
Status: COMPLETED | OUTPATIENT
Start: 2025-06-23 | End: 2025-06-23

## 2025-06-23 RX ORDER — BORTEZOMIB 3.5 MG/1
1.3 INJECTION, POWDER, LYOPHILIZED, FOR SOLUTION INTRAVENOUS; SUBCUTANEOUS ONCE
Status: CANCELLED | OUTPATIENT
Start: 2025-06-26

## 2025-06-23 RX ORDER — HYDROCORTISONE SODIUM SUCCINATE 100 MG/2ML
100 INJECTION INTRAMUSCULAR; INTRAVENOUS AS NEEDED
OUTPATIENT
Start: 2025-06-30

## 2025-06-23 RX ORDER — DIPHENHYDRAMINE HYDROCHLORIDE 50 MG/ML
50 INJECTION, SOLUTION INTRAMUSCULAR; INTRAVENOUS AS NEEDED
Status: CANCELLED | OUTPATIENT
Start: 2025-06-23

## 2025-06-23 RX ORDER — DIPHENHYDRAMINE HYDROCHLORIDE 50 MG/ML
50 INJECTION, SOLUTION INTRAMUSCULAR; INTRAVENOUS AS NEEDED
OUTPATIENT
Start: 2025-07-07

## 2025-06-23 RX ORDER — ACETAMINOPHEN 500 MG
1000 TABLET ORAL ONCE
Status: COMPLETED | OUTPATIENT
Start: 2025-06-23 | End: 2025-06-23

## 2025-06-23 RX ORDER — METHYLPREDNISOLONE SODIUM SUCCINATE 125 MG/2ML
100 INJECTION INTRAMUSCULAR; INTRAVENOUS ONCE
OUTPATIENT
Start: 2025-06-30

## 2025-06-23 RX ORDER — MONTELUKAST SODIUM 10 MG/1
10 TABLET ORAL ONCE
Status: CANCELLED | OUTPATIENT
Start: 2025-06-23

## 2025-06-23 RX ADMIN — SODIUM CHLORIDE 20 ML/HR: 9 INJECTION, SOLUTION INTRAVENOUS at 11:11

## 2025-06-23 RX ADMIN — METHYLPREDNISOLONE SODIUM SUCCINATE 100 MG: 40 INJECTION, POWDER, FOR SOLUTION INTRAMUSCULAR; INTRAVENOUS at 11:19

## 2025-06-23 RX ADMIN — DARATUMUMAB AND HYALURONIDASE-FIHJ (HUMAN RECOMBINANT) 1800 MG: 1800; 30000 INJECTION SUBCUTANEOUS at 12:34

## 2025-06-23 RX ADMIN — BORTEZOMIB 2.4 MG: 3.5 INJECTION, POWDER, LYOPHILIZED, FOR SOLUTION INTRAVENOUS; SUBCUTANEOUS at 11:58

## 2025-06-23 RX ADMIN — ACETAMINOPHEN 1000 MG: 500 TABLET ORAL at 11:12

## 2025-06-23 RX ADMIN — DIPHENHYDRAMINE HYDROCHLORIDE 50 MG: 50 INJECTION INTRAMUSCULAR; INTRAVENOUS at 11:14

## 2025-06-23 RX ADMIN — MONTELUKAST 10 MG: 10 TABLET, FILM COATED ORAL at 11:12

## 2025-06-23 NOTE — PROGRESS NOTES
Specialty Pharmacy Patient Management Program  Oncology General Communication       Specialty Pharmacy Note: Revlimid (lenalidomide)    Pharmacy notified from infusion nurse that pt reports not having received her Revlimid yet. We had received a phone call from [facility] on Friday, 6/20/25 requesting recent lab results and office clinic notes. Those were sent to pharmacy on 6/20/25. [facility] today, 6/23/25 and confirmed lab and clinic notes were received and pharmacist there confirmed they are good to send out medication. Notified pt that they would call her sometime this morning; however, if they have not received phone call by noon, provided pt with [facility] specialty phone number and encouraged pt to call to schedule next day delivery. Discussed with pt that she will miss today's dose, but she will start tomorrow when she receives medication. Pt was also confused on infusion schedule as she thought she was only scheduled for once weekly. Confirmed with Dr. Barrett that scheduling should follow current treatment plan. Scheduling notified and pt scheduled. Provided pt with updated calendar that included infusion dates per patient request. Pt had no other questions or concerns at this time.      Thank you,  Diane Che    Patient/Caregiver provided printed discharge information.

## 2025-06-23 NOTE — PROGRESS NOTES
Pt here for C1D1 Velcade/Dex.  Pt reports she took home dex this morning as instructed but has not received revlimid at present.  Reviewed with Diane Ferrari and Dr Barrett and pt should receive revlimid at home tomm and pt to proceed with treatment as planned and begin revlimid when receives.  Reviewed with pt and daughter and v/u and agreement. Treatment given per MAR and pt monitored post Darzalex.  AVS provided and pt discharged

## 2025-06-23 NOTE — PROGRESS NOTES
"                 Nutrition Assessment  Phuong Altamirano    Height: 5'1\"  Weight: 181.4#  BMI: 34.3  Weight Hx:   Wt Readings from Last 20 Encounters:   06/23/25 82.3 kg (181 lb 6.4 oz)   06/18/25 82.6 kg (182 lb)   06/13/25 82.4 kg (181 lb 10.5 oz)   06/06/25 83.5 kg (184 lb)   05/27/25 84.6 kg (186 lb 8 oz)   05/19/25 86.3 kg (190 lb 4.1 oz)   04/28/25 88 kg (194 lb)   04/11/25 91.2 kg (201 lb)   04/04/25 91.5 kg (201 lb 12.8 oz)   03/20/25 90.5 kg (199 lb 9.6 oz)   10/17/19 90.8 kg (200 lb 3.2 oz)   02/18/19 89.4 kg (197 lb)   09/26/18 88.9 kg (196 lb)   05/30/18 88 kg (194 lb)   01/25/18 88.9 kg (196 lb)   04/27/17 92.5 kg (204 lb)   01/23/17 92.5 kg (204 lb)   10/24/16 92.5 kg (204 lb)     IBW: 105# (172.7%)  UBW: 200# (90.7%)    Nutrition Questionnaire    Food Allergies: Pt denied allergies at this time    Chewing Problems: Pt denied chewing problems at this time.     Swallowing Problems: Pt denied problems swallowing at this time.    Who does the cooking & shopping: Pt does, pt lives alone. Pt often visits her daughter who cooks for her.    Nausea/Vomiting/Diarrhea: Pt struggling with ongoing nausea. We discussed taking Zofran on routine schedule to help reduce symptoms, pt verbalized understanding. Pt denies v/d/c.    Appetite: (poor) 1 2 3 4 5 6 7 8 9 10 (excellent): 5    24-Hour Diet Recall:  Breakfast - coffee  Lunch - mashed potatoes, beef tips, water  Dinner - nothing due to nausea  Snacks - orange  Water < 64 oz/day    Discussion: Met the pt and her daughter, Hans, to provide new-pt diet education. We reviewed possible side effects of her treatment and how it may impact her ability to eat and tolerate food. We discussed the importance of weight maintenance, consuming adequate calories and protein, even when appetite is low, taste changes occur, and energy is low, pt verbalized understanding. We reviewed ways to manage side effects with diet, pt verbalized understanding.    All questions and concerns " were addressed and answered. I provided my contact information and encouraged them to call or schedule an appointment as needed.    Nutrition-Related Side Effects:    []Abdominal Pain  [x]Constipation  [x]Diarrhea  []Electrolyte Imbalance  []Fatigue  []HTN  []Hypertriglyceridemia  []Hyponatremia  []Loss of Appetite  []Low Blood Pressure  []Metallic Taste  []Mouth Sores  [x]Nausea  [x]Neutropenia  []Peripheral Neuropathy  []Proteinuria  []Shortness of Breath  []Taste Changes  [x]Vomiting  [x]Weakness  [x]Weight Gain  []Other    Jessie Tse, MS,RD,LD-[place],CD-[place]  Registered Dietitian

## 2025-06-24 NOTE — PROGRESS NOTES
Specialty Pharmacy Patient Management Program  Lab Review       Specialty Pharmacy Note: Revlimid (lenalidomide)    Phuong Altamirano is a 70 y.o female with multiple myeloma who was seen 6/23/25 for labs and infusion appt  Lab Review: CBC and BMP from 6/23/25 reviewed and no dose adjustment on oral special medication recommended. Pt can start at recommended starting dose.    Specialty Pharmacy will continue to follow patient.      Thank you,  Diane Che, Pharm.D.

## 2025-06-25 ENCOUNTER — SPECIALTY PHARMACY (OUTPATIENT)
Dept: PHARMACY | Facility: HOSPITAL | Age: 70
End: 2025-06-25
Payer: MEDICARE

## 2025-06-25 NOTE — PROGRESS NOTES
Specialty Pharmacy Patient Management Program       Per Lisa burnett/[email or web address], Ms. Altamirano received her medication on 6/24/25. Her Co-pay was $0.    Ellen Vasquez - Pharmacy Care Coordinator   6/25/2025  14:50 EDT

## 2025-06-26 ENCOUNTER — HOSPITAL ENCOUNTER (OUTPATIENT)
Dept: ONCOLOGY | Facility: HOSPITAL | Age: 70
Discharge: HOME | End: 2025-06-26
Admitting: INTERNAL MEDICINE
Payer: MEDICARE

## 2025-06-26 VITALS
BODY MASS INDEX: 34.45 KG/M2 | HEART RATE: 114 BPM | RESPIRATION RATE: 18 BRPM | OXYGEN SATURATION: 92 % | DIASTOLIC BLOOD PRESSURE: 69 MMHG | TEMPERATURE: 98.5 F | WEIGHT: 182.2 LBS | SYSTOLIC BLOOD PRESSURE: 108 MMHG

## 2025-06-26 DIAGNOSIS — C90.00 MULTIPLE MYELOMA NOT HAVING ACHIEVED REMISSION: Primary | ICD-10-CM

## 2025-06-26 PROCEDURE — 96401 CHEMO ANTI-NEOPL SQ/IM: CPT

## 2025-06-26 PROCEDURE — 25010000002 BORTEZOMIB PER 0.1 MG: Performed by: INTERNAL MEDICINE

## 2025-06-26 RX ORDER — BORTEZOMIB 3.5 MG/1
1.3 INJECTION, POWDER, LYOPHILIZED, FOR SOLUTION INTRAVENOUS; SUBCUTANEOUS ONCE
Status: COMPLETED | OUTPATIENT
Start: 2025-06-26 | End: 2025-06-26

## 2025-06-26 RX ORDER — PROMETHAZINE HYDROCHLORIDE 12.5 MG/1
12.5 TABLET ORAL EVERY 6 HOURS PRN
Qty: 30 TABLET | Refills: 2 | Status: SHIPPED | OUTPATIENT
Start: 2025-06-26

## 2025-06-26 RX ADMIN — BORTEZOMIB 2.4 MG: 3.5 INJECTION, POWDER, LYOPHILIZED, FOR SOLUTION INTRAVENOUS; SUBCUTANEOUS at 14:17

## 2025-06-26 NOTE — PROGRESS NOTES
Patient in clinic for C1 D4 Velcade. Patient c/o continuous nausea, dry-heaving, headache when standing, bilateral hip pain-that resolved with Tylenol, pitting edema in bilateral lower legs and ankles; all symptoms started yesterday. No labs required for treatment today. Barbara and Dr. Barrett were notified of patient's symptoms and Dr. Barrett ordered she hold Revlimid for now and will reassess with her MD appointment on 6/30. Phenergan was also sent to the patient's pharmacy; patient was educated and verbalized understanding. Alisha with [facility] was notified that Revlimid is held for the time being. Patient given treatment per MAR and tolerated. Discharged in wheelchair; declined AVS.

## 2025-06-27 ENCOUNTER — TELEPHONE (OUTPATIENT)
Dept: CARDIOLOGY | Facility: CLINIC | Age: 70
End: 2025-06-27

## 2025-06-27 NOTE — PROGRESS NOTES
HEMATOLOGY ONCOLOGY OUTPATIENT FOLLOW UP       Patient name: Phuong Altamirano  : 1955  MRN: [id number]  Primary Care Physician: Chula Sanchez APRN  Referring Physician: Chula Sanchez APRN  Reason For Consult: Monoclonal gammopathy.     History of Present Illness:    History of Present Illness  3/20/2025: Ms. 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 In early , she had a bone marrow biopsy and aspiration that revealed equivocal results, leading to a repeat bone marrow biopsy in . This disclosed a normocellular bone marrow with trilineage hematopoiesis and 20% plasma cells. On flow cytometry, only 1% of the plasma cells revealed monoclonal restriction. Continued observation at [facility] showed no evidence of progression. She was last seen sometime in  without new problems.Today, she reports being largely asymptomatic. She remains active and energetic, has a good appetite, and her weight has been stable. She is not experiencing fevers or diaphoresis. She does not report any chest pain. She does experience dyspnea with exertion, which she attributes to her lack of physical activity. She is not experiencing abdominal pain, diarrhea, or dysuria. On exam she is conversant and oriented. No jaundice and no distress but she appears chronically ill. No oral lesions. Respirations not labored. Lungs clear and heart regular. Abdomen protuberant and soft; the liver and spleen don't seem enlarged. No edema. Reviewed all the records and all laboratory exams. Discussed with her. 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 She might need a bone marrow  aspiration and biopsy. She will have additional studies today and will see me with the results.     4/4/2025: In the office sooner than scheduled.  Her protein electrophoresis revealed a small increase in the M spike from 1.2 g/dL at the previous measurement available to 1.4 g/dL.  In addition the free lambda light chains increased from 11.1 mg/L to 309 mg/L, changing the ratio significantly.  She feels as well as she did at the time of the last visit.  On exam she is well-oriented and conversant.  She does not seem in any distress and she is not jaundiced.  The lungs are clear bilaterally and the heart regular.  The abdomen is protuberant and soft.  There is no edema.  Reviewed and discussed the laboratory exams with her.  I have asked her to allow me to obtain a bone marrow biopsy and aspiration as well as long bone survey and a 24-hour urine protein quantification and electrophoresis.  She will see me with results.    4/28/2025: She underwent a bone marrow biopsy and aspiration without any complications. The results indicate that her bone marrow is hypercellular, with 65% of nucleated precursors being plasma cells exhibiting an abnormal immunophenotype and lambda light chain restriction. The karyotype reveals a normal female complement; however, the FISH panel indicates a complex picture with gain of chromosome 1q21, deletion of 13q, loss of MAF/16q, and aneuploidy with gain of chromosomes 7, 9, and 15.On the basis of the above, Ms. Altamirano can be considered to have multiple myeloma, despite the relatively lower monoclonal protein in the blood. In addition the gain of chromosome 1q and the loss of MAF/16q ae considered poor prognostic markers. Under the new guidelines her condition can no longer be considered only smoldering myeloma and treatment is well justified. She's to have a PET scan for staging and new immunofixation and electrophoresis as well as light chain quantification. She's to see me with results.  "    5/19/2025: Ms. Altamirano is an established patient of mine. For some time she had been followed for what had been diagnosed with monoclonal gammopathy of undetermined significance. 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 At the time of the first visit with me she had an increase in the monoclonal bland and a decision was made to obtain staging studies again. This time her bone marrow contains at least 60% abnormal plasma cells. There is no suggestion of end organ damage and her condition can still be classified as smoldering myeloma but she is at high risk of developing complications and it is reasonable at this time to start treatment. I had a long discussion with her regarding the options available. On exam she is alert, conversant and oriented. No distress. No jaundice. No oral lesions. Respirations not labored. Lungs diminished and heart regular. Abdomen soft and minimal edema at this time. Reviewed the laboratory exams. To continue treatment for her congestive heart failure and to see me in the office after discharge.     5/27/2025: Was discharged from the hospital.  She was feeling better at the time of discharge.  Today she tells me she has not been feeling as well.  \"I do not have the umpf\".  Also frequently nauseated.  Her blood pressure has been much better controlled.  She has returned to her usual activities, including her work.  On exam she is oriented and conversant.  No distress.  No jaundice.  No edema.  Laboratory exams reviewed with her.  Treatment is well justified and discussed that with her despite the fact that she has no bone lesions and preserved renal function.  Her calcium has been within normal limits.  She does have mild normocytic anemia.  Discussed at length with her.  Explained the options of treatment.  Discussed with her the potential complications if treatment is not started.  " She is agreeable to commencement of treatment with the clarification that she could stop at any point if she so decides.  She wants to go on a vacation and will start after that vacation.    6/30/2025: Had been nauseated before the commencement of the treatment. She became much more nauseated and started vomiting. She was asked to hold the lenalidomide and to use the promethazine for the nausea. She feels much better today. She has been able to eat. No fevers. Denied chest pain or cough and has not had any more dyspnea. No abdominal pain or diarrhea. On exam alert and chronically ill appearing. No distress. No jaundice. No oral lesions and respirations not labored. Lungs diminished bilaterally and heart regular. Abdomen soft. No edema. Reviewed the laboratory exams. Discussed with her. To hold all treatment this week until fully recovered. UA and culture today. Hold the pravastatin for now. See me in 3 weeks.     Past Medical History:   Diagnosis Date    Arthritis 2010    Asthma 1 month    Cancer 12 years    Multiple mylenoma    Depression 2010    Diabetes mellitus 1.5 years    High blood pressure     High cholesterol 2005    Hypothyroidism     Lupus 2010    Sleep apnea Now     Past Surgical History:   Procedure Laterality Date    BREAST LUMPECTOMY Right 1983    benign    DILATATION AND CURETTAGE  1995    LAPAROSCOPIC TUBAL LIGATION  1979    OVARY SURGERY  1985    remoal of left ovary       Current Outpatient Medications:     acetaminophen (TYLENOL) 325 MG tablet, Take 2 tablets by mouth Every 6 (Six) Hours As Needed for Mild Pain., Disp: , Rfl:     acyclovir (ZOVIRAX) 400 MG tablet, Take 1 tablet by mouth 2 (Two) Times a Day., Disp: 60 tablet, Rfl: 3    albuterol sulfate HFA (Ventolin HFA) 108 (90 Base) MCG/ACT inhaler, , Disp: , Rfl:     atenolol (TENORMIN) 50 MG tablet, Take 1 tablet by mouth 2 (Two) Times a Day., Disp: , Rfl: 1    cholecalciferol (VITAMIN D3) 400 units tablet, Take 1 tablet by mouth Daily.,  Disp: , Rfl:     dexAMETHasone (DECADRON) 4 MG tablet, Take 5 tablets by mouth Daily With Breakfast. Take with food on Days 1, 2, 8, 9, 15 and 16., Disp: 30 tablet, Rfl: 3    furosemide (Lasix) 20 MG tablet, Take 1 tablet by mouth 2 (Two) Times a Day for 30 days., Disp: , Rfl:     hydroxychloroquine (PLAQUENIL) 200 MG tablet, Every 12 (Twelve) Hours., Disp: , Rfl:     levothyroxine (SYNTHROID, LEVOTHROID) 100 MCG tablet, Take 1 tablet by mouth Every Morning Before Breakfast., Disp: , Rfl: 1    lisinopril (PRINIVIL,ZESTRIL) 20 MG tablet, Take 1 tablet by mouth Daily., Disp: , Rfl:     metFORMIN (GLUCOPHAGE) 500 MG tablet, Take 1 tablet by mouth Daily With Breakfast., Disp: , Rfl:     omeprazole (priLOSEC) 20 MG capsule, Take 1 capsule by mouth Daily., Disp: , Rfl:     ondansetron (ZOFRAN) 8 MG tablet, Take 1 tablet by mouth 3 (Three) Times a Day As Needed for Nausea or Vomiting., Disp: 30 tablet, Rfl: 5    pravastatin (PRAVACHOL) 40 MG tablet, Take 1 tablet by mouth every night at bedtime., Disp: , Rfl:     promethazine (PHENERGAN) 12.5 MG tablet, Take 1 tablet by mouth Every 6 (Six) Hours As Needed for Nausea or Vomiting., Disp: 30 tablet, Rfl: 2    sertraline (ZOLOFT) 25 MG tablet, Take 1 tablet by mouth Daily., Disp: , Rfl: 1    sulfamethoxazole-trimethoprim (BACTRIM DS,SEPTRA DS) 800-160 MG per tablet, Take 1 tablet by mouth 3 (Three) Times a Week. Take 1 tablet on Mondays, Wednesdays and Fridays., Disp: 12 tablet, Rfl: 3    ibuprofen (ADVIL,MOTRIN) 200 MG tablet, Take 1 tablet by mouth Every 6 (Six) Hours As Needed for Mild Pain. (Patient not taking: Reported on 6/30/2025), Disp: , Rfl:     lenalidomide (REVLIMID) 25 MG capsule, Take 1 capsule by mouth See Admin Instructions. Take 1 capsule by mouth daily on days 1-14, OFF 7 days of each 21 day cycle. Take with or without food. Swallow capsules whole, do not crush, break or chew. (Patient not taking: Reported on 6/30/2025), Disp: 14 capsule, Rfl: 0    No Known  Allergies    Family History   Problem Relation Age of Onset    Heart disease Father     Pancreatic cancer Sister 59    Heart disease Sister     Stroke Sister     Pneumonia Brother      Cancer-related family history includes Pancreatic cancer (age of onset: 59) in her sister.    Social History     Tobacco Use    Smoking status: Former     Current packs/day: 0.00     Average packs/day: 1 pack/day for 21.0 years (21.0 ttl pk-yrs)     Types: Cigarettes     Start date:      Quit date:      Years since quittin.5    Smokeless tobacco: Never   Vaping Use    Vaping status: Never Used   Substance Use Topics    Alcohol use: Yes     Comment: Very occasionally    Drug use: No     Social History     Social History Narrative    Not on file     ROS:   Review of Systems   Constitutional:  Positive for fatigue. Negative for activity change, appetite change, chills, diaphoresis, fever and unexpected weight change.   HENT:  Negative for congestion, dental problem, drooling, ear discharge, ear pain, facial swelling, hearing loss, mouth sores, nosebleeds, postnasal drip, rhinorrhea, sinus pressure, sinus pain, sneezing, sore throat, tinnitus, trouble swallowing and voice change.    Eyes:  Negative for photophobia, pain, discharge, redness, itching and visual disturbance.   Respiratory:  Positive for shortness of breath. Negative for apnea, cough, choking, chest tightness, wheezing and stridor.    Cardiovascular:  Negative for chest pain, palpitations and leg swelling.   Gastrointestinal:  Negative for abdominal distention, abdominal pain, anal bleeding, blood in stool, constipation, diarrhea, nausea, rectal pain and vomiting.   Endocrine: Negative for cold intolerance, heat intolerance, polydipsia and polyuria.   Genitourinary:  Negative for decreased urine volume, difficulty urinating, dysuria, flank pain, frequency, genital sores, hematuria and urgency.   Musculoskeletal:  Negative for arthralgias, back pain, gait problem,  "joint swelling, myalgias, neck pain and neck stiffness.   Skin:  Negative for color change, pallor and rash.   Neurological:  Negative for dizziness, tremors, seizures, syncope, facial asymmetry, speech difficulty, weakness, light-headedness, numbness and headaches.   Hematological:  Negative for adenopathy. Does not bruise/bleed easily.   Psychiatric/Behavioral:  Negative for agitation, behavioral problems, confusion, decreased concentration, hallucinations, self-injury, sleep disturbance and suicidal ideas. The patient is not nervous/anxious.      Objective:    Vital Signs:  Vitals:    06/30/25 1126   BP: 103/72   Pulse: 110   Resp: 16   Temp: 97.4 °F (36.3 °C)   SpO2: 96%   Weight: 82.1 kg (181 lb)   Height: 154.9 cm (61\")   PainSc: 0-No pain     Body mass index is 34.2 kg/m².    ECOG  (0) Fully active, able to carry on all predisease performance without restriction    Physical Exam:   Physical Exam  Constitutional:       General: She is not in acute distress.     Appearance: She is ill-appearing. She is not toxic-appearing or diaphoretic.   HENT:      Head: Normocephalic and atraumatic.      Right Ear: External ear normal.      Left Ear: External ear normal.      Nose: Nose normal.      Mouth/Throat:      Mouth: Mucous membranes are moist.      Pharynx: Oropharynx is clear. No oropharyngeal exudate or posterior oropharyngeal erythema.   Eyes:      General: No scleral icterus.        Right eye: No discharge.         Left eye: No discharge.      Conjunctiva/sclera: Conjunctivae normal.      Pupils: Pupils are equal, round, and reactive to light.   Cardiovascular:      Rate and Rhythm: Normal rate and regular rhythm.      Pulses: Normal pulses.      Heart sounds: No murmur heard.     No friction rub. No gallop.   Pulmonary:      Effort: No respiratory distress.      Breath sounds: No stridor. No wheezing, rhonchi or rales.   Abdominal:      General: Bowel sounds are normal. There is no distension.      Palpations: " Abdomen is soft. There is no mass.      Tenderness: There is no abdominal tenderness. There is no right CVA tenderness, left CVA tenderness, guarding or rebound.      Hernia: No hernia is present.      Comments: Protuberant, soft and not tender. No hepatomegaly or splenomegaly.    Musculoskeletal:         General: No tenderness, deformity or signs of injury.      Cervical back: No rigidity.      Right lower leg: No edema.      Left lower leg: No edema.   Lymphadenopathy:      Cervical: No cervical adenopathy.   Skin:     Coloration: Skin is not jaundiced or pale.      Findings: No bruising, lesion or rash.   Neurological:      General: No focal deficit present.      Mental Status: She is alert and oriented to person, place, and time.      Cranial Nerves: No cranial nerve deficit.   Psychiatric:         Mood and Affect: Mood normal.         Behavior: Behavior normal.         Thought Content: Thought content normal.         Judgment: Judgment normal.     BRADLEY Barrett MD performed the physical exam on 6/30/2025 as documented above.     Lab Results - Last 18 Months   Lab Units 06/30/25  1100 06/23/25  0948 06/18/25  1521   WBC 10*3/mm3 5.60 6.08 4.59   HEMOGLOBIN g/dL 10.0* 10.3* 10.7*   HEMATOCRIT % 31.9* 33.3* 33.9*   PLATELETS 10*3/mm3 200 197 209   MCV fL 91.9 91.2 90.6     Lab Results - Last 18 Months   Lab Units 06/23/25  1035 06/13/25  2159 05/20/25  0454 05/19/25  0443 05/18/25  1818 04/28/25  1439   SODIUM mmol/L  --  140 135* 140 139 140   POTASSIUM mmol/L  --  3.3* 4.5 4.7 4.1 3.9   CHLORIDE mmol/L  --  98 97* 100 97* 101   CO2 mmol/L  --  28.3 30.3* 31.0* 27.7 30.2*   BUN mg/dL  --  13.8 33* 29* 28* 18   CREATININE mg/dL 0.70 0.78 0.66 0.67 0.85 0.53*   CALCIUM mg/dL  --  9.1 9.3 9.2 9.3 9.8   BILIRUBIN mg/dL  --  0.5  --   --  0.4 0.2   ALK PHOS U/L  --  94  --   --  77 75   ALT (SGPT) U/L  --  26  --   --  23 19   AST (SGOT) U/L  --  28  --   --  19 18   GLUCOSE mg/dL  --  119* 243* 181* 206* 99      Lab Results   Component Value Date    GLUCOSE 119 (H) 06/13/2025    BUN 13.8 06/13/2025    CREATININE 0.70 06/23/2025    EGFRIFNONA 116 05/20/2019    EGFRIFAFRI 100 05/20/2019    BCR 17.7 06/13/2025    K 3.3 (L) 06/13/2025    CO2 28.3 06/13/2025    CALCIUM 9.1 06/13/2025    ALBUMIN 3.8 06/13/2025    AST 28 06/13/2025    ALT 26 06/13/2025     Lab Results   Component Value Date    IRON 90 02/06/2018    TIBC 430 (H) 02/06/2018     Lab Results   Component Value Date    RETICCTPCT 0.90 02/06/2018     Lab Results   Component Value Date    ALEE  06/05/2017     NEGATIVE This result is designed to aid in the diagnosis of many of the    ALEE  06/05/2017     systemic autoimmune disorders and is not diagnostic by itself.  Test results    ALEE  06/05/2017     should be interpreted in conjunction with the clinical evaluation of the    ALEE  06/05/2017     patient.  SLE patients undergoing steroid therapy may have negative results.    SEDRATE 19 02/18/2019     Lab Results   Component Value Date    HAPTOGLOBIN 139 02/06/2018     Lab Results   Component Value Date    SEDRATE 19 02/18/2019      Assessment & Plan     Assessment & Plan  1. Multiple myeloma.  High risk based on gain of chromosome 1q and the loss of MAF/16q: To start a 4 drug regimen with daratumumab/bortezomib/dexamethasone/lenalidomide.  Discussed at length with her.  Placed the treatment plan.  Requested laboratory exams. To hold all treatment today. She's to resume on 7/7/25.   2. Nausea and vomiting. Continue to use the promethazine that seems to have provided relief.   3. UA and culture today given the recent history .   4. Reviewed all laboratory exams and discussed with er.   5. See me in approximately 3 weeks.     Neftali Barrett MD on 6/30/2025 at 12:06 PM.

## 2025-06-27 NOTE — TELEPHONE ENCOUNTER
Caller: AMANDA BROWN     Relationship: DAUGHTER    Best call back number: [phone]    What is your medical concern? DEHYDRATED, HIGH HEART, SWELLING IN FEET AND ANKLES.     How long has this issue been going on? WEEK OR SO    Is your provider already aware of this issue? NO, PATIENT SENT [email or web address] MESSAGE ON THURSDAY EVENING 06.26.25    Have you been treated for this issue? NO PLEASE CALL AND ADVISE.

## 2025-06-30 ENCOUNTER — OFFICE VISIT (OUTPATIENT)
Dept: ONCOLOGY | Facility: CLINIC | Age: 70
End: 2025-06-30
Payer: MEDICARE

## 2025-06-30 ENCOUNTER — LAB (OUTPATIENT)
Dept: LAB | Facility: HOSPITAL | Age: 70
End: 2025-06-30
Payer: MEDICARE

## 2025-06-30 VITALS
DIASTOLIC BLOOD PRESSURE: 72 MMHG | WEIGHT: 181 LBS | TEMPERATURE: 97.4 F | SYSTOLIC BLOOD PRESSURE: 103 MMHG | HEART RATE: 110 BPM | OXYGEN SATURATION: 96 % | RESPIRATION RATE: 16 BRPM | HEIGHT: 61 IN | BODY MASS INDEX: 34.17 KG/M2

## 2025-06-30 DIAGNOSIS — R68.89 OTHER GENERAL SYMPTOMS AND SIGNS: ICD-10-CM

## 2025-06-30 DIAGNOSIS — C90.00 MULTIPLE MYELOMA NOT HAVING ACHIEVED REMISSION: Primary | ICD-10-CM

## 2025-06-30 DIAGNOSIS — D47.2 MGUS (MONOCLONAL GAMMOPATHY OF UNKNOWN SIGNIFICANCE): ICD-10-CM

## 2025-06-30 DIAGNOSIS — C90.00 MULTIPLE MYELOMA NOT HAVING ACHIEVED REMISSION: ICD-10-CM

## 2025-06-30 LAB
ALBUMIN SERPL-MCNC: 3.6 G/DL (ref 3.5–5.2)
ALBUMIN/GLOB SERPL: 1.3 G/DL
ALP SERPL-CCNC: 67 U/L (ref 39–117)
ALT SERPL W P-5'-P-CCNC: 18 U/L (ref 1–33)
ANION GAP SERPL CALCULATED.3IONS-SCNC: 14.8 MMOL/L (ref 5–15)
AST SERPL-CCNC: 13 U/L (ref 1–32)
BACTERIA UR QL AUTO: NORMAL /HPF
BASOPHILS # BLD AUTO: 0 10*3/MM3 (ref 0–0.2)
BASOPHILS NFR BLD AUTO: 0 % (ref 0–1.5)
BILIRUB SERPL-MCNC: 0.3 MG/DL (ref 0–1.2)
BILIRUB UR QL STRIP: NEGATIVE
BUN SERPL-MCNC: 14 MG/DL (ref 8–23)
BUN/CREAT SERPL: 15.2 (ref 7–25)
CALCIUM SPEC-SCNC: 9.2 MG/DL (ref 8.6–10.5)
CHLORIDE SERPL-SCNC: 98 MMOL/L (ref 98–107)
CLARITY UR: CLEAR
CO2 SERPL-SCNC: 26.2 MMOL/L (ref 22–29)
COLOR UR: YELLOW
CREAT SERPL-MCNC: 0.92 MG/DL (ref 0.57–1)
DEPRECATED RDW RBC AUTO: 51.4 FL (ref 37–54)
EGFRCR SERPLBLD CKD-EPI 2021: 67.1 ML/MIN/1.73
EOSINOPHIL # BLD AUTO: 0.01 10*3/MM3 (ref 0–0.4)
EOSINOPHIL NFR BLD AUTO: 0.2 % (ref 0.3–6.2)
ERYTHROCYTE [DISTWIDTH] IN BLOOD BY AUTOMATED COUNT: 16 % (ref 12.3–15.4)
GLOBULIN UR ELPH-MCNC: 2.7 GM/DL
GLUCOSE SERPL-MCNC: 145 MG/DL (ref 65–99)
GLUCOSE UR STRIP-MCNC: NEGATIVE MG/DL
HCT VFR BLD AUTO: 31.9 % (ref 34–46.6)
HGB BLD-MCNC: 10 G/DL (ref 12–15.9)
HGB UR QL STRIP.AUTO: NEGATIVE
HYALINE CASTS UR QL AUTO: NORMAL /LPF
KETONES UR QL STRIP: NEGATIVE
LEUKOCYTE ESTERASE UR QL STRIP.AUTO: NEGATIVE
LYMPHOCYTES # BLD AUTO: 0.99 10*3/MM3 (ref 0.7–3.1)
LYMPHOCYTES NFR BLD AUTO: 17.7 % (ref 19.6–45.3)
MCH RBC QN AUTO: 28.8 PG (ref 26.6–33)
MCHC RBC AUTO-ENTMCNC: 31.3 G/DL (ref 31.5–35.7)
MCV RBC AUTO: 91.9 FL (ref 79–97)
MONOCYTES # BLD AUTO: 0.29 10*3/MM3 (ref 0.1–0.9)
MONOCYTES NFR BLD AUTO: 5.2 % (ref 5–12)
NEUTROPHILS NFR BLD AUTO: 4.31 10*3/MM3 (ref 1.7–7)
NEUTROPHILS NFR BLD AUTO: 76.9 % (ref 42.7–76)
NITRITE UR QL STRIP: NEGATIVE
PH UR STRIP.AUTO: 5.5 [PH] (ref 5–8)
PLATELET # BLD AUTO: 200 10*3/MM3 (ref 140–450)
PMV BLD AUTO: 10.6 FL (ref 6–12)
POTASSIUM SERPL-SCNC: 3.6 MMOL/L (ref 3.5–5.2)
PROT SERPL-MCNC: 6.3 G/DL (ref 6–8.5)
PROT UR QL STRIP: ABNORMAL
RBC # BLD AUTO: 3.47 10*6/MM3 (ref 3.77–5.28)
RBC # UR STRIP: NORMAL /HPF
REF LAB TEST METHOD: NORMAL
SODIUM SERPL-SCNC: 139 MMOL/L (ref 136–145)
SP GR UR STRIP: 1.01 (ref 1–1.03)
SQUAMOUS #/AREA URNS HPF: NORMAL /HPF
UROBILINOGEN UR QL STRIP: ABNORMAL
WBC # UR STRIP: NORMAL /HPF
WBC NRBC COR # BLD AUTO: 5.6 10*3/MM3 (ref 3.4–10.8)

## 2025-06-30 PROCEDURE — 81001 URINALYSIS AUTO W/SCOPE: CPT | Performed by: INTERNAL MEDICINE

## 2025-06-30 PROCEDURE — 99214 OFFICE O/P EST MOD 30 MIN: CPT | Performed by: INTERNAL MEDICINE

## 2025-06-30 PROCEDURE — 1160F RVW MEDS BY RX/DR IN RCRD: CPT | Performed by: INTERNAL MEDICINE

## 2025-06-30 PROCEDURE — 85025 COMPLETE CBC W/AUTO DIFF WBC: CPT

## 2025-06-30 PROCEDURE — 1126F AMNT PAIN NOTED NONE PRSNT: CPT | Performed by: INTERNAL MEDICINE

## 2025-06-30 PROCEDURE — 3078F DIAST BP <80 MM HG: CPT | Performed by: INTERNAL MEDICINE

## 2025-06-30 PROCEDURE — 87086 URINE CULTURE/COLONY COUNT: CPT | Performed by: INTERNAL MEDICINE

## 2025-06-30 PROCEDURE — 3074F SYST BP LT 130 MM HG: CPT | Performed by: INTERNAL MEDICINE

## 2025-06-30 PROCEDURE — 1159F MED LIST DOCD IN RCRD: CPT | Performed by: INTERNAL MEDICINE

## 2025-06-30 PROCEDURE — 80053 COMPREHEN METABOLIC PANEL: CPT | Performed by: INTERNAL MEDICINE

## 2025-06-30 PROCEDURE — 36415 COLL VENOUS BLD VENIPUNCTURE: CPT

## 2025-07-01 ENCOUNTER — APPOINTMENT (OUTPATIENT)
Dept: GENERAL RADIOLOGY | Facility: HOSPITAL | Age: 70
End: 2025-07-01
Payer: MEDICARE

## 2025-07-01 ENCOUNTER — OFFICE VISIT (OUTPATIENT)
Dept: CARDIOLOGY | Facility: CLINIC | Age: 70
End: 2025-07-01
Payer: MEDICARE

## 2025-07-01 ENCOUNTER — HOSPITAL ENCOUNTER (INPATIENT)
Facility: HOSPITAL | Age: 70
LOS: 2 days | Discharge: HOME | End: 2025-07-03
Attending: STUDENT IN AN ORGANIZED HEALTH CARE EDUCATION/TRAINING PROGRAM | Admitting: STUDENT IN AN ORGANIZED HEALTH CARE EDUCATION/TRAINING PROGRAM
Payer: MEDICARE

## 2025-07-01 ENCOUNTER — APPOINTMENT (OUTPATIENT)
Dept: CT IMAGING | Facility: HOSPITAL | Age: 70
End: 2025-07-01
Payer: MEDICARE

## 2025-07-01 VITALS
WEIGHT: 184 LBS | DIASTOLIC BLOOD PRESSURE: 77 MMHG | HEIGHT: 61 IN | SYSTOLIC BLOOD PRESSURE: 118 MMHG | HEART RATE: 118 BPM | BODY MASS INDEX: 34.74 KG/M2 | RESPIRATION RATE: 16 BRPM | OXYGEN SATURATION: 96 %

## 2025-07-01 DIAGNOSIS — I48.91 ATRIAL FIBRILLATION WITH RVR: Primary | ICD-10-CM

## 2025-07-01 DIAGNOSIS — R06.00 DYSPNEA, UNSPECIFIED TYPE: ICD-10-CM

## 2025-07-01 DIAGNOSIS — I10 ESSENTIAL (PRIMARY) HYPERTENSION: ICD-10-CM

## 2025-07-01 DIAGNOSIS — I50.32 CHRONIC DIASTOLIC CONGESTIVE HEART FAILURE: ICD-10-CM

## 2025-07-01 DIAGNOSIS — R79.89 ELEVATED BRAIN NATRIURETIC PEPTIDE (BNP) LEVEL: ICD-10-CM

## 2025-07-01 DIAGNOSIS — M25.552 BILATERAL HIP PAIN: ICD-10-CM

## 2025-07-01 DIAGNOSIS — C90.00 MULTIPLE MYELOMA NOT HAVING ACHIEVED REMISSION: ICD-10-CM

## 2025-07-01 DIAGNOSIS — M25.551 BILATERAL HIP PAIN: ICD-10-CM

## 2025-07-01 LAB
ALBUMIN SERPL-MCNC: 3.7 G/DL (ref 3.5–5.2)
ALBUMIN/GLOB SERPL: 1.3 G/DL
ALP SERPL-CCNC: 64 U/L (ref 39–117)
ALT SERPL W P-5'-P-CCNC: 19 U/L (ref 1–33)
ANION GAP SERPL CALCULATED.3IONS-SCNC: 14.8 MMOL/L (ref 5–15)
AST SERPL-CCNC: 13 U/L (ref 1–32)
BACTERIA SPEC AEROBE CULT: ABNORMAL
BASOPHILS # BLD AUTO: 0.01 10*3/MM3 (ref 0–0.2)
BASOPHILS NFR BLD AUTO: 0.2 % (ref 0–1.5)
BILIRUB SERPL-MCNC: 0.3 MG/DL (ref 0–1.2)
BUN SERPL-MCNC: 16.4 MG/DL (ref 8–23)
BUN/CREAT SERPL: 16.7 (ref 7–25)
CALCIUM SPEC-SCNC: 9.3 MG/DL (ref 8.6–10.5)
CHLORIDE SERPL-SCNC: 99 MMOL/L (ref 98–107)
CO2 SERPL-SCNC: 24.2 MMOL/L (ref 22–29)
CREAT SERPL-MCNC: 0.98 MG/DL (ref 0.57–1)
D DIMER PPP FEU-MCNC: 0.92 MCGFEU/ML (ref 0–0.7)
DEPRECATED RDW RBC AUTO: 52.3 FL (ref 37–54)
EGFRCR SERPLBLD CKD-EPI 2021: 62.2 ML/MIN/1.73
EOSINOPHIL # BLD AUTO: 0 10*3/MM3 (ref 0–0.4)
EOSINOPHIL NFR BLD AUTO: 0 % (ref 0.3–6.2)
ERYTHROCYTE [DISTWIDTH] IN BLOOD BY AUTOMATED COUNT: 16.1 % (ref 12.3–15.4)
GEN 5 1HR TROPONIN T REFLEX: 34 NG/L
GLOBULIN UR ELPH-MCNC: 2.9 GM/DL
GLUCOSE BLDC GLUCOMTR-MCNC: 182 MG/DL (ref 70–105)
GLUCOSE SERPL-MCNC: 208 MG/DL (ref 65–99)
HCT VFR BLD AUTO: 31.2 % (ref 34–46.6)
HGB BLD-MCNC: 9.7 G/DL (ref 12–15.9)
HOLD SPECIMEN: NORMAL
IMM GRANULOCYTES # BLD AUTO: 0.02 10*3/MM3 (ref 0–0.05)
IMM GRANULOCYTES NFR BLD AUTO: 0.3 % (ref 0–0.5)
LYMPHOCYTES # BLD AUTO: 0.74 10*3/MM3 (ref 0.7–3.1)
LYMPHOCYTES NFR BLD AUTO: 12.4 % (ref 19.6–45.3)
MCH RBC QN AUTO: 27.9 PG (ref 26.6–33)
MCHC RBC AUTO-ENTMCNC: 31.1 G/DL (ref 31.5–35.7)
MCV RBC AUTO: 89.7 FL (ref 79–97)
MONOCYTES # BLD AUTO: 0.07 10*3/MM3 (ref 0.1–0.9)
MONOCYTES NFR BLD AUTO: 1.2 % (ref 5–12)
NEUTROPHILS NFR BLD AUTO: 5.15 10*3/MM3 (ref 1.7–7)
NEUTROPHILS NFR BLD AUTO: 85.9 % (ref 42.7–76)
NRBC BLD AUTO-RTO: 0 /100 WBC (ref 0–0.2)
NT-PROBNP SERPL-MCNC: 7447 PG/ML (ref 0–900)
PLATELET # BLD AUTO: 199 10*3/MM3 (ref 140–450)
PMV BLD AUTO: 10.8 FL (ref 6–12)
POTASSIUM SERPL-SCNC: 4.2 MMOL/L (ref 3.5–5.2)
PROCALCITONIN SERPL-MCNC: 0.26 NG/ML (ref 0–0.25)
PROT SERPL-MCNC: 6.6 G/DL (ref 6–8.5)
RBC # BLD AUTO: 3.48 10*6/MM3 (ref 3.77–5.28)
SODIUM SERPL-SCNC: 138 MMOL/L (ref 136–145)
TROPONIN T % DELTA: -21
TROPONIN T NUMERIC DELTA: -9 NG/L
TROPONIN T SERPL HS-MCNC: 43 NG/L
TSH SERPL DL<=0.05 MIU/L-ACNC: 0.78 UIU/ML (ref 0.27–4.2)
WBC NRBC COR # BLD AUTO: 5.99 10*3/MM3 (ref 3.4–10.8)

## 2025-07-01 PROCEDURE — 84145 PROCALCITONIN (PCT): CPT | Performed by: STUDENT IN AN ORGANIZED HEALTH CARE EDUCATION/TRAINING PROGRAM

## 2025-07-01 PROCEDURE — 25010000002 FUROSEMIDE PER 20 MG: Performed by: PHYSICIAN ASSISTANT

## 2025-07-01 PROCEDURE — 25010000002 ENOXAPARIN PER 10 MG: Performed by: STUDENT IN AN ORGANIZED HEALTH CARE EDUCATION/TRAINING PROGRAM

## 2025-07-01 PROCEDURE — 36415 COLL VENOUS BLD VENIPUNCTURE: CPT

## 2025-07-01 PROCEDURE — P9612 CATHETERIZE FOR URINE SPEC: HCPCS

## 2025-07-01 PROCEDURE — 85379 FIBRIN DEGRADATION QUANT: CPT | Performed by: PHYSICIAN ASSISTANT

## 2025-07-01 PROCEDURE — 93005 ELECTROCARDIOGRAM TRACING: CPT | Performed by: STUDENT IN AN ORGANIZED HEALTH CARE EDUCATION/TRAINING PROGRAM

## 2025-07-01 PROCEDURE — 84443 ASSAY THYROID STIM HORMONE: CPT | Performed by: STUDENT IN AN ORGANIZED HEALTH CARE EDUCATION/TRAINING PROGRAM

## 2025-07-01 PROCEDURE — 83880 ASSAY OF NATRIURETIC PEPTIDE: CPT | Performed by: PHYSICIAN ASSISTANT

## 2025-07-01 PROCEDURE — 85025 COMPLETE CBC W/AUTO DIFF WBC: CPT | Performed by: PHYSICIAN ASSISTANT

## 2025-07-01 PROCEDURE — 99285 EMERGENCY DEPT VISIT HI MDM: CPT

## 2025-07-01 PROCEDURE — 84484 ASSAY OF TROPONIN QUANT: CPT | Performed by: PHYSICIAN ASSISTANT

## 2025-07-01 PROCEDURE — 93005 ELECTROCARDIOGRAM TRACING: CPT

## 2025-07-01 PROCEDURE — 63710000001 INSULIN LISPRO (HUMAN) PER 5 UNITS: Performed by: STUDENT IN AN ORGANIZED HEALTH CARE EDUCATION/TRAINING PROGRAM

## 2025-07-01 PROCEDURE — 80053 COMPREHEN METABOLIC PANEL: CPT | Performed by: PHYSICIAN ASSISTANT

## 2025-07-01 PROCEDURE — 71045 X-RAY EXAM CHEST 1 VIEW: CPT

## 2025-07-01 PROCEDURE — 82948 REAGENT STRIP/BLOOD GLUCOSE: CPT

## 2025-07-01 PROCEDURE — 71275 CT ANGIOGRAPHY CHEST: CPT

## 2025-07-01 PROCEDURE — 25510000001 IOPAMIDOL PER 1 ML: Performed by: PHYSICIAN ASSISTANT

## 2025-07-01 RX ORDER — ACETAMINOPHEN 650 MG/1
650 SUPPOSITORY RECTAL EVERY 4 HOURS PRN
Status: DISCONTINUED | OUTPATIENT
Start: 2025-07-01 | End: 2025-07-03 | Stop reason: HOSPADM

## 2025-07-01 RX ORDER — HYDROXYCHLOROQUINE SULFATE 200 MG/1
200 TABLET, FILM COATED ORAL EVERY 12 HOURS SCHEDULED
Status: DISCONTINUED | OUTPATIENT
Start: 2025-07-02 | End: 2025-07-03 | Stop reason: HOSPADM

## 2025-07-01 RX ORDER — BISACODYL 10 MG
10 SUPPOSITORY, RECTAL RECTAL DAILY PRN
Status: DISCONTINUED | OUTPATIENT
Start: 2025-07-01 | End: 2025-07-03 | Stop reason: HOSPADM

## 2025-07-01 RX ORDER — SODIUM CHLORIDE 9 MG/ML
40 INJECTION, SOLUTION INTRAVENOUS AS NEEDED
Status: DISCONTINUED | OUTPATIENT
Start: 2025-07-01 | End: 2025-07-03 | Stop reason: HOSPADM

## 2025-07-01 RX ORDER — BISACODYL 5 MG/1
5 TABLET, DELAYED RELEASE ORAL DAILY PRN
Status: DISCONTINUED | OUTPATIENT
Start: 2025-07-01 | End: 2025-07-03 | Stop reason: HOSPADM

## 2025-07-01 RX ORDER — DEXTROSE MONOHYDRATE 25 G/50ML
25 INJECTION, SOLUTION INTRAVENOUS
Status: DISCONTINUED | OUTPATIENT
Start: 2025-07-01 | End: 2025-07-03 | Stop reason: HOSPADM

## 2025-07-01 RX ORDER — SODIUM CHLORIDE 0.9 % (FLUSH) 0.9 %
10 SYRINGE (ML) INJECTION EVERY 12 HOURS SCHEDULED
Status: DISCONTINUED | OUTPATIENT
Start: 2025-07-01 | End: 2025-07-03 | Stop reason: HOSPADM

## 2025-07-01 RX ORDER — FUROSEMIDE 10 MG/ML
20 INJECTION INTRAMUSCULAR; INTRAVENOUS EVERY 12 HOURS
Status: DISCONTINUED | OUTPATIENT
Start: 2025-07-02 | End: 2025-07-02

## 2025-07-01 RX ORDER — LEVOTHYROXINE SODIUM 100 UG/1
100 TABLET ORAL
Status: DISCONTINUED | OUTPATIENT
Start: 2025-07-02 | End: 2025-07-03 | Stop reason: HOSPADM

## 2025-07-01 RX ORDER — ONDANSETRON 2 MG/ML
4 INJECTION INTRAMUSCULAR; INTRAVENOUS EVERY 6 HOURS PRN
Status: DISCONTINUED | OUTPATIENT
Start: 2025-07-01 | End: 2025-07-03 | Stop reason: HOSPADM

## 2025-07-01 RX ORDER — PANTOPRAZOLE SODIUM 40 MG/1
40 TABLET, DELAYED RELEASE ORAL
Status: DISCONTINUED | OUTPATIENT
Start: 2025-07-02 | End: 2025-07-03 | Stop reason: HOSPADM

## 2025-07-01 RX ORDER — POLYETHYLENE GLYCOL 3350 17 G/17G
17 POWDER, FOR SOLUTION ORAL DAILY PRN
Status: DISCONTINUED | OUTPATIENT
Start: 2025-07-01 | End: 2025-07-03 | Stop reason: HOSPADM

## 2025-07-01 RX ORDER — METOPROLOL TARTRATE 50 MG
50 TABLET ORAL EVERY 12 HOURS SCHEDULED
Status: DISCONTINUED | OUTPATIENT
Start: 2025-07-02 | End: 2025-07-03 | Stop reason: HOSPADM

## 2025-07-01 RX ORDER — ACETAMINOPHEN 160 MG/5ML
650 SOLUTION ORAL EVERY 4 HOURS PRN
Status: DISCONTINUED | OUTPATIENT
Start: 2025-07-01 | End: 2025-07-03 | Stop reason: HOSPADM

## 2025-07-01 RX ORDER — IBUPROFEN 600 MG/1
1 TABLET ORAL
Status: DISCONTINUED | OUTPATIENT
Start: 2025-07-01 | End: 2025-07-03 | Stop reason: HOSPADM

## 2025-07-01 RX ORDER — HYDROCHLOROTHIAZIDE 12.5 MG/1
12.5 TABLET ORAL DAILY
Status: DISCONTINUED | OUTPATIENT
Start: 2025-07-01 | End: 2025-07-02

## 2025-07-01 RX ORDER — HYDRALAZINE HYDROCHLORIDE 25 MG/1
25 TABLET, FILM COATED ORAL 3 TIMES DAILY
COMMUNITY
End: 2025-07-03 | Stop reason: HOSPADM

## 2025-07-01 RX ORDER — ENOXAPARIN SODIUM 100 MG/ML
1 INJECTION SUBCUTANEOUS EVERY 12 HOURS
Status: DISCONTINUED | OUTPATIENT
Start: 2025-07-01 | End: 2025-07-03

## 2025-07-01 RX ORDER — FUROSEMIDE 10 MG/ML
40 INJECTION INTRAMUSCULAR; INTRAVENOUS ONCE
Status: COMPLETED | OUTPATIENT
Start: 2025-07-01 | End: 2025-07-01

## 2025-07-01 RX ORDER — HYDROCHLOROTHIAZIDE 12.5 MG/1
12.5 TABLET ORAL DAILY
COMMUNITY
End: 2025-07-03 | Stop reason: HOSPADM

## 2025-07-01 RX ORDER — ACETAMINOPHEN 325 MG/1
650 TABLET ORAL EVERY 4 HOURS PRN
Status: DISCONTINUED | OUTPATIENT
Start: 2025-07-01 | End: 2025-07-03 | Stop reason: HOSPADM

## 2025-07-01 RX ORDER — OXYCODONE HYDROCHLORIDE 5 MG/1
5 TABLET ORAL EVERY 6 HOURS PRN
Refills: 0 | Status: DISCONTINUED | OUTPATIENT
Start: 2025-07-01 | End: 2025-07-03 | Stop reason: HOSPADM

## 2025-07-01 RX ORDER — NICOTINE POLACRILEX 4 MG
15 LOZENGE BUCCAL
Status: DISCONTINUED | OUTPATIENT
Start: 2025-07-01 | End: 2025-07-03 | Stop reason: HOSPADM

## 2025-07-01 RX ORDER — ALBUTEROL SULFATE 90 UG/1
2 INHALANT RESPIRATORY (INHALATION) EVERY 6 HOURS PRN
Status: DISCONTINUED | OUTPATIENT
Start: 2025-07-01 | End: 2025-07-03 | Stop reason: HOSPADM

## 2025-07-01 RX ORDER — NITROGLYCERIN 0.4 MG/1
0.4 TABLET SUBLINGUAL
Status: DISCONTINUED | OUTPATIENT
Start: 2025-07-01 | End: 2025-07-03 | Stop reason: HOSPADM

## 2025-07-01 RX ORDER — HYDRALAZINE HYDROCHLORIDE 25 MG/1
25 TABLET, FILM COATED ORAL 3 TIMES DAILY
Status: DISCONTINUED | OUTPATIENT
Start: 2025-07-01 | End: 2025-07-02

## 2025-07-01 RX ORDER — SCOPOLAMINE 1 MG/3D
1 PATCH, EXTENDED RELEASE TRANSDERMAL
Status: DISCONTINUED | OUTPATIENT
Start: 2025-07-01 | End: 2025-07-03 | Stop reason: HOSPADM

## 2025-07-01 RX ORDER — SODIUM CHLORIDE 0.9 % (FLUSH) 0.9 %
10 SYRINGE (ML) INJECTION AS NEEDED
Status: DISCONTINUED | OUTPATIENT
Start: 2025-07-01 | End: 2025-07-03 | Stop reason: HOSPADM

## 2025-07-01 RX ORDER — INSULIN LISPRO 100 [IU]/ML
2-7 INJECTION, SOLUTION INTRAVENOUS; SUBCUTANEOUS
Status: DISCONTINUED | OUTPATIENT
Start: 2025-07-01 | End: 2025-07-03 | Stop reason: HOSPADM

## 2025-07-01 RX ORDER — HYDROXYZINE HYDROCHLORIDE 25 MG/1
25 TABLET, FILM COATED ORAL ONCE
Status: COMPLETED | OUTPATIENT
Start: 2025-07-02 | End: 2025-07-01

## 2025-07-01 RX ORDER — ACYCLOVIR 200 MG/1
400 CAPSULE ORAL 2 TIMES DAILY
Status: DISCONTINUED | OUTPATIENT
Start: 2025-07-01 | End: 2025-07-03 | Stop reason: HOSPADM

## 2025-07-01 RX ORDER — METOPROLOL TARTRATE 25 MG/1
25 TABLET, FILM COATED ORAL EVERY 12 HOURS SCHEDULED
Status: DISCONTINUED | OUTPATIENT
Start: 2025-07-01 | End: 2025-07-01

## 2025-07-01 RX ORDER — LISINOPRIL 20 MG/1
20 TABLET ORAL DAILY
Status: DISCONTINUED | OUTPATIENT
Start: 2025-07-01 | End: 2025-07-02

## 2025-07-01 RX ORDER — AMOXICILLIN 250 MG
2 CAPSULE ORAL 2 TIMES DAILY PRN
Status: DISCONTINUED | OUTPATIENT
Start: 2025-07-01 | End: 2025-07-03 | Stop reason: HOSPADM

## 2025-07-01 RX ORDER — IOPAMIDOL 755 MG/ML
100 INJECTION, SOLUTION INTRAVASCULAR
Status: COMPLETED | OUTPATIENT
Start: 2025-07-01 | End: 2025-07-01

## 2025-07-01 RX ORDER — DILTIAZEM HCL/D5W 125 MG/125
5-15 PLASTIC BAG, INJECTION (ML) INTRAVENOUS CONTINUOUS
Status: DISCONTINUED | OUTPATIENT
Start: 2025-07-01 | End: 2025-07-03

## 2025-07-01 RX ORDER — SERTRALINE HYDROCHLORIDE 25 MG/1
25 TABLET, FILM COATED ORAL DAILY
Status: DISCONTINUED | OUTPATIENT
Start: 2025-07-02 | End: 2025-07-03 | Stop reason: HOSPADM

## 2025-07-01 RX ADMIN — FUROSEMIDE 40 MG: 10 INJECTION, SOLUTION INTRAMUSCULAR; INTRAVENOUS at 17:22

## 2025-07-01 RX ADMIN — HYDROXYZINE HYDROCHLORIDE 25 MG: 25 TABLET, FILM COATED ORAL at 23:49

## 2025-07-01 RX ADMIN — METOPROLOL TARTRATE 25 MG: 25 TABLET, FILM COATED ORAL at 18:52

## 2025-07-01 RX ADMIN — Medication 5 MG/HR: at 14:46

## 2025-07-01 RX ADMIN — INSULIN LISPRO 2 UNITS: 100 INJECTION, SOLUTION INTRAVENOUS; SUBCUTANEOUS at 21:26

## 2025-07-01 RX ADMIN — ACYCLOVIR 400 MG: 200 CAPSULE ORAL at 21:16

## 2025-07-01 RX ADMIN — ENOXAPARIN SODIUM 80 MG: 100 INJECTION SUBCUTANEOUS at 21:16

## 2025-07-01 RX ADMIN — Medication 10 ML: at 21:16

## 2025-07-01 RX ADMIN — IOPAMIDOL 100 ML: 755 INJECTION, SOLUTION INTRAVENOUS at 15:48

## 2025-07-01 NOTE — Clinical Note
Level of Care: Telemetry [5]   Admitting Physician: NOBLE MCKINLEY [[id number]]   Attending Physician: NOBLE MCKINLEY [[id number]]

## 2025-07-01 NOTE — ED PROVIDER NOTES
Subjective   History of Present Illness  70-year-old female presents emergency room complaints of increased shortness of breath with exertion over the past few days increased lower leg edema.  Patient was at her cardiology office today when she had an EKG that shows new onset A-fib.  Patient has never had atrial fibrillation in the past.  Denies chest pain.        Review of Systems   Constitutional:  Negative for chills, diaphoresis, fatigue and fever.   Respiratory:  Positive for shortness of breath. Negative for chest tightness.    Cardiovascular:  Positive for leg swelling. Negative for chest pain.   Genitourinary:  Negative for dysuria, frequency and hematuria.   Neurological:  Positive for light-headedness.       Past Medical History:   Diagnosis Date    Arthritis 2010    Asthma 1 month    Cancer 12 years    Multiple mylenoma    Depression 2010    Diabetes mellitus 1.5 years    High blood pressure     High cholesterol     Hypothyroidism     Lupus 2010    Sleep apnea Now       No Known Allergies    Past Surgical History:   Procedure Laterality Date    BREAST LUMPECTOMY Right 1983    benign    DILATATION AND CURETTAGE      LAPAROSCOPIC TUBAL LIGATION  1979    OVARY SURGERY  1985    remoal of left ovary       Family History   Problem Relation Age of Onset    Heart disease Father     Pancreatic cancer Sister 59    Heart disease Sister     Stroke Sister     Pneumonia Brother        Social History     Socioeconomic History    Marital status:    Tobacco Use    Smoking status: Former     Current packs/day: 0.00     Average packs/day: 1 pack/day for 21.0 years (21.0 ttl pk-yrs)     Types: Cigarettes     Start date:      Quit date:      Years since quittin.5    Smokeless tobacco: Never   Vaping Use    Vaping status: Never Used   Substance and Sexual Activity    Alcohol use: Yes     Comment: Very occasionally    Drug use: No    Sexual activity: Not Currently     Partners: Male     Birth  "control/protection: Tubal ligation           Objective   Physical Exam  Vitals and nursing note reviewed.   Constitutional:       General: She is not in acute distress.     Appearance: She is well-developed. She is not ill-appearing, toxic-appearing or diaphoretic.   HENT:      Head: Normocephalic and atraumatic.   Neck:      Vascular: No JVD.   Cardiovascular:      Rate and Rhythm: Tachycardia present. Rhythm irregular.   Pulmonary:      Effort: Pulmonary effort is normal.      Breath sounds: Normal breath sounds.   Abdominal:      Palpations: Abdomen is soft.   Musculoskeletal:      Cervical back: Normal range of motion and neck supple.      Right lower leg: Edema present.      Left lower leg: Edema present.   Skin:     General: Skin is warm and dry.   Neurological:      General: No focal deficit present.      Mental Status: She is alert and oriented to person, place, and time.   Psychiatric:         Mood and Affect: Mood normal.         Behavior: Behavior normal.         Procedures           ED Course      /62   Pulse 105   Temp 97.4 °F (36.3 °C)   Resp 24   Ht 154.9 cm (61\")   Wt 83.7 kg (184 lb 9.6 oz)   LMP 10/17/2014   SpO2 93%   BMI 34.88 kg/m²   Labs Reviewed   COMPREHENSIVE METABOLIC PANEL - Abnormal; Notable for the following components:       Result Value    Glucose 208 (*)     All other components within normal limits    Narrative:     GFR Categories in Chronic Kidney Disease (CKD)              GFR Category          GFR (mL/min/1.73)    Interpretation  G1                    90 or greater        Normal or high (1)  G2                    60-89                Mild decrease (1)  G3a                   45-59                Mild to moderate decrease  G3b                   30-44                Moderate to severe decrease  G4                    15-29                Severe decrease  G5                    14 or less           Kidney failure    (1)In the absence of evidence of kidney disease, neither " GFR category G1 or G2 fulfill the criteria for CKD.    eGFR calculation 2021 CKD-EPI creatinine equation, which does not include race as a factor   TROPONIN - Abnormal; Notable for the following components:    HS Troponin T 43 (*)     All other components within normal limits    Narrative:     High Sensitive Troponin T Reference Range:  <14.0 ng/L- Negative Female for AMI  <22.0 ng/L- Negative Male for AMI  >=14 - Abnormal Female indicating possible myocardial injury.  >=22 - Abnormal Male indicating possible myocardial injury.   Clinicians would have to utilize clinical acumen, EKG, Troponin, and serial changes to determine if it is an Acute Myocardial Infarction or myocardial injury due to an underlying chronic condition.        BNP (IN-HOUSE) - Abnormal; Notable for the following components:    proBNP 7,447.0 (*)     All other components within normal limits    Narrative:     This assay is used as an aid in the diagnosis of individuals suspected of having heart failure. It can be used as an aid in the diagnosis of acute decompensated heart failure (ADHF) in patients presenting with signs and symptoms of ADHF to the emergency department (ED). In addition, NT-proBNP of <300 pg/mL indicates ADHF is not likely.    Age Range Result Interpretation  NT-proBNP Concentration (pg/mL:      <50             Positive            >450                   Gray                 300-450                    Negative             <300    50-75           Positive            >900                  Gray                300-900                  Negative            <300      >75             Positive            >1800                  Gray                300-1800                  Negative            <300   D-DIMER, QUANTITATIVE - Abnormal; Notable for the following components:    D-Dimer, Quantitative 0.92 (*)     All other components within normal limits    Narrative:     According to the assay 's published package insert, a normal  "(<0.50 MCGFEU/mL) D-dimer result in conjunction with a non-high clinical probability assessment, excludes deep vein thrombosis (DVT) and pulmonary embolism (PE) with high sensitivity.    D-dimer values increase with age and this can make VTE exclusion of an older population difficult. To address this, the [employer], based on best available evidence and recent guidelines, recommends that clinicians use age-adjusted D-dimer thresholds in patients greater than 50 years of age with: a) a low probability of PE who do not meet all Pulmonary Embolism Rule Out Criteria, or b) in those with intermediate probability of PE.   The formula for an age-adjusted D-dimer cut-off is \"age/100\".  For example, a 60 year old patient would have an age-adjusted cut-off of 0.60 MCGFEU/mL and an 80 year old 0.80 MCGFEU/mL.   CBC WITH AUTO DIFFERENTIAL - Abnormal; Notable for the following components:    RBC 3.48 (*)     Hemoglobin 9.7 (*)     Hematocrit 31.2 (*)     MCHC 31.1 (*)     RDW 16.1 (*)     Neutrophil % 85.9 (*)     Lymphocyte % 12.4 (*)     Monocyte % 1.2 (*)     Eosinophil % 0.0 (*)     Monocytes, Absolute 0.07 (*)     All other components within normal limits   HIGH SENSITIVITIY TROPONIN T 1HR - Abnormal; Notable for the following components:    HS Troponin T 34 (*)     All other components within normal limits    Narrative:     High Sensitive Troponin T Reference Range:  <14.0 ng/L- Negative Female for AMI  <22.0 ng/L- Negative Male for AMI  >=14 - Abnormal Female indicating possible myocardial injury.  >=22 - Abnormal Male indicating possible myocardial injury.   Clinicians would have to utilize clinical acumen, EKG, Troponin, and serial changes to determine if it is an Acute Myocardial Infarction or myocardial injury due to an underlying chronic condition.        PROCALCITONIN   CBC AND DIFFERENTIAL    Narrative:     The following orders were created for panel order CBC & Differential.  Procedure          "                      Abnormality         Status                     ---------                               -----------         ------                     CBC Auto Differential[[id number]]        Abnormal            Final result                 Please view results for these tests on the individual orders.   EXTRA TUBES    Narrative:     The following orders were created for panel order Extra Tubes.  Procedure                               Abnormality         Status                     ---------                               -----------         ------                     Gold Top - SST[[id number]]                                   Final result                 Please view results for these tests on the individual orders.   GOLD TOP - SST     Medications   dilTIAZem (CARDIZEM) 125 mg in 125 mL D5W infusion (5 mg/hr Intravenous Currently Infusing 7/1/25 5278)   sodium chloride 0.9 % flush 10 mL (has no administration in time range)   sodium chloride 0.9 % flush 10 mL (has no administration in time range)   sodium chloride 0.9 % infusion 40 mL (has no administration in time range)   nitroglycerin (NITROSTAT) SL tablet 0.4 mg (has no administration in time range)   Potassium Replacement - Follow Nurse / BPA Driven Protocol (has no administration in time range)   Magnesium Cardiology Dose Replacement - Follow Nurse / BPA Driven Protocol (has no administration in time range)   Phosphorus Replacement - Follow Nurse / BPA Driven Protocol (has no administration in time range)   Calcium Replacement - Follow Nurse / BPA Driven Protocol (has no administration in time range)   acetaminophen (TYLENOL) tablet 650 mg (has no administration in time range)     Or   acetaminophen (TYLENOL) 160 MG/5ML oral solution 650 mg (has no administration in time range)     Or   acetaminophen (TYLENOL) suppository 650 mg (has no administration in time range)   sennosides-docusate (PERICOLACE) 8.6-50 MG per tablet 2 tablet (has no administration in  time range)     And   polyethylene glycol (MIRALAX) packet 17 g (has no administration in time range)     And   bisacodyl (DULCOLAX) EC tablet 5 mg (has no administration in time range)     And   bisacodyl (DULCOLAX) suppository 10 mg (has no administration in time range)   enoxaparin sodium (LOVENOX) syringe 80 mg (has no administration in time range)   metoprolol tartrate (LOPRESSOR) tablet 50 mg (has no administration in time range)   furosemide (LASIX) injection 20 mg (has no administration in time range)   scopolamine patch 1 mg/72 hr (has no administration in time range)   dilTIAZem (CARDIZEM) bolus from bag 1 mg/mL solution 10 mg (10 mg Intravenous Bolus from Bag 7/1/25 1451)   iopamidol (ISOVUE-370) 76 % injection 100 mL (100 mL Intravenous Given 7/1/25 1548)   furosemide (LASIX) injection 40 mg (40 mg Intravenous Given 7/1/25 1722)     CT Angiogram Chest Pulmonary Embolism  Result Date: 7/1/2025  Impression: 1.No evidence of pulmonary embolism. 2.Mild hazy mosaic attenuation noted throughout the lungs. Additionally there is mild patchy opacities noted within the posterior aspect of the left lower lobe. These findings are nonspecific and may be due to small airway disease. Mild infection is not excluded. 3.Additional findings as above. Electronically Signed: Pierre Cook DO  7/1/2025 4:00 PM EDT  Workstation ID: [id number]    XR Chest 1 View  Result Date: 7/1/2025  Impression: Mild diffuse hazy attenuation of the lungs could reflect a low-grade infectious/inflammatory process versus chronic change. No focal airspace consolidation. No pleural disease. Electronically Signed: Leeroy Tian MD  7/1/2025 3:10 PM EDT  Workstation ID: [id number]            [id number] Score: 5                                          Medical Decision Making  70-year-old female presents emergency room with complaints of increased shortness of breath and lightheadedness over the past few days patient states increased swelling of  lower extremities dyspnea with exertion went to cardiology today EKG shows A-fib was sent to the emergency room for new onset afibrillation.  Patient denies chest pain.  Physical exam: HEENT is normocephalic and nontraumatic with no signs of JVD.  Lungs are clear to auscultation bilateral with no crackles.  Heart is irregular rate with irregular rhythm consistent with A-fib.  Abdomen is soft nontender skin is warm and dry and extremities have 3+ pitting edema bilaterally to the lower legs.  Vital signs BP is 117/62 temperature 97.4 heart rate is 127 on EKG respirations 24 SpO2 on room air is 93%.  EKG shows atrial fibrillation with a heart rate of 128 no ST elevation or depression independently interpreted by Dr. Dewey.  Chest x-ray shows mild diffuse hazy attenuation of the lungs inflammation process versus chronic lung changes no focal airspace consolidation reported per radiologist.  Due to the elevation in the D-dimer CT was obtained of the chest PE protocol with no evidence of pulmonary embolism.  CBC WBCs 5.99 hemoglobin 9.7 hematocrit 31.2 with platelets of 199.  D-dimer was elevated 0.92 subsequently the chest CT was ordered and negative.  BNP Proby pro proBNP was elevated at 7440 7 repeat troponin was 43.  CMP glucose is 2 08 BUN 16.4 current creatinine is 0.98 with a sodium of 138 and a potassium of 4.2 ALT 19 AST 13 alk phos is 64.  1 hour troponin was 34 decrease of 9.  Due to the fluid overload Lasix 40 mg IV given patient was advised that hospitalization would be required for new onset A-fib with new onset congestive heart failure potentially being another diagnosis.  Patient agreed and subsequently she was admitted in stable condition to Dr. Moura.  Patient's ER course treatment plan and disposition discussed with Dr. Dewey.    Problems Addressed:  Atrial fibrillation with RVR: complicated acute illness or injury  Dyspnea, unspecified type: complicated acute illness or injury  Elevated brain  natriuretic peptide (BNP) level: complicated acute illness or injury    Amount and/or Complexity of Data Reviewed  Labs: ordered.  Radiology: ordered.    Risk  Prescription drug management.  Decision regarding hospitalization.        Final diagnoses:   Atrial fibrillation with RVR   Elevated brain natriuretic peptide (BNP) level   Dyspnea, unspecified type       ED Disposition  ED Disposition       ED Disposition   Decision to Admit    Condition   --    Comment   Level of Care: Progressive Care [20]   Diagnosis: Atrial fibrillation with rapid ventricular response [[id number]]   Admitting Physician: NOBLE MCKINLEY [[id number]]   Attending Physician: NOBLE MCKINLEY [[id number]]   Certification: I Certify That Inpatient Hospital Services Are Medically Necessary For Greater Than 2 Midnights                 No follow-up provider specified.       Medication List      No changes were made to your prescriptions during this visit.            Robe Caal PA-C  07/01/25 8294

## 2025-07-01 NOTE — H&P
[facility] Medicine Services  History & Physical    Patient Name: Phuong Altamirano  : 1955  MRN: [id number]  Primary Care Physician:  Chula Sanchez APRN  Date of admission: 2025  Date and Time of Service: 2025 at 1800    Subjective      Chief Complaint: NAVA, LE swelling    History of Present Illness: Phuong Altamirano is a 70 y.o. female with a CMH of CHF, multiple myeloma, hypertension, hyperlipidemia, type 2 diabetes, hypothyroidism, rheumatoid arthritis, lupus, GERD, anxiety/depression   who presented to [facility] on 2025 from cardiology clinic for new onset afib. Pt was admitted here back in May with progressive dyspnea/LE edema/orthopnea, diagnosed with new onset CHF. She has been following with heme/onc for MGUS, during the same admission she was diagnosed with smoldering myeloma and it was decided to start treatment. She has been getting treatment with daratumumab/bortezomib/dexamethasone/lenalidomide. She has had some fatigue/malaise/nausea/diarrhea with this but over the past week or so has had recurrent lower extremity edema as well as worsening dyspnea on exertion. During her past 2 heme/once visits she was noted to be tachycardic, initially thought 2/2 dehydration. During yesterday's visit her infusion was held and she was recommended to follow up with cardiology, was able to see them today. In office EKG with afib, no prior history, . Pt advised to present to ED.   In ED ECG again with afib, HR to 120s. Trop 43 -> 34, BNP 7400. Dimer elevated, CTA Chest with no PE, + nonspecific mild opacities, mild infection not excluded. Started on diltiazem drip with improvement in HR and given dose of IV lasix. Hospitalist service consulted for admission.       Review of Systems   Constitutional:  Positive for activity change, appetite change and fatigue. Negative for chills and fever.   HENT:  Negative for congestion, rhinorrhea and sore throat.    Eyes: Negative.     Respiratory:  Negative for cough, shortness of breath and wheezing.         + dyspnea on exertion   Cardiovascular:  Positive for leg swelling. Negative for chest pain and palpitations.   Gastrointestinal:  Positive for diarrhea and nausea. Negative for abdominal pain, blood in stool and vomiting.   Genitourinary:  Negative for dysuria, frequency and urgency.   Musculoskeletal:  Negative for myalgias.   Neurological:  Negative for dizziness, syncope, weakness, light-headedness and headaches.       Personal History     Past Medical History:   Diagnosis Date    Arthritis 2010    Asthma 1 month    Cancer 12 years    Multiple mylenoma    Depression 2010    Diabetes mellitus 1.5 years    High blood pressure     High cholesterol 2005    Hypothyroidism     Lupus 2010    Sleep apnea Now       Past Surgical History:   Procedure Laterality Date    BREAST LUMPECTOMY Right 1983    benign    DILATATION AND CURETTAGE  1995    LAPAROSCOPIC TUBAL LIGATION  1979    OVARY SURGERY  1985    remoal of left ovary       Family History: family history includes Heart disease in her father and sister; Pancreatic cancer (age of onset: 59) in her sister; Pneumonia in her brother; Stroke in her sister. Otherwise pertinent FHx was reviewed and not pertinent to current issue.    Social History:  reports that she quit smoking about 30 years ago. Her smoking use included cigarettes. She started smoking about 51 years ago. She has a 21 pack-year smoking history. She has never used smokeless tobacco. She reports current alcohol use. She reports that she does not use drugs.    Home Medications:  Prior to Admission Medications       Prescriptions Last Dose Informant Patient Reported? Taking?    acetaminophen (TYLENOL) 325 MG tablet   Yes No    Take 2 tablets by mouth Every 6 (Six) Hours As Needed for Mild Pain.    acyclovir (ZOVIRAX) 400 MG tablet 7/1/2025  No Yes    Take 1 tablet by mouth 2 (Two) Times a Day.    albuterol sulfate HFA (Ventolin HFA)  108 (90 Base) MCG/ACT inhaler   Yes No    Inhale 1 puff Every 6 (Six) Hours As Needed.    atenolol (TENORMIN) 50 MG tablet   Yes No    Take 1 tablet by mouth 2 (Two) Times a Day.    dexAMETHasone (DECADRON) 4 MG tablet 7/1/2025  No Yes    Take 5 tablets by mouth Daily With Breakfast. Take with food on Days 1, 2, 8, 9, 15 and 16.    furosemide (Lasix) 20 MG tablet 7/1/2025  No Yes    Take 1 tablet by mouth 2 (Two) Times a Day for 30 days.    hydroxychloroquine (PLAQUENIL) 200 MG tablet 7/1/2025  Yes Yes    Every 12 (Twelve) Hours.    ibuprofen (ADVIL,MOTRIN) 200 MG tablet   Yes No    Take 1 tablet by mouth Every 6 (Six) Hours As Needed for Mild Pain.    lenalidomide (REVLIMID) 25 MG capsule 6/26/2025  No No    Take 1 capsule by mouth See Admin Instructions. Take 1 capsule by mouth daily on days 1-14, OFF 7 days of each 21 day cycle. Take with or without food. Swallow capsules whole, do not crush, break or chew.    levothyroxine (SYNTHROID, LEVOTHROID) 100 MCG tablet 7/1/2025  Yes Yes    Take 1 tablet by mouth Every Morning Before Breakfast.    lisinopril (PRINIVIL,ZESTRIL) 20 MG tablet   Yes No    Take 1 tablet by mouth Daily.    metFORMIN (GLUCOPHAGE) 500 MG tablet 6/30/2025  Yes Yes    Take 1 tablet by mouth Daily With Breakfast.    omeprazole (priLOSEC) 40 MG capsule 7/1/2025  Yes Yes    Take 1 capsule by mouth Daily.    ondansetron (ZOFRAN) 8 MG tablet   No No    Take 1 tablet by mouth 3 (Three) Times a Day As Needed for Nausea or Vomiting.    promethazine (PHENERGAN) 12.5 MG tablet   No No    Take 1 tablet by mouth Every 6 (Six) Hours As Needed for Nausea or Vomiting.    sertraline (ZOLOFT) 25 MG tablet 7/1/2025  Yes Yes    Take 1 tablet by mouth Daily.    sulfamethoxazole-trimethoprim (BACTRIM DS,SEPTRA DS) 800-160 MG per tablet 6/30/2025  No Yes    Take 1 tablet by mouth 3 (Three) Times a Week. Take 1 tablet on Mondays, Wednesdays and Fridays.    hydrALAZINE (APRESOLINE) 25 MG tablet   Yes No    Take 1 tablet  by mouth 3 (Three) Times a Day.    hydroCHLOROthiazide 12.5 MG tablet 6/30/2025  Yes Yes    Take 1 tablet by mouth Daily.              Allergies:  No Known Allergies    Objective      Vitals:   Temp:  [97.3 °F (36.3 °C)-97.9 °F (36.6 °C)] 97.4 °F (36.3 °C)  Heart Rate:  [] 113  Resp:  [16-24] 23  BP: (101-129)/(60-84) 129/84  Body mass index is 34.88 kg/m².  Physical Exam  Constitutional:       General: She is not in acute distress.  HENT:      Head: Normocephalic and atraumatic.      Mouth/Throat:      Mouth: Mucous membranes are moist.      Pharynx: Oropharynx is clear.   Eyes:      Extraocular Movements: Extraocular movements intact.      Conjunctiva/sclera: Conjunctivae normal.   Cardiovascular:      Rate and Rhythm: Tachycardia present. Rhythm irregular.      Heart sounds: Normal heart sounds.   Pulmonary:      Effort: Pulmonary effort is normal.      Breath sounds: Normal breath sounds. No wheezing, rhonchi or rales.   Abdominal:      General: Abdomen is flat. Bowel sounds are normal.      Palpations: Abdomen is soft.      Tenderness: There is no abdominal tenderness. There is no guarding or rebound.   Musculoskeletal:      Cervical back: Normal range of motion and neck supple.      Right lower leg: Edema present.      Left lower leg: Edema present.      Comments: L calf mildly TTP   Skin:     General: Skin is warm and dry.   Neurological:      General: No focal deficit present.      Mental Status: She is alert and oriented to person, place, and time. Mental status is at baseline.       Diagnostic Data:  Lab Results (last 24 hours)       Procedure Component Value Units Date/Time    High Sensitivity Troponin T 1Hr [[id number]]  (Abnormal) Collected: 07/01/25 1549    Specimen: Blood Updated: 07/01/25 1614     HS Troponin T 34 ng/L      Troponin T Numeric Delta -9 ng/L      Troponin T % Delta -21    Narrative:      High Sensitive Troponin T Reference Range:  <14.0 ng/L- Negative Female for AMI  <22.0 ng/L-  Negative Male for AMI  >=14 - Abnormal Female indicating possible myocardial injury.  >=22 - Abnormal Male indicating possible myocardial injury.   Clinicians would have to utilize clinical acumen, EKG, Troponin, and serial changes to determine if it is an Acute Myocardial Infarction or myocardial injury due to an underlying chronic condition.         Extra Tubes [[id number]] Collected: 07/01/25 1549    Specimen: Blood, Venous Line Updated: 07/01/25 1600    Narrative:      The following orders were created for panel order Extra Tubes.  Procedure                               Abnormality         Status                     ---------                               -----------         ------                     Gold Top - SST[[id number]]                                   Final result                 Please view results for these tests on the individual orders.    Gold Top - SST [[id number]] Collected: 07/01/25 1549    Specimen: Blood Updated: 07/01/25 1600     Extra Tube Hold for add-ons.     Comment: Auto resulted.       Comprehensive Metabolic Panel [[id number]]  (Abnormal) Collected: 07/01/25 1436    Specimen: Blood Updated: 07/01/25 1509     Glucose 208 mg/dL      BUN 16.4 mg/dL      Creatinine 0.98 mg/dL      Sodium 138 mmol/L      Potassium 4.2 mmol/L      Chloride 99 mmol/L      CO2 24.2 mmol/L      Calcium 9.3 mg/dL      Total Protein 6.6 g/dL      Albumin 3.7 g/dL      ALT (SGPT) 19 U/L      AST (SGOT) 13 U/L      Alkaline Phosphatase 64 U/L      Total Bilirubin 0.3 mg/dL      Globulin 2.9 gm/dL      A/G Ratio 1.3 g/dL      BUN/Creatinine Ratio 16.7     Anion Gap 14.8 mmol/L      eGFR 62.2 mL/min/1.73     Narrative:      GFR Categories in Chronic Kidney Disease (CKD)              GFR Category          GFR (mL/min/1.73)    Interpretation  G1                    90 or greater        Normal or high (1)  G2                    60-89                Mild decrease (1)  G3a                   45-59                Mild to  moderate decrease  G3b                   30-44                Moderate to severe decrease  G4                    15-29                Severe decrease  G5                    14 or less           Kidney failure    (1)In the absence of evidence of kidney disease, neither GFR category G1 or G2 fulfill the criteria for CKD.    eGFR calculation 2021 CKD-EPI creatinine equation, which does not include race as a factor    High Sensitivity Troponin T [[id number]]  (Abnormal) Collected: 07/01/25 1436    Specimen: Blood Updated: 07/01/25 1509     HS Troponin T 43 ng/L     Narrative:      High Sensitive Troponin T Reference Range:  <14.0 ng/L- Negative Female for AMI  <22.0 ng/L- Negative Male for AMI  >=14 - Abnormal Female indicating possible myocardial injury.  >=22 - Abnormal Male indicating possible myocardial injury.   Clinicians would have to utilize clinical acumen, EKG, Troponin, and serial changes to determine if it is an Acute Myocardial Infarction or myocardial injury due to an underlying chronic condition.         BNP [[id number]]  (Abnormal) Collected: 07/01/25 1436    Specimen: Blood Updated: 07/01/25 1509     proBNP 7,447.0 pg/mL     Narrative:      This assay is used as an aid in the diagnosis of individuals suspected of having heart failure. It can be used as an aid in the diagnosis of acute decompensated heart failure (ADHF) in patients presenting with signs and symptoms of ADHF to the emergency department (ED). In addition, NT-proBNP of <300 pg/mL indicates ADHF is not likely.    Age Range Result Interpretation  NT-proBNP Concentration (pg/mL:      <50             Positive            >450                   Gray                 300-450                    Negative             <300    50-75           Positive            >900                  Gray                300-900                  Negative            <300      >75             Positive            >1800                  Gray                300-1800              "     Negative            <300    D-dimer, Quantitative [[id number]]  (Abnormal) Collected: 07/01/25 1436    Specimen: Blood Updated: 07/01/25 1453     D-Dimer, Quantitative 0.92 MCGFEU/mL     Narrative:      According to the assay 's published package insert, a normal (<0.50 MCGFEU/mL) D-dimer result in conjunction with a non-high clinical probability assessment, excludes deep vein thrombosis (DVT) and pulmonary embolism (PE) with high sensitivity.    D-dimer values increase with age and this can make VTE exclusion of an older population difficult. To address this, the [employer], based on best available evidence and recent guidelines, recommends that clinicians use age-adjusted D-dimer thresholds in patients greater than 50 years of age with: a) a low probability of PE who do not meet all Pulmonary Embolism Rule Out Criteria, or b) in those with intermediate probability of PE.   The formula for an age-adjusted D-dimer cut-off is \"age/100\".  For example, a 60 year old patient would have an age-adjusted cut-off of 0.60 MCGFEU/mL and an 80 year old 0.80 MCGFEU/mL.    CBC & Differential [[id number]]  (Abnormal) Collected: 07/01/25 1436    Specimen: Blood Updated: 07/01/25 1443    Narrative:      The following orders were created for panel order CBC & Differential.  Procedure                               Abnormality         Status                     ---------                               -----------         ------                     CBC Auto Differential[[id number]]        Abnormal            Final result                 Please view results for these tests on the individual orders.    CBC Auto Differential [[id number]]  (Abnormal) Collected: 07/01/25 1436    Specimen: Blood Updated: 07/01/25 1443     WBC 5.99 10*3/mm3      RBC 3.48 10*6/mm3      Hemoglobin 9.7 g/dL      Hematocrit 31.2 %      MCV 89.7 fL      MCH 27.9 pg      MCHC 31.1 g/dL      RDW 16.1 %      RDW-SD 52.3 fl      MPV " 10.8 fL      Platelets 199 10*3/mm3      Neutrophil % 85.9 %      Lymphocyte % 12.4 %      Monocyte % 1.2 %      Eosinophil % 0.0 %      Basophil % 0.2 %      Immature Grans % 0.3 %      Neutrophils, Absolute 5.15 10*3/mm3      Lymphocytes, Absolute 0.74 10*3/mm3      Monocytes, Absolute 0.07 10*3/mm3      Eosinophils, Absolute 0.00 10*3/mm3      Basophils, Absolute 0.01 10*3/mm3      Immature Grans, Absolute 0.02 10*3/mm3      nRBC 0.0 /100 WBC              Imaging Results (Last 24 Hours)       Procedure Component Value Units Date/Time    CT Angiogram Chest Pulmonary Embolism [[id number]] Collected: 07/01/25 1550     Updated: 07/01/25 1602    Narrative:      CT ANGIOGRAM CHEST PULMONARY EMBOLISM    Date of Exam: 7/1/2025 3:48 PM EDT    Indication: Pulmonary Embolism.    Comparison: Outside CT 5/18/2025    Technique: Axial CT images were obtained of the chest after the uneventful intravenous administration of iodinated contrast utilizing pulmonary embolism protocol.  In addition, a 3-D volume rendered image was created for interpretation.  Sagittal and   coronal reconstructions were performed.  Automated exposure control and iterative reconstruction methods were used.      Findings:    Pulmonary arteries:Adequate opacification of the pulmonary arteries. No evidence of acute pulmonary embolism.    Aorta: Unremarkable.    Lungs and Pleura: Mild hazy mosaic attenuation noted throughout the lungs. Additionally there is mild patchy opacities noted within the posterior aspect of the left lower lobe. Otherwise the lungs are clear. No consolidative changes.  Few calcified granulomas. The central airways are patent. No pleural effusion or pneumothorax      Mediastinum/Ynes: Scattered prominent mediastinal lymph nodes, few of which are calcified. This most likely represents an underlying granulomatosis process.  No definite suspicious mass.    Heart: Normal in size. No pericardial effusion. Normal RV/LV ratio.    Soft Tissue:  Unremarkable.      Upper Abdomen: Unremarkable.    Bones: No acute osseous abnormality. Degenerative changes noted throughout the visualized spine. Unchanged subtle deformity through the manubrium. Unchanged grade 1 anterolisthesis of C7 over T1.        Impression:      Impression:  1.No evidence of pulmonary embolism.  2.Mild hazy mosaic attenuation noted throughout the lungs. Additionally there is mild patchy opacities noted within the posterior aspect of the left lower lobe. These findings are nonspecific and may be due to small airway disease. Mild infection is not   excluded.  3.Additional findings as above.        Electronically Signed: Pierre Cook DO    7/1/2025 4:00 PM EDT    Workstation ID: [id number]    XR Chest 1 View [[id number]] Collected: 07/01/25 1508     Updated: 07/01/25 1512    Narrative:      XR CHEST 1 VW    Date of Exam: 7/1/2025 2:42 PM EDT    Indication: Shortness of breath    Comparison: Chest radiograph 5/18/2025.    Findings:  Enlarged cardiac silhouette, unchanged. Benign calcified mediastinal/hilar lymph nodes. Mild diffuse hazy attenuation of the lungs. No focal airspace consolidation. No pleural effusion. No pneumothorax. Osseous structures are unchanged.      Impression:      Impression:  Mild diffuse hazy attenuation of the lungs could reflect a low-grade infectious/inflammatory process versus chronic change. No focal airspace consolidation. No pleural disease.      Electronically Signed: Leeroy Tian MD    7/1/2025 3:10 PM EDT    Workstation ID: [id number]              Assessment & Plan        This is a 70 y.o. female with:    Active and Resolved Problems  Active Hospital Problems    Diagnosis  POA    **Atrial fibrillation with rapid ventricular response [I48.91]  Yes      Resolved Hospital Problems   No resolved problems to display.     Atrial fibrillation with RVR  HFpEF  - Trop 43->34, BNP 7400 (was 2900 during recent admission). + xiang LE edema, not significantly fluid  overloaded on imaging. Stable on room air.   - Cardiology and HF navigator consulted, appreciate assistance  - Pt w/o infectious Sx, WBC wnl. Will check procal, likely monitor off abx  - TSH pending, recent A1C as below  - TTE 5/18/25 EF 56-60%, normal estimated RVSP  - Pt pending sleep study outpt  - [id number] 5, will start lovenox at present for anticoagulation   - Dimer 0.92, no PE on imaging, pt does report LLE TTP, will check US.   - On diltiazem drip, will start metoprolol and titrate to wean off drip as able  - diuresing, monitor renal function  - Telemetry monitoring  - Monitor intake/outpt, daily weight    Htn  - BP wnl, will hold antihypertensives on diltiazem drip and while monitoring renal function, resume as appropriate.     Hypothyroid  - TSH pending  - cont home synthroid    Normocytic anemia  - Hgb 9.7 OA, similar to previous. No active bleeding Sx  - iron profile pending in am  - trend    T2DM  - A1C 6.06 5/2025  - holding home oral  - SSI    Suspected COPD/ILD  - pending outpt PFTs  - not in exacerbation  - cont home inhaler    Multiple myeloma  - following with heme/onc, on therapy with daratumumab/bortezomib/dexamethasone/lenalidomide. Holding at present, per pt plan to resume treatments 7/7. Pt states she only takes the steroid with infusion days.     RA  - on plaquenil        VTE Prophylaxis:  No VTE prophylaxis order currently exists.        The patient desires to be as follows:    CODE STATUS:    Code Status (Patient has no pulse and is not breathing): CPR (Attempt to Resuscitate)  Medical Interventions (Patient has pulse or is breathing): Full Support  Level Of Support Discussed With: Patient        Hans Pearce, who can be contacted at [phone] , is the designated person to make medical decisions on the patient's behalf if She is incapable of doing so. This was clarified with patient and/or next of kin on 7/1/2025 during the course of this H&P.    Admission Status:  I believe this  patient meets inpatient status.    Expected Length of Stay: >2 midnights    PDMP and Medication Dispenses via Sidebar reviewed and consistent with patient reported medications.    I discussed the patient's findings and my recommendations with patient.      Signature:     This document has been electronically signed by Jagdish Moura MD on July 1, 2025 18:06 EDT   [facility]ist Team

## 2025-07-01 NOTE — PROGRESS NOTES
"Cardiology Office Follow Up Visit      Primary Care Provider:  Chula Sanchez APRN    Reason for f/u:     Episodic visit      Subjective     CC:    Contacted office stating \"dehydrated, high heart rate, swelling in feet/ankles\"    History of Present Illness       Phuong Altamirano is a 70 y.o. female patient of Dr Felton, previously under care of Dr Sims. She has a history of hypertension, hyperlipidemia, systolic murmur, type 2 diabetes, hypothyroidism, multiple myeloma, MGUS, rheumatoid arthritis, lupus, GERD, anxiety/depression and family history of CAD.     Patient presented to [facility] ED after being transferred from [place] on 5/18/2025 for progressively worsening dyspnea and lower extremity edema, fatigue, orthopnea with diagnosis with new onset COPD with acute exacerbation/new onset congestive heart failure.  Patient stated that her activity was greatly decreased from her norm, she was previously able to climb a flight of stairs.  CTA of chest showed no PE, positive for pulmonary vascular congestion.       From cardiac standpoint, she was diuresed during admission and d/c'd on po Lasix. Echo on recent admit showed normal LV function, EF 56-60%, no wall motion abnormalities noted, grade 1-2 diastolic dysfunction suspected, mild to moderate mitral regurgitation, mild tricuspid regurgitation, no hemodynamically significant pulmonic valve regurgitation noted, trileaflet aortic valve structurally normal with no regurgitation or stenosis noted.      She followed up in office 6/6/25 and was to restart norvasc/HTZ after hospital discharge as BP permitted, however, she states BP at home runs 105-110/60-70. In office that day, /76, resumed lisinopril only.   Patient to keep home blood pressure logs.      Pt contacted our office 6/26/25 stating hem-onc wanted to give her IVF for dehydration, advised her ok to do so under that physician's guidance, she has normal EF. She requested appt for S/S as noted " above.    She presents today on 6/26/2025, she was at heme-onc office, they thought she was dehydrated due to her heart rate being 140.  They considered fluid resuscitation, however did not do this, however they have halted her treatments for this week.  Patient states that approximately 2 days prior to that, she had been feeling fatigued and short of breath.  Patient has been generally not feeling well, she has had a lot of nausea from her chemo, poor intake, vomiting and diarrhea on Sunday.  She states that she has had increasing edema.  On exam today she has 3+ pitting edema in bilateral lower extremities.  Has not noticed any chest pain or palpitations.  EKG reveals A-fib rate 118, per her knowledge she has never had any arrhythmias, including A-fib prior.  She did have labs yesterday, sodium 139, CO2 26.2, potassium 3.6, Creatinine 0.92, hemoglobin 10.0. patient advised to proceed to [facility] ED, I informed Robe, physician assessment at [facility] ED as well as Dr. Felton of pending arrival to ED.        ASSESSMENT/PLAN:      Diagnoses and all orders for this visit:    1. Atrial fibrillation with RVR (Primary)    2. Chronic diastolic congestive heart failure    3. Multiple myeloma not having achieved remission    4. Essential (primary) hypertension    Other orders  -     ECG 12 Lead      MEDICAL DECISION MAKING:    New onset A-fib  Per EKG in clinic today, rate 118    Diastolic heart failure  Patient with pitting lower extremity edema, has been on p.o. Lasix, blood pressure well-controlled 118/77 on lisinopril 20 mg and atenolol 50 mg today    Multiple myeloma  Patient has been generally very ill with chemotherapy, has had weakness poor appetite nausea vomiting and diarrhea    COPD  Patient has completed outpatient PFTs, and has been tested for sleep apnea-result pending.        Past Medical History:   Diagnosis Date    Arthritis 2010    Asthma 1 month    Cancer 12 years    Multiple mylenoma    Depression 2010    Diabetes  mellitus 1.5 years    High blood pressure     High cholesterol 2005    Hypothyroidism     Lupus 2010    Sleep apnea Now       Past Surgical History:   Procedure Laterality Date    BREAST LUMPECTOMY Right 1983    benign    DILATATION AND CURETTAGE  1995    LAPAROSCOPIC TUBAL LIGATION  1979    OVARY SURGERY  1985    remoal of left ovary       No current facility-administered medications for this visit.    Current Outpatient Medications:     acetaminophen (TYLENOL) 325 MG tablet, Take 2 tablets by mouth Every 6 (Six) Hours As Needed for Mild Pain., Disp: , Rfl:     acyclovir (ZOVIRAX) 400 MG tablet, Take 1 tablet by mouth 2 (Two) Times a Day., Disp: 60 tablet, Rfl: 3    albuterol sulfate HFA (Ventolin HFA) 108 (90 Base) MCG/ACT inhaler, , Disp: , Rfl:     atenolol (TENORMIN) 50 MG tablet, Take 1 tablet by mouth 2 (Two) Times a Day., Disp: , Rfl: 1    dexAMETHasone (DECADRON) 4 MG tablet, Take 5 tablets by mouth Daily With Breakfast. Take with food on Days 1, 2, 8, 9, 15 and 16., Disp: 30 tablet, Rfl: 3    furosemide (Lasix) 20 MG tablet, Take 1 tablet by mouth 2 (Two) Times a Day for 30 days., Disp: , Rfl:     hydroxychloroquine (PLAQUENIL) 200 MG tablet, Every 12 (Twelve) Hours., Disp: , Rfl:     ibuprofen (ADVIL,MOTRIN) 200 MG tablet, Take 1 tablet by mouth Every 6 (Six) Hours As Needed for Mild Pain., Disp: , Rfl:     lenalidomide (REVLIMID) 25 MG capsule, Take 1 capsule by mouth See Admin Instructions. Take 1 capsule by mouth daily on days 1-14, OFF 7 days of each 21 day cycle. Take with or without food. Swallow capsules whole, do not crush, break or chew., Disp: 14 capsule, Rfl: 0    levothyroxine (SYNTHROID, LEVOTHROID) 100 MCG tablet, Take 1 tablet by mouth Every Morning Before Breakfast., Disp: , Rfl: 1    lisinopril (PRINIVIL,ZESTRIL) 20 MG tablet, Take 1 tablet by mouth Daily., Disp: , Rfl:     metFORMIN (GLUCOPHAGE) 500 MG tablet, Take 1 tablet by mouth Daily With Breakfast., Disp: , Rfl:     omeprazole  (priLOSEC) 20 MG capsule, Take 1 capsule by mouth Daily., Disp: , Rfl:     ondansetron (ZOFRAN) 8 MG tablet, Take 1 tablet by mouth 3 (Three) Times a Day As Needed for Nausea or Vomiting., Disp: 30 tablet, Rfl: 5    promethazine (PHENERGAN) 12.5 MG tablet, Take 1 tablet by mouth Every 6 (Six) Hours As Needed for Nausea or Vomiting., Disp: 30 tablet, Rfl: 2    sertraline (ZOLOFT) 25 MG tablet, Take 1 tablet by mouth Daily., Disp: , Rfl: 1    sulfamethoxazole-trimethoprim (BACTRIM DS,SEPTRA DS) 800-160 MG per tablet, Take 1 tablet by mouth 3 (Three) Times a Week. Take 1 tablet on ,  and ., Disp: 12 tablet, Rfl: 3    Facility-Administered Medications Ordered in Other Visits:     dilTIAZem (CARDIZEM) 125 mg in 125 mL D5W infusion, 5-15 mg/hr, Intravenous, Continuous, Robe Caal PA-C    dilTIAZem (CARDIZEM) bolus from bag 1 mg/mL solution 10 mg, 10 mg, Intravenous, Once, Robe Caal PA-C    Social History     Socioeconomic History    Marital status:    Tobacco Use    Smoking status: Former     Current packs/day: 0.00     Average packs/day: 1 pack/day for 21.0 years (21.0 ttl pk-yrs)     Types: Cigarettes     Start date:      Quit date:      Years since quittin.5    Smokeless tobacco: Never   Vaping Use    Vaping status: Never Used   Substance and Sexual Activity    Alcohol use: Yes     Comment: Very occasionally    Drug use: No    Sexual activity: Not Currently     Partners: Male     Birth control/protection: Tubal ligation       Family History   Problem Relation Age of Onset    Heart disease Father     Pancreatic cancer Sister 59    Heart disease Sister     Stroke Sister     Pneumonia Brother        The following portions of the patient's history were reviewed and updated as appropriate: allergies, current medications, past family history, past medical history, past social history, past surgical history and problem list.    Review of Systems   Constitutional:  "Positive for decreased appetite and malaise/fatigue.   Cardiovascular:  Positive for dyspnea on exertion, leg swelling and orthopnea. Negative for chest pain, irregular heartbeat, palpitations and syncope.   Respiratory:  Positive for shortness of breath.    Gastrointestinal:  Positive for vomiting. Negative for diarrhea and nausea.   Neurological:  Positive for weakness.       Pertinent items are noted in HPI, all other systems reviewed and negative    /77 (BP Location: Right arm, Patient Position: Sitting, Cuff Size: Large Adult)   Pulse 118   Resp 16   Ht 154 cm (60.63\")   Wt 83.5 kg (184 lb)   LMP 10/17/2014   SpO2 96%   BMI 35.19 kg/m² .  Objective     Physical Exam  General Appearance:  ill-appearing  Head:   Normocephalic, atraumatic  Eyes:    PERRLA, EOM intact, conjunctivae and sclerae normal, no  icterus  Throat: Oral mucosa moist  Neck:No carotid bruit or JVD  Lungs:  Clear to auscultation, respirations regular, even and unlabored   Heart: Irregularly irregular and rapid rate, normal S1 and S2, no gallop, no rub, no click  Chest Wall:  No abnormalities observed  Abdomen:  Soft, obese   Extremities: bilateral lower extremity pitting edema, no cyanosis or redness  Skin:  No bleeding, cyanosis, bruising or rash   Neurologic: tearful      ECG 12 Lead    Date/Time: 7/1/2025 2:41 PM  Performed by: Gris Mcgee APRN    Authorized by: Gris Mcgee APRN  Comparison: compared with previous ECG   Comparison to previous ECG: Previous SR  Rhythm: atrial fibrillation  Rate: tachycardic  BPM: 118          EKG ordered by and reviewed by me in office       proBNP   Date Value Ref Range Status   05/18/2025 2,912.0 (H) 0.0 - 900.0 pg/mL Final     CMP          6/13/2025    21:59 6/23/2025    10:35 6/30/2025    11:00   CMP   Glucose 119   145    BUN 13.8   14.0    Creatinine 0.78  0.70  0.92    EGFR 81.8  93.2  67.1    Sodium 140   139    Potassium 3.3   3.6    Chloride 98   98    Calcium 9.1   " "9.2    Total Protein 7.3   6.3    Albumin 3.8   3.6    Globulin 3.5   2.7    Total Bilirubin 0.5   0.3    Alkaline Phosphatase 94   67    AST (SGOT) 28   13    ALT (SGPT) 26   18    Albumin/Globulin Ratio 1.1   1.3    BUN/Creatinine Ratio 17.7   15.2    Anion Gap 13.7   14.8      CBC w/diff          6/18/2025    15:21 6/23/2025    09:48 6/30/2025    11:00   CBC w/Diff   WBC 4.59  6.08  5.60    RBC 3.74  3.65  3.47    Hemoglobin 10.7  10.3  10.0    Hematocrit 33.9  33.3  31.9    MCV 90.6  91.2  91.9    MCH 28.6  28.2  28.8    MCHC 31.6  30.9  31.3    RDW 15.0  15.1  16.0    Platelets 209  197  200    Neutrophil Rel % 42.7  74.3  76.9    Lymphocyte Rel % 44.0  22.0  17.7    Monocyte Rel % 13.1  3.5  5.2    Eosinophil Rel % 0.0  0.0  0.2    Basophil Rel % 0.2  0.2  0.0       Lab Results   Component Value Date    TSH 3.110 06/23/2025      No results found for: \"FREET4\"   No results found for: \"DDIMERQUANT\"  Magnesium   Date Value Ref Range Status   05/20/2025 2.3 1.6 - 2.4 mg/dL Final      No results found for: \"DIGOXIN\"   No results found for: \"TROPONINT\"        Lipid Panel          5/19/2025    04:43   Lipid Panel   Total Cholesterol 163    Triglycerides 98    HDL Cholesterol 31    VLDL Cholesterol 18    LDL Cholesterol  114    LDL/HDL Ratio 3.63      No results found for: \"POCTROP\"    Results for orders placed during the hospital encounter of 05/18/25    Adult Transthoracic Echo Complete w/ Color, Spectral and Contrast if necessary per protocol 05/18/2025  7:32 PM    Interpretation Summary    Left ventricular systolic function is normal. Left ventricular ejection fraction appears to be 56 - 60%.    Left ventricular diastolic function was indeterminate.    Estimated right ventricular systolic pressure from tricuspid regurgitation is normal (<35 mmHg).     No results found for this or any previous visit.    Phuong Altamirano  reports that she quit smoking about 30 years ago. Her smoking use included cigarettes. She " started smoking about 51 years ago. She has a 21 pack-year smoking history. She has never used smokeless tobacco.

## 2025-07-02 ENCOUNTER — APPOINTMENT (OUTPATIENT)
Dept: CARDIOLOGY | Facility: HOSPITAL | Age: 70
End: 2025-07-02
Payer: MEDICARE

## 2025-07-02 LAB
ANION GAP SERPL CALCULATED.3IONS-SCNC: 13.9 MMOL/L (ref 5–15)
BASOPHILS # BLD AUTO: 0.01 10*3/MM3 (ref 0–0.2)
BASOPHILS NFR BLD AUTO: 0.1 % (ref 0–1.5)
BH CV LOWER VASCULAR LEFT COMMON FEMORAL AUGMENT: NORMAL
BH CV LOWER VASCULAR LEFT COMMON FEMORAL COMPETENT: NORMAL
BH CV LOWER VASCULAR LEFT COMMON FEMORAL COMPRESS: NORMAL
BH CV LOWER VASCULAR LEFT COMMON FEMORAL PHASIC: NORMAL
BH CV LOWER VASCULAR LEFT COMMON FEMORAL SPONT: NORMAL
BH CV LOWER VASCULAR LEFT DISTAL FEMORAL COMPRESS: NORMAL
BH CV LOWER VASCULAR LEFT GASTRONEMIUS COMPRESS: NORMAL
BH CV LOWER VASCULAR LEFT GREATER SAPH AK COMPRESS: NORMAL
BH CV LOWER VASCULAR LEFT GREATER SAPH BK COMPRESS: NORMAL
BH CV LOWER VASCULAR LEFT LESSER SAPH COMPRESS: NORMAL
BH CV LOWER VASCULAR LEFT MID FEMORAL AUGMENT: NORMAL
BH CV LOWER VASCULAR LEFT MID FEMORAL COMPETENT: NORMAL
BH CV LOWER VASCULAR LEFT MID FEMORAL COMPRESS: NORMAL
BH CV LOWER VASCULAR LEFT MID FEMORAL PHASIC: NORMAL
BH CV LOWER VASCULAR LEFT MID FEMORAL SPONT: NORMAL
BH CV LOWER VASCULAR LEFT PERONEAL COMPRESS: NORMAL
BH CV LOWER VASCULAR LEFT POPLITEAL AUGMENT: NORMAL
BH CV LOWER VASCULAR LEFT POPLITEAL COMPETENT: NORMAL
BH CV LOWER VASCULAR LEFT POPLITEAL COMPRESS: NORMAL
BH CV LOWER VASCULAR LEFT POPLITEAL PHASIC: NORMAL
BH CV LOWER VASCULAR LEFT POPLITEAL SPONT: NORMAL
BH CV LOWER VASCULAR LEFT POSTERIOR TIBIAL COMPRESS: NORMAL
BH CV LOWER VASCULAR LEFT PROFUNDA FEMORAL COMPRESS: NORMAL
BH CV LOWER VASCULAR LEFT PROXIMAL FEMORAL COMPRESS: NORMAL
BH CV LOWER VASCULAR LEFT SAPHENOFEMORAL JUNCTION COMPRESS: NORMAL
BH CV LOWER VASCULAR RIGHT COMMON FEMORAL AUGMENT: NORMAL
BH CV LOWER VASCULAR RIGHT COMMON FEMORAL COMPETENT: NORMAL
BH CV LOWER VASCULAR RIGHT COMMON FEMORAL COMPRESS: NORMAL
BH CV LOWER VASCULAR RIGHT COMMON FEMORAL PHASIC: NORMAL
BH CV LOWER VASCULAR RIGHT COMMON FEMORAL SPONT: NORMAL
BUN SERPL-MCNC: 18.9 MG/DL (ref 8–23)
BUN/CREAT SERPL: 22.5 (ref 7–25)
CALCIUM SPEC-SCNC: 9.2 MG/DL (ref 8.6–10.5)
CHLORIDE SERPL-SCNC: 99 MMOL/L (ref 98–107)
CO2 SERPL-SCNC: 24.1 MMOL/L (ref 22–29)
CREAT SERPL-MCNC: 0.84 MG/DL (ref 0.57–1)
DEPRECATED RDW RBC AUTO: 53.9 FL (ref 37–54)
EGFRCR SERPLBLD CKD-EPI 2021: 74.9 ML/MIN/1.73
EOSINOPHIL # BLD AUTO: 0 10*3/MM3 (ref 0–0.4)
EOSINOPHIL NFR BLD AUTO: 0 % (ref 0.3–6.2)
ERYTHROCYTE [DISTWIDTH] IN BLOOD BY AUTOMATED COUNT: 16.5 % (ref 12.3–15.4)
FERRITIN SERPL-MCNC: 118 NG/ML (ref 13–150)
GLUCOSE BLDC GLUCOMTR-MCNC: 134 MG/DL (ref 70–105)
GLUCOSE BLDC GLUCOMTR-MCNC: 155 MG/DL (ref 70–105)
GLUCOSE BLDC GLUCOMTR-MCNC: 168 MG/DL (ref 70–105)
GLUCOSE BLDC GLUCOMTR-MCNC: 180 MG/DL (ref 70–105)
GLUCOSE SERPL-MCNC: 169 MG/DL (ref 65–99)
HCT VFR BLD AUTO: 26.2 % (ref 34–46.6)
HCT VFR BLD AUTO: 30.7 % (ref 34–46.6)
HGB BLD-MCNC: 8.1 G/DL (ref 12–15.9)
HGB BLD-MCNC: 9.5 G/DL (ref 12–15.9)
IMM GRANULOCYTES # BLD AUTO: 0.02 10*3/MM3 (ref 0–0.05)
IMM GRANULOCYTES NFR BLD AUTO: 0.3 % (ref 0–0.5)
IRON 24H UR-MRATE: 54 MCG/DL (ref 37–145)
IRON SATN MFR SERPL: 14 % (ref 20–50)
LYMPHOCYTES # BLD AUTO: 1.31 10*3/MM3 (ref 0.7–3.1)
LYMPHOCYTES NFR BLD AUTO: 18.4 % (ref 19.6–45.3)
MCH RBC QN AUTO: 28 PG (ref 26.6–33)
MCHC RBC AUTO-ENTMCNC: 30.9 G/DL (ref 31.5–35.7)
MCV RBC AUTO: 90.7 FL (ref 79–97)
MONOCYTES # BLD AUTO: 0.36 10*3/MM3 (ref 0.1–0.9)
MONOCYTES NFR BLD AUTO: 5.1 % (ref 5–12)
NEUTROPHILS NFR BLD AUTO: 5.41 10*3/MM3 (ref 1.7–7)
NEUTROPHILS NFR BLD AUTO: 76.1 % (ref 42.7–76)
NRBC BLD AUTO-RTO: 0 /100 WBC (ref 0–0.2)
PLATELET # BLD AUTO: 232 10*3/MM3 (ref 140–450)
PMV BLD AUTO: 11.5 FL (ref 6–12)
POTASSIUM SERPL-SCNC: 4.2 MMOL/L (ref 3.5–5.2)
QT INTERVAL: 347 MS
QTC INTERVAL: 505 MS
RBC # BLD AUTO: 2.89 10*6/MM3 (ref 3.77–5.28)
RETICS # AUTO: 0.07 10*6/MM3 (ref 0.02–0.13)
RETICS/RBC NFR AUTO: 2.58 % (ref 0.7–1.9)
SODIUM SERPL-SCNC: 137 MMOL/L (ref 136–145)
TIBC SERPL-MCNC: 380 MCG/DL (ref 298–536)
TRANSFERRIN SERPL-MCNC: 255 MG/DL (ref 200–360)
WBC NRBC COR # BLD AUTO: 7.11 10*3/MM3 (ref 3.4–10.8)

## 2025-07-02 PROCEDURE — 85014 HEMATOCRIT: CPT | Performed by: STUDENT IN AN ORGANIZED HEALTH CARE EDUCATION/TRAINING PROGRAM

## 2025-07-02 PROCEDURE — 80048 BASIC METABOLIC PNL TOTAL CA: CPT | Performed by: STUDENT IN AN ORGANIZED HEALTH CARE EDUCATION/TRAINING PROGRAM

## 2025-07-02 PROCEDURE — 25010000002 FUROSEMIDE PER 20 MG: Performed by: INTERNAL MEDICINE

## 2025-07-02 PROCEDURE — 82948 REAGENT STRIP/BLOOD GLUCOSE: CPT | Performed by: STUDENT IN AN ORGANIZED HEALTH CARE EDUCATION/TRAINING PROGRAM

## 2025-07-02 PROCEDURE — 63710000001 INSULIN LISPRO (HUMAN) PER 5 UNITS: Performed by: STUDENT IN AN ORGANIZED HEALTH CARE EDUCATION/TRAINING PROGRAM

## 2025-07-02 PROCEDURE — 85045 AUTOMATED RETICULOCYTE COUNT: CPT | Performed by: STUDENT IN AN ORGANIZED HEALTH CARE EDUCATION/TRAINING PROGRAM

## 2025-07-02 PROCEDURE — 82948 REAGENT STRIP/BLOOD GLUCOSE: CPT

## 2025-07-02 PROCEDURE — 25810000003 SODIUM CHLORIDE 0.9 % SOLUTION: Performed by: INTERNAL MEDICINE

## 2025-07-02 PROCEDURE — 93971 EXTREMITY STUDY: CPT

## 2025-07-02 PROCEDURE — 82728 ASSAY OF FERRITIN: CPT | Performed by: STUDENT IN AN ORGANIZED HEALTH CARE EDUCATION/TRAINING PROGRAM

## 2025-07-02 PROCEDURE — 25010000002 FUROSEMIDE PER 20 MG: Performed by: STUDENT IN AN ORGANIZED HEALTH CARE EDUCATION/TRAINING PROGRAM

## 2025-07-02 PROCEDURE — 99222 1ST HOSP IP/OBS MODERATE 55: CPT | Performed by: INTERNAL MEDICINE

## 2025-07-02 PROCEDURE — 25010000002 ENOXAPARIN PER 10 MG: Performed by: STUDENT IN AN ORGANIZED HEALTH CARE EDUCATION/TRAINING PROGRAM

## 2025-07-02 PROCEDURE — 83540 ASSAY OF IRON: CPT | Performed by: STUDENT IN AN ORGANIZED HEALTH CARE EDUCATION/TRAINING PROGRAM

## 2025-07-02 PROCEDURE — 85025 COMPLETE CBC W/AUTO DIFF WBC: CPT | Performed by: STUDENT IN AN ORGANIZED HEALTH CARE EDUCATION/TRAINING PROGRAM

## 2025-07-02 PROCEDURE — 93971 EXTREMITY STUDY: CPT | Performed by: SURGERY

## 2025-07-02 PROCEDURE — 84466 ASSAY OF TRANSFERRIN: CPT | Performed by: STUDENT IN AN ORGANIZED HEALTH CARE EDUCATION/TRAINING PROGRAM

## 2025-07-02 PROCEDURE — 25010000002 NA FERRIC GLUC CPLX PER 12.5 MG: Performed by: INTERNAL MEDICINE

## 2025-07-02 PROCEDURE — 85018 HEMOGLOBIN: CPT | Performed by: STUDENT IN AN ORGANIZED HEALTH CARE EDUCATION/TRAINING PROGRAM

## 2025-07-02 RX ORDER — FUROSEMIDE 10 MG/ML
40 INJECTION INTRAMUSCULAR; INTRAVENOUS EVERY 12 HOURS
Status: DISCONTINUED | OUTPATIENT
Start: 2025-07-02 | End: 2025-07-03

## 2025-07-02 RX ORDER — FERROUS SULFATE 325(65) MG
325 TABLET ORAL
Status: DISCONTINUED | OUTPATIENT
Start: 2025-07-03 | End: 2025-07-03 | Stop reason: HOSPADM

## 2025-07-02 RX ADMIN — HYDROXYCHLOROQUINE SULFATE 200 MG: 200 TABLET, FILM COATED ORAL at 20:08

## 2025-07-02 RX ADMIN — ACYCLOVIR 400 MG: 200 CAPSULE ORAL at 08:08

## 2025-07-02 RX ADMIN — Medication 2.5 MG/HR: at 15:03

## 2025-07-02 RX ADMIN — DOCUSATE SODIUM 50 MG AND SENNOSIDES 8.6 MG 2 TABLET: 8.6; 5 TABLET, FILM COATED ORAL at 20:11

## 2025-07-02 RX ADMIN — METOPROLOL TARTRATE 50 MG: 50 TABLET, FILM COATED ORAL at 20:08

## 2025-07-02 RX ADMIN — INSULIN LISPRO 2 UNITS: 100 INJECTION, SOLUTION INTRAVENOUS; SUBCUTANEOUS at 11:44

## 2025-07-02 RX ADMIN — FUROSEMIDE 40 MG: 10 INJECTION, SOLUTION INTRAMUSCULAR; INTRAVENOUS at 17:14

## 2025-07-02 RX ADMIN — ENOXAPARIN SODIUM 80 MG: 100 INJECTION SUBCUTANEOUS at 08:09

## 2025-07-02 RX ADMIN — SERTRALINE HYDROCHLORIDE 25 MG: 25 TABLET, FILM COATED ORAL at 08:09

## 2025-07-02 RX ADMIN — INSULIN LISPRO 2 UNITS: 100 INJECTION, SOLUTION INTRAVENOUS; SUBCUTANEOUS at 20:27

## 2025-07-02 RX ADMIN — LEVOTHYROXINE SODIUM 100 MCG: 100 TABLET ORAL at 08:20

## 2025-07-02 RX ADMIN — PANTOPRAZOLE SODIUM 40 MG: 40 TABLET, DELAYED RELEASE ORAL at 06:22

## 2025-07-02 RX ADMIN — ACYCLOVIR 400 MG: 200 CAPSULE ORAL at 20:08

## 2025-07-02 RX ADMIN — Medication 10 ML: at 08:09

## 2025-07-02 RX ADMIN — INSULIN LISPRO 2 UNITS: 100 INJECTION, SOLUTION INTRAVENOUS; SUBCUTANEOUS at 08:08

## 2025-07-02 RX ADMIN — METOPROLOL TARTRATE 50 MG: 50 TABLET, FILM COATED ORAL at 08:09

## 2025-07-02 RX ADMIN — FUROSEMIDE 20 MG: 10 INJECTION, SOLUTION INTRAMUSCULAR; INTRAVENOUS at 06:22

## 2025-07-02 RX ADMIN — ENOXAPARIN SODIUM 80 MG: 100 INJECTION SUBCUTANEOUS at 20:08

## 2025-07-02 RX ADMIN — SODIUM CHLORIDE 250 MG: 9 INJECTION, SOLUTION INTRAVENOUS at 13:23

## 2025-07-02 RX ADMIN — HYDROXYCHLOROQUINE SULFATE 200 MG: 200 TABLET, FILM COATED ORAL at 08:08

## 2025-07-02 NOTE — PROGRESS NOTES
[facility] MEDICINE SERVICE  DAILY PROGRESS NOTE    NAME: Phuong Altamirano  : 1955  MRN: [id number]      LOS: 1 day     PROVIDER OF SERVICE: Mariano Chaudhary MD    Chief Complaint: Atrial fibrillation with rapid ventricular response    Subjective:     Interval History:  History taken from: patient  Pt denied palpitation ; endorses that has been having dark stool since 1 week back with no gregorio bleeding    Review of Systems:   Review of Systems  14 point review of system unremarkable except mentioned above  Objective:     Vital Signs  Temp:  [97.3 °F (36.3 °C)-97.9 °F (36.6 °C)] 97.9 °F (36.6 °C)  Heart Rate:  [] 100  Resp:  [16-24] 20  BP: ()/() 115/63   Body mass index is 34.11 kg/m².    Physical Exam  Physical Exam     Gen: Resting comfortably  HENT: NC/AT, pink MMM  CVS: Irregularly irregular, no M/G/R  Pulm: CTAB w/o wheezing or rhonchi  Abd: Nondistended, bowel sounds present, nontender   Ext:No clubbing or cyanosis  Skin: No petechia or jaundice  Neuro: AAOx3, CN II-XII intact  Psych: appropriate       Diagnostic Data    Results from last 7 days   Lab Units 25  0156 25  1436   WBC 10*3/mm3 7.11 5.99   HEMOGLOBIN g/dL 8.1* 9.7*   HEMATOCRIT % 26.2* 31.2*   PLATELETS 10*3/mm3 232 199   GLUCOSE mg/dL 169* 208*   CREATININE mg/dL 0.84 0.98   BUN mg/dL 18.9 16.4   SODIUM mmol/L 137 138   POTASSIUM mmol/L 4.2 4.2   AST (SGOT) U/L  --  13   ALT (SGPT) U/L  --  19   ALK PHOS U/L  --  64   BILIRUBIN mg/dL  --  0.3   ANION GAP mmol/L 13.9 14.8       CT Angiogram Chest Pulmonary Embolism  Result Date: 2025  Impression: 1.No evidence of pulmonary embolism. 2.Mild hazy mosaic attenuation noted throughout the lungs. Additionally there is mild patchy opacities noted within the posterior aspect of the left lower lobe. These findings are nonspecific and may be due to small airway disease. Mild infection is not excluded. 3.Additional findings as above. Electronically Signed: Pierre  DO Joyce  7/1/2025 4:00 PM EDT  Workstation ID: [id number]    XR Chest 1 View  Result Date: 7/1/2025  Impression: Mild diffuse hazy attenuation of the lungs could reflect a low-grade infectious/inflammatory process versus chronic change. No focal airspace consolidation. No pleural disease. Electronically Signed: Leeroy Tian MD  7/1/2025 3:10 PM EDT  Workstation ID: [id number]        I reviewed the patient's new clinical results.    Assessment/Plan:     Active and Resolved Problems  Active Hospital Problems    Diagnosis  POA    **Atrial fibrillation with rapid ventricular response [I48.91]  Yes      Resolved Hospital Problems   No resolved problems to display.   Phuong Altamirano is a 70 y.o. female with a CMH of CHF, multiple myeloma, hypertension, hyperlipidemia, type 2 diabetes, hypothyroidism, rheumatoid arthritis, lupus, GERD, anxiety/depression   who presented to [facility] on 7/1/2025 from cardiology clinic for new onset afib.    A-fib with RVR  Acute exacerbation of HFpEF likely from arrhythmia  -Was a direct referral from cardiologist office for elevated heart rates in A-fib with RVR   Trop 43->34, BNP 7400 (was 2900 during recent admission). + xiang LE edema, not significantly fluid overloaded on imaging. Stable on room air.   - Cardiology and HF navigator consulted, appreciate assistance  - Pt w/o infectious Sx, WBC wnl. Will check procal, likely monitor off abx  - TSH pending, recent A1C as below  - TTE 5/18/25 EF 56-60%, normal estimated RVSP  - Pt pending sleep study outpt  - [id number] 5, will start lovenox at present for anticoagulation   - Dimer 0.92, no PE on imaging, pt does report LLE TTP, will check US.   - On diltiazem drip, will start metoprolol and titrate to wean off drip as able  - diuresing, monitor renal function  - Telemetry monitoring  - Monitor intake/outpt, daily weight  - Team has been consulted    Essential hypertension  -continue to monitor blood pressure medication  -  Monitor for hypotension current blood pressure medication put on hold as he is on diltiazem    Hypothyroid  - TSH pending  - cont home synthroid     Chronic normocytic anemia  - Hgb 9.7 OA, similar to previous. No active bleeding Sx  - iron profile pending in am  - trend     T2DM  - A1C 6.06 5/2025  - holding home oral  - SSI     Suspected COPD/ILD  - pending outpt PFTs  - not in exacerbation  - cont home inhaler     Multiple myeloma  - following with heme/onc, on therapy with daratumumab/bortezomib/dexamethasone/lenalidomide. Holding at present, per pt plan to resume treatments 7/7. Pt states she only takes the steroid with infusion days.      RA  - on plaquenil        VTE Prophylaxis:  Pharmacologic VTE prophylaxis orders are present.             Disposition Planning:     Barriers to Discharge: A-fib with RVR  Anticipated Date of Discharge: 7/4  Place of Discharge:  home       Time: 35 minutes     Code Status and Medical Interventions: CPR (Attempt to Resuscitate); Full Support   Ordered at: 07/01/25 1806     Code Status (Patient has no pulse and is not breathing):    CPR (Attempt to Resuscitate)     Medical Interventions (Patient has pulse or is breathing):    Full Support     Level Of Support Discussed With:    Patient       Signature: Electronically signed by Mariano Chaudhary MD, 07/02/25, 08:53 EDT.  [facility] Hospitalist Team

## 2025-07-02 NOTE — CONSULTS
Consult requested by Kyle Felton    Indication for consultation  Uncontrollable atrial fibrillation      Sub  70-year-old patient has multiple myeloma recently evaluated by oncology services currently under therapy for high risk multiple myeloma.  Patient presented with atrial fibrillation with rapid ventricular rate currently on intravenous Cardizem consultation requested  Patient complains of dyspnea and denies any prior history of atrial fibrillation  No prior history of stroke or bleeding diathesis  Patient has history of hypertension hyperlipidemia history of systolic murmur type 2 diabetes and hypothyroidism  Patient has known history of rheumatoid arthritis/lupus and history of acid reflux  Denies any active chest pain  CT chest was performed without evidence of pulm embolus  Recent echocardiography revealed normal EF      Past Medical History:   Diagnosis Date    Arthritis 2010    Asthma 1 month    Cancer 12 years    Multiple mylenoma    Depression 2010    Diabetes mellitus 1.5 years    High blood pressure     High cholesterol 2005    Hypothyroidism     Lupus 2010    Sleep apnea Now     Past Surgical History:   Procedure Laterality Date    BREAST LUMPECTOMY Right 1983    benign    DILATATION AND CURETTAGE      LAPAROSCOPIC TUBAL LIGATION  1979    OVARY SURGERY  1985    remoal of left ovary     Family History   Problem Relation Age of Onset    Heart disease Father     Pancreatic cancer Sister 59    Heart disease Sister     Stroke Sister     Pneumonia Brother      Social History     Tobacco Use    Smoking status: Former     Current packs/day: 0.00     Average packs/day: 1 pack/day for 21.0 years (21.0 ttl pk-yrs)     Types: Cigarettes     Start date:      Quit date:      Years since quittin.5    Smokeless tobacco: Never   Vaping Use    Vaping status: Never Used   Substance Use Topics    Alcohol use: Yes     Comment: Very occasionally    Drug use: No     Medications Prior to Admission    Medication Sig Dispense Refill Last Dose/Taking    acyclovir (ZOVIRAX) 400 MG tablet Take 1 tablet by mouth 2 (Two) Times a Day. 60 tablet 3 7/1/2025    dexAMETHasone (DECADRON) 4 MG tablet Take 5 tablets by mouth Daily With Breakfast. Take with food on Days 1, 2, 8, 9, 15 and 16. 30 tablet 3 7/1/2025    furosemide (Lasix) 20 MG tablet Take 1 tablet by mouth 2 (Two) Times a Day for 30 days.   7/1/2025    hydroCHLOROthiazide 12.5 MG tablet Take 1 tablet by mouth Daily.   6/30/2025    hydroxychloroquine (PLAQUENIL) 200 MG tablet Every 12 (Twelve) Hours.   7/1/2025    levothyroxine (SYNTHROID, LEVOTHROID) 100 MCG tablet Take 1 tablet by mouth Every Morning Before Breakfast.  1 7/1/2025    metFORMIN (GLUCOPHAGE) 500 MG tablet Take 1 tablet by mouth Daily With Breakfast.   6/30/2025    omeprazole (priLOSEC) 40 MG capsule Take 1 capsule by mouth Daily.   7/1/2025    sertraline (ZOLOFT) 25 MG tablet Take 1 tablet by mouth Daily.  1 7/1/2025    sulfamethoxazole-trimethoprim (BACTRIM DS,SEPTRA DS) 800-160 MG per tablet Take 1 tablet by mouth 3 (Three) Times a Week. Take 1 tablet on Mondays, Wednesdays and Fridays. 12 tablet 3 6/30/2025    acetaminophen (TYLENOL) 325 MG tablet Take 2 tablets by mouth Every 6 (Six) Hours As Needed for Mild Pain.       albuterol sulfate HFA (Ventolin HFA) 108 (90 Base) MCG/ACT inhaler Inhale 1 puff Every 6 (Six) Hours As Needed.       atenolol (TENORMIN) 50 MG tablet Take 1 tablet by mouth 2 (Two) Times a Day.  1     hydrALAZINE (APRESOLINE) 25 MG tablet Take 1 tablet by mouth 3 (Three) Times a Day.       ibuprofen (ADVIL,MOTRIN) 200 MG tablet Take 1 tablet by mouth Every 6 (Six) Hours As Needed for Mild Pain.       lenalidomide (REVLIMID) 25 MG capsule Take 1 capsule by mouth See Admin Instructions. Take 1 capsule by mouth daily on days 1-14, OFF 7 days of each 21 day cycle. Take with or without food. Swallow capsules whole, do not crush, break or chew. 14 capsule 0 6/26/2025    lisinopril  (PRINIVIL,ZESTRIL) 20 MG tablet Take 1 tablet by mouth Daily.       ondansetron (ZOFRAN) 8 MG tablet Take 1 tablet by mouth 3 (Three) Times a Day As Needed for Nausea or Vomiting. 30 tablet 5     promethazine (PHENERGAN) 12.5 MG tablet Take 1 tablet by mouth Every 6 (Six) Hours As Needed for Nausea or Vomiting. 30 tablet 2      Allergies:  Patient has no known allergies.    Review of Systems   General:  positive for fatigue and tiredness  Eyes: No redness  Cardiovascular: No chest pain  Respiratory:   positive for class 3 shortness of breath    Physical Exam  VITALS REVIEWED    General:      well developed, well nourished, in no acute distress.    Head:      normocephalic and atraumatic.    Eyes:      PERRL/EOM intact, conjunctivae and sclerae clear without nystagmus.    Neck:      no masses, thyromegaly,  trachea central with normal respiratory effort   Lungs:      clear bilaterally to auscultation.    Heart:       underlying rapid atrial fibrillation with irregularly irregular rhythm and rapid ventricular rate without any murmurs, gallops or rubs  Msk:      no deformity or scoliosis noted of thoracic or lumbar spine.    Pulses:      pulses normal in all 4 extremities.    Extremities:       no cyanosis or clubbing--trace left pedal edema and trace right pedal edema.    Neurologic:      no focal deficits.   alert oriented x3  Skin:      intact without lesions or rashes.    Psych:      alert and cooperative; normal mood and affect; normal attention span and concentration.          CBC    Results from last 7 days   Lab Units 07/02/25  1330 07/02/25  0156 07/01/25  1436 06/30/25  1100   WBC 10*3/mm3  --  7.11 5.99 5.60   HEMOGLOBIN g/dL 9.5* 8.1* 9.7* 10.0*   PLATELETS 10*3/mm3  --  232 199 200     BMP   Results from last 7 days   Lab Units 07/02/25  0156 07/01/25  1436 06/30/25  1100   SODIUM mmol/L 137 138 139   POTASSIUM mmol/L 4.2 4.2 3.6   CHLORIDE mmol/L 99 99 98   CO2 mmol/L 24.1 24.2 26.2   BUN mg/dL 18.9 16.4  14.0   CREATININE mg/dL 0.84 0.98 0.92   GLUCOSE mg/dL 169* 208* 145*     CMP   Results from last 7 days   Lab Units 07/02/25  0156 07/01/25  1436 06/30/25  1100   SODIUM mmol/L 137 138 139   POTASSIUM mmol/L 4.2 4.2 3.6   CHLORIDE mmol/L 99 99 98   CO2 mmol/L 24.1 24.2 26.2   BUN mg/dL 18.9 16.4 14.0   CREATININE mg/dL 0.84 0.98 0.92   GLUCOSE mg/dL 169* 208* 145*   ALBUMIN g/dL  --  3.7 3.6   BILIRUBIN mg/dL  --  0.3 0.3   ALK PHOS U/L  --  64 67   AST (SGOT) U/L  --  13 13   ALT (SGPT) U/L  --  19 18     Radiology(recent) CT Angiogram Chest Pulmonary Embolism  Result Date: 7/1/2025  Impression: 1.No evidence of pulmonary embolism. 2.Mild hazy mosaic attenuation noted throughout the lungs. Additionally there is mild patchy opacities noted within the posterior aspect of the left lower lobe. These findings are nonspecific and may be due to small airway disease. Mild infection is not excluded. 3.Additional findings as above. Electronically Signed: Pierre Cook DO  7/1/2025 4:00 PM EDT  Workstation ID: [id number]    XR Chest 1 View  Result Date: 7/1/2025  Impression: Mild diffuse hazy attenuation of the lungs could reflect a low-grade infectious/inflammatory process versus chronic change. No focal airspace consolidation. No pleural disease. Electronically Signed: Leeroy Tian MD  7/1/2025 3:10 PM EDT  Workstation ID: [id number]       Assessment and plan    New onset of atrial fibrillation with rapid ventricular rate currently on intravenous Cardizem and beta-blockers and Lovenox  Therapeutic options discussed with patient and family  Schedule patient for ANN guided cardioversion tomorrow  Post cardioversion, patient can be started on anticoagulation  Multiple myeloma under therapy of oncology and hematology  History of mild sleep apnea  TSH is normal with history of hypothyroidism on supplementation  History of diabetes and hypertension  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

## 2025-07-02 NOTE — PLAN OF CARE
Problem: Adult Inpatient Plan of Care  Goal: Plan of Care Review  Outcome: Progressing  Flowsheets (Taken 7/1/2025 2000)  Progress: no change  Outcome Evaluation: Patient admitted to CVU room 3122 due to AFIB with RVR on cardizem gtt, patients family at bedside, patient is alert and oriented, patient can ambulate to the bathroom with iv pole, patient can use call light to make needs known, VSS bedsides heart rate being high  Plan of Care Reviewed With: patient  Goal: Patient-Specific Goal (Individualized)  Outcome: Progressing  Goal: Absence of Hospital-Acquired Illness or Injury  Outcome: Progressing  Intervention: Identify and Manage Fall Risk  Recent Flowsheet Documentation  Taken 7/1/2025 1900 by Cheryle Stephenson RN  Safety Promotion/Fall Prevention: safety round/check completed  Intervention: Prevent Skin Injury  Recent Flowsheet Documentation  Taken 7/1/2025 1900 by Cheryle Stephenson RN  Body Position: position changed independently  Intervention: Prevent Infection  Recent Flowsheet Documentation  Taken 7/1/2025 1900 by Cheryle Stephenson RN  Infection Prevention: hand hygiene promoted  Goal: Optimal Comfort and Wellbeing  Outcome: Progressing  Intervention: Provide Person-Centered Care  Recent Flowsheet Documentation  Taken 7/1/2025 1900 by Cheryle Stephenson RN  Trust Relationship/Rapport:   care explained   choices provided   emotional support provided   empathic listening provided   questions answered   questions encouraged   reassurance provided   thoughts/feelings acknowledged  Goal: Readiness for Transition of Care  Outcome: Progressing     Problem: Dysrhythmia  Goal: Normalized Cardiac Rhythm  Outcome: Progressing   Goal Outcome Evaluation:  Plan of Care Reviewed With: patient        Progress: no change  Outcome Evaluation: Patient admitted to CVU room 3122 due to AFIB with RVR on cardizem gtt, patients family at bedside, patient is alert and oriented, patient can ambulate to the bathroom with iv pole,  patient can use call light to make needs known, VSS bedsides heart rate being high

## 2025-07-02 NOTE — CONSULTS
[facility] [place]    Subjective:     Encounter Date:07/01/2025      Patient ID: Phuong Altamirano is a 70 y.o. female     Referring Physician: Jet    Chief Complaint: soa edema    Reason for Consult: new onset afib    Seen and examined greater than 50% of total encounter time and medical decision making performed by me  Scruff findings below  HPI:  Phuong Altamirano is a 70 y.o. female patient of Dr. Felton with a history of hypertension, hyperlipidemia, systolic murmur, type 2 diabetes, hypothyroidism, multiple myeloma, MGUS, rheumatoid arthritis, lupus, GERD, anxiety/depression and family history of CAD, who presented to our office yesterday for follow-up and was found to be in new onset atrial fibrillation and bilateral lower extremity edema.  She was sent to [facility] ED.    On 6/26/2025, she was at heme-onc office, they thought she was dehydrated due to her heart rate being 140.  They considered fluid resuscitation, however did not do this, however they have halted her treatments for this week.  Patient states that approximately 2 days prior to that, she had been feeling fatigued and short of breath.  Patient has been generally not feeling well, she has had a lot of nausea from her chemo, poor intake, vomiting and diarrhea on Sunday.  She states that she has had increasing edema.  Has not noticed any chest pain or palpitations.  EKG in office yesterday reveals A-fib rate 118, per her knowledge she has never had any arrhythmias, including A-fib prior.      Presented to the ED, repeat EKG remained in A-fib rate 128.  Chest x-ray mild diffuse hazy attenuation in the lungs versus chronic changes.  She did have elevated D-dimer And had CT PE protocol with no evidence of pulmonary embolism.  BMP unremarkable, potassium was 4.2.  Elevated proBNP 7447.  High-sensitivity troponins x 2: 43, 34, she received initial dose of 40 mg IV Lasix in the ED.    Patient seen and examined today as she is sitting up in recliner.   Patient free of chest pain or palpitations.  Remains in A-fib with elevated rates, remains on diltiazem drip as well as Lopressor 50 every 12.  Patient is currently getting Lovenox.  Lower extremity somewhat improved today, she remains on Lasix 20 mg IV every 12.     Telemetry reviewed A-fib rate controlled 90s to low 100s  Remains with lower extremity edema  Pressures marginal 100s over 70s      Review of systems otherwise negative x 14 point review of systems except as mentioned above  Historical data copied forward from previous encounters in EMR is unchanged      Past Medical History:   Diagnosis Date    Arthritis 2010    Asthma 1 month    Cancer 12 years    Multiple mylenoma    Depression 2010    Diabetes mellitus 1.5 years    High blood pressure     High cholesterol 2005    Hypothyroidism     Lupus 2010    Sleep apnea Now       Past Surgical History:   Procedure Laterality Date    BREAST LUMPECTOMY Right 1983    benign    DILATATION AND CURETTAGE      LAPAROSCOPIC TUBAL LIGATION  1979    OVARY SURGERY  1985    remoal of left ovary       Family History   Problem Relation Age of Onset    Heart disease Father     Pancreatic cancer Sister 59    Heart disease Sister     Stroke Sister     Pneumonia Brother        Social History     Socioeconomic History    Marital status:    Tobacco Use    Smoking status: Former     Current packs/day: 0.00     Average packs/day: 1 pack/day for 21.0 years (21.0 ttl pk-yrs)     Types: Cigarettes     Start date:      Quit date:      Years since quittin.5    Smokeless tobacco: Never   Vaping Use    Vaping status: Never Used   Substance and Sexual Activity    Alcohol use: Yes     Comment: Very occasionally    Drug use: No    Sexual activity: Not Currently     Partners: Male     Birth control/protection: Tubal ligation         Review of Systems   Constitutional: Positive for malaise/fatigue.   Cardiovascular:  Positive for leg swelling. Negative for chest pain,  "irregular heartbeat, palpitations and syncope.   Respiratory:  Positive for shortness of breath.    Neurological:  Positive for weakness. Negative for dizziness and loss of balance.   Psychiatric/Behavioral:  Negative for altered mental status.             Objective:         /63 (BP Location: Right arm, Patient Position: Sitting)   Pulse 100   Temp 97.9 °F (36.6 °C) (Oral)   Resp 20   Ht 154.9 cm (61\")   Wt 81.9 kg (180 lb 8 oz)   LMP 10/17/2014   SpO2 97%   BMI 34.11 kg/m²     Physical Exam:  Physical Exam    General Appearance:    Alert, cooperative, in no acute distress   Head:    Normocephalic, without obvious abnormality, atraumatic   Eyes:            PERRLA, EOM intact, conjunctivae and sclerae normal, no  icterus       Throat:   Oral mucosa moist, No oral lesions, no thrush   Neck:   No carotid bruit, no JVD, supple, trachea midline, no thyromegaly    Lungs:     Clear to auscultation, respirations regular, even and   unlabored    Heart:    irregular rhythm and rapidl rate, normal S1 and S2, no   murmur, no gallop, no rub, no click   Chest Wall:    No abnormalities observed   Abdomen:     Soft, obese nontender, nondistended, no guarding, no rebound  tenderness, no masses, no organomegaly,   Extremities:   BLE 2+ edema, no cyanosis or redness   Pulses:   Pulses palpable and equal bilaterally in all extremities   Skin:   No bleeding, bruising or rash       Neurologic:   Normal mood, thought content and behavior     Scribe findings above      ASCVD Risk Score::  The 10-year ASCVD risk score (Trent DK, et al., 2019) is: 19.8%    Values used to calculate the score:      Age: 70 years      Sex: Female      Is Non- : No      Diabetic: Yes      Tobacco smoker: No      Systolic Blood Pressure: 115 mmHg      Is BP treated: Yes      HDL Cholesterol: 31 mg/dL      Total Cholesterol: 163 mg/dL      Lab Review:     Results from last 7 days   Lab Units 07/02/25  0156 07/01/25  1436 " "06/30/25  1100   SODIUM mmol/L 137 138 139   POTASSIUM mmol/L 4.2 4.2 3.6   CHLORIDE mmol/L 99 99 98   CO2 mmol/L 24.1 24.2 26.2   BUN mg/dL 18.9 16.4 14.0   CREATININE mg/dL 0.84 0.98 0.92   GLUCOSE mg/dL 169* 208* 145*   CALCIUM mg/dL 9.2 9.3 9.2   AST (SGOT) U/L  --  13 13   ALT (SGPT) U/L  --  19 18     Results from last 7 days   Lab Units 07/01/25  1549 07/01/25  1436   HSTROP T ng/L 34* 43*     Results from last 7 days   Lab Units 07/02/25  0156 07/01/25  1436   WBC 10*3/mm3 7.11 5.99   HEMOGLOBIN g/dL 8.1* 9.7*   HEMATOCRIT % 26.2* 31.2*   PLATELETS 10*3/mm3 232 199                   Invalid input(s): \"LDLCALC\"  Results from last 7 days   Lab Units 07/01/25  1436   PROBNP pg/mL 7,447.0*     Results from last 7 days   Lab Units 07/01/25  1549   TSH uIU/mL 0.780       Recent Radiology:  Imaging Results (Most Recent)       Procedure Component Value Units Date/Time    CT Angiogram Chest Pulmonary Embolism [[id number]] Collected: 07/01/25 1550     Updated: 07/01/25 1602    Narrative:      CT ANGIOGRAM CHEST PULMONARY EMBOLISM    Date of Exam: 7/1/2025 3:48 PM EDT    Indication: Pulmonary Embolism.    Comparison: Outside CT 5/18/2025    Technique: Axial CT images were obtained of the chest after the uneventful intravenous administration of iodinated contrast utilizing pulmonary embolism protocol.  In addition, a 3-D volume rendered image was created for interpretation.  Sagittal and   coronal reconstructions were performed.  Automated exposure control and iterative reconstruction methods were used.      Findings:    Pulmonary arteries:Adequate opacification of the pulmonary arteries. No evidence of acute pulmonary embolism.    Aorta: Unremarkable.    Lungs and Pleura: Mild hazy mosaic attenuation noted throughout the lungs. Additionally there is mild patchy opacities noted within the posterior aspect of the left lower lobe. Otherwise the lungs are clear. No consolidative changes.  Few calcified granulomas. The " central airways are patent. No pleural effusion or pneumothorax      Mediastinum/Ynes: Scattered prominent mediastinal lymph nodes, few of which are calcified. This most likely represents an underlying granulomatosis process.  No definite suspicious mass.    Heart: Normal in size. No pericardial effusion. Normal RV/LV ratio.    Soft Tissue: Unremarkable.      Upper Abdomen: Unremarkable.    Bones: No acute osseous abnormality. Degenerative changes noted throughout the visualized spine. Unchanged subtle deformity through the manubrium. Unchanged grade 1 anterolisthesis of C7 over T1.        Impression:      Impression:  1.No evidence of pulmonary embolism.  2.Mild hazy mosaic attenuation noted throughout the lungs. Additionally there is mild patchy opacities noted within the posterior aspect of the left lower lobe. These findings are nonspecific and may be due to small airway disease. Mild infection is not   excluded.  3.Additional findings as above.        Electronically Signed: Pierre Cook DO    7/1/2025 4:00 PM EDT    Workstation ID: [id number]    XR Chest 1 View [[id number]] Collected: 07/01/25 1508     Updated: 07/01/25 1512    Narrative:      XR CHEST 1 VW    Date of Exam: 7/1/2025 2:42 PM EDT    Indication: Shortness of breath    Comparison: Chest radiograph 5/18/2025.    Findings:  Enlarged cardiac silhouette, unchanged. Benign calcified mediastinal/hilar lymph nodes. Mild diffuse hazy attenuation of the lungs. No focal airspace consolidation. No pleural effusion. No pneumothorax. Osseous structures are unchanged.      Impression:      Impression:  Mild diffuse hazy attenuation of the lungs could reflect a low-grade infectious/inflammatory process versus chronic change. No focal airspace consolidation. No pleural disease.      Electronically Signed: Leeroy Tian MD    7/1/2025 3:10 PM EDT    Workstation ID: [id number]              ECHOCARDIOGRAM:  Results for orders placed during the hospital encounter of  05/18/25    Adult Transthoracic Echo Complete w/ Color, Spectral and Contrast if necessary per protocol 05/18/2025  7:32 PM    Interpretation Summary    Left ventricular systolic function is normal. Left ventricular ejection fraction appears to be 56 - 60%.    Left ventricular diastolic function was indeterminate.    Estimated right ventricular systolic pressure from tricuspid regurgitation is normal (<35 mmHg).      Stress Test:        Cardiac Catheterization:  No results found for this or any previous visit.      Results Review:  I have personally reviewed the results from the time of this admission to 7/2/2025 09:43 EDT and agree with these findings:  []  Laboratory  []  Microbiology  []  Radiology  []  EKG/Telemetry   []  Cardiology/Vascular   []  Pathology  []  Old records  []  Other:    Most notable findings include:     No Known Allergies    Current Medications:   Scheduled Meds:acyclovir, 400 mg, Oral, BID  enoxaparin sodium, 1 mg/kg, Subcutaneous, Q12H  furosemide, 20 mg, Intravenous, Q12H  [Held by provider] hydrALAZINE, 25 mg, Oral, TID  [Held by provider] hydroCHLOROthiazide, 12.5 mg, Oral, Daily  hydroxychloroquine, 200 mg, Oral, Q12H  insulin lispro, 2-7 Units, Subcutaneous, 4x Daily AC & at Bedtime  levothyroxine, 100 mcg, Oral, QAM AC  [Held by provider] lisinopril, 20 mg, Oral, Daily  metoprolol tartrate, 50 mg, Oral, Q12H  pantoprazole, 40 mg, Oral, Q AM  sertraline, 25 mg, Oral, Daily  sodium chloride, 10 mL, Intravenous, Q12H      Continuous Infusions:dilTIAZem, 5-15 mg/hr, Last Rate: 2.5 mg/hr (07/02/25 0706)            Assessment:         Active Hospital Problems    Diagnosis  POA    **Atrial fibrillation with rapid ventricular response [I48.91]  Yes          Plan:   New onset A-fib with RVR  Electrolytes stable, TSH 0.78  [id number] 5, will start lovenox     Heart failure with preserved EF  proBNP 7447  High-sensitivity troponins trended 43 -> 34  Lower extremity edema present  IV Lasix  continues  Heart failure navigator consulted    Hypertension  Lisinopril hydrochlorothiazide and hydralazine currently held  Creatinine today 0.84  With currently running 110s, resume when appropriate    Hyperlipidemia      Elevated D-dimer  CT PE protocol negative  Lower extremity Dopplers ordered today    Anemia/Multiple myeloma  Hemoglobin today 8.1, down from 9.7 yesterday, iron panel ordered which was normal except for iron saturation was 14  following with heme/onc, on therapy with daratumumab/bortezomib/dexamethasone/lenalidomide. Holding at presen     Plan  Currently on Cardizem drip, remains in A-fib with rates in high teens  Continue using metoprolol, titrate up depending on rates  Consult EP for ANN cardioversion if able  Continue diuresis with lower extremity edema and volume overload    No previous ischemic eval seen in EMR  Electrolytes and TSH within normal limits  strict I&O and daily weights  Monitor with daily labs continuous telemetry and vitals at least every shift  DOAC prior discharge for [id number] score 5    Supplement IV iron with iron deficiency anemia  Schedule oral iron replacement as well  May need GI consult for acute drop in hemoglobin, reports some dark stools possible melena    Further recommendations to follow findings and clinical course  Ion Felton MD, PhD         Gris Mcgee, STANLEY  07/02/25  09:43 EDT

## 2025-07-02 NOTE — CONSULTS
"Heart Failure Program  Nurse Navigator  Discharge Planning    Patient Name:Phuong Altamirano  :1955  Cardiologist:Jose Francisco  Current Admission Date: 2025   Previous Admission: 2025  Admission frequency: 2 admissions in 6 months    Heart Failure history per record:    Symptoms on admission:c/o SOA, Fatigue, swelling lower legs. Pt seen at cardiology office and sent to ED for new AFIB evaluation. Pt states her daily weight had increased also, medication adherence.Pt states recent UTI, treated by ED. Now noticed some dark stools as well        Admission Weight:  Flowsheet Rows      Flowsheet Row First Filed Value   Admission Height 154.9 cm (61\") Documented at 2025 1410   Admission Weight 83.7 kg (184 lb 9.6 oz) Documented at 2025 1410              Current Home Medications:  Prior to Admission medications    Medication Sig Start Date End Date Taking? Authorizing Provider   acyclovir (ZOVIRAX) 400 MG tablet Take 1 tablet by mouth 2 (Two) Times a Day. 25  Yes Barbara Walters PA-C   dexAMETHasone (DECADRON) 4 MG tablet Take 5 tablets by mouth Daily With Breakfast. Take with food on Days 1, 2, 8, 9, 15 and 16. 25  Yes Barbara Walters PA-C   furosemide (Lasix) 20 MG tablet Take 1 tablet by mouth 2 (Two) Times a Day for 30 days. 25 Yes Gris Mcgee APRN   hydroCHLOROthiazide 12.5 MG tablet Take 1 tablet by mouth Daily.   Yes ProviderJagruti MD   hydroxychloroquine (PLAQUENIL) 200 MG tablet Every 12 (Twelve) Hours. 14  Yes Jagruti Reynoso MD   levothyroxine (SYNTHROID, LEVOTHROID) 100 MCG tablet Take 1 tablet by mouth Every Morning Before Breakfast. 9/15/19  Yes Jagruti Reynoso MD   metFORMIN (GLUCOPHAGE) 500 MG tablet Take 1 tablet by mouth Daily With Breakfast.   Yes Jagruti Reynoso MD   omeprazole (priLOSEC) 40 MG capsule Take 1 capsule by mouth Daily.   Yes Jagruti Reynoso MD   sertraline (ZOLOFT) 25 MG tablet Take 1 tablet " by mouth Daily. 8/12/19  Yes Jagruti Reynoso MD   sulfamethoxazole-trimethoprim (BACTRIM DS,SEPTRA DS) 800-160 MG per tablet Take 1 tablet by mouth 3 (Three) Times a Week. Take 1 tablet on Mondays, Wednesdays and Fridays. 6/20/25  Yes Barbara Walters PA-C   acetaminophen (TYLENOL) 325 MG tablet Take 2 tablets by mouth Every 6 (Six) Hours As Needed for Mild Pain.    Jagruti Reynoso MD   albuterol sulfate HFA (Ventolin HFA) 108 (90 Base) MCG/ACT inhaler Inhale 1 puff Every 6 (Six) Hours As Needed. 5/28/25   Jagruti Reynoso MD   atenolol (TENORMIN) 50 MG tablet Take 1 tablet by mouth 2 (Two) Times a Day. 8/12/19   Jagruti Reynoso MD   hydrALAZINE (APRESOLINE) 25 MG tablet Take 1 tablet by mouth 3 (Three) Times a Day.    Jagruti Reynoso MD   ibuprofen (ADVIL,MOTRIN) 200 MG tablet Take 1 tablet by mouth Every 6 (Six) Hours As Needed for Mild Pain.    Jagruti Reynoso MD   lenalidomide (REVLIMID) 25 MG capsule Take 1 capsule by mouth See Admin Instructions. Take 1 capsule by mouth daily on days 1-14, OFF 7 days of each 21 day cycle. Take with or without food. Swallow capsules whole, do not crush, break or chew. 6/19/25   Neftali Barrett MD   lisinopril (PRINIVIL,ZESTRIL) 20 MG tablet Take 1 tablet by mouth Daily. 4/18/23   Jagruti Reynoso MD   ondansetron (ZOFRAN) 8 MG tablet Take 1 tablet by mouth 3 (Three) Times a Day As Needed for Nausea or Vomiting. 6/19/25   Barbara Walters PA-C   promethazine (PHENERGAN) 12.5 MG tablet Take 1 tablet by mouth Every 6 (Six) Hours As Needed for Nausea or Vomiting. 6/26/25   Barbara Walters PA-C       Social history:   Pt from home, current with treatments    Smoking status:     Diagnostics Testing:  proBNP level: 7447    Echocardiogram:Results for orders placed during the hospital encounter of 05/18/25    Adult Transthoracic Echo Complete w/ Color, Spectral and Contrast if necessary per protocol 05/18/2025  1932    Interpretation Summary    Left ventricular systolic function is normal. Left ventricular ejection fraction appears to be 56 - 60%.    Left ventricular diastolic function was indeterminate.    Estimated right ventricular systolic pressure from tricuspid regurgitation is normal (<35 mmHg).        Patient Assessment:   Pt sitting up in chair, resp even and unlabored. No soa with conversation. Noted edema bilateral legs 1+ below knees    Current O2:    Home O2:     Education provided to patient:   yes- Heart Failure disease education  yes -Symptom identification/management  yes -Daily Weights  yes- Diet education  - Fluid restriction (if ordered)  yes- Activity education  yes- Medication education   - Smoking cessation  yes- Follow-up Appointments   -Provided information on how to access [facility] My HF Guide/Heart Failure Interactive workbook    Acceptance of learning: acceptance, cooperative    Heart Failure education interactive teaching session time: 30 minutes    GWTG: EF 56-60%    Identified needs/barriers:   Volume status, pending EP consult. Needs 7 day apt and cardiology apt    Intervention:   Education, HF clinic information provided

## 2025-07-02 NOTE — CASE MANAGEMENT/SOCIAL WORK
Discharge Planning Assessment   [facility]     Patient Name: Phuong Altamirano  MRN: [id number]  Today's Date: 7/2/2025    Admit Date: 7/1/2025    Plan: Plan to return home alone.   Discharge Needs Assessment       Row Name 07/02/25 1149       Living Environment    People in Home alone    Current Living Arrangements home    Potentially Unsafe Housing Conditions none    In the past 12 months has the electric, gas, oil, or water company threatened to shut off services in your home? No    Primary Care Provided by self    Provides Primary Care For no one    Family Caregiver if Needed child(lacie), adult    Family Caregiver Names Chancla = dgt    Quality of Family Relationships helpful;involved;supportive    Able to Return to Prior Arrangements yes       Resource/Environmental Concerns    Resource/Environmental Concerns none    Transportation Concerns none       Transportation Needs    In the past 12 months, has lack of transportation kept you from medical appointments or from getting medications? no    In the past 12 months, has lack of transportation kept you from meetings, work, or from getting things needed for daily living? No       Food Insecurity    Within the past 12 months, you worried that your food would run out before you got the money to buy more. Never true    Within the past 12 months, the food you bought just didn't last and you didn't have money to get more. Never true       Transition Planning    Patient/Family Anticipates Transition to home    Patient/Family Anticipated Services at Transition none    Transportation Anticipated car, drives self;family or friend will provide       Discharge Needs Assessment    Readmission Within the Last 30 Days no previous admission in last 30 days    Equipment Currently Used at Home none    Concerns to be Addressed discharge planning    Anticipated Changes Related to Illness none    Equipment Needed After Discharge none                   Discharge Plan       Row Name  07/02/25 1149       Plan    Plan Plan to return home alone.    Patient/Family in Agreement with Plan yes    Plan Comments CM met with patient at bedside. Patient A&Ox4. Patient lives at home alone. Family will transport at discharge. Patient performs ADLs. PCP and pharmacy confirmed. Agreeable to M2B.  Denies financial assistance needs for medication and/or food. Denies any current DME, HH, Caregiver, or rehab services. DC Barriers: Cardiac monitoring, Cardizem gtt, HGB drop to 8.1 & having dark stools, Cardio/HF Jason following.               Expected Discharge Date and Time       Expected Discharge Date Expected Discharge Time    Jul 3, 2025            Demographic Summary       Row Name 07/02/25 1148       General Information    Admission Type inpatient    Arrived From emergency department    Required Notices Provided Important Message from Medicare    Referral Source admission list    Reason for Consult discharge planning    Preferred Language English                   Functional Status       Row Name 07/02/25 1148       Functional Status    Usual Activity Tolerance good    Current Activity Tolerance good       Functional Status, IADL    Medications independent    Meal Preparation independent    Housekeeping independent    Laundry independent    Shopping independent       Mental Status    General Appearance WDL WDL       Mental Status Summary    Recent Changes in Mental Status/Cognitive Functioning no changes             O. Diane Anglin RN  ICU/CVU   O: [phone]  C: [phone]  Alicia@"[employer].[facility]

## 2025-07-03 ENCOUNTER — ANESTHESIA (OUTPATIENT)
Dept: CARDIOLOGY | Facility: HOSPITAL | Age: 70
End: 2025-07-03
Payer: MEDICARE

## 2025-07-03 ENCOUNTER — APPOINTMENT (OUTPATIENT)
Dept: CARDIOLOGY | Facility: HOSPITAL | Age: 70
End: 2025-07-03
Payer: MEDICARE

## 2025-07-03 ENCOUNTER — ANESTHESIA EVENT (OUTPATIENT)
Dept: CARDIOLOGY | Facility: HOSPITAL | Age: 70
End: 2025-07-03
Payer: MEDICARE

## 2025-07-03 ENCOUNTER — READMISSION MANAGEMENT (OUTPATIENT)
Dept: CALL CENTER | Facility: HOSPITAL | Age: 70
End: 2025-07-03
Payer: MEDICARE

## 2025-07-03 VITALS
HEART RATE: 80 BPM | RESPIRATION RATE: 21 BRPM | BODY MASS INDEX: 33.99 KG/M2 | DIASTOLIC BLOOD PRESSURE: 67 MMHG | WEIGHT: 180 LBS | OXYGEN SATURATION: 96 % | HEIGHT: 61 IN | SYSTOLIC BLOOD PRESSURE: 128 MMHG | TEMPERATURE: 97.9 F

## 2025-07-03 LAB
ANION GAP SERPL CALCULATED.3IONS-SCNC: 11 MMOL/L (ref 5–15)
BASOPHILS # BLD AUTO: 0.01 10*3/MM3 (ref 0–0.2)
BASOPHILS NFR BLD AUTO: 0.2 % (ref 0–1.5)
BUN SERPL-MCNC: 16.6 MG/DL (ref 8–23)
BUN/CREAT SERPL: 26.8 (ref 7–25)
CALCIUM SPEC-SCNC: 7.1 MG/DL (ref 8.6–10.5)
CHLORIDE SERPL-SCNC: 106 MMOL/L (ref 98–107)
CO2 SERPL-SCNC: 24 MMOL/L (ref 22–29)
CREAT SERPL-MCNC: 0.62 MG/DL (ref 0.57–1)
DEPRECATED RDW RBC AUTO: 62.4 FL (ref 37–54)
EGFRCR SERPLBLD CKD-EPI 2021: 95.9 ML/MIN/1.73
EOSINOPHIL # BLD AUTO: 0 10*3/MM3 (ref 0–0.4)
EOSINOPHIL NFR BLD AUTO: 0 % (ref 0.3–6.2)
ERYTHROCYTE [DISTWIDTH] IN BLOOD BY AUTOMATED COUNT: 16.6 % (ref 12.3–15.4)
GLUCOSE BLDC GLUCOMTR-MCNC: 100 MG/DL (ref 70–105)
GLUCOSE BLDC GLUCOMTR-MCNC: 90 MG/DL (ref 70–105)
GLUCOSE BLDC GLUCOMTR-MCNC: 93 MG/DL (ref 70–105)
GLUCOSE SERPL-MCNC: 159 MG/DL (ref 65–99)
HCT VFR BLD AUTO: 29.8 % (ref 34–46.6)
HGB BLD-MCNC: 8.1 G/DL (ref 12–15.9)
IMM GRANULOCYTES # BLD AUTO: 0.01 10*3/MM3 (ref 0–0.05)
IMM GRANULOCYTES NFR BLD AUTO: 0.2 % (ref 0–0.5)
LV EF 2D ECHO EST: 60 %
LYMPHOCYTES # BLD AUTO: 1.22 10*3/MM3 (ref 0.7–3.1)
LYMPHOCYTES NFR BLD AUTO: 29.3 % (ref 19.6–45.3)
MAGNESIUM SERPL-MCNC: 1.7 MG/DL (ref 1.6–2.4)
MCH RBC QN AUTO: 28 PG (ref 26.6–33)
MCHC RBC AUTO-ENTMCNC: 27.2 G/DL (ref 31.5–35.7)
MCV RBC AUTO: 103.1 FL (ref 79–97)
MONOCYTES # BLD AUTO: 0.37 10*3/MM3 (ref 0.1–0.9)
MONOCYTES NFR BLD AUTO: 8.9 % (ref 5–12)
NEUTROPHILS NFR BLD AUTO: 2.56 10*3/MM3 (ref 1.7–7)
NEUTROPHILS NFR BLD AUTO: 61.4 % (ref 42.7–76)
NRBC BLD AUTO-RTO: 0 /100 WBC (ref 0–0.2)
PHOSPHATE SERPL-MCNC: 3.3 MG/DL (ref 2.5–4.5)
PLATELET # BLD AUTO: 123 10*3/MM3 (ref 140–450)
PMV BLD AUTO: 10.9 FL (ref 6–12)
POTASSIUM SERPL-SCNC: 3.1 MMOL/L (ref 3.5–5.2)
QT INTERVAL: 407 MS
QT INTERVAL: 427 MS
QTC INTERVAL: 452 MS
QTC INTERVAL: 539 MS
RBC # BLD AUTO: 2.89 10*6/MM3 (ref 3.77–5.28)
SODIUM SERPL-SCNC: 141 MMOL/L (ref 136–145)
WBC NRBC COR # BLD AUTO: 4.17 10*3/MM3 (ref 3.4–10.8)

## 2025-07-03 PROCEDURE — 93010 ELECTROCARDIOGRAM REPORT: CPT | Performed by: INTERNAL MEDICINE

## 2025-07-03 PROCEDURE — 99232 SBSQ HOSP IP/OBS MODERATE 35: CPT | Performed by: INTERNAL MEDICINE

## 2025-07-03 PROCEDURE — 92960 CARDIOVERSION ELECTRIC EXT: CPT | Performed by: INTERNAL MEDICINE

## 2025-07-03 PROCEDURE — 25810000003 SODIUM CHLORIDE 0.9 % SOLUTION

## 2025-07-03 PROCEDURE — 83735 ASSAY OF MAGNESIUM: CPT | Performed by: STUDENT IN AN ORGANIZED HEALTH CARE EDUCATION/TRAINING PROGRAM

## 2025-07-03 PROCEDURE — 92960 CARDIOVERSION ELECTRIC EXT: CPT

## 2025-07-03 PROCEDURE — 25010000002 ENOXAPARIN PER 10 MG: Performed by: STUDENT IN AN ORGANIZED HEALTH CARE EDUCATION/TRAINING PROGRAM

## 2025-07-03 PROCEDURE — 93320 DOPPLER ECHO COMPLETE: CPT | Performed by: INTERNAL MEDICINE

## 2025-07-03 PROCEDURE — 25010000002 PROPOFOL 10 MG/ML EMULSION

## 2025-07-03 PROCEDURE — 93325 DOPPLER ECHO COLOR FLOW MAPG: CPT | Performed by: INTERNAL MEDICINE

## 2025-07-03 PROCEDURE — 93312 ECHO TRANSESOPHAGEAL: CPT | Performed by: INTERNAL MEDICINE

## 2025-07-03 PROCEDURE — 25010000002 LIDOCAINE PF 2% 2 % SOLUTION

## 2025-07-03 PROCEDURE — 93320 DOPPLER ECHO COMPLETE: CPT

## 2025-07-03 PROCEDURE — 25010000002 MAGNESIUM SULFATE 2 GM/50ML SOLUTION: Performed by: STUDENT IN AN ORGANIZED HEALTH CARE EDUCATION/TRAINING PROGRAM

## 2025-07-03 PROCEDURE — 93325 DOPPLER ECHO COLOR FLOW MAPG: CPT

## 2025-07-03 PROCEDURE — 82948 REAGENT STRIP/BLOOD GLUCOSE: CPT

## 2025-07-03 PROCEDURE — 5A2204Z RESTORATION OF CARDIAC RHYTHM, SINGLE: ICD-10-PCS | Performed by: INTERNAL MEDICINE

## 2025-07-03 PROCEDURE — 84100 ASSAY OF PHOSPHORUS: CPT | Performed by: STUDENT IN AN ORGANIZED HEALTH CARE EDUCATION/TRAINING PROGRAM

## 2025-07-03 PROCEDURE — 80048 BASIC METABOLIC PNL TOTAL CA: CPT | Performed by: STUDENT IN AN ORGANIZED HEALTH CARE EDUCATION/TRAINING PROGRAM

## 2025-07-03 PROCEDURE — 85025 COMPLETE CBC W/AUTO DIFF WBC: CPT | Performed by: STUDENT IN AN ORGANIZED HEALTH CARE EDUCATION/TRAINING PROGRAM

## 2025-07-03 PROCEDURE — 93005 ELECTROCARDIOGRAM TRACING: CPT | Performed by: STUDENT IN AN ORGANIZED HEALTH CARE EDUCATION/TRAINING PROGRAM

## 2025-07-03 PROCEDURE — 25010000002 ONDANSETRON PER 1 MG: Performed by: STUDENT IN AN ORGANIZED HEALTH CARE EDUCATION/TRAINING PROGRAM

## 2025-07-03 PROCEDURE — 25010000002 CALCIUM GLUCONATE 2-0.675 GM/100ML-% SOLUTION: Performed by: STUDENT IN AN ORGANIZED HEALTH CARE EDUCATION/TRAINING PROGRAM

## 2025-07-03 PROCEDURE — 93005 ELECTROCARDIOGRAM TRACING: CPT | Performed by: INTERNAL MEDICINE

## 2025-07-03 PROCEDURE — 25010000002 FUROSEMIDE PER 20 MG: Performed by: INTERNAL MEDICINE

## 2025-07-03 PROCEDURE — 93312 ECHO TRANSESOPHAGEAL: CPT

## 2025-07-03 RX ORDER — LIDOCAINE HYDROCHLORIDE 20 MG/ML
INJECTION, SOLUTION EPIDURAL; INFILTRATION; INTRACAUDAL; PERINEURAL AS NEEDED
Status: DISCONTINUED | OUTPATIENT
Start: 2025-07-03 | End: 2025-07-03 | Stop reason: SURG

## 2025-07-03 RX ORDER — MAGNESIUM SULFATE HEPTAHYDRATE 40 MG/ML
2 INJECTION, SOLUTION INTRAVENOUS ONCE
Status: COMPLETED | OUTPATIENT
Start: 2025-07-03 | End: 2025-07-03

## 2025-07-03 RX ORDER — ALBUTEROL SULFATE 0.83 MG/ML
2.5 SOLUTION RESPIRATORY (INHALATION) ONCE AS NEEDED
OUTPATIENT
Start: 2025-07-03

## 2025-07-03 RX ORDER — CALCIUM GLUCONATE 20 MG/ML
2000 INJECTION, SOLUTION INTRAVENOUS ONCE
Status: COMPLETED | OUTPATIENT
Start: 2025-07-03 | End: 2025-07-03

## 2025-07-03 RX ORDER — POTASSIUM CHLORIDE 7.45 MG/ML
10 INJECTION INTRAVENOUS
Status: DISCONTINUED | OUTPATIENT
Start: 2025-07-03 | End: 2025-07-03

## 2025-07-03 RX ORDER — METOPROLOL TARTRATE 50 MG
50 TABLET ORAL EVERY 12 HOURS SCHEDULED
Qty: 60 TABLET | Refills: 0 | Status: ON HOLD | OUTPATIENT
Start: 2025-07-03 | End: 2025-08-02

## 2025-07-03 RX ORDER — ACETAMINOPHEN 325 MG/1
650 TABLET ORAL EVERY 4 HOURS PRN
Status: ON HOLD
Start: 2025-07-03

## 2025-07-03 RX ORDER — PROPOFOL 10 MG/ML
VIAL (ML) INTRAVENOUS AS NEEDED
Status: DISCONTINUED | OUTPATIENT
Start: 2025-07-03 | End: 2025-07-03 | Stop reason: SURG

## 2025-07-03 RX ORDER — POTASSIUM CHLORIDE 1500 MG/1
60 TABLET, EXTENDED RELEASE ORAL ONCE
Status: COMPLETED | OUTPATIENT
Start: 2025-07-03 | End: 2025-07-03

## 2025-07-03 RX ORDER — ONDANSETRON 2 MG/ML
4 INJECTION INTRAMUSCULAR; INTRAVENOUS ONCE AS NEEDED
OUTPATIENT
Start: 2025-07-03

## 2025-07-03 RX ORDER — OXYCODONE HYDROCHLORIDE 5 MG/1
5 TABLET ORAL EVERY 6 HOURS PRN
Qty: 12 TABLET | Refills: 0 | Status: ON HOLD | OUTPATIENT
Start: 2025-07-03 | End: 2025-07-06

## 2025-07-03 RX ORDER — FERROUS SULFATE 325(65) MG
325 TABLET ORAL
Qty: 30 TABLET | Refills: 0 | Status: ON HOLD | OUTPATIENT
Start: 2025-07-04 | End: 2025-08-03

## 2025-07-03 RX ORDER — DIPHENHYDRAMINE HYDROCHLORIDE 50 MG/ML
12.5 INJECTION, SOLUTION INTRAMUSCULAR; INTRAVENOUS
OUTPATIENT
Start: 2025-07-03

## 2025-07-03 RX ORDER — SODIUM CHLORIDE 9 MG/ML
INJECTION, SOLUTION INTRAVENOUS CONTINUOUS PRN
Status: DISCONTINUED | OUTPATIENT
Start: 2025-07-03 | End: 2025-07-03 | Stop reason: SURG

## 2025-07-03 RX ORDER — POTASSIUM CHLORIDE 1500 MG/1
40 TABLET, EXTENDED RELEASE ORAL ONCE
Status: COMPLETED | OUTPATIENT
Start: 2025-07-03 | End: 2025-07-03

## 2025-07-03 RX ORDER — LABETALOL HYDROCHLORIDE 5 MG/ML
5 INJECTION, SOLUTION INTRAVENOUS
OUTPATIENT
Start: 2025-07-03

## 2025-07-03 RX ADMIN — POTASSIUM CHLORIDE 40 MEQ: 1500 TABLET, EXTENDED RELEASE ORAL at 16:50

## 2025-07-03 RX ADMIN — Medication 10 ML: at 08:46

## 2025-07-03 RX ADMIN — ONDANSETRON 4 MG: 2 INJECTION, SOLUTION INTRAMUSCULAR; INTRAVENOUS at 02:44

## 2025-07-03 RX ADMIN — SODIUM CHLORIDE: 9 INJECTION, SOLUTION INTRAVENOUS at 13:43

## 2025-07-03 RX ADMIN — PROPOFOL 80 MG: 10 INJECTION, EMULSION INTRAVENOUS at 13:52

## 2025-07-03 RX ADMIN — Medication 7.5 MG/HR: at 12:51

## 2025-07-03 RX ADMIN — CALCIUM GLUCONATE 2000 MG: 20 INJECTION, SOLUTION INTRAVENOUS at 10:00

## 2025-07-03 RX ADMIN — FUROSEMIDE 40 MG: 10 INJECTION, SOLUTION INTRAMUSCULAR; INTRAVENOUS at 05:21

## 2025-07-03 RX ADMIN — LIDOCAINE HYDROCHLORIDE 100 MG: 20 INJECTION, SOLUTION EPIDURAL; INFILTRATION; INTRACAUDAL; PERINEURAL at 13:52

## 2025-07-03 RX ADMIN — MAGNESIUM SULFATE HEPTAHYDRATE 2 G: 40 INJECTION, SOLUTION INTRAVENOUS at 10:01

## 2025-07-03 RX ADMIN — PROPOFOL 40 MG: 10 INJECTION, EMULSION INTRAVENOUS at 13:54

## 2025-07-03 RX ADMIN — POTASSIUM CHLORIDE 60 MEQ: 1500 TABLET, EXTENDED RELEASE ORAL at 14:56

## 2025-07-03 RX ADMIN — PANTOPRAZOLE SODIUM 40 MG: 40 TABLET, DELAYED RELEASE ORAL at 05:21

## 2025-07-03 RX ADMIN — ENOXAPARIN SODIUM 80 MG: 100 INJECTION SUBCUTANEOUS at 08:45

## 2025-07-03 NOTE — CASE MANAGEMENT/SOCIAL WORK
Continued Stay Note  LINDSEY Price     Patient Name: Phuong Altamirano  MRN: [id number]  Today's Date: 7/3/2025    Admit Date: 7/1/2025    Plan: Plan to return home alone.   Discharge Plan       Row Name 07/03/25 1237       Plan    Plan Plan to return home alone.    Plan Comments DC Barriers: Plan Cardioversion 7/3, Cardiac monitoring, Cardizem gtt, HGB drop to 8.1 & having dark stools, Cardio/HF Jason following.                 Expected Discharge Date and Time       Expected Discharge Date Expected Discharge Time    Jul 3, 2025               HILL Anglin RN  ICU/CVU   O: [phone]  C: [phone]  [email or web address]

## 2025-07-03 NOTE — PROGRESS NOTES
[facility] MEDICINE SERVICE  DAILY PROGRESS NOTE    NAME: Phuong Altamirano  : 1955  MRN: [id number]      LOS: 2 days     PROVIDER OF SERVICE: Mariano Chaudhary MD    Chief Complaint: Atrial fibrillation with rapid ventricular response    Subjective:     Interval History:  History taken from: patient  Pt denied palpitation ; endorses that has been having dark stool since 1 week back with no gregorio bleeding  7/3-no new complaint plan for cardioversion today    Review of Systems:   Review of Systems  14 point review of system unremarkable except mentioned above  Objective:     Vital Signs  Temp:  [97.6 °F (36.4 °C)-98 °F (36.7 °C)] 97.7 °F (36.5 °C)  Heart Rate:  [] 124  Resp:  [16-24] 21  BP: ()/(46-98) 99/60   Body mass index is 34.09 kg/m².    Physical Exam  Physical Exam     Gen: Resting comfortably  HENT: NC/AT, pink MMM  CVS: Irregularly irregular, no M/G/R  Pulm: CTAB w/o wheezing or rhonchi  Abd: Nondistended, bowel sounds present, nontender   Ext:No clubbing or cyanosis  Skin: No petechia or jaundice  Neuro: AAOx3, CN II-XII intact  Psych: appropriate       Diagnostic Data    Results from last 7 days   Lab Units 25  1330 25  0156 25  1436   WBC 10*3/mm3  --  7.11 5.99   HEMOGLOBIN g/dL 9.5* 8.1* 9.7*   HEMATOCRIT % 30.7* 26.2* 31.2*   PLATELETS 10*3/mm3  --  232 199   GLUCOSE mg/dL  --  169* 208*   CREATININE mg/dL  --  0.84 0.98   BUN mg/dL  --  18.9 16.4   SODIUM mmol/L  --  137 138   POTASSIUM mmol/L  --  4.2 4.2   AST (SGOT) U/L  --   --  13   ALT (SGPT) U/L  --   --  19   ALK PHOS U/L  --   --  64   BILIRUBIN mg/dL  --   --  0.3   ANION GAP mmol/L  --  13.9 14.8       CT Angiogram Chest Pulmonary Embolism  Result Date: 2025  Impression: 1.No evidence of pulmonary embolism. 2.Mild hazy mosaic attenuation noted throughout the lungs. Additionally there is mild patchy opacities noted within the posterior aspect of the left lower lobe. These findings are nonspecific  and may be due to small airway disease. Mild infection is not excluded. 3.Additional findings as above. Electronically Signed: Pierre Cook DO  7/1/2025 4:00 PM EDT  Workstation ID: [id number]    XR Chest 1 View  Result Date: 7/1/2025  Impression: Mild diffuse hazy attenuation of the lungs could reflect a low-grade infectious/inflammatory process versus chronic change. No focal airspace consolidation. No pleural disease. Electronically Signed: Leeroy Tian MD  7/1/2025 3:10 PM EDT  Workstation ID: [id number]        I reviewed the patient's new clinical results.    Assessment/Plan:     Active and Resolved Problems  Active Hospital Problems    Diagnosis  POA    **Atrial fibrillation with rapid ventricular response [I48.91]  Yes      Resolved Hospital Problems   No resolved problems to display.   Phuong Altamirano is a 70 y.o. female with a CMH of CHF, multiple myeloma, hypertension, hyperlipidemia, type 2 diabetes, hypothyroidism, rheumatoid arthritis, lupus, GERD, anxiety/depression   who presented to [facility] on 7/1/2025 from cardiology clinic for new onset afib.    A-fib with RVR  Acute exacerbation of HFpEF likely from arrhythmia  -Was a direct referral from cardiologist office for elevated heart rates in A-fib with RVR   Trop 43->34, BNP 7400 (was 2900 during recent admission). + xiang LE edema, not significantly fluid overloaded on imaging. Stable on room air.   - Cardiology and HF navigator consulted, appreciate assistance  - Pt w/o infectious Sx, WBC wnl. Will check procal, likely monitor off abx  - TSH pending, recent A1C as below  - TTE 5/18/25 EF 56-60%, normal estimated RVSP  - Pt pending sleep study outpt  - [id number] 5, will start lovenox at present for anticoagulation   - Dimer 0.92, no PE on imaging, pt does report LLE TTP, will check US.   - On diltiazem drip, will start metoprolol and titrate to wean off drip as able  - diuresing, monitor renal function  - Telemetry monitoring  -  Monitor intake/outpt, daily weight  - Team has been consulted    Essential hypertension  -continue to monitor blood pressure medication  - Monitor for hypotension current blood pressure medication put on hold as he is on diltiazem    Hypothyroid  - TSH pending  - cont home synthroid     Chronic normocytic anemia  - Hgb 9.7 OA, similar to previous. No active bleeding Sx  - iron profile pending in am  - trend     T2DM  - A1C 6.06 5/2025  - holding home oral  - SSI     Suspected COPD/ILD  - pending outpt PFTs  - not in exacerbation  - cont home inhaler     Multiple myeloma  - following with heme/onc, on therapy with daratumumab/bortezomib/dexamethasone/lenalidomide. Holding at present, per pt plan to resume treatments 7/7. Pt states she only takes the steroid with infusion days.      RA  - on plaquenil        VTE Prophylaxis:  Pharmacologic VTE prophylaxis orders are present.             Disposition Planning:     Barriers to Discharge: A-fib with RVR, plan for cardioversion  Anticipated Date of Discharge: 7/4  Place of Discharge:  home       Time: 35 minutes     Code Status and Medical Interventions: CPR (Attempt to Resuscitate); Full Support   Ordered at: 07/01/25 1806     Code Status (Patient has no pulse and is not breathing):    CPR (Attempt to Resuscitate)     Medical Interventions (Patient has pulse or is breathing):    Full Support     Level Of Support Discussed With:    Patient       Signature: Electronically signed by Mariano Chaudhary MD, 07/03/25, 08:44 EDT.  [facility] Hospitalist Team

## 2025-07-03 NOTE — OUTREACH NOTE
Prep Survey      Flowsheet Row Responses   [facility] facility patient discharged from? [facility]   Is LACE score < 7 ? No   Eligibility Readm Mgmt   Discharge diagnosis Atrial fibrillation with RVR   Does the patient have one of the following disease processes/diagnoses(primary or secondary)? Other   Does the patient have Home health ordered? No   Is there a DME ordered? No   Prep survey completed? Yes            DAVID HERZOG - Registered Nurse

## 2025-07-03 NOTE — PHARMACY PATIENT ASSISTANCE
Transitions of Care (test claim):    Drug Apixaban: 5 mg PO BID  Covered/PA Required: Covered without PA  Patient Cost: $63.67  Is the medication eligible for a trial card/copay card? Free trial available from  (done 07/03/2025)        Patient cost may be due to deductibles associated with her health insurance plan. Patient cost may vary over time. The patient used the one-time 30-day free trial card today, and is not eligible to use a monthly discount card. The patient may apply for financial assistance from the drug company or for the Medicare Prescription Payment Plan program.         Please note that when it states medication is eligible for a trial card that this only means that the medication has a free trial available. This does not assess if the patient has already utilized the once in a lifetime free trial.     This test claim coverage is only valid on the date the note is published. Copay/coverage could vary depending on patient coverage at a later date.  Additionally this test claim is for the sole purpose of a price check not a clinical recommendation.     For billing questions please call [facility] Pharmacist at ext: [phone]  For M2B questions please call [facility] at ext: [phone]      Hieu Mane, Piyush, BCPS

## 2025-07-03 NOTE — ANESTHESIA POSTPROCEDURE EVALUATION
Patient: Phuong Altamirano    Procedure Summary       Date: 07/03/25 Room / Location: [facility]    Anesthesia Start: 1343 Anesthesia Stop: 1358    Procedures:       CARDIOVERSION EXTERNAL IN CARDIOLOGY DEPARTMENT      ADULT TRANSESOPHAGEAL ECHO (ANN) W/ CONT IF NECESSARY PER PROTOCOL Diagnosis:       (af)      (Arrhythmia)    Scheduled Providers: Bull Wiggins MD Provider: Alonzo Kam MD    Anesthesia Type: MAC ASA Status: 3            Anesthesia Type: MAC    Vitals  Vitals Value Taken Time   /72 07/03/25 15:06   Temp     Pulse 71 07/03/25 15:06   Resp 18 07/03/25 13:45   SpO2 100 % 07/03/25 15:06   Vitals shown include unfiled device data.        Post Anesthesia Care and Evaluation    Patient location during evaluation: PACU  Patient participation: complete - patient participated  Level of consciousness: awake  Pain scale: See nurse's notes for pain score.  Pain management: adequate    Airway patency: patent  Anesthetic complications: No anesthetic complications  PONV Status: none  Cardiovascular status: acceptable  Respiratory status: acceptable and spontaneous ventilation  Hydration status: acceptable    Comments: Patient seen and examined postoperatively; vital signs stable; SpO2 greater than or equal to 90%; cardiopulmonary status stable; nausea/vomiting adequately controlled; pain adequately controlled; no apparent anesthesia complications; patient discharged from anesthesia care when discharge criteria were met

## 2025-07-03 NOTE — DISCHARGE SUMMARY
[facility]  Discharge Summary    Date of Service: 7/3/2025  Patient Name: Phuong Altamirano  : 1955  MRN: [id number]    Date of Admission: 2025  Discharge Diagnosis: Atrial fibrillation with rapid ventricular response  Date of Discharge: 7/3/2025  Primary Care Physician: Chula Sanchez APRN      Presenting Problem:   Atrial fibrillation with rapid ventricular response [I48.91]  Atrial fibrillation with RVR [I48.91]  Elevated brain natriuretic peptide (BNP) level [R79.89]  Dyspnea, unspecified type [R06.00]    Active and Resolved Hospital Problems:  Active Hospital Problems    Diagnosis POA    **Atrial fibrillation with rapid ventricular response [I48.91] Yes    Atrial fibrillation with RVR [I48.91] Yes    Essential (primary) hypertension [I10] Yes    Multiple myeloma not having achieved remission [C90.00] Yes      Resolved Hospital Problems   No resolved problems to display.     A-fib with RVR  Acute exacerbation of HFpEF likely from arrhythmia  Essential hypertension  Hypothyroid  Chronic normocytic anemia  T2DM  Suspected COPD/ILD  Multiple myeloma  RA    Hospital Course     HPI:  Phuong Altamirano started on metoprolol a 70 y.o. female with a CMH of CHF, multiple myeloma, hypertension, hyperlipidemia, type 2 diabetes, hypothyroidism, rheumatoid arthritis, lupus, GERD, anxiety/depression   who presented to [facility] on 2025 from cardiology clinic for new onset afib.    Hospital Course:  Patient with Cardizem  started Cardizem drip, metoprolol and anticoagulation with Lovenox.  EP team was consulted patient underwent successful cardioversion and started to smoke on apixaban given voucher for 1 months and follow-up with PCP and cardiology as outpatient        DISCHARGE Follow Up Recommendations for labs and diagnostics: PCP, cardiologist        Day of Discharge     Vital Signs:  Temp:  [97.6 °F (36.4 °C)-98.1 °F (36.7 °C)] 97.9 °F (36.6 °C)  Heart  Rate:  [] 80  Resp:  [16-21] 21  BP: ()/(53-98) 128/67    Physical Exam:  Physical Exam   Gen: Resting comfortably  HENT: NC/AT, pink MMM  CVS: Irregularly irregular, no M/G/R  Pulm: CTAB w/o wheezing or rhonchi  Abd: Nondistended, bowel sounds present, nontender   Ext:No clubbing or cyanosis  Skin: No petechia or jaundice  Neuro: AAOx3, CN II-XII intact  Psych: appropriate       Pertinent  and/or Most Recent Results     LAB RESULTS:      Lab 07/03/25  0902 07/02/25  1330 07/02/25  0156 07/01/25  1549 07/01/25  1436 06/30/25  1100   WBC 4.17  --  7.11  --  5.99 5.60   HEMOGLOBIN 8.1* 9.5* 8.1*  --  9.7* 10.0*   HEMATOCRIT 29.8* 30.7* 26.2*  --  31.2* 31.9*   PLATELETS 123*  --  232  --  199 200   NEUTROS ABS 2.56  --  5.41  --  5.15 4.31   IMMATURE GRANS (ABS) 0.01  --  0.02  --  0.02  --    LYMPHS ABS 1.22  --  1.31  --  0.74 0.99   MONOS ABS 0.37  --  0.36  --  0.07* 0.29   EOS ABS 0.00  --  0.00  --  0.00 0.01   .1*  --  90.7  --  89.7 91.9   PROCALCITONIN  --   --   --  0.26*  --   --    D DIMER QUANT  --   --   --   --  0.92*  --          Lab 07/03/25  0902 07/02/25  0156 07/01/25  1549 07/01/25  1436 06/30/25  1100   SODIUM 141 137  --  138 139   POTASSIUM 3.1* 4.2  --  4.2 3.6   CHLORIDE 106 99  --  99 98   CO2 24.0 24.1  --  24.2 26.2   ANION GAP 11.0 13.9  --  14.8 14.8   BUN 16.6 18.9  --  16.4 14.0   CREATININE 0.62 0.84  --  0.98 0.92   EGFR 95.9 74.9  --  62.2 67.1   GLUCOSE 159* 169*  --  208* 145*   CALCIUM 7.1* 9.2  --  9.3 9.2   MAGNESIUM 1.7  --   --   --   --    PHOSPHORUS 3.3  --   --   --   --    TSH  --   --  0.780  --   --          Lab 07/01/25  1436 06/30/25  1100   TOTAL PROTEIN 6.6 6.3   ALBUMIN 3.7 3.6   GLOBULIN 2.9 2.7   ALT (SGPT) 19 18   AST (SGOT) 13 13   BILIRUBIN 0.3 0.3   ALK PHOS 64 67         Lab 07/01/25  1549 07/01/25  1436   PROBNP  --  7,447.0*   HSTROP T 34* 43*             Lab 07/02/25  0156   IRON 54   IRON SATURATION (TSAT) 14*   TIBC 380   TRANSFERRIN  255   FERRITIN 118.00         Brief Urine Lab Results  (Last result in the past 365 days)        Color   Clarity   Blood   Leuk Est   Nitrite   Protein   CREAT   Urine HCG        06/30/25 1211 Yellow   Clear   Negative   Negative   Negative   100 mg/dL (2+)                 Microbiology Results (last 10 days)       Procedure Component Value - Date/Time    Urine Culture - Urine, Urine, Clean Catch [[id number]]  (Abnormal) Collected: 06/30/25 1211    Lab Status: Final result Specimen: Urine, Clean Catch Updated: 07/01/25 0702     Urine Culture <25,000 CFU/mL Gram Negative Bacilli     Comment: Call if further workup needed.         Narrative:      Colonization of the urinary tract without infection is common. Treatment is discouraged unless the patient is symptomatic, pregnant, or undergoing an invasive urologic procedure.            CT Angiogram Chest Pulmonary Embolism  Result Date: 7/1/2025  Impression: Impression: 1.No evidence of pulmonary embolism. 2.Mild hazy mosaic attenuation noted throughout the lungs. Additionally there is mild patchy opacities noted within the posterior aspect of the left lower lobe. These findings are nonspecific and may be due to small airway disease. Mild infection is not excluded. 3.Additional findings as above. Electronically Signed: Pierre Cook DO  7/1/2025 4:00 PM EDT  Workstation ID: [id number]    XR Chest 1 View  Result Date: 7/1/2025  Impression: Impression: Mild diffuse hazy attenuation of the lungs could reflect a low-grade infectious/inflammatory process versus chronic change. No focal airspace consolidation. No pleural disease. Electronically Signed: Leeroy Tian MD  7/1/2025 3:10 PM EDT  Workstation ID: [id number]    CT Abdomen Pelvis With Contrast  Result Date: 6/13/2025  Impression: Impression: 1.No acute intra-abdominal or intrapelvic process. 2.Ancillary findings as described above. Electronically Signed: Aleena Toussaint MD  6/13/2025 11:37 PM EDT  Workstation ID:  [id number]      Results for orders placed during the hospital encounter of 07/01/25    Duplex Venous Lower Extremity - Left CAR 07/02/2025 10:39 AM    Interpretation Summary    Normal left lower extremity venous duplex scan.      Results for orders placed during the hospital encounter of 07/01/25    Duplex Venous Lower Extremity - Left CAR 07/02/2025 10:39 AM    Interpretation Summary    Normal left lower extremity venous duplex scan.      Results for orders placed during the hospital encounter of 07/01/25    Adult Transesophageal Echo (ANN) W/ Cont if Necessary Per Protocol (Cardiology Department) 07/03/2025  3:34 PM    Interpretation Summary    Left ventricular systolic function is normal. Estimated left ventricular EF = 60% Left ventricular ejection fraction appears to be 56 - 60%.    Left ventricular wall thickness is consistent with mild to moderate concentric hypertrophy.    Left ventricular diastolic function is consistent with (grade I) impaired relaxation.    The left atrial cavity is mild to moderately dilated.    Abnormal mitral valve structure consistent with dilated annulus.    Estimated right ventricular systolic pressure from tricuspid regurgitation is normal (<35 mmHg).    There is a trivial pericardial effusion.    Procedure indication  Atrial fibrillation with uncontrollable rate    conscious sedation administered by anesthesia  Timeout before procedure    consent obtained before procedure      Procedure note  after obtaining a valid consent patient was sedated by Anesthesia and a ANN probe was easily placed into esophagus with multiplane imaging with 2D, color and Doppler followed by bubble study with agitated saline without any complications    ANN  Findings    Mild to moderate left atrial enlargement with mild to moderate central MR without any shunt or clot  EF is normal with EF of 60% with mild LVH  Mild RVH with normal RV systolic  Mild TR without pulm hypertension and trivial effusion  noted    Plan  Proceed with cardioversion    Procedures done  Transesophageal echocardiogram    Electronically signed by Bull Wiggins MD, 07/03/25, 3:34 PM EDT.      Labs Pending at Discharge:  Pending Results       Procedure [Order ID] Specimen - Date/Time    Potassium [[id number]]     Specimen: Blood             Procedures Performed           Consults:   Consults       Date and Time Order Name Status Description    7/2/2025 12:32 PM Inpatient Cardiac Electrophysiology Consult      7/1/2025  6:06 PM Inpatient Cardiology Consult Completed     7/1/2025  5:02 PM Hospitalist (on-call MD unless specified)                Discharge Details        Discharge Medications        New Medications        Instructions Start Date   Eliquis 5 MG tablet tablet  Generic drug: apixaban   5 mg, Oral, 2 Times Daily      ferrous sulfate 325 (65 FE) MG tablet   325 mg, Oral, Daily With Breakfast   Start Date: July 4, 2025     metoprolol tartrate 50 MG tablet  Commonly known as: LOPRESSOR   50 mg, Oral, Every 12 Hours Scheduled      oxyCODONE 5 MG immediate release tablet  Commonly known as: ROXICODONE   5 mg, Oral, Every 6 Hours PRN             Changes to Medications        Instructions Start Date   Tylenol 325 MG tablet  Generic drug: acetaminophen  What changed: Another medication with the same name was added. Make sure you understand how and when to take each.   650 mg, Every 6 Hours PRN      acetaminophen 325 MG tablet  Commonly known as: TYLENOL  What changed: You were already taking a medication with the same name, and this prescription was added. Make sure you understand how and when to take each.   650 mg, Oral, Every 4 Hours PRN             Continue These Medications        Instructions Start Date   acyclovir 400 MG tablet  Commonly known as: ZOVIRAX   400 mg, Oral, 2 Times Daily      dexAMETHasone 4 MG tablet  Commonly known as: DECADRON   20 mg, Oral, Daily With Breakfast, Take with food on Days 1, 2, 8, 9, 15 and 16.       furosemide 20 MG tablet  Commonly known as: Lasix   20 mg, Oral, 2 Times Daily      lenalidomide 25 MG capsule  Commonly known as: REVLIMID   25 mg, Oral, See Admin Instructions, Take 1 capsule by mouth daily on days 1-14, OFF 7 days of each 21 day cycle. Take with or without food. Swallow capsules whole, do not crush, break or chew.      levothyroxine 100 MCG tablet  Commonly known as: SYNTHROID, LEVOTHROID   100 mcg, Every Morning Before Breakfast      metFORMIN 500 MG tablet  Commonly known as: GLUCOPHAGE   Take 1 tablet by mouth Daily With Breakfast.      omeprazole 40 MG capsule  Commonly known as: priLOSEC   40 mg, Daily      ondansetron 8 MG tablet  Commonly known as: ZOFRAN   8 mg, Oral, 3 Times Daily PRN      Plaquenil 200 MG tablet  Generic drug: hydroxychloroquine   Every 12 Hours      promethazine 12.5 MG tablet  Commonly known as: PHENERGAN   12.5 mg, Oral, Every 6 Hours PRN      sertraline 25 MG tablet  Commonly known as: ZOLOFT   25 mg, Daily      sulfamethoxazole-trimethoprim 800-160 MG per tablet  Commonly known as: BACTRIM DS,SEPTRA DS   1 tablet, Oral, 3 Times Weekly, Take 1 tablet on Mondays, Wednesdays and Fridays.      Ventolin  (90 Base) MCG/ACT inhaler  Generic drug: albuterol sulfate HFA   Inhale 1 puff Every 6 (Six) Hours As Needed.             Stop These Medications      atenolol 50 MG tablet  Commonly known as: TENORMIN     hydrALAZINE 25 MG tablet  Commonly known as: APRESOLINE     hydroCHLOROthiazide 12.5 MG tablet     ibuprofen 200 MG tablet  Commonly known as: ADVIL,MOTRIN     lisinopril 20 MG tablet  Commonly known as: PRINIVIL,ZESTRIL              No Known Allergies      Discharge Disposition: home  Home or Self Care    Diet:  Hospital:  Diet Order   Procedures    Diet: Cardiac; Healthy Heart (2-3 Na+); Fluid Consistency: Thin (IDDSI 0)         Discharge Activity:         CODE STATUS:  Code Status and Medical Interventions: CPR (Attempt to Resuscitate); Full Support    Ordered at: 07/01/25 1806     Code Status (Patient has no pulse and is not breathing):    CPR (Attempt to Resuscitate)     Medical Interventions (Patient has pulse or is breathing):    Full Support     Level Of Support Discussed With:    Patient         Future Appointments   Date Time Provider Department Center   7/7/2025  9:15 AM INF ROOM 33B - [facility]IR [facility]   7/14/2025  8:15 AM INF ROOM 30 - [facility]   7/17/2025  1:45 PM INF ROOM 27 - CHAIR/BED [facility]   7/21/2025  9:30 AM INF ROOM 33A - [facility]   7/24/2025  1:45 PM INF ROOM 28 - [facility]   7/28/2025  8:45 AM INF ROOM 35 - [facility]   8/5/2025  1:30 PM Gris Mcgee, APRN [facility]           Time spent on Discharge including face to face service:  35 minutes    Signature: Electronically signed by Mariano Chaudhary MD, 07/03/25, 16:40 EDT.  [facility] Hospitalist Team

## 2025-07-03 NOTE — CONSULTS
[facility]    Subjective:     Encounter Date:07/01/2025      Patient ID: Phuong Altamirano is a 70 y.o. female     Referring Physician: Jet    Chief Complaint: soa edema    Reason for Consult: new onset afib    Seen and examined greater than 50% of total encounter time and medical decision making performed by me  Scruff findings below  HPI:  Phuong Altamirano is a 70 y.o. female patient of Dr. Felton with a history of hypertension, hyperlipidemia, systolic murmur, type 2 diabetes, hypothyroidism, multiple myeloma, MGUS, rheumatoid arthritis, lupus, GERD, anxiety/depression and family history of CAD, who presented to our office yesterday for follow-up and was found to be in new onset atrial fibrillation and bilateral lower extremity edema.  She was sent to [facility] ED.    On 6/26/2025, she was at heme-onc office, they thought she was dehydrated due to her heart rate being 140.  They considered fluid resuscitation, however did not do this, however they have halted her treatments for this week.  Patient states that approximately 2 days prior to that, she had been feeling fatigued and short of breath.  Patient has been generally not feeling well, she has had a lot of nausea from her chemo, poor intake, vomiting and diarrhea on Sunday.  She states that she has had increasing edema.  Has not noticed any chest pain or palpitations.  EKG in office yesterday reveals A-fib rate 118, per her knowledge she has never had any arrhythmias, including A-fib prior.      Presented to the ED, repeat EKG remained in A-fib rate 128.  Chest x-ray mild diffuse hazy attenuation in the lungs versus chronic changes.  She did have elevated D-dimer And had CT PE protocol with no evidence of pulmonary embolism.  BMP unremarkable, potassium was 4.2.  Elevated proBNP 7447.  High-sensitivity troponins x 2: 43, 34, she received initial dose of 40 mg IV Lasix in the ED.    Patient seen and examined today as she is sitting up in recliner.   Patient free of chest pain or palpitations.  Remains in A-fib with elevated rates, remains on diltiazem drip as well as Lopressor 50 every 12.  Patient is currently getting Lovenox.  Lower extremity somewhat improved today, she remains on Lasix 20 mg IV every 12.   ===========================================================  Rate controlled atrial fibrillation  Scheduled for ANN with cardioversion today  No distress  Still has some edema with ongoing efforts at gentle diuresis  No acute events overnight        Review of systems otherwise negative x 14 point review of systems except as mentioned above  Historical data copied forward from previous encounters in EMR is unchanged      Past Medical History:   Diagnosis Date    Arthritis 2010    Asthma 1 month    Cancer 12 years    Multiple mylenoma    Depression 2010    Diabetes mellitus 1.5 years    High blood pressure     High cholesterol 2005    Hypothyroidism     Lupus 2010    Sleep apnea Now       Past Surgical History:   Procedure Laterality Date    BREAST LUMPECTOMY Right 1983    benign    DILATATION AND CURETTAGE      LAPAROSCOPIC TUBAL LIGATION  1979    OVARY SURGERY  1985    remoal of left ovary       Family History   Problem Relation Age of Onset    Heart disease Father     Pancreatic cancer Sister 59    Heart disease Sister     Stroke Sister     Pneumonia Brother        Social History     Socioeconomic History    Marital status:    Tobacco Use    Smoking status: Former     Current packs/day: 0.00     Average packs/day: 1 pack/day for 21.0 years (21.0 ttl pk-yrs)     Types: Cigarettes     Start date:      Quit date:      Years since quittin.5    Smokeless tobacco: Never   Vaping Use    Vaping status: Never Used   Substance and Sexual Activity    Alcohol use: Yes     Comment: Very occasionally    Drug use: No    Sexual activity: Not Currently     Partners: Male     Birth control/protection: Tubal ligation         Review of Systems  "  Constitutional: Positive for malaise/fatigue.   Cardiovascular:  Positive for leg swelling. Negative for chest pain, irregular heartbeat, palpitations and syncope.   Respiratory:  Positive for shortness of breath.    Neurological:  Positive for weakness. Negative for dizziness and loss of balance.   Psychiatric/Behavioral:  Negative for altered mental status.             Objective:         BP 90/59 (BP Location: Right arm, Patient Position: Lying)   Pulse 80   Temp 97.6 °F (36.4 °C) (Oral)   Resp 16   Ht 154.9 cm (61\")   Wt 81.9 kg (180 lb 8 oz)   LMP 10/17/2014   SpO2 93%   BMI 34.11 kg/m²     Physical Exam:  Physical Exam    General Appearance:    Alert, cooperative, in no acute distress   Head:    Normocephalic, without obvious abnormality, atraumatic   Eyes:            PERRLA, EOM intact, conjunctivae and sclerae normal, no  icterus       Throat:   Oral mucosa moist, No oral lesions, no thrush   Neck:   No carotid bruit, no JVD, supple, trachea midline, no thyromegaly    Lungs:     Clear to auscultation, respirations regular, even and   unlabored    Heart:    irregular rhythm and rapidl rate, normal S1 and S2, no   murmur, no gallop, no rub, no click   Chest Wall:    No abnormalities observed   Abdomen:     Soft, obese nontender, nondistended, no guarding, no rebound  tenderness, no masses, no organomegaly,   Extremities:   BLE 2+ edema, no cyanosis or redness   Pulses:   Pulses palpable and equal bilaterally in all extremities   Skin:   No bleeding, bruising or rash       Neurologic:   Normal mood, thought content and behavior     Scribe findings above      ASCVD Risk Score::  The 10-year ASCVD risk score (Wana DK, et al., 2019) is: 12.6%    Values used to calculate the score:      Age: 70 years      Sex: Female      Is Non- : No      Diabetic: Yes      Tobacco smoker: No      Systolic Blood Pressure: 90 mmHg      Is BP treated: Yes      HDL Cholesterol: 31 mg/dL      Total " "Cholesterol: 163 mg/dL      Lab Review:     Results from last 7 days   Lab Units 07/02/25  0156 07/01/25  1436 06/30/25  1100   SODIUM mmol/L 137 138 139   POTASSIUM mmol/L 4.2 4.2 3.6   CHLORIDE mmol/L 99 99 98   CO2 mmol/L 24.1 24.2 26.2   BUN mg/dL 18.9 16.4 14.0   CREATININE mg/dL 0.84 0.98 0.92   GLUCOSE mg/dL 169* 208* 145*   CALCIUM mg/dL 9.2 9.3 9.2   AST (SGOT) U/L  --  13 13   ALT (SGPT) U/L  --  19 18     Results from last 7 days   Lab Units 07/01/25  1549 07/01/25  1436   HSTROP T ng/L 34* 43*     Results from last 7 days   Lab Units 07/02/25  1330 07/02/25  0156 07/01/25  1436   WBC 10*3/mm3  --  7.11 5.99   HEMOGLOBIN g/dL 9.5* 8.1* 9.7*   HEMATOCRIT % 30.7* 26.2* 31.2*   PLATELETS 10*3/mm3  --  232 199                   Invalid input(s): \"LDLCALC\"  Results from last 7 days   Lab Units 07/01/25  1436   PROBNP pg/mL 7,447.0*     Results from last 7 days   Lab Units 07/01/25  1549   TSH uIU/mL 0.780       Recent Radiology:  Imaging Results (Most Recent)       Procedure Component Value Units Date/Time    CT Angiogram Chest Pulmonary Embolism [[id number]] Collected: 07/01/25 1550     Updated: 07/01/25 1602    Narrative:      CT ANGIOGRAM CHEST PULMONARY EMBOLISM    Date of Exam: 7/1/2025 3:48 PM EDT    Indication: Pulmonary Embolism.    Comparison: Outside CT 5/18/2025    Technique: Axial CT images were obtained of the chest after the uneventful intravenous administration of iodinated contrast utilizing pulmonary embolism protocol.  In addition, a 3-D volume rendered image was created for interpretation.  Sagittal and   coronal reconstructions were performed.  Automated exposure control and iterative reconstruction methods were used.      Findings:    Pulmonary arteries:Adequate opacification of the pulmonary arteries. No evidence of acute pulmonary embolism.    Aorta: Unremarkable.    Lungs and Pleura: Mild hazy mosaic attenuation noted throughout the lungs. Additionally there is mild patchy opacities " noted within the posterior aspect of the left lower lobe. Otherwise the lungs are clear. No consolidative changes.  Few calcified granulomas. The central airways are patent. No pleural effusion or pneumothorax      Mediastinum/Ynes: Scattered prominent mediastinal lymph nodes, few of which are calcified. This most likely represents an underlying granulomatosis process.  No definite suspicious mass.    Heart: Normal in size. No pericardial effusion. Normal RV/LV ratio.    Soft Tissue: Unremarkable.      Upper Abdomen: Unremarkable.    Bones: No acute osseous abnormality. Degenerative changes noted throughout the visualized spine. Unchanged subtle deformity through the manubrium. Unchanged grade 1 anterolisthesis of C7 over T1.        Impression:      Impression:  1.No evidence of pulmonary embolism.  2.Mild hazy mosaic attenuation noted throughout the lungs. Additionally there is mild patchy opacities noted within the posterior aspect of the left lower lobe. These findings are nonspecific and may be due to small airway disease. Mild infection is not   excluded.  3.Additional findings as above.        Electronically Signed: Pierre Cook DO    7/1/2025 4:00 PM EDT    Workstation ID: [id number]    XR Chest 1 View [[id number]] Collected: 07/01/25 1508     Updated: 07/01/25 1512    Narrative:      XR CHEST 1 VW    Date of Exam: 7/1/2025 2:42 PM EDT    Indication: Shortness of breath    Comparison: Chest radiograph 5/18/2025.    Findings:  Enlarged cardiac silhouette, unchanged. Benign calcified mediastinal/hilar lymph nodes. Mild diffuse hazy attenuation of the lungs. No focal airspace consolidation. No pleural effusion. No pneumothorax. Osseous structures are unchanged.      Impression:      Impression:  Mild diffuse hazy attenuation of the lungs could reflect a low-grade infectious/inflammatory process versus chronic change. No focal airspace consolidation. No pleural disease.      Electronically Signed: Leeroy  MD Jaylan    7/1/2025 3:10 PM EDT    Workstation ID: [id number]              ECHOCARDIOGRAM:  Results for orders placed during the hospital encounter of 05/18/25    Adult Transthoracic Echo Complete w/ Color, Spectral and Contrast if necessary per protocol 05/18/2025  7:32 PM    Interpretation Summary    Left ventricular systolic function is normal. Left ventricular ejection fraction appears to be 56 - 60%.    Left ventricular diastolic function was indeterminate.    Estimated right ventricular systolic pressure from tricuspid regurgitation is normal (<35 mmHg).      Stress Test:        Cardiac Catheterization:  No results found for this or any previous visit.      Results Review:  I have personally reviewed the results from the time of this admission to 7/3/2025 05:43 EDT and agree with these findings:  []  Laboratory  []  Microbiology  []  Radiology  []  EKG/Telemetry   []  Cardiology/Vascular   []  Pathology  []  Old records  []  Other:    Most notable findings include:     No Known Allergies    Current Medications:   Scheduled Meds:acyclovir, 400 mg, Oral, BID  enoxaparin sodium, 1 mg/kg, Subcutaneous, Q12H  ferrous sulfate, 325 mg, Oral, Daily With Breakfast  furosemide, 40 mg, Intravenous, Q12H  hydroxychloroquine, 200 mg, Oral, Q12H  insulin lispro, 2-7 Units, Subcutaneous, 4x Daily AC & at Bedtime  levothyroxine, 100 mcg, Oral, QAM AC  metoprolol tartrate, 50 mg, Oral, Q12H  pantoprazole, 40 mg, Oral, Q AM  sertraline, 25 mg, Oral, Daily  sodium chloride, 10 mL, Intravenous, Q12H      Continuous Infusions:dilTIAZem, 5-15 mg/hr, Last Rate: 2.5 mg/hr (07/02/25 1503)            Assessment:         Active Hospital Problems    Diagnosis  POA    **Atrial fibrillation with rapid ventricular response [I48.91]  Yes          Plan:   New onset A-fib with RVR  Electrolytes stable, TSH 0.78  [id number] 5, Lovenox continues, transition to NOAC after cardioversion    Heart failure with preserved EF  proBNP  7447  High-sensitivity troponins trended 43 -> 34  Lower extremity edema present  IV Lasix continues  Heart failure navigator consulted    Hypertension  Lisinopril hydrochlorothiazide and hydralazine currently held  Creatinine today 0.84  With currently running 110s, resume when appropriate    Hyperlipidemia      Elevated D-dimer  CT PE protocol negative  Lower extremity Dopplers ordered today    Anemia/Multiple myeloma  Hemoglobin today 8.1, down from 9.7 yesterday, iron panel ordered which was normal except for iron saturation was 14  following with heme/onc, on therapy with daratumumab/bortezomib/dexamethasone/lenalidomide. Holding at presen     Plan  Currently on Cardizem drip, rates are controlled  Pending ANN cardioversion  Can hold metoprolol this morning  Lasix continue , marginal pressures  Midodrine if needed    Appreciate EP involvement      No previous ischemic eval seen in EMR  Electrolytes and TSH within normal limits  strict I&O and daily weights  Monitor with daily labs continuous telemetry and vitals at least every shift  DOAC prior discharge for [id number] score 5    Supplement IV iron with iron deficiency anemia  Schedule oral iron replacement as well  May need GI consult for acute drop in hemoglobin, reports some dark stools possible melena    Further recommendations to follow findings and clinical course  Ion Felton MD, PhD         Ion Felton MD  07/02/25  05:43 EDT

## 2025-07-03 NOTE — ANESTHESIA PREPROCEDURE EVALUATION
Anesthesia Evaluation     Patient summary reviewed and Nursing notes reviewed   NPO Solid Status: > 8 hours  NPO Liquid Status: > 8 hours           Airway   Mallampati: II  Dental      Pulmonary    (+) asthma,shortness of breath, sleep apnea  Cardiovascular     ECG reviewed    (+) hypertension, dysrhythmias Tachycardia, Atrial Fib, CHF , hyperlipidemia      Neuro/Psych  GI/Hepatic/Renal/Endo    (+) diabetes mellitus type 2, thyroid problem hypothyroidism    Musculoskeletal     Abdominal    Substance History      OB/GYN          Other   arthritis,   history of cancer active    ROS/Med Hx Other: New onset Afib/RVR stable on Cardizem drip, Lovenox    For cardioversion    Recent chemo for multiple myeloma              Anesthesia Plan    ASA 3     MAC     intravenous induction     Anesthetic plan, risks, benefits, and alternatives have been provided, discussed and informed consent has been obtained with: patient.    Plan discussed with CRNA.    CODE STATUS:    Code Status (Patient has no pulse and is not breathing): CPR (Attempt to Resuscitate)  Medical Interventions (Patient has pulse or is breathing): Full Support  Level Of Support Discussed With: Patient

## 2025-07-04 ENCOUNTER — APPOINTMENT (OUTPATIENT)
Dept: OTHER | Facility: HOSPITAL | Age: 70
End: 2025-07-04
Payer: MEDICARE

## 2025-07-04 ENCOUNTER — HOSPITAL ENCOUNTER (INPATIENT)
Facility: HOSPITAL | Age: 70
LOS: 6 days | Discharge: HOME | End: 2025-07-10
Attending: STUDENT IN AN ORGANIZED HEALTH CARE EDUCATION/TRAINING PROGRAM | Admitting: STUDENT IN AN ORGANIZED HEALTH CARE EDUCATION/TRAINING PROGRAM
Payer: MEDICARE

## 2025-07-04 PROCEDURE — 93005 ELECTROCARDIOGRAM TRACING: CPT | Performed by: STUDENT IN AN ORGANIZED HEALTH CARE EDUCATION/TRAINING PROGRAM

## 2025-07-04 RX ORDER — ACETAMINOPHEN 650 MG/1
650 SUPPOSITORY RECTAL EVERY 4 HOURS PRN
Status: DISCONTINUED | OUTPATIENT
Start: 2025-07-04 | End: 2025-07-10 | Stop reason: HOSPADM

## 2025-07-04 RX ORDER — ACYCLOVIR 200 MG/1
400 CAPSULE ORAL 2 TIMES DAILY
Status: DISCONTINUED | OUTPATIENT
Start: 2025-07-05 | End: 2025-07-10 | Stop reason: HOSPADM

## 2025-07-04 RX ORDER — NITROGLYCERIN 0.4 MG/1
0.4 TABLET SUBLINGUAL
Status: DISCONTINUED | OUTPATIENT
Start: 2025-07-04 | End: 2025-07-08 | Stop reason: SDUPTHER

## 2025-07-04 RX ORDER — SERTRALINE HYDROCHLORIDE 25 MG/1
25 TABLET, FILM COATED ORAL DAILY
Status: DISCONTINUED | OUTPATIENT
Start: 2025-07-05 | End: 2025-07-10 | Stop reason: HOSPADM

## 2025-07-04 RX ORDER — LEVOTHYROXINE SODIUM 100 UG/1
100 TABLET ORAL
Status: DISCONTINUED | OUTPATIENT
Start: 2025-07-05 | End: 2025-07-10 | Stop reason: HOSPADM

## 2025-07-04 RX ORDER — PROMETHAZINE HYDROCHLORIDE 12.5 MG/1
12.5 TABLET ORAL EVERY 6 HOURS PRN
Status: DISCONTINUED | OUTPATIENT
Start: 2025-07-04 | End: 2025-07-10 | Stop reason: HOSPADM

## 2025-07-04 RX ORDER — SODIUM CHLORIDE 0.9 % (FLUSH) 0.9 %
10 SYRINGE (ML) INJECTION EVERY 12 HOURS SCHEDULED
Status: DISCONTINUED | OUTPATIENT
Start: 2025-07-05 | End: 2025-07-10 | Stop reason: HOSPADM

## 2025-07-04 RX ORDER — PANTOPRAZOLE SODIUM 40 MG/1
40 TABLET, DELAYED RELEASE ORAL
Status: DISCONTINUED | OUTPATIENT
Start: 2025-07-05 | End: 2025-07-10 | Stop reason: HOSPADM

## 2025-07-04 RX ORDER — FUROSEMIDE 20 MG/1
20 TABLET ORAL 2 TIMES DAILY
Status: DISCONTINUED | OUTPATIENT
Start: 2025-07-05 | End: 2025-07-04

## 2025-07-04 RX ORDER — HYDROXYCHLOROQUINE SULFATE 200 MG/1
200 TABLET, FILM COATED ORAL EVERY 12 HOURS SCHEDULED
Status: DISCONTINUED | OUTPATIENT
Start: 2025-07-05 | End: 2025-07-10 | Stop reason: HOSPADM

## 2025-07-04 RX ORDER — FUROSEMIDE 10 MG/ML
40 INJECTION INTRAMUSCULAR; INTRAVENOUS ONCE
Status: COMPLETED | OUTPATIENT
Start: 2025-07-05 | End: 2025-07-05

## 2025-07-04 RX ORDER — ACETAMINOPHEN 160 MG/5ML
650 SOLUTION ORAL EVERY 4 HOURS PRN
Status: DISCONTINUED | OUTPATIENT
Start: 2025-07-04 | End: 2025-07-10 | Stop reason: HOSPADM

## 2025-07-04 RX ORDER — ACETAMINOPHEN 325 MG/1
650 TABLET ORAL EVERY 4 HOURS PRN
Status: DISCONTINUED | OUTPATIENT
Start: 2025-07-04 | End: 2025-07-10 | Stop reason: HOSPADM

## 2025-07-04 RX ORDER — METOPROLOL TARTRATE 50 MG
50 TABLET ORAL EVERY 12 HOURS SCHEDULED
Status: DISCONTINUED | OUTPATIENT
Start: 2025-07-05 | End: 2025-07-08

## 2025-07-04 RX ORDER — OXYCODONE HYDROCHLORIDE 5 MG/1
5 TABLET ORAL EVERY 6 HOURS PRN
Refills: 0 | Status: DISPENSED | OUTPATIENT
Start: 2025-07-04 | End: 2025-07-06

## 2025-07-04 RX ORDER — SODIUM CHLORIDE 9 MG/ML
40 INJECTION, SOLUTION INTRAVENOUS AS NEEDED
Status: DISCONTINUED | OUTPATIENT
Start: 2025-07-04 | End: 2025-07-10 | Stop reason: HOSPADM

## 2025-07-04 RX ORDER — SODIUM CHLORIDE 0.9 % (FLUSH) 0.9 %
10 SYRINGE (ML) INJECTION AS NEEDED
Status: DISCONTINUED | OUTPATIENT
Start: 2025-07-04 | End: 2025-07-10 | Stop reason: HOSPADM

## 2025-07-04 NOTE — CASE MANAGEMENT/SOCIAL WORK
Case Management Discharge Note      Final Note: Home         Transportation Services  Transportation: Private Transportation  Private: Car    Final Discharge Disposition Code: 01 - home or self-care

## 2025-07-05 ENCOUNTER — READMISSION MANAGEMENT (OUTPATIENT)
Dept: CALL CENTER | Facility: HOSPITAL | Age: 70
End: 2025-07-05
Payer: MEDICARE

## 2025-07-05 PROBLEM — I50.33 ACUTE ON CHRONIC HEART FAILURE WITH PRESERVED EJECTION FRACTION (HFPEF): Status: ACTIVE | Noted: 2025-07-05

## 2025-07-05 LAB
ALBUMIN SERPL-MCNC: 3.8 G/DL (ref 3.5–5.2)
ALBUMIN/GLOB SERPL: 1.3 G/DL
ALP SERPL-CCNC: 113 U/L (ref 39–117)
ALT SERPL W P-5'-P-CCNC: 51 U/L (ref 1–33)
ANION GAP SERPL CALCULATED.3IONS-SCNC: 12.6 MMOL/L (ref 5–15)
AST SERPL-CCNC: 34 U/L (ref 1–32)
BASOPHILS # BLD AUTO: 0 10*3/MM3 (ref 0–0.2)
BASOPHILS NFR BLD AUTO: 0 % (ref 0–1.5)
BILIRUB SERPL-MCNC: 0.5 MG/DL (ref 0–1.2)
BUN SERPL-MCNC: 15.8 MG/DL (ref 8–23)
BUN/CREAT SERPL: 17.6 (ref 7–25)
CALCIUM SPEC-SCNC: 9.2 MG/DL (ref 8.6–10.5)
CHLORIDE SERPL-SCNC: 96 MMOL/L (ref 98–107)
CO2 SERPL-SCNC: 29.4 MMOL/L (ref 22–29)
CREAT SERPL-MCNC: 0.9 MG/DL (ref 0.57–1)
DEPRECATED RDW RBC AUTO: 54.2 FL (ref 37–54)
EGFRCR SERPLBLD CKD-EPI 2021: 68.9 ML/MIN/1.73
EOSINOPHIL # BLD AUTO: 0 10*3/MM3 (ref 0–0.4)
EOSINOPHIL NFR BLD AUTO: 0 % (ref 0.3–6.2)
ERYTHROCYTE [DISTWIDTH] IN BLOOD BY AUTOMATED COUNT: 16.3 % (ref 12.3–15.4)
GLOBULIN UR ELPH-MCNC: 3 GM/DL
GLUCOSE BLDC GLUCOMTR-MCNC: 111 MG/DL (ref 70–105)
GLUCOSE BLDC GLUCOMTR-MCNC: 125 MG/DL (ref 70–105)
GLUCOSE BLDC GLUCOMTR-MCNC: 130 MG/DL (ref 70–105)
GLUCOSE BLDC GLUCOMTR-MCNC: 91 MG/DL (ref 70–105)
GLUCOSE SERPL-MCNC: 118 MG/DL (ref 65–99)
HCT VFR BLD AUTO: 34.9 % (ref 34–46.6)
HGB BLD-MCNC: 10.8 G/DL (ref 12–15.9)
IMM GRANULOCYTES # BLD AUTO: 0.01 10*3/MM3 (ref 0–0.05)
IMM GRANULOCYTES NFR BLD AUTO: 0.2 % (ref 0–0.5)
LYMPHOCYTES # BLD AUTO: 0.41 10*3/MM3 (ref 0.7–3.1)
LYMPHOCYTES NFR BLD AUTO: 8.2 % (ref 19.6–45.3)
MAGNESIUM SERPL-MCNC: 1.8 MG/DL (ref 1.6–2.4)
MCH RBC QN AUTO: 28.1 PG (ref 26.6–33)
MCHC RBC AUTO-ENTMCNC: 30.9 G/DL (ref 31.5–35.7)
MCV RBC AUTO: 90.9 FL (ref 79–97)
MONOCYTES # BLD AUTO: 0.31 10*3/MM3 (ref 0.1–0.9)
MONOCYTES NFR BLD AUTO: 6.2 % (ref 5–12)
NEUTROPHILS NFR BLD AUTO: 4.26 10*3/MM3 (ref 1.7–7)
NEUTROPHILS NFR BLD AUTO: 85.4 % (ref 42.7–76)
NRBC BLD AUTO-RTO: 0 /100 WBC (ref 0–0.2)
NT-PROBNP SERPL-MCNC: ABNORMAL PG/ML (ref 0–900)
PHOSPHATE SERPL-MCNC: 3.7 MG/DL (ref 2.5–4.5)
PLATELET # BLD AUTO: 239 10*3/MM3 (ref 140–450)
PMV BLD AUTO: 10.2 FL (ref 6–12)
POTASSIUM SERPL-SCNC: 4.1 MMOL/L (ref 3.5–5.2)
PROCALCITONIN SERPL-MCNC: 1.47 NG/ML (ref 0–0.25)
PROT SERPL-MCNC: 6.8 G/DL (ref 6–8.5)
RBC # BLD AUTO: 3.84 10*6/MM3 (ref 3.77–5.28)
SODIUM SERPL-SCNC: 138 MMOL/L (ref 136–145)
WBC NRBC COR # BLD AUTO: 4.99 10*3/MM3 (ref 3.4–10.8)

## 2025-07-05 PROCEDURE — 93010 ELECTROCARDIOGRAM REPORT: CPT | Performed by: STUDENT IN AN ORGANIZED HEALTH CARE EDUCATION/TRAINING PROGRAM

## 2025-07-05 PROCEDURE — 84145 PROCALCITONIN (PCT): CPT | Performed by: STUDENT IN AN ORGANIZED HEALTH CARE EDUCATION/TRAINING PROGRAM

## 2025-07-05 PROCEDURE — 93005 ELECTROCARDIOGRAM TRACING: CPT | Performed by: HOSPITALIST

## 2025-07-05 PROCEDURE — 84100 ASSAY OF PHOSPHORUS: CPT | Performed by: STUDENT IN AN ORGANIZED HEALTH CARE EDUCATION/TRAINING PROGRAM

## 2025-07-05 PROCEDURE — 63710000001 PROMETHAZINE PER 12.5 MG: Performed by: STUDENT IN AN ORGANIZED HEALTH CARE EDUCATION/TRAINING PROGRAM

## 2025-07-05 PROCEDURE — 82948 REAGENT STRIP/BLOOD GLUCOSE: CPT | Performed by: STUDENT IN AN ORGANIZED HEALTH CARE EDUCATION/TRAINING PROGRAM

## 2025-07-05 PROCEDURE — 25010000002 AMIODARONE IN DEXTROSE 5% 150-4.21 MG/100ML-% SOLUTION: Performed by: NURSE PRACTITIONER

## 2025-07-05 PROCEDURE — 25010000002 AMIODARONE IN DEXTROSE 5% 360-4.14 MG/200ML-% SOLUTION: Performed by: NURSE PRACTITIONER

## 2025-07-05 PROCEDURE — 80053 COMPREHEN METABOLIC PANEL: CPT | Performed by: STUDENT IN AN ORGANIZED HEALTH CARE EDUCATION/TRAINING PROGRAM

## 2025-07-05 PROCEDURE — 85025 COMPLETE CBC W/AUTO DIFF WBC: CPT | Performed by: STUDENT IN AN ORGANIZED HEALTH CARE EDUCATION/TRAINING PROGRAM

## 2025-07-05 PROCEDURE — 25010000002 DILTIAZEM 25 MG/5ML SOLUTION: Performed by: NURSE PRACTITIONER

## 2025-07-05 PROCEDURE — 83735 ASSAY OF MAGNESIUM: CPT | Performed by: STUDENT IN AN ORGANIZED HEALTH CARE EDUCATION/TRAINING PROGRAM

## 2025-07-05 PROCEDURE — 82948 REAGENT STRIP/BLOOD GLUCOSE: CPT

## 2025-07-05 PROCEDURE — 83880 ASSAY OF NATRIURETIC PEPTIDE: CPT | Performed by: STUDENT IN AN ORGANIZED HEALTH CARE EDUCATION/TRAINING PROGRAM

## 2025-07-05 PROCEDURE — 25010000002 FUROSEMIDE PER 20 MG: Performed by: STUDENT IN AN ORGANIZED HEALTH CARE EDUCATION/TRAINING PROGRAM

## 2025-07-05 PROCEDURE — 99222 1ST HOSP IP/OBS MODERATE 55: CPT | Performed by: STUDENT IN AN ORGANIZED HEALTH CARE EDUCATION/TRAINING PROGRAM

## 2025-07-05 RX ORDER — DILTIAZEM HCL/D5W 125 MG/125
5-15 PLASTIC BAG, INJECTION (ML) INTRAVENOUS
Status: DISCONTINUED | OUTPATIENT
Start: 2025-07-05 | End: 2025-07-05

## 2025-07-05 RX ORDER — DEXTROSE MONOHYDRATE 25 G/50ML
25 INJECTION, SOLUTION INTRAVENOUS
Status: DISCONTINUED | OUTPATIENT
Start: 2025-07-05 | End: 2025-07-10 | Stop reason: HOSPADM

## 2025-07-05 RX ORDER — FUROSEMIDE 10 MG/ML
40 INJECTION INTRAMUSCULAR; INTRAVENOUS EVERY 12 HOURS
Status: DISCONTINUED | OUTPATIENT
Start: 2025-07-05 | End: 2025-07-07

## 2025-07-05 RX ORDER — INSULIN LISPRO 100 [IU]/ML
2-7 INJECTION, SOLUTION INTRAVENOUS; SUBCUTANEOUS
Status: DISCONTINUED | OUTPATIENT
Start: 2025-07-05 | End: 2025-07-10 | Stop reason: HOSPADM

## 2025-07-05 RX ORDER — NICOTINE POLACRILEX 4 MG
15 LOZENGE BUCCAL
Status: DISCONTINUED | OUTPATIENT
Start: 2025-07-05 | End: 2025-07-10 | Stop reason: HOSPADM

## 2025-07-05 RX ORDER — DILTIAZEM HYDROCHLORIDE 5 MG/ML
10 INJECTION INTRAVENOUS ONCE
Status: COMPLETED | OUTPATIENT
Start: 2025-07-05 | End: 2025-07-05

## 2025-07-05 RX ORDER — IBUPROFEN 600 MG/1
1 TABLET ORAL
Status: DISCONTINUED | OUTPATIENT
Start: 2025-07-05 | End: 2025-07-10 | Stop reason: HOSPADM

## 2025-07-05 RX ADMIN — METOPROLOL TARTRATE 50 MG: 50 TABLET, FILM COATED ORAL at 00:36

## 2025-07-05 RX ADMIN — APIXABAN 5 MG: 5 TABLET, FILM COATED ORAL at 20:13

## 2025-07-05 RX ADMIN — AMIODARONE HYDROCHLORIDE 150 MG: 1.5 INJECTION, SOLUTION INTRAVENOUS at 12:08

## 2025-07-05 RX ADMIN — ACYCLOVIR 400 MG: 200 CAPSULE ORAL at 00:25

## 2025-07-05 RX ADMIN — ACYCLOVIR 400 MG: 200 CAPSULE ORAL at 20:15

## 2025-07-05 RX ADMIN — HYDROXYCHLOROQUINE SULFATE 200 MG: 200 TABLET, FILM COATED ORAL at 00:29

## 2025-07-05 RX ADMIN — APIXABAN 5 MG: 5 TABLET, FILM COATED ORAL at 09:24

## 2025-07-05 RX ADMIN — APIXABAN 5 MG: 5 TABLET, FILM COATED ORAL at 00:29

## 2025-07-05 RX ADMIN — ACETAMINOPHEN 650 MG: 325 TABLET ORAL at 03:44

## 2025-07-05 RX ADMIN — HYDROXYCHLOROQUINE SULFATE 200 MG: 200 TABLET, FILM COATED ORAL at 09:24

## 2025-07-05 RX ADMIN — AMIODARONE HYDROCHLORIDE 1 MG/MIN: 1.8 INJECTION, SOLUTION INTRAVENOUS at 12:31

## 2025-07-05 RX ADMIN — DILTIAZEM HYDROCHLORIDE 10 MG: 5 INJECTION, SOLUTION INTRAVENOUS at 11:14

## 2025-07-05 RX ADMIN — LEVOTHYROXINE SODIUM 100 MCG: 0.1 TABLET ORAL at 05:36

## 2025-07-05 RX ADMIN — FUROSEMIDE 40 MG: 10 INJECTION, SOLUTION INTRAMUSCULAR; INTRAVENOUS at 08:41

## 2025-07-05 RX ADMIN — Medication 10 ML: at 20:26

## 2025-07-05 RX ADMIN — Medication 5 MG: at 22:52

## 2025-07-05 RX ADMIN — ACYCLOVIR 400 MG: 200 CAPSULE ORAL at 09:24

## 2025-07-05 RX ADMIN — Medication 10 ML: at 00:28

## 2025-07-05 RX ADMIN — SERTRALINE HYDROCHLORIDE 25 MG: 25 TABLET, FILM COATED ORAL at 08:41

## 2025-07-05 RX ADMIN — Medication 10 ML: at 08:42

## 2025-07-05 RX ADMIN — FUROSEMIDE 40 MG: 10 INJECTION, SOLUTION INTRAMUSCULAR; INTRAVENOUS at 20:15

## 2025-07-05 RX ADMIN — AMIODARONE HYDROCHLORIDE 0.5 MG/MIN: 1.8 INJECTION, SOLUTION INTRAVENOUS at 18:02

## 2025-07-05 RX ADMIN — METOPROLOL TARTRATE 50 MG: 50 TABLET, FILM COATED ORAL at 20:16

## 2025-07-05 RX ADMIN — PANTOPRAZOLE SODIUM 40 MG: 40 TABLET, DELAYED RELEASE ORAL at 05:36

## 2025-07-05 RX ADMIN — PROMETHAZINE HYDROCHLORIDE 12.5 MG: 12.5 TABLET ORAL at 03:44

## 2025-07-05 RX ADMIN — HYDROXYCHLOROQUINE SULFATE 200 MG: 200 TABLET, FILM COATED ORAL at 20:15

## 2025-07-05 RX ADMIN — FUROSEMIDE 40 MG: 10 INJECTION, SOLUTION INTRAMUSCULAR; INTRAVENOUS at 00:26

## 2025-07-05 NOTE — PLAN OF CARE
Goal Outcome Evaluation:  Plan of Care Reviewed With: patient        Progress: improving  Outcome Evaluation: Patient AOx4 on2lpm NC, able to walk to bathroom with standby assist. HR Afib w RVR, Cardizem IV given and amio gtt started. Denies any chest pain. Monitored for any untoward signs and symptoms. Call light within reach

## 2025-07-05 NOTE — PROGRESS NOTES
[facility] MEDICINE SERVICE  DAILY PROGRESS NOTE    NAME: Phuong Altamirano  : 1955  MRN: [id number]      LOS: 1 day     PROVIDER OF SERVICE: Luna Salinas MD    Chief Complaint: Atrial fibrillation with RVR    Subjective:     Interval History:    Patient awake and alert, less short of breath as compared to yesterday, requiring oxygen.    Review of Systems:   Review of Systems  10 point ROS is negative other than what is stated positive above  Objective:     Vital Signs  Temp:  [98 °F (36.7 °C)-99.8 °F (37.7 °C)] 98 °F (36.7 °C)  Heart Rate:  [] 96  Resp:  [16-24] 24  BP: (105-140)/(55-68) 113/56  Flow (L/min) (Oxygen Therapy):  [2] 2   Body mass index is 31.91 kg/m².    Physical Exam  Physical Exam  Physical Exam  Constitutional:       General: She is not in acute distress.     Appearance: She is obese.   HENT:      Head: Normocephalic.      Mouth/Throat:      Mouth: Mucous membranes are dry.   Eyes:      Extraocular Movements: Extraocular movements intact.      Pupils: Pupils are equal, round, and reactive to light.   Cardiovascular:      Rate and Rhythm:  Rhythm irregular.      Pulses: Normal pulses.      Heart sounds: Normal heart sounds.   Pulmonary:      Effort: Pulmonary effort is normal.      Breath sounds: Rales present. No wheezing.   Abdominal:      General: Abdomen is flat. There is no distension.      Palpations: Abdomen is soft.      Tenderness: There is no abdominal tenderness. There is no guarding.   Musculoskeletal:         General: Normal range of motion.      Cervical back: Neck supple.      Right lower leg: Edema present.      Left lower leg: Edema present.   Skin:     General: Skin is dry.       Neurological:      General: No focal deficit present.      Mental Status: She is alert and oriented to person, place, and time.   Psychiatric:         Behavior: Behavior normal.      Diagnostic Data    Results from last 7 days   Lab Units 25  0115   WBC 10*3/mm3 4.99    HEMOGLOBIN g/dL 10.8*   HEMATOCRIT % 34.9   PLATELETS 10*3/mm3 239   GLUCOSE mg/dL 118*   CREATININE mg/dL 0.90   BUN mg/dL 15.8   SODIUM mmol/L 138   POTASSIUM mmol/L 4.1   AST (SGOT) U/L 34*   ALT (SGPT) U/L 51*   ALK PHOS U/L 113   BILIRUBIN mg/dL 0.5   ANION GAP mmol/L 12.6       No radiology results for the last day      I reviewed the patient's new clinical results.    Assessment/Plan:     Active and Resolved Problems  Active Hospital Problems    Diagnosis  POA    **Atrial fibrillation with RVR [I48.91]  Yes    Acute on chronic heart failure with preserved ejection fraction (HFpEF) [I50.33]  Unknown      Resolved Hospital Problems   No resolved problems to display.         RVR A fib  S/P ANN cardioversion 7/3 with restoration to sinus rhythm however with recurrence of RVR A fib 7/4  Now rate controlled with metoprolol 50 po bid  Continue anticoagulation with eliquis 5 mg bid for stroke risk reduction   Continue telemetry monitoring  Optimize electrolytes  Patient probably would not be a candidate for amiodarone given concern for ILD, underlying lung issues   Cardiology following        Acute on chronic HFpEF, decompensated due to RVR A fib  She presents with sudden onset SOB, orthopnea, has bilateral pedal edema 1 +  BNP elevated 11,212 compared to 7447 on 7/1  X ray prior to transfer showed increased vascular congestion  Continue  IV lasix 40 mg bid  Daily weights, strict I/O       History of RA  Continue plaquenil home dose     Multiple myeloma  On therapy with daratumumab/bortezomib/dexamethasone/lenalidomide.   Plan to resume treatments 7/7.   Continue to follow with primary hematologist upon discharge   She is on acyclovir for prophylaxis      Type 2 DM  Insulin SS  Holding metformin while inpatient   Accu-Checks      Possible undiagnosed COPD/ILD  Continue bronchodilators as needed  Avoid albuterol given RVR A fib, may use atrovent and symbicort or levalbuterol if needed  Follow outpatient with  Pulmonology      Hypothyroidism   Continue home dose levothyroxine 100 mcg once daily   Obtain updated TSH     VTE Prophylaxis:  Pharmacologic & mechanical VTE prophylaxis orders are present.           Disposition Planning:     Barriers to Discharge:sob  Anticipated Date of Discharge: 7/7  Place of Discharge: home      Time: 35 minutes     Code Status and Medical Interventions: CPR (Attempt to Resuscitate); Full Support   Ordered at: 07/04/25 1969     Code Status (Patient has no pulse and is not breathing):    CPR (Attempt to Resuscitate)     Medical Interventions (Patient has pulse or is breathing):    Full Support       Signature: Electronically signed by Luna Salinas MD, 07/05/25, 10:33 EDT.  [facility] Hospitalist Team

## 2025-07-05 NOTE — OUTREACH NOTE
Medical Week 1 Survey      Flowsheet Row Responses   [facility] facility patient discharged from? [facility]   Does the patient have one of the following disease processes/diagnoses(primary or secondary)? Other   Week 1 attempt successful? No   Unsuccessful attempts Attempt 1   Revoke Readmitted            Mariam H - Registered Nurse

## 2025-07-05 NOTE — H&P
"[facility] Medicine Services  History & Physical    Patient Name: Phuong Altamirano  : 1955  MRN: [id number]  Primary Care Physician:  Chula Sanchez APRN  Date of admission: 2025  Date and Time of Service: 2025 at 2300    Subjective      Chief Complaint: \"shortness of breath\"    History of Present Illness: Phuong Altamirano is a 70 y.o. female with a with a PMH of HFpEF, multiple myeloma, hypertension, hyperlipidemia, type 2 diabetes, hypothyroidism, rheumatoid arthritis, lupus, GERD, anxiety/depression, recently diagnosed with new A fib S/P cardioversion yesterday after which she converted to sinus rhythm and was discharged who presented to [facility] on 2025 with elevated HR and sudden onset dyspnea. She was initially seen at [facility] where she was found in RVR A fib rates in 130s. X ray showed increased vascular congestion and she received 20 mg iv lasix prior to transfer. She denies chest pain, dizziness, syncope, fever or chills. The patient was subsequently transfer to [facility] for further evaluation and management.       Review of Systems   Constitutional:  Positive for fatigue. Negative for chills.   Respiratory:  Positive for shortness of breath. Negative for cough and chest tightness.    Cardiovascular:  Positive for palpitations and leg swelling. Negative for chest pain.   Gastrointestinal:  Negative for abdominal pain, blood in stool and diarrhea.   Endocrine: Negative for polydipsia.   Genitourinary:  Negative for dysuria and frequency.   Musculoskeletal:  Positive for arthralgias and back pain.   Skin:  Negative for pallor and wound.   Neurological:  Negative for numbness and headaches.   Psychiatric/Behavioral:  Negative for confusion.        Personal History     Past Medical History:   Diagnosis Date    Arthritis 2010    Asthma 1 month    Cancer 12 years    Multiple mylenoma    Depression 2010    Diabetes mellitus 1.5 years    High blood pressure     " High cholesterol 2005    Hypothyroidism     Lupus 2010    Sleep apnea Now       Past Surgical History:   Procedure Laterality Date    BREAST LUMPECTOMY Right 1983    benign    DILATATION AND CURETTAGE  1995    LAPAROSCOPIC TUBAL LIGATION  1979    OVARY SURGERY  1985    remoal of left ovary       Family History: family history includes Heart disease in her father and sister; Pancreatic cancer (age of onset: 59) in her sister; Pneumonia in her brother; Stroke in her sister. Otherwise pertinent FHx was reviewed and not pertinent to current issue.    Social History:  reports that she quit smoking about 30 years ago. Her smoking use included cigarettes. She started smoking about 51 years ago. She has a 21 pack-year smoking history. She has never used smokeless tobacco. She reports current alcohol use. She reports that she does not use drugs.    Home Medications:  Prior to Admission Medications       Prescriptions Last Dose Informant Patient Reported? Taking?    acetaminophen (TYLENOL) 325 MG tablet   Yes No    Take 2 tablets by mouth Every 6 (Six) Hours As Needed for Mild Pain.    acetaminophen (TYLENOL) 325 MG tablet   No No    Take 2 tablets by mouth Every 4 (Four) Hours As Needed for Mild Pain.    acyclovir (ZOVIRAX) 400 MG tablet   No No    Take 1 tablet by mouth 2 (Two) Times a Day.    albuterol sulfate HFA (Ventolin HFA) 108 (90 Base) MCG/ACT inhaler   Yes No    Inhale 1 puff Every 6 (Six) Hours As Needed.    apixaban (ELIQUIS) 5 MG tablet tablet   No No    Take 1 tablet by mouth 2 (Two) Times a Day for 30 days.    dexAMETHasone (DECADRON) 4 MG tablet   No No    Take 5 tablets by mouth Daily With Breakfast. Take with food on Days 1, 2, 8, 9, 15 and 16.    ferrous sulfate 325 (65 FE) MG tablet   No No    Take 1 tablet by mouth Daily With Breakfast for 30 days.    furosemide (Lasix) 20 MG tablet   No No    Take 1 tablet by mouth 2 (Two) Times a Day for 30 days.    hydroxychloroquine (PLAQUENIL) 200 MG tablet   Yes No     Every 12 (Twelve) Hours.    lenalidomide (REVLIMID) 25 MG capsule   No No    Take 1 capsule by mouth See Admin Instructions. Take 1 capsule by mouth daily on days 1-14, OFF 7 days of each 21 day cycle. Take with or without food. Swallow capsules whole, do not crush, break or chew.    levothyroxine (SYNTHROID, LEVOTHROID) 100 MCG tablet   Yes No    Take 1 tablet by mouth Every Morning Before Breakfast.    metFORMIN (GLUCOPHAGE) 500 MG tablet   Yes No    Take 1 tablet by mouth Daily With Breakfast.    metoprolol tartrate (LOPRESSOR) 50 MG tablet   No No    Take 1 tablet by mouth Every 12 (Twelve) Hours for 30 days.    omeprazole (priLOSEC) 40 MG capsule   Yes No    Take 1 capsule by mouth Daily.    ondansetron (ZOFRAN) 8 MG tablet   No No    Take 1 tablet by mouth 3 (Three) Times a Day As Needed for Nausea or Vomiting.    oxyCODONE (ROXICODONE) 5 MG immediate release tablet   No No    Take 1 tablet by mouth Every 6 (Six) Hours As Needed for Moderate Pain for up to 3 days.    promethazine (PHENERGAN) 12.5 MG tablet   No No    Take 1 tablet by mouth Every 6 (Six) Hours As Needed for Nausea or Vomiting.    sertraline (ZOLOFT) 25 MG tablet   Yes No    Take 1 tablet by mouth Daily.    sulfamethoxazole-trimethoprim (BACTRIM DS,SEPTRA DS) 800-160 MG per tablet   No No    Take 1 tablet by mouth 3 (Three) Times a Week. Take 1 tablet on Mondays, Wednesdays and Fridays.              Allergies:  No Known Allergies    Objective      Vitals:   Temp:  [98.6 °F (37 °C)-98.7 °F (37.1 °C)] 98.7 °F (37.1 °C)  Heart Rate:  [110-112] 112  Resp:  [16-22] 22  BP: (115-140)/(63-68) 140/68  Body mass index is 33.99 kg/m².  Physical Exam  Constitutional:       General: She is not in acute distress.     Appearance: She is obese.   HENT:      Head: Normocephalic.      Mouth/Throat:      Mouth: Mucous membranes are dry.   Eyes:      Extraocular Movements: Extraocular movements intact.      Pupils: Pupils are equal, round, and reactive to  light.   Cardiovascular:      Rate and Rhythm: Tachycardia present. Rhythm irregular.      Pulses: Normal pulses.      Heart sounds: Normal heart sounds.   Pulmonary:      Effort: Pulmonary effort is normal.      Breath sounds: Rales present. No wheezing.   Abdominal:      General: Abdomen is flat. There is no distension.      Palpations: Abdomen is soft.      Tenderness: There is no abdominal tenderness. There is no guarding.   Musculoskeletal:         General: Normal range of motion.      Cervical back: Neck supple.      Right lower leg: Edema present.      Left lower leg: Edema present.   Skin:     General: Skin is dry.      Coloration: Skin is pale.   Neurological:      General: No focal deficit present.      Mental Status: She is alert and oriented to person, place, and time.   Psychiatric:         Behavior: Behavior normal.         Diagnostic Data:  Lab Results (last 24 hours)       Procedure Component Value Units Date/Time    Magnesium [[id number]]  (Normal) Collected: 07/05/25 0115    Specimen: Blood Updated: 07/05/25 0205     Magnesium 1.8 mg/dL     Comprehensive Metabolic Panel [[id number]]  (Abnormal) Collected: 07/05/25 0115    Specimen: Blood Updated: 07/05/25 0205     Glucose 118 mg/dL      BUN 15.8 mg/dL      Creatinine 0.90 mg/dL      Sodium 138 mmol/L      Potassium 4.1 mmol/L      Comment: Specimen hemolyzed.  Result may be falsely elevated.        Chloride 96 mmol/L      CO2 29.4 mmol/L      Calcium 9.2 mg/dL      Total Protein 6.8 g/dL      Albumin 3.8 g/dL      ALT (SGPT) 51 U/L      AST (SGOT) 34 U/L      Comment: Specimen hemolyzed.  Result may be falsely elevated.        Alkaline Phosphatase 113 U/L      Total Bilirubin 0.5 mg/dL      Globulin 3.0 gm/dL      A/G Ratio 1.3 g/dL      BUN/Creatinine Ratio 17.6     Anion Gap 12.6 mmol/L      eGFR 68.9 mL/min/1.73     Narrative:      GFR Categories in Chronic Kidney Disease (CKD)              GFR Category          GFR (mL/min/1.73)     Interpretation  G1                    90 or greater        Normal or high (1)  G2                    60-89                Mild decrease (1)  G3a                   45-59                Mild to moderate decrease  G3b                   30-44                Moderate to severe decrease  G4                    15-29                Severe decrease  G5                    14 or less           Kidney failure    (1)In the absence of evidence of kidney disease, neither GFR category G1 or G2 fulfill the criteria for CKD.    eGFR calculation 2021 CKD-EPI creatinine equation, which does not include race as a factor    CBC Auto Differential [[id number]]  (Abnormal) Collected: 07/05/25 0115    Specimen: Blood Updated: 07/05/25 0204     WBC 4.99 10*3/mm3      RBC 3.84 10*6/mm3      Hemoglobin 10.8 g/dL      Comment: Result checked          Hematocrit 34.9 %      MCV 90.9 fL      MCH 28.1 pg      MCHC 30.9 g/dL      RDW 16.3 %      RDW-SD 54.2 fl      MPV 10.2 fL      Platelets 239 10*3/mm3      Neutrophil % 85.4 %      Lymphocyte % 8.2 %      Monocyte % 6.2 %      Eosinophil % 0.0 %      Basophil % 0.0 %      Immature Grans % 0.2 %      Neutrophils, Absolute 4.26 10*3/mm3      Lymphocytes, Absolute 0.41 10*3/mm3      Monocytes, Absolute 0.31 10*3/mm3      Eosinophils, Absolute 0.00 10*3/mm3      Basophils, Absolute 0.00 10*3/mm3      Immature Grans, Absolute 0.01 10*3/mm3      nRBC 0.0 /100 WBC     Phosphorus [[id number]]  (Normal) Collected: 07/05/25 0115    Specimen: Blood Updated: 07/05/25 0145     Phosphorus 3.7 mg/dL     BNP [[id number]]  (Abnormal) Collected: 07/05/25 0115    Specimen: Blood Updated: 07/05/25 0145     proBNP 11,212.0 pg/mL     Narrative:      This assay is used as an aid in the diagnosis of individuals suspected of having heart failure. It can be used as an aid in the diagnosis of acute decompensated heart failure (ADHF) in patients presenting with signs and symptoms of ADHF to the emergency department (ED). In  addition, NT-proBNP of <300 pg/mL indicates ADHF is not likely.    Age Range Result Interpretation  NT-proBNP Concentration (pg/mL:      <50             Positive            >450                   Gray                 300-450                    Negative             <300    50-75           Positive            >900                  Gray                300-900                  Negative            <300      >75             Positive            >1800                  Gray                300-1800                  Negative            <300             Imaging Results (Last 24 Hours)       ** No results found for the last 24 hours. **              Assessment & Plan        This is a 70 y.o. female with:    Active and Resolved Problems  Active Hospital Problems    Diagnosis  POA    **Atrial fibrillation with RVR [I48.91]  Yes    Acute on chronic heart failure with preserved ejection fraction (HFpEF) [I50.33]  Unknown      Resolved Hospital Problems   No resolved problems to display.       RVR A fib  S/P ANN cardioversion yesterday with restoration to sinus rhythm however with recurrence of RVR A fib today   HR fluctuating in low 100s upon arrival here, will give evening dose metoprolol 50 mg tab and will monitor, will consider cardizem drip if again HR goes up  Continue anticoagulation with eliquis 5 mg bid for stroke risk reduction   Continue telemetry monitoring  Optimize electrolytes  Patient probably would not be a candidate for amiodarone given concern for ILD, underlying lung issues   Cardiology consultation in am       Acute on chronic HFpEF, decompensated due to RVR A fib  She presents with sudden onset SOB, orthopnea, has bilateral pedal edema 1 +  BNP elevated 11,212 compared to 7447 on 7/1  X ray prior to transfer showed increased vascular congestion  Will start IV lasix 40 mg bid  Daily weights, strict I/O  Cardiology consultation     History of RA  Continue plaquenil home dose    Multiple myeloma  On therapy with  daratumumab/bortezomib/dexamethasone/lenalidomide.   Plan to resume treatments 7/7.   Continue to follow with primary hematologist upon discharge   She is on acyclovir for prophylaxis     Type 2 DM  Insulin SS  Holding metformin while inpatient   Accu-Checks     Possible undiagnosed COPD/ILD  Continue bronchodilators as needed  Avoid albuterol given RVR A fib, may use atrovent and symbicort or levalbuterol if needed  Follow outpatient with Pulmonology     Hypothyroidism   Continue home dose levothyroxine 100 mcg once daily   Obtain updated TSH    VTE Prophylaxis:  Pharmacologic & mechanical VTE prophylaxis orders are present.        The patient desires to be as follows:    CODE STATUS:    Code Status (Patient has no pulse and is not breathing): CPR (Attempt to Resuscitate)  Medical Interventions (Patient has pulse or is breathing): Full Support        Hans Harvey, who can be contacted at , is the designated person to make medical decisions on the patient's behalf if She is incapable of doing so. This was clarified with patient and/or next of kin on 7/4/2025 during the course of this H&P.    Admission Status:  I believe this patient meets inpatient status.    Expected Length of Stay: 2-3 days     PDMP and Medication Dispenses via Sidebar reviewed and consistent with patient reported medications.    I discussed the patient's findings and my recommendations with patient, family, and nursing staff.      Signature:     This document has been electronically signed by Carlos Llanes Alvarez, MD on July 5, 2025 02:09 EDT   [facility], [place], operated [facility]

## 2025-07-05 NOTE — CONSULTS
Cardiology attending:  Seen and examined.  Chart and labs reviewed. Independent interpretations of cardiac testing was performed. History and exam findings are verified.  Assessment and plan notated by APC after being formulated by attending consultant.  Note that greater than 50% of the time spent in care of the patient was provided by attending consultant.    Patient is a 70-year-old female who recently underwent ANN/cardioversion after being admitted with atrial fibrillation RVR.  She reports she was initially feeling well but later that day progressed with worsening shortness of breath again.  She was admitted this time with shortness of breath, being managed for CHF exacerbation, currently on IV diuretics and improving her symptoms.  Initial EKG showed sinus rhythm but subsequently EKG on 7/5/2025 showed recurrent episode of atrial fibrillation, heart rate 135 bpm.    On exam she is comfortable on room air  During my assessment she was in sinus rhythm, regular rate and rhythm  Decreased sounds in both bases  Mild lower extremity edema.    Given recurrent episodes of atrial fibrillation at this time recommend starting IV amiodarone drip  Continue anticoagulation with Eliquis 5 mg twice daily  Continue diuresis, furosemide 40 mg IV twice daily, reassess volume status daily  Replete electrolytes as needed  If patient fails to convert to sinus rhythm we will reconsider ANN/cardioversion and patient should see EP for possible A-fib ablation    Electronically signed by Jasson Davalos MD, 07/05/25, 6:19 PM EDT.        CARDIOLOGY CONSULT      Referring Provider: Luna Salinas MD    Reason for Consultation:      Shortness of breath  Heart failure with preserved ejection fraction  Paroxysmal atrial fibrillation  Recent cardioversion      Patient Care Team:  Chula Sanchez APRN as PCP - General (Nurse Practitioner)  Reva Jaeger MD as PCP - Family Medicine  Neftali Barrett MD as  Consulting Physician (Hematology and Oncology)      SUBJECTIVE     Chief Complaint: Shortness of breath    History of present illness:  Phuong Altamirano is a 70 y.o. female with a history of paroxysmal atrial fibrillation who presented to [facility] with complaint of acute shortness of breath.     Patient is a 70-year-old female who is routinely followed by Dr. Felton.  She was admitted to [facility] onJuly 1, 2025 where she presented with atrial fibrillation with RVR.  She was evaluated by Dr. Felton where she was found to be in A-fib with rates in the 130s.  PE protocol CT was negative for PE.  She was started on IV diltiazem as well as beta-blockers orally.  Patient was found to be anemic which is chronic for the patient and followed by hematology.  She underwent ANN cardioversion on July 3.  Cardioversion successfully converted atrial fibrillation to sinus rhythm.  ANN showed ejection fraction to be 55 to 60% mild to moderate concentric LVH, grade 1 diastolic dysfunction.  Mild TR.    Patient was discharged home and returned to the emergency room [facility] on July 4, 2025 at 2300 she had presented  She presented there with complaints of acute onset shortness of breath and reported to be in heart failure.Labs that have been completed since admission here proBNP 11,212.  BUN and creatinine 15.8 and 0.9 procalcitonin 1.47 CBC white blood cell count 4.99 hemoglobin 10.8 platelets 239    Patient has been on IV diuretics reports some improvement in her breathing this morning.  She denies chest pain or feelings of her heart racing.          Review of systems:    Constitutional: No weakness, fatigue, fever, rigors, chills   Eyes: No vision changes, eye pain   ENT/oropharynx: No difficulty swallowing, sore throat, epistaxis, changes in hearing   Cardiovascular: No chest pain, chest tightness, palpitations, paroxysmal nocturnal dyspnea, orthopnea, diaphoresis, dizziness / syncopal episode    Respiratory: C/o  shortness of breath, dyspnea on exertion, denies cough, wheezing, hemoptysis   Gastrointestinal: No abdominal pain, nausea, vomiting, diarrhea, bloody stools   Genitourinary: No hematuria, dysuria   Neurological: No headache, tremors, numbness, one-sided weakness    Musculoskeletal: No cramps, myalgias, joint pain, joint swelling   Integument: No rash, edema        Personal History:      Past Medical History:   Diagnosis Date    Arthritis 2010    Asthma 1 month    Cancer 12 years    Multiple mylenoma    Depression 2010    Diabetes mellitus 1.5 years    High blood pressure     High cholesterol 2005    Hypothyroidism     Lupus 2010    Sleep apnea Now       Past Surgical History:   Procedure Laterality Date    BREAST LUMPECTOMY Right 1983    benign    DILATATION AND CURETTAGE      LAPAROSCOPIC TUBAL LIGATION  1979    OVARY SURGERY  1985    remoal of left ovary       Family History   Problem Relation Age of Onset    Heart disease Father     Pancreatic cancer Sister 59    Heart disease Sister     Stroke Sister     Pneumonia Brother        Social History     Tobacco Use    Smoking status: Former     Current packs/day: 0.00     Average packs/day: 1 pack/day for 21.0 years (21.0 ttl pk-yrs)     Types: Cigarettes     Start date:      Quit date:      Years since quittin.5    Smokeless tobacco: Never   Vaping Use    Vaping status: Never Used   Substance Use Topics    Alcohol use: Yes     Comment: Very occasionally    Drug use: No        Home meds:  Prior to Admission medications    Medication Sig Start Date End Date Taking? Authorizing Provider   acyclovir (ZOVIRAX) 400 MG tablet Take 1 tablet by mouth 2 (Two) Times a Day. 25  Yes Barbara Walters PA-C   apixaban (ELIQUIS) 5 MG tablet tablet Take 1 tablet by mouth 2 (Two) Times a Day for 30 days. 7/3/25 8/2/25 Yes Mariano Chaudhary MD   ferrous sulfate 325 (65 FE) MG tablet Take 1 tablet by mouth Daily With Breakfast for 30 days.  7/4/25 8/3/25 Yes Mariano Chaudhary MD   furosemide (Lasix) 20 MG tablet Take 1 tablet by mouth 2 (Two) Times a Day for 30 days. 6/6/25 7/6/25 Yes Gris Mcgee APRN   hydroxychloroquine (PLAQUENIL) 200 MG tablet Every 12 (Twelve) Hours. 12/29/14  Yes Jagruti Reynoso MD   levothyroxine (SYNTHROID, LEVOTHROID) 100 MCG tablet Take 1 tablet by mouth Every Morning Before Breakfast. 9/15/19  Yes Jagruti Reynoso MD   metFORMIN (GLUCOPHAGE) 500 MG tablet Take 1 tablet by mouth Daily With Dinner.   Yes Jagruti Reynoso MD   metoprolol tartrate (LOPRESSOR) 50 MG tablet Take 1 tablet by mouth Every 12 (Twelve) Hours for 30 days. 7/3/25 8/2/25 Yes Mariano Chaudhary MD   omeprazole (priLOSEC) 40 MG capsule Take 1 capsule by mouth Daily.   Yes Jagruti Reynoso MD   sertraline (ZOLOFT) 25 MG tablet Take 1 tablet by mouth Daily. 8/12/19  Yes Jagruti Reynoso MD   sulfamethoxazole-trimethoprim (BACTRIM DS,SEPTRA DS) 800-160 MG per tablet Take 1 tablet by mouth 3 (Three) Times a Week. Take 1 tablet on Mondays, Wednesdays and Fridays. 6/20/25  Yes Barbara Walters PA-C   acetaminophen (TYLENOL) 325 MG tablet Take 2 tablets by mouth Every 6 (Six) Hours As Needed for Mild Pain.    Jagruti Reynoso MD   acetaminophen (TYLENOL) 325 MG tablet Take 2 tablets by mouth Every 4 (Four) Hours As Needed for Mild Pain. 7/3/25   Mariano Chaudhary MD   albuterol sulfate HFA (Ventolin HFA) 108 (90 Base) MCG/ACT inhaler Inhale 1 puff Every 6 (Six) Hours As Needed. 5/28/25   Jagruti Reynoso MD   dexAMETHasone (DECADRON) 4 MG tablet Take 5 tablets by mouth Daily With Breakfast. Take with food on Days 1, 2, 8, 9, 15 and 16. 6/19/25   Barbara Walters PA-C   lenalidomide (REVLIMID) 25 MG capsule Take 1 capsule by mouth See Admin Instructions. Take 1 capsule by mouth daily on days 1-14, OFF 7 days of each 21 day cycle. Take with or without food. Swallow capsules whole, do not crush, break or chew. 6/19/25    "Neftali Barrett MD   ondansetron (ZOFRAN) 8 MG tablet Take 1 tablet by mouth 3 (Three) Times a Day As Needed for Nausea or Vomiting. 6/19/25   Barbara Walters PA-C   oxyCODONE (ROXICODONE) 5 MG immediate release tablet Take 1 tablet by mouth Every 6 (Six) Hours As Needed for Moderate Pain for up to 3 days. 7/3/25 7/6/25  Mariano Chaudhary MD   promethazine (PHENERGAN) 12.5 MG tablet Take 1 tablet by mouth Every 6 (Six) Hours As Needed for Nausea or Vomiting. 6/26/25   Barbara Walters PA-C       Allergies:     Patient has no known allergies.    Scheduled Meds:acyclovir, 400 mg, Oral, BID  apixaban, 5 mg, Oral, BID  furosemide, 40 mg, Intravenous, Q12H  hydroxychloroquine, 200 mg, Oral, Q12H  insulin lispro, 2-7 Units, Subcutaneous, 4x Daily AC & at Bedtime  levothyroxine, 100 mcg, Oral, Q AM  metoprolol tartrate, 50 mg, Oral, Q12H  pantoprazole, 40 mg, Oral, Q AM  sertraline, 25 mg, Oral, Daily  sodium chloride, 10 mL, Intravenous, Q12H      Continuous Infusions:dilTIAZem, 5-15 mg/hr      PRN Meds:  acetaminophen **OR** acetaminophen **OR** acetaminophen    Calcium Replacement - Follow Nurse / BPA Driven Protocol    dextrose    dextrose    glucagon (human recombinant)    Magnesium Standard Dose Replacement - Follow Nurse / BPA Driven Protocol    nitroglycerin    oxyCODONE    Phosphorus Replacement - Follow Nurse / BPA Driven Protocol    Potassium Replacement - Follow Nurse / BPA Driven Protocol    promethazine    sodium chloride    sodium chloride      OBJECTIVE    Vital Signs  Vitals:    07/04/25 2300 07/05/25 0100 07/05/25 0349 07/05/25 0525   BP: 115/63 140/68  105/55   BP Location: Left arm Left arm  Left arm   Patient Position: Lying Lying  Lying   Pulse: 110 112 92 86   Resp: 16 22 24   Temp: 98.6 °F (37 °C) 98.7 °F (37.1 °C)  99.8 °F (37.7 °C)   TempSrc: Oral Oral  Oral   SpO2: 97% 92%  97%   Weight: 81.6 kg (179 lb 14.3 oz)   76.6 kg (168 lb 14 oz)   Height: 154.9 cm (61\")          Flowsheet Rows  " "    Flowsheet Row First Filed Value   Admission Height 154.9 cm (61\") Documented at 07/04/2025 2300   Admission Weight 81.6 kg (179 lb 14.3 oz) Documented at 07/04/2025 2300              Intake/Output Summary (Last 24 hours) at 7/5/2025 0804  Last data filed at 7/5/2025 0400  Gross per 24 hour   Intake 245 ml   Output 700 ml   Net -455 ml        Telemetry: Harris rhythm    Physical Exam:  The patient is alert, oriented and in no distress.  Vital signs as noted above.  Head and neck revealed no carotid bruits or jugular venous distention.   Lungs clear.  No wheezing.  Faint crackles bilateral bases.  Heart: Normal first and second heart sounds. No murmur.  No precordial rub is present.  No gallop is present.  Abdomen: Soft and nontender.    Extremities with good peripheral pulses with edema   skin: Warm and dry.  Musculoskeletal system is grossly normal.  CNS grossly normal.       Results Review:  I have personally reviewed the results from the time of this admission to 7/5/2025 08:04 EDT and agree with these findings:  []  Laboratory  []  Microbiology  []  Radiology  []  EKG/Telemetry   []  Cardiology/Vascular   []  Pathology  []  Old records  []  Other:    Most notable findings include:     Lab Results (last 24 hours)       Procedure Component Value Units Date/Time    Procalcitonin [[id number]]  (Abnormal) Collected: 07/05/25 0115    Specimen: Blood Updated: 07/05/25 0310     Procalcitonin 1.47 ng/mL     Narrative:      As a Marker for Sepsis (Non-Neonates):    1. <0.5 ng/mL represents a low risk of severe sepsis and/or septic shock.  2. >2 ng/mL represents a high risk of severe sepsis and/or septic shock.    As a Marker for Lower Respiratory Tract Infections that require antibiotic therapy:    PCT on Admission    Antibiotic Therapy       6-12 Hrs later    >0.5                Strongly Recommended  >0.25 - <0.5        Recommended   0.1 - 0.25          Discouraged              Remeasure/reassess PCT  <0.1               " " Strongly Discouraged     Remeasure/reassess PCT    As 28 day mortality risk marker: \"Change in Procalcitonin Result\" (>80% or <=80%) if Day 0 (or Day 1) and Day 4 values are available. Refer to [email or web address]-[email or web address]    Change in PCT <=80%  A decrease of PCT levels below or equal to 80% defines a positive change in PCT test result representing a higher risk for 28-day all-cause mortality of patients diagnosed with severe sepsis for septic shock.    Change in PCT >80%  A decrease of PCT levels of more than 80% defines a negative change in PCT result representing a lower risk for 28-day all-cause mortality of patients diagnosed with severe sepsis or septic shock.       Magnesium [[id number]]  (Normal) Collected: 07/05/25 0115    Specimen: Blood Updated: 07/05/25 0205     Magnesium 1.8 mg/dL     Comprehensive Metabolic Panel [[id number]]  (Abnormal) Collected: 07/05/25 0115    Specimen: Blood Updated: 07/05/25 0205     Glucose 118 mg/dL      BUN 15.8 mg/dL      Creatinine 0.90 mg/dL      Sodium 138 mmol/L      Potassium 4.1 mmol/L      Comment: Specimen hemolyzed.  Result may be falsely elevated.        Chloride 96 mmol/L      CO2 29.4 mmol/L      Calcium 9.2 mg/dL      Total Protein 6.8 g/dL      Albumin 3.8 g/dL      ALT (SGPT) 51 U/L      AST (SGOT) 34 U/L      Comment: Specimen hemolyzed.  Result may be falsely elevated.        Alkaline Phosphatase 113 U/L      Total Bilirubin 0.5 mg/dL      Globulin 3.0 gm/dL      A/G Ratio 1.3 g/dL      BUN/Creatinine Ratio 17.6     Anion Gap 12.6 mmol/L      eGFR 68.9 mL/min/1.73     Narrative:      GFR Categories in Chronic Kidney Disease (CKD)              GFR Category          GFR (mL/min/1.73)    Interpretation  G1                    90 or greater        Normal or high (1)  G2                    60-89                Mild decrease (1)  G3a                   45-59                Mild to moderate decrease  G3b                   30-44                Moderate to " severe decrease  G4                    15-29                Severe decrease  G5                    14 or less           Kidney failure    (1)In the absence of evidence of kidney disease, neither GFR category G1 or G2 fulfill the criteria for CKD.    eGFR calculation 2021 CKD-EPI creatinine equation, which does not include race as a factor    CBC Auto Differential [[id number]]  (Abnormal) Collected: 07/05/25 0115    Specimen: Blood Updated: 07/05/25 0204     WBC 4.99 10*3/mm3      RBC 3.84 10*6/mm3      Hemoglobin 10.8 g/dL      Comment: Result checked          Hematocrit 34.9 %      MCV 90.9 fL      MCH 28.1 pg      MCHC 30.9 g/dL      RDW 16.3 %      RDW-SD 54.2 fl      MPV 10.2 fL      Platelets 239 10*3/mm3      Neutrophil % 85.4 %      Lymphocyte % 8.2 %      Monocyte % 6.2 %      Eosinophil % 0.0 %      Basophil % 0.0 %      Immature Grans % 0.2 %      Neutrophils, Absolute 4.26 10*3/mm3      Lymphocytes, Absolute 0.41 10*3/mm3      Monocytes, Absolute 0.31 10*3/mm3      Eosinophils, Absolute 0.00 10*3/mm3      Basophils, Absolute 0.00 10*3/mm3      Immature Grans, Absolute 0.01 10*3/mm3      nRBC 0.0 /100 WBC     Phosphorus [[id number]]  (Normal) Collected: 07/05/25 0115    Specimen: Blood Updated: 07/05/25 0145     Phosphorus 3.7 mg/dL     BNP [[id number]]  (Abnormal) Collected: 07/05/25 0115    Specimen: Blood Updated: 07/05/25 0145     proBNP 11,212.0 pg/mL     Narrative:      This assay is used as an aid in the diagnosis of individuals suspected of having heart failure. It can be used as an aid in the diagnosis of acute decompensated heart failure (ADHF) in patients presenting with signs and symptoms of ADHF to the emergency department (ED). In addition, NT-proBNP of <300 pg/mL indicates ADHF is not likely.    Age Range Result Interpretation  NT-proBNP Concentration (pg/mL:      <50             Positive            >450                   Gray                 300-450                    Negative              <300    50-75           Positive            >900                  Gray                300-900                  Negative            <300      >75             Positive            >1800                  Gray                300-1800                  Negative            <300            Imaging Results (Last 24 Hours)       ** No results found for the last 24 hours. **            LAB RESULTS (LAST 7 DAYS)    CBC  Results from last 7 days   Lab Units 07/05/25  0115 07/03/25  0902 07/02/25  1330 07/02/25  0156 07/01/25 1436 06/30/25  1100   WBC 10*3/mm3 4.99 4.17  --  7.11 5.99 5.60   RBC 10*6/mm3 3.84 2.89*  --  2.89* 3.48* 3.47*   HEMOGLOBIN g/dL 10.8* 8.1* 9.5* 8.1* 9.7* 10.0*   HEMATOCRIT % 34.9 29.8* 30.7* 26.2* 31.2* 31.9*   MCV fL 90.9 103.1*  --  90.7 89.7 91.9   PLATELETS 10*3/mm3 239 123*  --  232 199 200       BMP  Results from last 7 days   Lab Units 07/05/25  0115 07/03/25  0902 07/02/25  0156 07/01/25 1436 06/30/25  1100   SODIUM mmol/L 138 141 137 138 139   POTASSIUM mmol/L 4.1 3.1* 4.2 4.2 3.6   CHLORIDE mmol/L 96* 106 99 99 98   CO2 mmol/L 29.4* 24.0 24.1 24.2 26.2   BUN mg/dL 15.8 16.6 18.9 16.4 14.0   CREATININE mg/dL 0.90 0.62 0.84 0.98 0.92   GLUCOSE mg/dL 118* 159* 169* 208* 145*   MAGNESIUM mg/dL 1.8 1.7  --   --   --    PHOSPHORUS mg/dL 3.7 3.3  --   --   --        CMP   Results from last 7 days   Lab Units 07/05/25  0115 07/03/25  0902 07/02/25  0156 07/01/25 1436 06/30/25  1100   SODIUM mmol/L 138 141 137 138 139   POTASSIUM mmol/L 4.1 3.1* 4.2 4.2 3.6   CHLORIDE mmol/L 96* 106 99 99 98   CO2 mmol/L 29.4* 24.0 24.1 24.2 26.2   BUN mg/dL 15.8 16.6 18.9 16.4 14.0   CREATININE mg/dL 0.90 0.62 0.84 0.98 0.92   GLUCOSE mg/dL 118* 159* 169* 208* 145*   ALBUMIN g/dL 3.8  --   --  3.7 3.6   BILIRUBIN mg/dL 0.5  --   --  0.3 0.3   ALK PHOS U/L 113  --   --  64 67   AST (SGOT) U/L 34*  --   --  13 13   ALT (SGPT) U/L 51*  --   --  19 18       BNP        TROPONIN  Results from last 7 days   Lab Units  07/01/25  1549   HSTROP T ng/L 34*       CoAg        Creatinine Clearance  Estimated Creatinine Clearance: 54.4 mL/min (by C-G formula based on SCr of 0.9 mg/dL).    ABG          Radiology  No radiology results for the last day      EKG  I personally viewed and interpreted the patient's EKG/Telemetry data:  Telemetry Scan   Final Result      ECG 12 Lead Tachycardia   Preliminary Result   HEART RATE=88  bpm   RR Norzrbgp=792  ms   DC Gmtbkdxt=011  ms   P Horizontal Axis=10  deg   P Front Axis=56  deg   QRSD Interval=69  ms   QT Epytrhsb=998  ms   TJtS=886  ms   QRS Axis=87  deg   T Wave Axis=179  deg   - ABNORMAL ECG -   Sinus rhythm   Low voltage, precordial leads   Consider anteroseptal infarct   Nonspecific T abnormalities, lateral leads   Date and Time of Study:2025-07-05 02:34:26      Telemetry Scan   Final Result      Telemetry Scan   Final Result                  Echocardiogram:    Results for orders placed during the hospital encounter of 07/01/25    Adult Transesophageal Echo (ANN) W/ Cont if Necessary Per Protocol (Cardiology Department) 07/03/2025 1534    Interpretation Summary    Left ventricular systolic function is normal. Estimated left ventricular EF = 60% Left ventricular ejection fraction appears to be 56 - 60%.    Left ventricular wall thickness is consistent with mild to moderate concentric hypertrophy.    Left ventricular diastolic function is consistent with (grade I) impaired relaxation.    The left atrial cavity is mild to moderately dilated.    Abnormal mitral valve structure consistent with dilated annulus.    Estimated right ventricular systolic pressure from tricuspid regurgitation is normal (<35 mmHg).    There is a trivial pericardial effusion.    Procedure indication  Atrial fibrillation with uncontrollable rate    conscious sedation administered by anesthesia  Timeout before procedure    consent obtained before procedure      Procedure note  after obtaining a valid consent patient was  sedated by Anesthesia and a ANN probe was easily placed into esophagus with multiplane imaging with 2D, color and Doppler followed by bubble study with agitated saline without any complications    ANN  Findings    Mild to moderate left atrial enlargement with mild to moderate central MR without any shunt or clot  EF is normal with EF of 60% with mild LVH  Mild RVH with normal RV systolic  Mild TR without pulm hypertension and trivial effusion noted    Plan  Proceed with cardioversion    Procedures done  Transesophageal echocardiogram    Electronically signed by Bull Wiggins MD, 07/03/25, 3:34 PM EDT.        Stress Test:        Cardiac Catheterization:  No results found for this or any previous visit.        Other:      ASSESSMENT & PLAN:    Principal Problem:    Atrial fibrillation with RVR  Active Problems:    Acute on chronic heart failure with preserved ejection fraction (HFpEF)    Heart failure with preserved ejection fraction  Chest x-ray consistent with pulmonary edema  proBNP 11,000  Receiving IV diuretics  Recent TEEJuly 3, 2025 with a EF of 60%, moderate concentric LVH, mild TR    Paroxysmal atrial fibrillation  Recent cardioversion  Maintaining sinus rhythm  Continue anticoagulation with Eliquis  Continue oral beta-blockers    Hypertension  Stable on current medical therapy    Anemia/multiple myeloma  Hemoglobin 10  Follows regularly with hematology        Continue gentle diuresis  Stop IV Diltiazem  Continue BB, Eliquis  Additional recommendations per Dr. Susannah Telles, STANLEY  07/05/25  08:04 EDT

## 2025-07-05 NOTE — PLAN OF CARE
Goal Outcome Evaluation:  Plan of Care Reviewed With: patient           Outcome Evaluation: ongoing

## 2025-07-06 LAB
ANION GAP SERPL CALCULATED.3IONS-SCNC: 9.2 MMOL/L (ref 5–15)
BASOPHILS # BLD AUTO: 0.01 10*3/MM3 (ref 0–0.2)
BASOPHILS NFR BLD AUTO: 0.2 % (ref 0–1.5)
BUN SERPL-MCNC: 14.6 MG/DL (ref 8–23)
BUN/CREAT SERPL: 19 (ref 7–25)
CALCIUM SPEC-SCNC: 8.6 MG/DL (ref 8.6–10.5)
CHLORIDE SERPL-SCNC: 93 MMOL/L (ref 98–107)
CO2 SERPL-SCNC: 30.8 MMOL/L (ref 22–29)
CREAT SERPL-MCNC: 0.77 MG/DL (ref 0.57–1)
DEPRECATED RDW RBC AUTO: 54.1 FL (ref 37–54)
DEVICE COMMENT: ABNORMAL
DEVICE COMMENT: ABNORMAL
DEVICE COMMENT: NORMAL
EGFRCR SERPLBLD CKD-EPI 2021: 83.1 ML/MIN/1.73
EOSINOPHIL # BLD AUTO: 0 10*3/MM3 (ref 0–0.4)
EOSINOPHIL NFR BLD AUTO: 0 % (ref 0.3–6.2)
ERYTHROCYTE [DISTWIDTH] IN BLOOD BY AUTOMATED COUNT: 16.1 % (ref 12.3–15.4)
GLUCOSE BLDC GLUCOMTR-MCNC: 102 MG/DL (ref 70–105)
GLUCOSE BLDC GLUCOMTR-MCNC: 122 MG/DL (ref 70–105)
GLUCOSE BLDC GLUCOMTR-MCNC: 140 MG/DL (ref 70–105)
GLUCOSE BLDC GLUCOMTR-MCNC: 155 MG/DL (ref 70–105)
GLUCOSE SERPL-MCNC: 119 MG/DL (ref 65–99)
HCT VFR BLD AUTO: 33.6 % (ref 34–46.6)
HGB BLD-MCNC: 10.3 G/DL (ref 12–15.9)
IMM GRANULOCYTES # BLD AUTO: 0.01 10*3/MM3 (ref 0–0.05)
IMM GRANULOCYTES NFR BLD AUTO: 0.2 % (ref 0–0.5)
LYMPHOCYTES # BLD AUTO: 2.01 10*3/MM3 (ref 0.7–3.1)
LYMPHOCYTES NFR BLD AUTO: 43.7 % (ref 19.6–45.3)
MAGNESIUM SERPL-MCNC: 1.9 MG/DL (ref 1.6–2.4)
MCH RBC QN AUTO: 27.8 PG (ref 26.6–33)
MCHC RBC AUTO-ENTMCNC: 30.7 G/DL (ref 31.5–35.7)
MCV RBC AUTO: 90.8 FL (ref 79–97)
MONOCYTES # BLD AUTO: 0.48 10*3/MM3 (ref 0.1–0.9)
MONOCYTES NFR BLD AUTO: 10.4 % (ref 5–12)
NEUTROPHILS NFR BLD AUTO: 2.09 10*3/MM3 (ref 1.7–7)
NEUTROPHILS NFR BLD AUTO: 45.5 % (ref 42.7–76)
NRBC BLD AUTO-RTO: 0 /100 WBC (ref 0–0.2)
PLATELET # BLD AUTO: 211 10*3/MM3 (ref 140–450)
PMV BLD AUTO: 10.1 FL (ref 6–12)
POTASSIUM SERPL-SCNC: 3.7 MMOL/L (ref 3.5–5.2)
QT INTERVAL: 326 MS
QT INTERVAL: 353 MS
QT INTERVAL: 373 MS
QTC INTERVAL: 451 MS
QTC INTERVAL: 467 MS
QTC INTERVAL: 490 MS
RBC # BLD AUTO: 3.7 10*6/MM3 (ref 3.77–5.28)
SODIUM SERPL-SCNC: 133 MMOL/L (ref 136–145)
WBC NRBC COR # BLD AUTO: 4.6 10*3/MM3 (ref 3.4–10.8)

## 2025-07-06 PROCEDURE — 82948 REAGENT STRIP/BLOOD GLUCOSE: CPT

## 2025-07-06 PROCEDURE — 93005 ELECTROCARDIOGRAM TRACING: CPT | Performed by: NURSE PRACTITIONER

## 2025-07-06 PROCEDURE — 93010 ELECTROCARDIOGRAM REPORT: CPT | Performed by: INTERNAL MEDICINE

## 2025-07-06 PROCEDURE — 25010000002 DIGOXIN PER 500 MCG: Performed by: STUDENT IN AN ORGANIZED HEALTH CARE EDUCATION/TRAINING PROGRAM

## 2025-07-06 PROCEDURE — 82948 REAGENT STRIP/BLOOD GLUCOSE: CPT | Performed by: STUDENT IN AN ORGANIZED HEALTH CARE EDUCATION/TRAINING PROGRAM

## 2025-07-06 PROCEDURE — 25010000002 DIGOXIN PER 500 MCG: Performed by: HOSPITALIST

## 2025-07-06 PROCEDURE — 85025 COMPLETE CBC W/AUTO DIFF WBC: CPT

## 2025-07-06 PROCEDURE — 80048 BASIC METABOLIC PNL TOTAL CA: CPT

## 2025-07-06 PROCEDURE — 83735 ASSAY OF MAGNESIUM: CPT | Performed by: HOSPITALIST

## 2025-07-06 PROCEDURE — 63710000001 INSULIN LISPRO (HUMAN) PER 5 UNITS: Performed by: STUDENT IN AN ORGANIZED HEALTH CARE EDUCATION/TRAINING PROGRAM

## 2025-07-06 RX ORDER — DIGOXIN 0.25 MG/ML
250 INJECTION INTRAMUSCULAR; INTRAVENOUS EVERY 6 HOURS
Status: COMPLETED | OUTPATIENT
Start: 2025-07-06 | End: 2025-07-07

## 2025-07-06 RX ORDER — DIGOXIN 0.25 MG/ML
500 INJECTION INTRAMUSCULAR; INTRAVENOUS ONCE
Status: COMPLETED | OUTPATIENT
Start: 2025-07-06 | End: 2025-07-06

## 2025-07-06 RX ORDER — DIGOXIN 0.25 MG/ML
125 INJECTION INTRAMUSCULAR; INTRAVENOUS ONCE
Status: DISCONTINUED | OUTPATIENT
Start: 2025-07-06 | End: 2025-07-06

## 2025-07-06 RX ORDER — DIGOXIN 250 MCG
125 TABLET ORAL ONCE
Status: COMPLETED | OUTPATIENT
Start: 2025-07-06 | End: 2025-07-06

## 2025-07-06 RX ORDER — DIGOXIN 0.25 MG/ML
125 INJECTION INTRAMUSCULAR; INTRAVENOUS ONCE
Status: COMPLETED | OUTPATIENT
Start: 2025-07-06 | End: 2025-07-06

## 2025-07-06 RX ADMIN — LEVOTHYROXINE SODIUM 100 MCG: 0.1 TABLET ORAL at 05:46

## 2025-07-06 RX ADMIN — PANTOPRAZOLE SODIUM 40 MG: 40 TABLET, DELAYED RELEASE ORAL at 05:46

## 2025-07-06 RX ADMIN — DIGOXIN 500 MCG: 250 INJECTION, SOLUTION INTRAMUSCULAR; INTRAVENOUS; PARENTERAL at 13:24

## 2025-07-06 RX ADMIN — SERTRALINE HYDROCHLORIDE 25 MG: 25 TABLET, FILM COATED ORAL at 08:25

## 2025-07-06 RX ADMIN — ACYCLOVIR 400 MG: 200 CAPSULE ORAL at 20:40

## 2025-07-06 RX ADMIN — APIXABAN 5 MG: 5 TABLET, FILM COATED ORAL at 08:26

## 2025-07-06 RX ADMIN — Medication 10 ML: at 20:49

## 2025-07-06 RX ADMIN — Medication 10 ML: at 08:26

## 2025-07-06 RX ADMIN — INSULIN LISPRO 2 UNITS: 100 INJECTION, SOLUTION INTRAVENOUS; SUBCUTANEOUS at 21:18

## 2025-07-06 RX ADMIN — DIGOXIN 125 MCG: 0.25 TABLET ORAL at 03:59

## 2025-07-06 RX ADMIN — DIGOXIN 250 MCG: 250 INJECTION, SOLUTION INTRAMUSCULAR; INTRAVENOUS; PARENTERAL at 20:41

## 2025-07-06 RX ADMIN — HYDROXYCHLOROQUINE SULFATE 200 MG: 200 TABLET, FILM COATED ORAL at 20:41

## 2025-07-06 RX ADMIN — DIGOXIN 125 MCG: 0.25 INJECTION INTRAMUSCULAR; INTRAVENOUS at 09:14

## 2025-07-06 RX ADMIN — ACYCLOVIR 400 MG: 200 CAPSULE ORAL at 08:25

## 2025-07-06 RX ADMIN — Medication 5 MG: at 23:28

## 2025-07-06 RX ADMIN — APIXABAN 5 MG: 5 TABLET, FILM COATED ORAL at 20:41

## 2025-07-06 RX ADMIN — HYDROXYCHLOROQUINE SULFATE 200 MG: 200 TABLET, FILM COATED ORAL at 08:25

## 2025-07-06 NOTE — NURSING NOTE
Spoke with Dr. Davalos on the phone and made him aware that patient's SBP had drop in the 60s and that this nurse stopped the Amiodarone drip and that patient still on Afib with RVR.   Dr. Davalos ordered to hold Amiodarone drip and  to give a one time dose Laxoxin PO.

## 2025-07-06 NOTE — PLAN OF CARE
Goal Outcome Evaluation:  Plan of Care Reviewed With: patient        Progress: improving  Outcome Evaluation: Patient AOx4, on 2lpm. BP still soft, HR afib w -140s, cardiology and hospitalist aware. Metoprolol and lasix not given. Patient denies any chest pain and heaviness. CArdiology ordered digoxin for 2 more dose every 6hrs. Call light within reach

## 2025-07-06 NOTE — PLAN OF CARE
Goal Outcome Evaluation:  Plan of Care Reviewed With: patient           Outcome Evaluation: Patient is currently on Afib with RVR and has an ongoing Amiodarone drip at 0.5mg/hr.  She was also given metoprolol 50 mg PO on my shift. Plan of care is ongoing.

## 2025-07-06 NOTE — PROGRESS NOTES
[facility] MEDICINE SERVICE  DAILY PROGRESS NOTE    NAME: Phuong Altamirano  : 1955  MRN: [id number]      LOS: 2 days     PROVIDER OF SERVICE: Luna Salinas MD    Chief Complaint: Atrial fibrillation with RVR    Subjective:     Interval History:    Patient awake and alert, less short of breath as compared to yesterday, requiring oxygen.     patient seen and examined, awake alert, denies any symptoms however heart rate is still in 140s and blood pressure systolic 85.  Patient did receive a dose of dig in the morning at 125 which I ordered.  Will repeat 1 another dose.  Also called ICU team to see if she needs pressors.    Cardiology team notified as well    Review of Systems:   Review of Systems  10 point ROS is negative other than what is stated positive above  Objective:     Vital Signs  Temp:  [97.4 °F (36.3 °C)-98.5 °F (36.9 °C)] 97.6 °F (36.4 °C)  Heart Rate:  [105-155] 134  Resp:  [17-23] 18  BP: ()/(45-73) 85/66  Flow (L/min) (Oxygen Therapy):  [2] 2   Body mass index is 33.99 kg/m².    Physical Exam  Physical Exam  Physical Exam  Constitutional:       General: She is not in acute distress.     Appearance: She is obese.   HENT:      Head: Normocephalic.      Mouth/Throat:      Mouth: Mucous membranes are dry.   Eyes:      Extraocular Movements: Extraocular movements intact.      Pupils: Pupils are equal, round, and reactive to light.   Cardiovascular:      Rate and Rhythm:  Rhythm irregular.      Pulses: Normal pulses.      Heart sounds: Normal heart sounds.   Pulmonary:      Effort: Pulmonary effort is normal.      Breath sounds: Rales present. No wheezing.   Abdominal:      General: Abdomen is flat. There is no distension.      Palpations: Abdomen is soft.      Tenderness: There is no abdominal tenderness. There is no guarding.   Musculoskeletal:         General: Normal range of motion.      Cervical back: Neck supple.      Right lower leg: Edema present.      Left lower leg: Edema  present.   Skin:     General: Skin is dry.       Neurological:      General: No focal deficit present.      Mental Status: She is alert and oriented to person, place, and time.   Psychiatric:         Behavior: Behavior normal.      Diagnostic Data    Results from last 7 days   Lab Units 07/06/25  0208 07/05/25  0115   WBC 10*3/mm3 4.60 4.99   HEMOGLOBIN g/dL 10.3* 10.8*   HEMATOCRIT % 33.6* 34.9   PLATELETS 10*3/mm3 211 239   GLUCOSE mg/dL 119* 118*   CREATININE mg/dL 0.77 0.90   BUN mg/dL 14.6 15.8   SODIUM mmol/L 133* 138   POTASSIUM mmol/L 3.7 4.1   AST (SGOT) U/L  --  34*   ALT (SGPT) U/L  --  51*   ALK PHOS U/L  --  113   BILIRUBIN mg/dL  --  0.5   ANION GAP mmol/L 9.2 12.6       No radiology results for the last day      I reviewed the patient's new clinical results.    Assessment/Plan:     Active and Resolved Problems  Active Hospital Problems    Diagnosis  POA    **Atrial fibrillation with RVR [I48.91]  Yes    Acute on chronic heart failure with preserved ejection fraction (HFpEF) [I50.33]  Unknown      Resolved Hospital Problems   No resolved problems to display.         RVR A fib  S/P ANN cardioversion 7/3 with restoration to sinus rhythm however with recurrence of RVR A fib 7/4  Was rate controlled however recurrence of RVR again today on 7 6.  Given dose of dig.  Patient has been on amiodarone drip which was held overnight likely secondary to hypotension, continues to be hypotensive.  Given dose of dig.  Called ICU team and recommended another dose of dig.  Ordered.  Also notified cardiology team.    Hold Lasix and p.o. metoprolol for now      Continue anticoagulation with eliquis 5 mg bid for stroke risk reduction   Continue telemetry monitoring  Optimize electrolytes  Patient probably would not be a candidate for amiodarone given concern for ILD, underlying lung issues   Cardiology following        Acute on chronic HFpEF, decompensated due to RVR A fib  She presents with sudden onset SOB, orthopnea,  has bilateral pedal edema 1 +  BNP elevated 11,212 compared to 7447 on 7/1  X ray prior to transfer showed increased vascular congestion  Has been on IV Lasix twice daily, hold given low blood pressure for now  Daily weights, strict I/O       History of RA  Continue plaquenil home dose     Multiple myeloma  On therapy with daratumumab/bortezomib/dexamethasone/lenalidomide.   Plan to resume treatments 7/7.   Continue to follow with primary hematologist upon discharge   She is on acyclovir for prophylaxis      Type 2 DM  Insulin SS  Holding metformin while inpatient   Accu-Checks      Possible undiagnosed COPD/ILD  Continue bronchodilators as needed  Avoid albuterol given RVR A fib, may use atrovent and symbicort or levalbuterol if needed  Follow outpatient with Pulmonology      Hypothyroidism   Continue home dose levothyroxine 100 mcg once daily   Obtain updated TSH     VTE Prophylaxis:  Pharmacologic & mechanical VTE prophylaxis orders are present.           Disposition Planning:     Barriers to Discharge:sob  Anticipated Date of Discharge: 7/8  Place of Discharge: home      Time: 35 minutes     Code Status and Medical Interventions: CPR (Attempt to Resuscitate); Full Support   Ordered at: 07/04/25 6086     Code Status (Patient has no pulse and is not breathing):    CPR (Attempt to Resuscitate)     Medical Interventions (Patient has pulse or is breathing):    Full Support       Signature: Electronically signed by Luna Salinas MD, 07/06/25, 10:23 EDT.  [facility] Hospitalist Team

## 2025-07-07 PROBLEM — E43 SEVERE PROTEIN-CALORIE MALNUTRITION: Status: ACTIVE | Noted: 2025-07-07

## 2025-07-07 LAB
ANION GAP SERPL CALCULATED.3IONS-SCNC: 7 MMOL/L (ref 5–15)
BASOPHILS # BLD AUTO: 0.01 10*3/MM3 (ref 0–0.2)
BASOPHILS NFR BLD AUTO: 0.2 % (ref 0–1.5)
BUN SERPL-MCNC: 10.8 MG/DL (ref 8–23)
BUN/CREAT SERPL: 18.9 (ref 7–25)
CALCIUM SPEC-SCNC: 8.7 MG/DL (ref 8.6–10.5)
CHLORIDE SERPL-SCNC: 97 MMOL/L (ref 98–107)
CO2 SERPL-SCNC: 30 MMOL/L (ref 22–29)
CREAT SERPL-MCNC: 0.57 MG/DL (ref 0.57–1)
DEPRECATED RDW RBC AUTO: 51.8 FL (ref 37–54)
EGFRCR SERPLBLD CKD-EPI 2021: 97.9 ML/MIN/1.73
EOSINOPHIL # BLD AUTO: 0 10*3/MM3 (ref 0–0.4)
EOSINOPHIL NFR BLD AUTO: 0 % (ref 0.3–6.2)
ERYTHROCYTE [DISTWIDTH] IN BLOOD BY AUTOMATED COUNT: 15.6 % (ref 12.3–15.4)
GLUCOSE BLDC GLUCOMTR-MCNC: 118 MG/DL (ref 70–105)
GLUCOSE BLDC GLUCOMTR-MCNC: 119 MG/DL (ref 70–105)
GLUCOSE BLDC GLUCOMTR-MCNC: 129 MG/DL (ref 70–105)
GLUCOSE BLDC GLUCOMTR-MCNC: 99 MG/DL (ref 70–105)
GLUCOSE SERPL-MCNC: 94 MG/DL (ref 65–99)
HCT VFR BLD AUTO: 33.5 % (ref 34–46.6)
HGB BLD-MCNC: 10.3 G/DL (ref 12–15.9)
IMM GRANULOCYTES # BLD AUTO: 0.01 10*3/MM3 (ref 0–0.05)
IMM GRANULOCYTES NFR BLD AUTO: 0.2 % (ref 0–0.5)
LYMPHOCYTES # BLD AUTO: 1.95 10*3/MM3 (ref 0.7–3.1)
LYMPHOCYTES NFR BLD AUTO: 40 % (ref 19.6–45.3)
MCH RBC QN AUTO: 28.1 PG (ref 26.6–33)
MCHC RBC AUTO-ENTMCNC: 30.7 G/DL (ref 31.5–35.7)
MCV RBC AUTO: 91.5 FL (ref 79–97)
MONOCYTES # BLD AUTO: 0.42 10*3/MM3 (ref 0.1–0.9)
MONOCYTES NFR BLD AUTO: 8.6 % (ref 5–12)
NEUTROPHILS NFR BLD AUTO: 2.49 10*3/MM3 (ref 1.7–7)
NEUTROPHILS NFR BLD AUTO: 51 % (ref 42.7–76)
NRBC BLD AUTO-RTO: 0 /100 WBC (ref 0–0.2)
PLATELET # BLD AUTO: 214 10*3/MM3 (ref 140–450)
PMV BLD AUTO: 10 FL (ref 6–12)
POTASSIUM SERPL-SCNC: 3.8 MMOL/L (ref 3.5–5.2)
RBC # BLD AUTO: 3.66 10*6/MM3 (ref 3.77–5.28)
SODIUM SERPL-SCNC: 134 MMOL/L (ref 136–145)
WBC NRBC COR # BLD AUTO: 4.88 10*3/MM3 (ref 3.4–10.8)

## 2025-07-07 PROCEDURE — 93010 ELECTROCARDIOGRAM REPORT: CPT | Performed by: INTERNAL MEDICINE

## 2025-07-07 PROCEDURE — 85025 COMPLETE CBC W/AUTO DIFF WBC: CPT

## 2025-07-07 PROCEDURE — 63710000001 PROMETHAZINE PER 12.5 MG: Performed by: STUDENT IN AN ORGANIZED HEALTH CARE EDUCATION/TRAINING PROGRAM

## 2025-07-07 PROCEDURE — 82948 REAGENT STRIP/BLOOD GLUCOSE: CPT | Performed by: STUDENT IN AN ORGANIZED HEALTH CARE EDUCATION/TRAINING PROGRAM

## 2025-07-07 PROCEDURE — 80048 BASIC METABOLIC PNL TOTAL CA: CPT

## 2025-07-07 PROCEDURE — 93005 ELECTROCARDIOGRAM TRACING: CPT | Performed by: NURSE PRACTITIONER

## 2025-07-07 PROCEDURE — 25010000002 DIGOXIN PER 500 MCG: Performed by: STUDENT IN AN ORGANIZED HEALTH CARE EDUCATION/TRAINING PROGRAM

## 2025-07-07 PROCEDURE — 99233 SBSQ HOSP IP/OBS HIGH 50: CPT | Performed by: INTERNAL MEDICINE

## 2025-07-07 PROCEDURE — 82948 REAGENT STRIP/BLOOD GLUCOSE: CPT

## 2025-07-07 PROCEDURE — 25010000002 ONDANSETRON PER 1 MG: Performed by: HOSPITALIST

## 2025-07-07 RX ORDER — LACTULOSE 10 G/15ML
20 SOLUTION ORAL ONCE
Status: COMPLETED | OUTPATIENT
Start: 2025-07-07 | End: 2025-07-07

## 2025-07-07 RX ORDER — ALUMINA, MAGNESIA, AND SIMETHICONE 2400; 2400; 240 MG/30ML; MG/30ML; MG/30ML
15 SUSPENSION ORAL EVERY 6 HOURS PRN
Status: DISCONTINUED | OUTPATIENT
Start: 2025-07-07 | End: 2025-07-10 | Stop reason: HOSPADM

## 2025-07-07 RX ORDER — POTASSIUM CHLORIDE 750 MG/1
10 TABLET, FILM COATED, EXTENDED RELEASE ORAL DAILY
Status: DISCONTINUED | OUTPATIENT
Start: 2025-07-07 | End: 2025-07-10 | Stop reason: HOSPADM

## 2025-07-07 RX ORDER — FUROSEMIDE 20 MG/1
20 TABLET ORAL
Status: DISCONTINUED | OUTPATIENT
Start: 2025-07-07 | End: 2025-07-10 | Stop reason: HOSPADM

## 2025-07-07 RX ORDER — ONDANSETRON 2 MG/ML
4 INJECTION INTRAMUSCULAR; INTRAVENOUS EVERY 6 HOURS PRN
Status: DISCONTINUED | OUTPATIENT
Start: 2025-07-07 | End: 2025-07-10 | Stop reason: HOSPADM

## 2025-07-07 RX ADMIN — LACTULOSE SOLUTION USP, 10 G/15 ML 20 G: 10 SOLUTION ORAL; RECTAL at 14:40

## 2025-07-07 RX ADMIN — PANTOPRAZOLE SODIUM 40 MG: 40 TABLET, DELAYED RELEASE ORAL at 05:11

## 2025-07-07 RX ADMIN — APIXABAN 5 MG: 5 TABLET, FILM COATED ORAL at 21:08

## 2025-07-07 RX ADMIN — ALUMINUM HYDROXIDE, MAGNESIUM HYDROXIDE, AND DIMETHICONE 15 ML: 400; 400; 40 SUSPENSION ORAL at 14:40

## 2025-07-07 RX ADMIN — ACYCLOVIR 400 MG: 200 CAPSULE ORAL at 08:14

## 2025-07-07 RX ADMIN — FUROSEMIDE 20 MG: 20 TABLET ORAL at 17:32

## 2025-07-07 RX ADMIN — Medication 10 ML: at 08:17

## 2025-07-07 RX ADMIN — HYDROXYCHLOROQUINE SULFATE 200 MG: 200 TABLET, FILM COATED ORAL at 21:08

## 2025-07-07 RX ADMIN — ONDANSETRON 4 MG: 2 INJECTION, SOLUTION INTRAMUSCULAR; INTRAVENOUS at 14:40

## 2025-07-07 RX ADMIN — Medication 10 ML: at 21:09

## 2025-07-07 RX ADMIN — POTASSIUM CHLORIDE 10 MEQ: 750 TABLET, EXTENDED RELEASE ORAL at 13:41

## 2025-07-07 RX ADMIN — ACYCLOVIR 400 MG: 200 CAPSULE ORAL at 21:08

## 2025-07-07 RX ADMIN — SERTRALINE HYDROCHLORIDE 25 MG: 25 TABLET, FILM COATED ORAL at 08:14

## 2025-07-07 RX ADMIN — PROMETHAZINE HYDROCHLORIDE 12.5 MG: 12.5 TABLET ORAL at 02:23

## 2025-07-07 RX ADMIN — HYDROXYCHLOROQUINE SULFATE 200 MG: 200 TABLET, FILM COATED ORAL at 08:14

## 2025-07-07 RX ADMIN — METOPROLOL TARTRATE 50 MG: 50 TABLET, FILM COATED ORAL at 08:14

## 2025-07-07 RX ADMIN — METOPROLOL TARTRATE 50 MG: 50 TABLET, FILM COATED ORAL at 21:08

## 2025-07-07 RX ADMIN — DIGOXIN 250 MCG: 250 INJECTION, SOLUTION INTRAMUSCULAR; INTRAVENOUS; PARENTERAL at 01:34

## 2025-07-07 RX ADMIN — LEVOTHYROXINE SODIUM 100 MCG: 0.1 TABLET ORAL at 05:11

## 2025-07-07 RX ADMIN — Medication 5 MG: at 21:08

## 2025-07-07 RX ADMIN — APIXABAN 5 MG: 5 TABLET, FILM COATED ORAL at 08:14

## 2025-07-07 NOTE — CASE MANAGEMENT/SOCIAL WORK
Discharge Planning Assessment   Albert     Patient Name: Phuong Altamirano  MRN: [id number]  Today's Date: 7/7/2025    Admit Date: 7/4/2025    Plan: Home at discharge.   Discharge Needs Assessment       Row Name 07/07/25 1044       Living Environment    People in Home alone    Current Living Arrangements home    Potentially Unsafe Housing Conditions none    In the past 12 months has the electric, gas, oil, or water company threatened to shut off services in your home? No    Primary Care Provided by self    Provides Primary Care For no one    Family Caregiver if Needed child(lacie), adult    Family Caregiver Names [facility]    Quality of Family Relationships involved;helpful;supportive    Able to Return to Prior Arrangements yes       Resource/Environmental Concerns    Resource/Environmental Concerns none    Transportation Concerns none       Transportation Needs    In the past 12 months, has lack of transportation kept you from medical appointments or from getting medications? no    In the past 12 months, has lack of transportation kept you from meetings, work, or from getting things needed for daily living? No       Food Insecurity    Within the past 12 months, you worried that your food would run out before you got the money to buy more. Never true    Within the past 12 months, the food you bought just didn't last and you didn't have money to get more. Never true       Transition Planning    Patient/Family Anticipates Transition to home    Patient/Family Anticipated Services at Transition none    Transportation Anticipated family or friend will provide       Discharge Needs Assessment    Readmission Within the Last 30 Days previous discharge plan unsuccessful    Equipment Currently Used at Home none    Concerns to be Addressed discharge planning    Do you want help finding or keeping work or a job? I do not need or want help    Do you want help with school or training? For example, starting or completing job  training or getting a high school diploma, GED or equivalent No    Anticipated Changes Related to Illness none    Equipment Needed After Discharge none                   Discharge Plan       Row Name 07/07/25 1045       Plan    Plan Home at discharge.    Patient/Family in Agreement with Plan yes    Plan Comments CM met with patient at bedside. Patient is A/O x3. Patient lives at alone. Patient drives, daughter will transport at discharge. Patient performs ADL independently. PCP and pharmacy confirmed. [facility] confirmed, CM updated pharmacy to reflect [facility]. Denies current DME use. Denies financial assistance needs for medication, food, utilities. Denies current HH and OPPT services. D/C barriers: afib, amio gtt, Cardiology following.                  Demographic Summary       Row Name 07/07/25 1043       General Information    Admission Type inpatient    Arrived From emergency department    Required Notices Provided Important Message from Medicare    Referral Source admission list    Reason for Consult discharge planning    Preferred Language English                   Functional Status       Row Name 07/07/25 1043       Functional Status    Usual Activity Tolerance good    Current Activity Tolerance good       Functional Status, IADL    Medications independent    Meal Preparation independent    Housekeeping independent    Laundry independent    Shopping independent    If for any reason you need help with day-to-day activities such as bathing, preparing meals, shopping, managing finances, etc., do you get the help you need? I get all the help I need             SAMUEL Nguyen, RN, [facility]  Care Coordination Supervisor   [address] [address]  Phone: [phone]  Fax: [phone]

## 2025-07-07 NOTE — PLAN OF CARE
Goal Outcome Evaluation:      N/O of lasix 20mg PO BID and K 10 Eliezer daily. IV lasix DC. Pt c/o nausea and no vomiting N/O of PRN zofran Q6hrs. Maalox one time order for GERD. Lactulose one time order for constipation. EP consult for recurrent reasoning.

## 2025-07-07 NOTE — SIGNIFICANT NOTE
07/07/25 1048   Readmission Indications   Is the patient and/or family able to complete the readmission assessment questions? Yes   Is this hospitalization related to the prior hospital diagnosis? Yes   Recommendation for rehospitalization   Did you speak with your physician prior to coming to the hospital No   Follow-up Appointments   Do you have a PCP? Yes  (Chula Sanchez)   Did you have an appointment with PCP after your hospitalization? No   Did you have an appointment with a Specialist? Yes   When was your appointment scheduled?   (7/1-Cardiology, 6/30-Hem/onc)   Did you go to appointment? Yes   Are you current with the Pulmonary Clinic? No   Are you current with the CHF Clinic? No   Medications   Did you have newly prescribed medications at discharge? Yes   Did you understand the reasons for your medications at discharge and how to take them? Yes   Did you understand the side effects of your medications? Yes   Are you taking all of you prescribed medications? Yes   What pharmacy was used to fill prescription(s)? [facility]   Were medications picked up? Yes   Discharge Instructions   Did you understand your discharge instructions? Yes   Did your family/caregiver hear your instructions? Yes   Were you told to eat a special diet? Yes  (Healthy Heart)   Did you adhere to the diet? Yes   Were you given a number of someone to call if you had questions or concerns? Yes   Index discharge location/services   Where did you go upon discharge? Home   Do you have supportive family or friends in the home? Yes   Discharge Readiness   On a scale of 1-5 (5 being well prepared), how ready were you for discharge 5   Palliative Care/Hospice   Are you current with Palliative Care? No   Are you current with Hospice Care? No   Readmission Assessment Final Comments   Final Comments Previous: New onsent afib. Cardizem gtt, metoprolol, lovenox. Cardioversion, converted NSR. Started on Eliquis at discharge. Current: ED for SOB at [place]  ER, afib w/ RVR, transferred to [facility]. CXR showed increased vascular congestion, IV lasix prior to transfer. Cardiology started amio gtt, diuresis, continue Eliquis. Pending further work-up. LACE: 12

## 2025-07-07 NOTE — PROGRESS NOTES
[facility] MEDICINE SERVICE  DAILY PROGRESS NOTE    NAME: Phuong Altamirano  : 1955  MRN: [id number]      LOS: 3 days     PROVIDER OF SERVICE: Luna Salinas MD    Chief Complaint: Atrial fibrillation with RVR    Subjective:     Interval History:    Patient awake and alert, less short of breath as compared to yesterday, requiring oxygen.     patient seen and examined, awake alert, denies any symptoms however heart rate is still in 140s and blood pressure systolic 85.  Patient did receive a dose of dig in the morning at 125 which I ordered.  Will repeat 1 another dose.  Also called ICU team to see if she needs pressors.    Cardiology team notified as well     heart rate much better today.  Currently on metoprolol 50 p.o. twice daily.  Received multiple doses of dig yesterday.  She had recommended holding Lasix until blood pressure stabilizes    Review of Systems:   Review of Systems  10 point ROS is negative other than what is stated positive above  Objective:     Vital Signs  Temp:  [97.5 °F (36.4 °C)-98.6 °F (37 °C)] 97.5 °F (36.4 °C)  Heart Rate:  [] 79  Resp:  [15-22] 19  BP: (104-125)/(55-79) 111/68  Flow (L/min) (Oxygen Therapy):  [2] 2   Body mass index is 33.78 kg/m².    Physical Exam  Physical Exam  Physical Exam  Constitutional:       General: She is not in acute distress.     Appearance: She is obese.   HENT:      Head: Normocephalic.      Mouth/Throat:      Mouth: Mucous membranes are dry.   Eyes:      Extraocular Movements: Extraocular movements intact.      Pupils: Pupils are equal, round, and reactive to light.   Cardiovascular:      Rate and Rhythm:  Rhythm irregular.      Pulses: Normal pulses.      Heart sounds: Normal heart sounds.   Pulmonary:      Effort: Pulmonary effort is normal.      Breath sounds: Rales present. No wheezing.   Abdominal:      General: Abdomen is flat. There is no distension.      Palpations: Abdomen is soft.      Tenderness: There is no abdominal  tenderness. There is no guarding.   Musculoskeletal:         General: Normal range of motion.      Cervical back: Neck supple.      Right lower leg: Edema present.      Left lower leg: Edema present.   Skin:     General: Skin is dry.       Neurological:      General: No focal deficit present.      Mental Status: She is alert and oriented to person, place, and time.   Psychiatric:         Behavior: Behavior normal.      Diagnostic Data    Results from last 7 days   Lab Units 07/07/25  0225 07/06/25  0208 07/05/25  0115   WBC 10*3/mm3 4.88   < > 4.99   HEMOGLOBIN g/dL 10.3*   < > 10.8*   HEMATOCRIT % 33.5*   < > 34.9   PLATELETS 10*3/mm3 214   < > 239   GLUCOSE mg/dL 94   < > 118*   CREATININE mg/dL 0.57   < > 0.90   BUN mg/dL 10.8   < > 15.8   SODIUM mmol/L 134*   < > 138   POTASSIUM mmol/L 3.8   < > 4.1   AST (SGOT) U/L  --   --  34*   ALT (SGPT) U/L  --   --  51*   ALK PHOS U/L  --   --  113   BILIRUBIN mg/dL  --   --  0.5   ANION GAP mmol/L 7.0   < > 12.6    < > = values in this interval not displayed.       No radiology results for the last day      I reviewed the patient's new clinical results.    Assessment/Plan:     Active and Resolved Problems  Active Hospital Problems    Diagnosis  POA    **Atrial fibrillation with RVR [I48.91]  Yes    Acute on chronic heart failure with preserved ejection fraction (HFpEF) [I50.33]  Unknown      Resolved Hospital Problems   No resolved problems to display.         RVR A fib  S/P ANN cardioversion 7/3 with restoration to sinus rhythm however with recurrence of RVR A fib 7/4  And went into RVR on 7/6.  Received multiple doses of dig.  Blood pressure now stabilizing gradually.  On p.o. metoprolol.  IV Lasix on hold.      Continue anticoagulation with eliquis 5 mg bid for stroke risk reduction   Continue telemetry monitoring  Optimize electrolytes  Patient probably would not be a candidate for amiodarone given concern for ILD, underlying lung issues   Cardiology following         Acute on chronic HFpEF, decompensated due to RVR A fib  She presents with sudden onset SOB, orthopnea, has bilateral pedal edema 1 +  BNP elevated 11,212 compared to 7447 on 7/1  X ray prior to transfer showed increased vascular congestion  Has been on IV Lasix twice daily, hold given low blood pressure for now--stabilizing gradually   Daily weights, strict I/O       History of RA  Continue plaquenil home dose     Multiple myeloma  On therapy with daratumumab/bortezomib/dexamethasone/lenalidomide.   Plan to resume treatments 7/7.   Continue to follow with primary hematologist upon discharge   She is on acyclovir for prophylaxis      Type 2 DM  Insulin SS  Holding metformin while inpatient   Accu-Checks      Possible undiagnosed COPD/ILD  Continue bronchodilators as needed  Avoid albuterol given RVR A fib, may use atrovent and symbicort or levalbuterol if needed  Follow outpatient with Pulmonology      Hypothyroidism   Continue home dose levothyroxine 100 mcg once daily   Obtain updated TSH     VTE Prophylaxis:  Pharmacologic & mechanical VTE prophylaxis orders are present.           Disposition Planning: Diuresing with IV Lasix for now.  Final recommendations from cardiology    Barriers to Discharge:sob  Anticipated Date of Discharge: 7/9  Place of Discharge: home      Time: 35 minutes     Code Status and Medical Interventions: CPR (Attempt to Resuscitate); Full Support   Ordered at: 07/04/25 5994     Code Status (Patient has no pulse and is not breathing):    CPR (Attempt to Resuscitate)     Medical Interventions (Patient has pulse or is breathing):    Full Support       Signature: Electronically signed by Luna Salinas MD, 07/07/25, 10:59 EDT.  [facility] Hospitalist Team

## 2025-07-07 NOTE — PLAN OF CARE
Outcome Evaluation: A&Ox4. Remains on 2L NC. No c/o pain or discomfort. Remains in A-fib with HR between 100s-110s throughout the shift. No c/o SOA with unlabored breath sounds and diminished lung sounds noted. Eliquis VTE prophylaxis. Metoprolol held. Digoxin given per order with results. Moderate edema to BLE. Generalized weakness causing some issues with mobility. Daughter remains at bedside. Able to make needs known. Call light within reach. VSS. Plan of care ongoing at this time.      Problem: Adult Inpatient Plan of Care  Goal: Plan of Care Review  Outcome: Progressing  Flowsheets (Taken 7/7/2025 0247)  Outcome Evaluation: A&Ox4. Remains on 2L NC. No c/o pain or discomfort. Remains in A-fib with HR between 100s-110s throughout the shift. No c/o SOA with unlabored breath sounds and diminished lung sounds noted. Eliquis VTE prophylaxis. Metoprolol held. Digoxin given per order with results. Moderate edema to BLE. Generalized weakness causing some issues with mobility. Daughter remains at bedside. Able to make needs known. Call light within reach. VSS. Plan of care ongoing at this time.  Goal: Patient-Specific Goal (Individualized)  Outcome: Progressing  Goal: Absence of Hospital-Acquired Illness or Injury  Outcome: Progressing  Intervention: Identify and Manage Fall Risk  Recent Flowsheet Documentation  Taken 7/7/2025 0200 by Chely, Christa, LPN  Safety Promotion/Fall Prevention:   safety round/check completed   room organization consistent   nonskid shoes/slippers when out of bed   mobility aid in reach   fall prevention program maintained   clutter free environment maintained   assistive device/personal items within reach   activity supervised  Taken 7/7/2025 0000 by Chely, Christa, LPN  Safety Promotion/Fall Prevention:   safety round/check completed   room organization consistent   nonskid shoes/slippers when out of bed   fall prevention program maintained   clutter free environment maintained    assistive device/personal items within reach   activity supervised  Taken 7/6/2025 2200 by Chely, Christa, LPN  Safety Promotion/Fall Prevention:   safety round/check completed   room organization consistent   nonskid shoes/slippers when out of bed   fall prevention program maintained   clutter free environment maintained   assistive device/personal items within reach   activity supervised  Taken 7/6/2025 2041 by Chely, Christa, LPN  Safety Promotion/Fall Prevention:   safety round/check completed   room organization consistent   nonskid shoes/slippers when out of bed   fall prevention program maintained   clutter free environment maintained   assistive device/personal items within reach   activity supervised  Intervention: Prevent Skin Injury  Recent Flowsheet Documentation  Taken 7/7/2025 0200 by Christa Mo LPN  Body Position: position changed independently  Taken 7/7/2025 0000 by Christa Mo LPN  Body Position: position changed independently  Skin Protection: transparent dressing maintained  Taken 7/6/2025 2041 by Christa Mo LPN  Body Position: position changed independently  Skin Protection: transparent dressing maintained  Intervention: Prevent and Manage VTE (Venous Thromboembolism) Risk  Recent Flowsheet Documentation  Taken 7/7/2025 0000 by Christa Mo LPN  VTE Prevention/Management: (Eliquis) --  Taken 7/6/2025 2041 by Christa Mo LPN  VTE Prevention/Management: (Eliquis) --  Intervention: Prevent Infection  Recent Flowsheet Documentation  Taken 7/7/2025 0200 by Christa Mo LPN  Infection Prevention:   single patient room provided   rest/sleep promoted   hand hygiene promoted   equipment surfaces disinfected  Taken 7/7/2025 0000 by Christa Mo LPN  Infection Prevention:   single patient room provided   rest/sleep promoted   hand hygiene promoted   equipment surfaces disinfected  Taken 7/6/2025 2200 by Christa Mo LPN  Infection  Prevention:   single patient room provided   rest/sleep promoted   hand hygiene promoted   equipment surfaces disinfected   personal protective equipment utilized  Taken 7/6/2025 2041 by Christa Mo LPN  Infection Prevention:   single patient room provided   rest/sleep promoted   hand hygiene promoted   equipment surfaces disinfected  Goal: Optimal Comfort and Wellbeing  Outcome: Progressing  Intervention: Provide Person-Centered Care  Recent Flowsheet Documentation  Taken 7/6/2025 2041 by Christa Mo LPN  Trust Relationship/Rapport:   care explained   choices provided   questions answered  Goal: Readiness for Transition of Care  Outcome: Progressing     Problem: Sepsis/Septic Shock  Goal: Optimal Coping  Outcome: Progressing  Intervention: Support Patient and Family Response  Recent Flowsheet Documentation  Taken 7/7/2025 0000 by Christa Mo LPN  Supportive Measures: active listening utilized  Taken 7/6/2025 2041 by Christa Mo LPN  Supportive Measures: active listening utilized  Family/Support System Care:   presence promoted   support provided  Goal: Absence of Bleeding  Outcome: Progressing  Goal: Blood Glucose Level Within Target Range  Outcome: Progressing  Intervention: Optimize Glycemic Control  Recent Flowsheet Documentation  Taken 7/7/2025 0000 by Christa Mo LPN  Hyperglycemia Management: blood glucose monitored  Hypoglycemia Management: blood glucose monitored  Taken 7/6/2025 2041 by Christa Mo LPN  Hyperglycemia Management: blood glucose monitored  Hypoglycemia Management: blood glucose monitored  Goal: Absence of Infection Signs and Symptoms  Outcome: Progressing  Intervention: Initiate Sepsis Management  Recent Flowsheet Documentation  Taken 7/7/2025 0200 by Christa Mo LPN  Infection Prevention:   single patient room provided   rest/sleep promoted   hand hygiene promoted   equipment surfaces disinfected  Taken 7/7/2025 0000 by Chely  SANJEEV Goodwin  Infection Prevention:   single patient room provided   rest/sleep promoted   hand hygiene promoted   equipment surfaces disinfected  Taken 7/6/2025 2200 by Christa Mo LPN  Infection Prevention:   single patient room provided   rest/sleep promoted   hand hygiene promoted   equipment surfaces disinfected   personal protective equipment utilized  Taken 7/6/2025 2041 by Christa Mo LPN  Infection Prevention:   single patient room provided   rest/sleep promoted   hand hygiene promoted   equipment surfaces disinfected  Intervention: Promote Recovery  Recent Flowsheet Documentation  Taken 7/7/2025 0000 by Christa Mo LPN  Activity Management: activity encouraged  Sleep/Rest Enhancement:   awakenings minimized   consistent schedule promoted  Taken 7/6/2025 2041 by Christa oM LPN  Activity Management: activity encouraged  Sleep/Rest Enhancement:   awakenings minimized   consistent schedule promoted  Goal: Optimal Nutrition Delivery  Outcome: Progressing  Intervention: Optimize Nutrition Delivery  Recent Flowsheet Documentation  Taken 7/7/2025 0000 by Christa Mo LPN  Nutrition Support Management: weight trending reviewed  Taken 7/6/2025 2041 by Christa Mo LPN  Nutrition Support Management: weight trending reviewed     Problem: Fall Injury Risk  Goal: Absence of Fall and Fall-Related Injury  Outcome: Progressing  Intervention: Promote Injury-Free Environment  Recent Flowsheet Documentation  Taken 7/7/2025 0200 by Chely, Christa, LPN  Safety Promotion/Fall Prevention:   safety round/check completed   room organization consistent   nonskid shoes/slippers when out of bed   mobility aid in reach   fall prevention program maintained   clutter free environment maintained   assistive device/personal items within reach   activity supervised  Taken 7/7/2025 0000 by Chely, Christa, LPN  Safety Promotion/Fall Prevention:   safety round/check completed   room  organization consistent   nonskid shoes/slippers when out of bed   fall prevention program maintained   clutter free environment maintained   assistive device/personal items within reach   activity supervised  Taken 7/6/2025 2200 by Chely, Christa, LPN  Safety Promotion/Fall Prevention:   safety round/check completed   room organization consistent   nonskid shoes/slippers when out of bed   fall prevention program maintained   clutter free environment maintained   assistive device/personal items within reach   activity supervised  Taken 7/6/2025 2041 by Chely, Christa, LPN  Safety Promotion/Fall Prevention:   safety round/check completed   room organization consistent   nonskid shoes/slippers when out of bed   fall prevention program maintained   clutter free environment maintained   assistive device/personal items within reach   activity supervised

## 2025-07-07 NOTE — CONSULTS
"Patient Name: Phuong Altamirano  YOB: 1955  MRN: [id number]  Admission date: 7/4/2025  Reason for Encounter: MST 2-3 or Nursing Admission Screen    [facility] Clinical Nutrition Assessment     Subjective    Subjective Information     7/7: Pt was admitted with shortness of breath, Cardiology is following. At my visit pt reports a poor appetite. She reports a poor appetite over several months. Pt does not like nutrition supplements. Encouraged her to make the most out of her meals when she feels well. Per chart review, pt has lost 21# over 4 months (11% loss). Pt reports that this was actual loss and not fluid related. NFPE completed, consistent with nutrition diagnosis of malnutrition using AND/ASPEN criteria. See MSA below.       Assessment    H&P and Current Problems      H&P  Past Medical History:   Diagnosis Date    Arthritis 2010    Asthma 1 month    Cancer 12 years    Multiple mylenoma    Depression 2010    Diabetes mellitus 1.5 years    High blood pressure     High cholesterol 2005    Hypothyroidism     Lupus 2010    Sleep apnea Now      Past Surgical History:   Procedure Laterality Date    BREAST LUMPECTOMY Right 1983    benign    DILATATION AND CURETTAGE  1995    LAPAROSCOPIC TUBAL LIGATION  1979    OVARY SURGERY  1985    remoal of left ovary      Current Problems   Admission Diagnosis:  Atrial fibrillation with RVR [I48.91]    Problem List:    Atrial fibrillation with RVR    Acute on chronic heart failure with preserved ejection fraction (HFpEF)      Other Applicable Nutrition Information:   -Cardiology is following      Anthropometrics      BMI, Height, Weight Estimated body mass index is 33.78 kg/m² as calculated from the following:    Height as of this encounter: 154.9 cm (61\").    Weight as of this encounter: 81.1 kg (178 lb 12.7 oz).    Weight Method: Bed scale       Trending Weight Changes 7/7: 178#   21# loss over 4 months (11% loss)        Weight History  Wt Readings from Last 20 " Encounters:   07/07/25 0456 81.1 kg (178 lb 12.7 oz)   07/06/25 0327 81.6 kg (179 lb 14.3 oz)   07/05/25 0525 76.6 kg (168 lb 14 oz)   07/04/25 2300 81.6 kg (179 lb 14.3 oz)   07/03/25 1345 81.6 kg (180 lb)   07/03/25 0600 81.8 kg (180 lb 6.4 oz)   07/02/25 0553 81.9 kg (180 lb 8 oz)   07/01/25 2000 81.9 kg (180 lb 8 oz)   07/01/25 1410 83.7 kg (184 lb 9.6 oz)   07/01/25 1257 83.5 kg (184 lb)   06/30/25 1126 82.1 kg (181 lb)   06/26/25 1337 82.6 kg (182 lb 3.2 oz)   06/23/25 0934 82.3 kg (181 lb 6.4 oz)   06/18/25 1258 82.6 kg (182 lb)   06/13/25 2140 82.4 kg (181 lb 10.5 oz)   06/06/25 1027 83.5 kg (184 lb)   05/27/25 1215 84.6 kg (186 lb 8 oz)   05/19/25 0443 86.3 kg (190 lb 4.1 oz)   05/18/25 1400 85.3 kg (188 lb)   05/18/25 1142 85.6 kg (188 lb 11.4 oz)   04/28/25 1341 88 kg (194 lb)   04/11/25 0810 91.2 kg (201 lb)   04/04/25 0845 91.5 kg (201 lb 12.8 oz)   03/20/25 1227 90.5 kg (199 lb 9.6 oz)   10/17/19 0940 90.8 kg (200 lb 3.2 oz)   02/18/19 1143 89.4 kg (197 lb)   09/26/18 1323 88.9 kg (196 lb)   05/30/18 0920 88 kg (194 lb)   01/25/18 1030 88.9 kg (196 lb)        Labs      Comment: Low sodium management per MD      Results from last 7 days   Lab Units 07/07/25  0225 07/06/25  0208 07/05/25  0115 07/03/25  0902 07/02/25  0156 07/01/25  1436 06/30/25  1100   SODIUM mmol/L 134* 133* 138 141   < > 138 139   POTASSIUM mmol/L 3.8 3.7 4.1 3.1*   < > 4.2 3.6   GLUCOSE mg/dL 94 119* 118* 159*   < > 208* 145*   BUN mg/dL 10.8 14.6 15.8 16.6   < > 16.4 14.0   CREATININE mg/dL 0.57 0.77 0.90 0.62   < > 0.98 0.92   CALCIUM mg/dL 8.7 8.6 9.2 7.1*   < > 9.3 9.2   PHOSPHORUS mg/dL  --   --  3.7 3.3  --   --   --    MAGNESIUM mg/dL  --  1.9 1.8 1.7  --   --   --    ALBUMIN g/dL  --   --  3.8  --   --  3.7 3.6   BILIRUBIN mg/dL  --   --  0.5  --   --  0.3 0.3   ALK PHOS U/L  --   --  113  --   --  64 67   AST (SGOT) U/L  --   --  34*  --   --  13 13   ALT (SGPT) U/L  --   --  51*  --   --  19 18   PROBNP pg/mL  --   --   11,212.0*  --   --  7,447.0*  --     < > = values in this interval not displayed.     Results from last 7 days   Lab Units 07/07/25  0225 07/06/25  0208 07/05/25  0115 07/02/25  1330 07/02/25  0156   PLATELETS 10*3/mm3 214 211 239   < > 232   HEMOGLOBIN g/dL 10.3* 10.3* 10.8*   < > 8.1*   HEMATOCRIT % 33.5* 33.6* 34.9   < > 26.2*   IRON mcg/dL  --   --   --   --  54    < > = values in this interval not displayed.     Lab Results   Component Value Date    HGBA1C 6.06 (H) 05/19/2025          Medications       Scheduled Medications acyclovir, 400 mg, Oral, BID  apixaban, 5 mg, Oral, BID  furosemide, 40 mg, Intravenous, Q12H  hydroxychloroquine, 200 mg, Oral, Q12H  insulin lispro, 2-7 Units, Subcutaneous, 4x Daily AC & at Bedtime  levothyroxine, 100 mcg, Oral, Q AM  metoprolol tartrate, 50 mg, Oral, Q12H  pantoprazole, 40 mg, Oral, Q AM  sertraline, 25 mg, Oral, Daily  sodium chloride, 10 mL, Intravenous, Q12H        Infusions      PRN Medications   acetaminophen **OR** acetaminophen **OR** acetaminophen    Calcium Replacement - Follow Nurse / BPA Driven Protocol    dextrose    dextrose    glucagon (human recombinant)    Magnesium Standard Dose Replacement - Follow Nurse / BPA Driven Protocol    melatonin    nitroglycerin    Phosphorus Replacement - Follow Nurse / BPA Driven Protocol    Potassium Replacement - Follow Nurse / BPA Driven Protocol    promethazine    sodium chloride    sodium chloride     Physical Findings      Chewing/Swallowing  Teeth Status: Mouth/Teeth WDL: WDL    Chewing/Swallowing Issues: No issues identified at this time   Edema            Leg, Left Edema: 3+ (Moderate) Leg, Right Edema: 3+ (Moderate)  Ankle, Left Edema: 3+ (Moderate) Ankle, Right Edema: 3+ (Moderate)  Foot, Left Edema: 3+ (Moderate) Foot, Right Edema: 3+ (Moderate)   Bowel Function  Stool Output  Perineal Care: absorbent brief/pad changed, perineum cleansed (Purewick changed) (07/06/25 1314)  Last Bowel Movement: 07/04/25    I/Os  Intake & Output (last 3 days)         07/04 0701 07/05 0700 07/05 0701 07/06 0700 07/06 0701 07/07 0700 07/07 0701  07/08 0700    P.O. 245 480 720     Total Intake(mL/kg) 245 (3.2) 480 (5.9) 720 (8.9)     Urine (mL/kg/hr) 700 700 (0.4) 1500 (0.8)     Total Output      Net -455 220 -780             Urine Unmeasured Occurrence 2 x  5 x            Lines, Drains, Airways, & Wounds       Active LDAs       Name Placement date Placement time Site Days Last dressing change    Peripheral IV 07/07/25 0234 20 G Distal;Posterior;Right Forearm 07/07/25  0234  Forearm  less than 1     External Urinary Catheter 07/06/25  0632  --  1                       Nutrition Focused Physical Exam     Trending NFPE 7/7: NFPE completed, consistent with nutrition diagnosis of malnutrition using AND/ASPEN criteria. See MSA below.       Malnutrition Severity Assessment         Malnutrition Severity Assessment      Patient meets criteria for : Severe Malnutrition  Malnutrition Type (Last 8 Hours)       Malnutrition Severity Assessment       Row Name 07/07/25 1057       Malnutrition Severity Assessment    Malnutrition Type Chronic Disease - Related Malnutrition      Row Name 07/07/25 1057       Insufficient Energy Intake     Insufficient Energy Intake Findings Severe    Insufficient Energy Intake  <75% of est. energy requirement for > or equal to 1 month      Row Name 07/07/25 1057       Unintentional Weight Loss     Unintentional Weight Loss Findings Severe    Unintentional Weight Loss  Weight loss greater than 10% in six months      Row Name 07/07/25 1057       Criteria Met (Must meet criteria for severity in at least 2 of these categories: M Wasting, Fat Loss, Fluid, Secondary Signs, Wt. Status, Intake)    Patient meets criteria for  Severe Malnutrition                           (1)   Current Nutrition Orders & Evaluation of Intake      Oral Nutrition     Food Allergies  and Intolerances NKFA   Current PO Diet Diet:  Cardiac; Healthy Heart (2-3 Na+); Fluid Consistency: Thin (IDDSI 0)   Oral Nutrition Supplement None     Trending % PO Intake 7/7: 100%    (2)  Assessment & Plan   Nutrition Diagnosis and Goals       Nutrition Diagnosis 1 Severe chronic disease or condition related malnutrition multiple diseases, COPD, CHF as evidenced by reported intake <75% of estimated needs for >1 month, weight loss >10% over 6 months       Nutrition Diagnosis 2 None        Goal(s) Average Meal Intake at Least %     Nutrition Intervention and Prescription       Intervention  Completed NFPE and Encourage PO intake       Diet Prescription Cardiac     Supplement Prescription None     Education Provided  N/A   (3)  Monitoring/Evaluation       Monitor/Evaluation PO Intake     RD Follow-Up Encounter 3-5 days     Electronically signed by:  Yesica Haskins RD  07/07/25 07:49 EDT

## 2025-07-07 NOTE — PROGRESS NOTES
CARDIOLOGY FOLLOW-UP PROGRESS NOTE      Reason for follow-up:    Atrial fibrillation   Shortness of breath     Attending: Luna Salinas MD      Subjective .     Patient is awake and alert sitting in the chair at time of exam.  Family at bedside  Regular heart atrial fibrillation 90s  Volume is much improved after IV diuretics    Still trouble with rate control  Blood pressures 100s to 110 systolic  EP consult pending    Received IV digoxin in addition to IV diltiazem drip     Review of Systems   Constitutional: Negative for malaise/fatigue.   HENT: Negative.     Eyes: Negative.    Cardiovascular:  Negative for chest pain, dyspnea on exertion, irregular heartbeat, leg swelling and palpitations.   Respiratory:  Negative for cough and shortness of breath.    Endocrine: Negative.    Hematologic/Lymphatic: Negative.    Skin:  Negative for color change and nail changes.   Musculoskeletal: Negative.    Gastrointestinal: Negative.    Genitourinary: Negative.    Neurological:  Negative for light-headedness.   Psychiatric/Behavioral: Negative.     Allergic/Immunologic: Negative.      Pertinent items are noted in HPI, all other systems reviewed and negative  Allergies: Patient has no known allergies.    Scheduled Meds:acyclovir, 400 mg, Oral, BID  apixaban, 5 mg, Oral, BID  furosemide, 40 mg, Intravenous, Q12H  hydroxychloroquine, 200 mg, Oral, Q12H  insulin lispro, 2-7 Units, Subcutaneous, 4x Daily AC & at Bedtime  levothyroxine, 100 mcg, Oral, Q AM  metoprolol tartrate, 50 mg, Oral, Q12H  pantoprazole, 40 mg, Oral, Q AM  sertraline, 25 mg, Oral, Daily  sodium chloride, 10 mL, Intravenous, Q12H        Continuous Infusions:     PRN Meds:.  acetaminophen **OR** acetaminophen **OR** acetaminophen    Calcium Replacement - Follow Nurse / BPA Driven Protocol    dextrose    dextrose    glucagon (human recombinant)    Magnesium Standard Dose Replacement - Follow Nurse / BPA Driven Protocol    melatonin    nitroglycerin    Phosphorus  "Replacement - Follow Nurse / BPA Driven Protocol    Potassium Replacement - Follow Nurse / BPA Driven Protocol    promethazine    sodium chloride    sodium chloride    Objective     VITAL SIGNS  Patient Vitals for the past 24 hrs:   BP Temp Temp src Pulse Resp SpO2 Weight   07/07/25 0800 113/69 -- -- 96 -- -- --   07/07/25 0746 113/69 97.5 °F (36.4 °C) Oral 102 19 97 % --   07/07/25 0456 124/69 97.6 °F (36.4 °C) Oral 77 22 98 % 81.1 kg (178 lb 12.7 oz)   07/07/25 0049 125/65 97.5 °F (36.4 °C) Oral 84 20 100 % --   07/06/25 2100 -- -- -- (!) 139 -- 98 % --   07/06/25 1900 105/55 97.6 °F (36.4 °C) Oral (!) 140 15 98 % --   07/06/25 1632 108/79 97.7 °F (36.5 °C) Oral (!) 129 16 98 % --   07/06/25 1324 -- -- -- (!) 141 -- -- --   07/06/25 1308 104/70 98.6 °F (37 °C) Oral (!) 126 15 -- --   07/06/25 1027 (!) 89/63 -- -- 119 -- 100 % --        Flowsheet Rows      Flowsheet Row First Filed Value   Admission Height 154.9 cm (61\") Documented at 07/04/2025 2300   Admission Weight 81.6 kg (179 lb 14.3 oz) Documented at 07/04/2025 2300            Body mass index is 33.78 kg/m².      Intake/Output Summary (Last 24 hours) at 7/7/2025 0959  Last data filed at 7/7/2025 0800  Gross per 24 hour   Intake 720 ml   Output 1500 ml   Net -780 ml        TELEMETRY:     Atrial fibrillation rates controlled 80s    Physical Exam:  Physical Exam     Constitutional:       General: she is not in acute distress.     Appearance: Normal appearance. .   HENT:      Head: Normocephalic and atraumatic.   Eyes:      Extraocular Movements: Extraocular movements intact.      Pupils: Pupils are equal, round, and reactive to light.   Cardiovascular:      Rate and Rhythm: Normal rate and irregular rhythm.   Pulmonary:      Effort: Pulmonary effort is normal.      Breath sounds: diminished breath sounds.   Abdominal:      General: Abdomen not distended. Bowel sounds are normal.      Palpations: Abdomen is soft.   Musculoskeletal:      Cervical back: Normal " range of motion.   Skin:     General: Skin is warm and dry.      1+ BLE edema  Neurological:      General: No focal deficit present.      Mental Status: He is alert and oriented to person, place, and time. Mental status is at baseline.   Psychiatric:         Mood and Affect: Mood normal.         Behavior: Behavior normal.         Thought Content: Thought content normal.     Results Review:   I reviewed the patient's new clinical results.    CBC    Results from last 7 days   Lab Units 07/07/25  0225 07/06/25  0208 07/05/25  0115 07/03/25  0902 07/02/25  1330 07/02/25  0156 07/01/25  1436 06/30/25  1100   WBC 10*3/mm3 4.88 4.60 4.99 4.17  --  7.11 5.99 5.60   HEMOGLOBIN g/dL 10.3* 10.3* 10.8* 8.1* 9.5* 8.1* 9.7* 10.0*   PLATELETS 10*3/mm3 214 211 239 123*  --  232 199 200     BMP   Results from last 7 days   Lab Units 07/07/25  0225 07/06/25  0208 07/05/25  0115 07/03/25  0902 07/02/25  0156 07/01/25  1436 06/30/25  1100   SODIUM mmol/L 134* 133* 138 141 137 138 139   POTASSIUM mmol/L 3.8 3.7 4.1 3.1* 4.2 4.2 3.6   CHLORIDE mmol/L 97* 93* 96* 106 99 99 98   CO2 mmol/L 30.0* 30.8* 29.4* 24.0 24.1 24.2 26.2   BUN mg/dL 10.8 14.6 15.8 16.6 18.9 16.4 14.0   CREATININE mg/dL 0.57 0.77 0.90 0.62 0.84 0.98 0.92   GLUCOSE mg/dL 94 119* 118* 159* 169* 208* 145*   MAGNESIUM mg/dL  --  1.9 1.8 1.7  --   --   --    PHOSPHORUS mg/dL  --   --  3.7 3.3  --   --   --      Cr Clearance Estimated Creatinine Clearance: 88.6 mL/min (by C-G formula based on SCr of 0.57 mg/dL).  Coag     HbA1C   Lab Results   Component Value Date    HGBA1C 6.06 (H) 05/19/2025     Blood Glucose   Glucose   Date/Time Value Ref Range Status   07/07/2025 0744 99 70 - 105 mg/dL Final     Comment:     Serial Number: [id number]:  [id number]   07/06/2025 2037 155 (H) 70 - 105 mg/dL Final     Comment:     Serial Number: [id number]:  [id number]   07/06/2025 1636 122 (H) 70 - 105 mg/dL Final     Comment:     Serial Number: [id number]:  [id number]  "  07/06/2025 1125 140 (H) 70 - 105 mg/dL Final     Comment:     Serial Number: [id number]:  [id number]   07/06/2025 0727 102 70 - 105 mg/dL Final     Comment:     Serial Number: [id number]:  [id number]   07/05/2025 2030 130 (H) 70 - 105 mg/dL Final     Comment:     Serial Number: [id number]:  [id number]   07/05/2025 1642 125 (H) 70 - 105 mg/dL Final     Comment:     Serial Number: [id number]:  [id number]   07/05/2025 1157 111 (H) 70 - 105 mg/dL Final     Comment:     Serial Number: [id number]:  [id number]     Infection   Results from last 7 days   Lab Units 07/05/25  0115 07/01/25  1549 06/30/25  1211   URINECX   --   --  <25,000 CFU/mL Gram Negative Bacilli*   PROCALCITONIN ng/mL 1.47* 0.26*  --      CMP   Results from last 7 days   Lab Units 07/07/25  0225 07/06/25  0208 07/05/25  0115 07/03/25  0902 07/02/25  0156 07/01/25  1436 06/30/25  1100   SODIUM mmol/L 134* 133* 138 141 137 138 139   POTASSIUM mmol/L 3.8 3.7 4.1 3.1* 4.2 4.2 3.6   CHLORIDE mmol/L 97* 93* 96* 106 99 99 98   CO2 mmol/L 30.0* 30.8* 29.4* 24.0 24.1 24.2 26.2   BUN mg/dL 10.8 14.6 15.8 16.6 18.9 16.4 14.0   CREATININE mg/dL 0.57 0.77 0.90 0.62 0.84 0.98 0.92   GLUCOSE mg/dL 94 119* 118* 159* 169* 208* 145*   ALBUMIN g/dL  --   --  3.8  --   --  3.7 3.6   BILIRUBIN mg/dL  --   --  0.5  --   --  0.3 0.3   ALK PHOS U/L  --   --  113  --   --  64 67   AST (SGOT) U/L  --   --  34*  --   --  13 13   ALT (SGPT) U/L  --   --  51*  --   --  19 18     ABG      UA    Results from last 7 days   Lab Units 06/30/25  1211   NITRITE UA  Negative   WBC UA /HPF 0-2   BACTERIA UA /HPF None Seen   SQUAM EPITHEL UA /HPF 0-2   URINECX  <25,000 CFU/mL Gram Negative Bacilli*     ASTER  No results found for: \"POCMETH\", \"POCAMPHET\", \"POCBARBITUR\", \"POCBENZO\", \"POCCOCAINE\", \"POCOPIATES\", \"POCOXYCODO\", \"POCPHENCYC\", \"POCPROPOXY\", \"POCTHC\", \"POCTRICYC\"  Lysis Labs   Results from last 7 days   Lab Units 07/07/25  0225 07/06/25  0208 " 07/05/25  0115 07/03/25  0902 07/02/25  1330 07/02/25  0156 07/01/25  1436 06/30/25  1100   HEMOGLOBIN g/dL 10.3* 10.3* 10.8* 8.1* 9.5* 8.1* 9.7* 10.0*   PLATELETS 10*3/mm3 214 211 239 123*  --  232 199 200   CREATININE mg/dL 0.57 0.77 0.90 0.62  --  0.84 0.98 0.92     Radiology(recent) No radiology results for the last day    Imaging Results (Last 24 Hours)       Procedure Component Value Units Date/Time    XR Outside Chest [[id number]] Resulted: 07/07/25 0924     Updated: 07/07/25 0924    Narrative:      This procedure was auto-finalized with no dictation required.            Results from last 7 days   Lab Units 07/01/25  1549   HSTROP T ng/L 34*       EKG         I personally viewed and interpreted the patient's EKG/Telemetry data:        ECHOCARDIOGRAM:     Results for orders placed during the hospital encounter of 07/01/25    Adult Transesophageal Echo (ANN) W/ Cont if Necessary Per Protocol (Cardiology Department) 07/03/2025 1534    Interpretation Summary    Left ventricular systolic function is normal. Estimated left ventricular EF = 60% Left ventricular ejection fraction appears to be 56 - 60%.    Left ventricular wall thickness is consistent with mild to moderate concentric hypertrophy.    Left ventricular diastolic function is consistent with (grade I) impaired relaxation.    The left atrial cavity is mild to moderately dilated.    Abnormal mitral valve structure consistent with dilated annulus.    Estimated right ventricular systolic pressure from tricuspid regurgitation is normal (<35 mmHg).    There is a trivial pericardial effusion.    Procedure indication  Atrial fibrillation with uncontrollable rate    conscious sedation administered by anesthesia  Timeout before procedure    consent obtained before procedure      Procedure note  after obtaining a valid consent patient was sedated by Anesthesia and a ANN probe was easily placed into esophagus with multiplane imaging with 2D, color and Doppler followed  by bubble study with agitated saline without any complications    ANN  Findings    Mild to moderate left atrial enlargement with mild to moderate central MR without any shunt or clot  EF is normal with EF of 60% with mild LVH  Mild RVH with normal RV systolic  Mild TR without pulm hypertension and trivial effusion noted    Plan  Proceed with cardioversion    Procedures done  Transesophageal echocardiogram    Electronically signed by Bull Wiggins MD, 07/03/25, 3:34 PM EDT.          Assessment & Plan            Atrial fibrillation with RVR    Acute on chronic heart failure with preserved ejection fraction (HFpEF)        ASSESSMENT:    Heart failure with preserved ejection fraction  Chest x-ray consistent with pulmonary edema  proBNP 11,000  Receiving IV diuretics  Recent TEEJuly 3, 2025 with a EF of 60%, moderate concentric LVH, mild TR     Paroxysmal atrial fibrillation  Recent cardioversion    Hypertension  Stable on current medical therapy     Anemia/multiple myeloma  Hemoglobin 10  Follows regularly with hematology    PLAN:    Continue anticoagulation with Eliquis  Continue oral beta-blockers  EP consult pending  Continue digoxin IV as needed, scheduled diltiazem, beta-blocker    Transition to oral Lasix twice daily      Further recommendations to follow findings and clinical course  Ion Felton MD, PhD      STANLEY Bocanegra  07/07/25  09:59 EDT

## 2025-07-08 LAB
ALBUMIN SERPL-MCNC: 3 G/DL (ref 3.5–5.2)
ALBUMIN/GLOB SERPL: 1 G/DL
ALP SERPL-CCNC: 79 U/L (ref 39–117)
ALT SERPL W P-5'-P-CCNC: 23 U/L (ref 1–33)
ANION GAP SERPL CALCULATED.3IONS-SCNC: 9 MMOL/L (ref 5–15)
AST SERPL-CCNC: 32 U/L (ref 1–32)
BASOPHILS # BLD AUTO: 0 10*3/MM3 (ref 0–0.2)
BASOPHILS NFR BLD AUTO: 0 % (ref 0–1.5)
BILIRUB SERPL-MCNC: 0.3 MG/DL (ref 0–1.2)
BUN SERPL-MCNC: 12 MG/DL (ref 8–23)
BUN/CREAT SERPL: 18.2 (ref 7–25)
CALCIUM SPEC-SCNC: 8.9 MG/DL (ref 8.6–10.5)
CHLORIDE SERPL-SCNC: 100 MMOL/L (ref 98–107)
CO2 SERPL-SCNC: 28 MMOL/L (ref 22–29)
CREAT SERPL-MCNC: 0.66 MG/DL (ref 0.57–1)
DEPRECATED RDW RBC AUTO: 51.8 FL (ref 37–54)
EGFRCR SERPLBLD CKD-EPI 2021: 94.5 ML/MIN/1.73
EOSINOPHIL # BLD AUTO: 0 10*3/MM3 (ref 0–0.4)
EOSINOPHIL NFR BLD AUTO: 0 % (ref 0.3–6.2)
ERYTHROCYTE [DISTWIDTH] IN BLOOD BY AUTOMATED COUNT: 15.9 % (ref 12.3–15.4)
GLOBULIN UR ELPH-MCNC: 2.9 GM/DL
GLUCOSE BLDC GLUCOMTR-MCNC: 116 MG/DL (ref 70–105)
GLUCOSE BLDC GLUCOMTR-MCNC: 121 MG/DL (ref 70–105)
GLUCOSE BLDC GLUCOMTR-MCNC: 139 MG/DL (ref 70–105)
GLUCOSE BLDC GLUCOMTR-MCNC: 98 MG/DL (ref 70–105)
GLUCOSE SERPL-MCNC: 110 MG/DL (ref 65–99)
HCT VFR BLD AUTO: 32.1 % (ref 34–46.6)
HGB BLD-MCNC: 10.1 G/DL (ref 12–15.9)
IMM GRANULOCYTES # BLD AUTO: 0 10*3/MM3 (ref 0–0.05)
IMM GRANULOCYTES NFR BLD AUTO: 0 % (ref 0–0.5)
LYMPHOCYTES # BLD AUTO: 2.05 10*3/MM3 (ref 0.7–3.1)
LYMPHOCYTES NFR BLD AUTO: 57.7 % (ref 19.6–45.3)
MAGNESIUM SERPL-MCNC: 2.2 MG/DL (ref 1.6–2.4)
MCH RBC QN AUTO: 28.4 PG (ref 26.6–33)
MCHC RBC AUTO-ENTMCNC: 31.5 G/DL (ref 31.5–35.7)
MCV RBC AUTO: 90.2 FL (ref 79–97)
MONOCYTES # BLD AUTO: 0.26 10*3/MM3 (ref 0.1–0.9)
MONOCYTES NFR BLD AUTO: 7.3 % (ref 5–12)
NEUTROPHILS NFR BLD AUTO: 1.24 10*3/MM3 (ref 1.7–7)
NEUTROPHILS NFR BLD AUTO: 35 % (ref 42.7–76)
NRBC BLD AUTO-RTO: 0 /100 WBC (ref 0–0.2)
PLATELET # BLD AUTO: 362 10*3/MM3 (ref 140–450)
PMV BLD AUTO: 11.5 FL (ref 6–12)
POTASSIUM SERPL-SCNC: 4.6 MMOL/L (ref 3.5–5.2)
PROT SERPL-MCNC: 5.9 G/DL (ref 6–8.5)
QT INTERVAL: 350 MS
QT INTERVAL: 356 MS
QT INTERVAL: 366 MS
QT INTERVAL: 378 MS
QTC INTERVAL: 427 MS
QTC INTERVAL: 430 MS
QTC INTERVAL: 436 MS
QTC INTERVAL: 534 MS
RBC # BLD AUTO: 3.56 10*6/MM3 (ref 3.77–5.28)
SODIUM SERPL-SCNC: 137 MMOL/L (ref 136–145)
WBC NRBC COR # BLD AUTO: 3.55 10*3/MM3 (ref 3.4–10.8)

## 2025-07-08 PROCEDURE — 93010 ELECTROCARDIOGRAM REPORT: CPT | Performed by: INTERNAL MEDICINE

## 2025-07-08 PROCEDURE — 93005 ELECTROCARDIOGRAM TRACING: CPT | Performed by: NURSE PRACTITIONER

## 2025-07-08 PROCEDURE — 83735 ASSAY OF MAGNESIUM: CPT | Performed by: INTERNAL MEDICINE

## 2025-07-08 PROCEDURE — 99233 SBSQ HOSP IP/OBS HIGH 50: CPT | Performed by: INTERNAL MEDICINE

## 2025-07-08 PROCEDURE — 93010 ELECTROCARDIOGRAM REPORT: CPT | Performed by: STUDENT IN AN ORGANIZED HEALTH CARE EDUCATION/TRAINING PROGRAM

## 2025-07-08 PROCEDURE — 85025 COMPLETE CBC W/AUTO DIFF WBC: CPT

## 2025-07-08 PROCEDURE — 93005 ELECTROCARDIOGRAM TRACING: CPT | Performed by: INTERNAL MEDICINE

## 2025-07-08 PROCEDURE — 82948 REAGENT STRIP/BLOOD GLUCOSE: CPT | Performed by: STUDENT IN AN ORGANIZED HEALTH CARE EDUCATION/TRAINING PROGRAM

## 2025-07-08 PROCEDURE — 99222 1ST HOSP IP/OBS MODERATE 55: CPT | Performed by: INTERNAL MEDICINE

## 2025-07-08 PROCEDURE — 82948 REAGENT STRIP/BLOOD GLUCOSE: CPT

## 2025-07-08 PROCEDURE — 80053 COMPREHEN METABOLIC PANEL: CPT | Performed by: HOSPITALIST

## 2025-07-08 RX ORDER — METOPROLOL SUCCINATE 50 MG/1
50 TABLET, EXTENDED RELEASE ORAL EVERY 12 HOURS SCHEDULED
Status: DISCONTINUED | OUTPATIENT
Start: 2025-07-09 | End: 2025-07-08

## 2025-07-08 RX ORDER — POLYETHYLENE GLYCOL 3350 17 G/17G
17 POWDER, FOR SOLUTION ORAL EVERY 12 HOURS PRN
Status: DISCONTINUED | OUTPATIENT
Start: 2025-07-08 | End: 2025-07-10 | Stop reason: HOSPADM

## 2025-07-08 RX ORDER — SOTALOL HYDROCHLORIDE 80 MG/1
80 TABLET ORAL EVERY 12 HOURS SCHEDULED
Status: DISCONTINUED | OUTPATIENT
Start: 2025-07-08 | End: 2025-07-08

## 2025-07-08 RX ORDER — SOTALOL HYDROCHLORIDE 80 MG/1
80 TABLET ORAL EVERY 12 HOURS SCHEDULED
Status: DISCONTINUED | OUTPATIENT
Start: 2025-07-08 | End: 2025-07-10 | Stop reason: HOSPADM

## 2025-07-08 RX ORDER — NITROGLYCERIN 0.4 MG/1
0.4 TABLET SUBLINGUAL
Status: DISCONTINUED | OUTPATIENT
Start: 2025-07-08 | End: 2025-07-10 | Stop reason: HOSPADM

## 2025-07-08 RX ADMIN — APIXABAN 5 MG: 5 TABLET, FILM COATED ORAL at 21:00

## 2025-07-08 RX ADMIN — SOTALOL HYDROCHLORIDE 80 MG: 80 TABLET ORAL at 21:00

## 2025-07-08 RX ADMIN — HYDROXYCHLOROQUINE SULFATE 200 MG: 200 TABLET, FILM COATED ORAL at 21:00

## 2025-07-08 RX ADMIN — PANTOPRAZOLE SODIUM 40 MG: 40 TABLET, DELAYED RELEASE ORAL at 05:16

## 2025-07-08 RX ADMIN — Medication 10 ML: at 21:01

## 2025-07-08 RX ADMIN — POTASSIUM CHLORIDE 10 MEQ: 750 TABLET, EXTENDED RELEASE ORAL at 08:41

## 2025-07-08 RX ADMIN — Medication 10 ML: at 08:46

## 2025-07-08 RX ADMIN — ACYCLOVIR 400 MG: 200 CAPSULE ORAL at 21:00

## 2025-07-08 RX ADMIN — FUROSEMIDE 20 MG: 20 TABLET ORAL at 17:15

## 2025-07-08 RX ADMIN — METOPROLOL TARTRATE 50 MG: 50 TABLET, FILM COATED ORAL at 08:41

## 2025-07-08 RX ADMIN — POLYETHYLENE GLYCOL 3350 17 G: 17 POWDER, FOR SOLUTION ORAL at 16:31

## 2025-07-08 RX ADMIN — HYDROXYCHLOROQUINE SULFATE 200 MG: 200 TABLET, FILM COATED ORAL at 08:41

## 2025-07-08 RX ADMIN — LEVOTHYROXINE SODIUM 100 MCG: 0.1 TABLET ORAL at 05:16

## 2025-07-08 RX ADMIN — FUROSEMIDE 20 MG: 20 TABLET ORAL at 08:41

## 2025-07-08 RX ADMIN — SERTRALINE HYDROCHLORIDE 25 MG: 25 TABLET, FILM COATED ORAL at 08:41

## 2025-07-08 RX ADMIN — ACYCLOVIR 400 MG: 200 CAPSULE ORAL at 08:41

## 2025-07-08 RX ADMIN — APIXABAN 5 MG: 5 TABLET, FILM COATED ORAL at 08:41

## 2025-07-08 RX ADMIN — Medication 5 MG: at 21:00

## 2025-07-08 NOTE — NURSING NOTE
Dr. Jimenez rounded. Plan to start sotalol tonight and plan for cardioversion on Thursday.     Other plans of ablation and watchman discussed as outpatient.

## 2025-07-08 NOTE — PROGRESS NOTES
CARDIOLOGY FOLLOW-UP PROGRESS NOTE      Reason for follow-up:    Atrial fibrillation   Shortness of breath     Attending: Giovanny Harding DO      Subjective .     Doing well on oral diuretics, off IV, volume reduced  Rates are controlled presently in the 80s blood pressure 100/60 this morning  No distress on encounter    \Telemetry reviewed and interpreted demonstrates rate controlled atrial fibrillation in the 80s     Review of Systems   Constitutional: Negative for malaise/fatigue.   HENT: Negative.     Eyes: Negative.    Cardiovascular:  Negative for chest pain, dyspnea on exertion, irregular heartbeat, leg swelling and palpitations.   Respiratory:  Negative for cough and shortness of breath.    Endocrine: Negative.    Hematologic/Lymphatic: Negative.    Skin:  Negative for color change and nail changes.   Musculoskeletal: Negative.    Gastrointestinal: Negative.    Genitourinary: Negative.    Neurological:  Negative for light-headedness.   Psychiatric/Behavioral: Negative.     Allergic/Immunologic: Negative.      Pertinent items are noted in HPI, all other systems reviewed and negative  Allergies: Patient has no known allergies.    Scheduled Meds:acyclovir, 400 mg, Oral, BID  apixaban, 5 mg, Oral, BID  furosemide, 20 mg, Oral, BID Diuretics  hydroxychloroquine, 200 mg, Oral, Q12H  insulin lispro, 2-7 Units, Subcutaneous, 4x Daily AC & at Bedtime  levothyroxine, 100 mcg, Oral, Q AM  metoprolol tartrate, 50 mg, Oral, Q12H  pantoprazole, 40 mg, Oral, Q AM  potassium chloride, 10 mEq, Oral, Daily  sertraline, 25 mg, Oral, Daily  sodium chloride, 10 mL, Intravenous, Q12H        Continuous Infusions:     PRN Meds:.  acetaminophen **OR** acetaminophen **OR** acetaminophen    aluminum-magnesium hydroxide-simethicone    Calcium Replacement - Follow Nurse / BPA Driven Protocol    dextrose    dextrose    glucagon (human recombinant)    Magnesium Standard Dose Replacement - Follow Nurse / BPA Driven Protocol     "melatonin    nitroglycerin    ondansetron    Phosphorus Replacement - Follow Nurse / BPA Driven Protocol    Potassium Replacement - Follow Nurse / BPA Driven Protocol    promethazine    sodium chloride    sodium chloride    Objective     VITAL SIGNS  Patient Vitals for the past 24 hrs:   BP Temp Temp src Pulse Resp SpO2 Weight   07/08/25 0841 101/60 -- -- 84 -- -- --   07/08/25 0700 116/59 97.6 °F (36.4 °C) Oral 82 23 94 % --   07/08/25 0405 97/59 97.7 °F (36.5 °C) Oral 88 21 94 % 78.8 kg (173 lb 11.6 oz)   07/07/25 2325 110/58 98.8 °F (37.1 °C) Oral 95 20 94 % --   07/07/25 1943 123/66 98.1 °F (36.7 °C) Oral 119 16 92 % --   07/07/25 1732 145/71 -- -- 109 -- -- --   07/07/25 1619 121/54 98 °F (36.7 °C) Oral 93 23 95 % --   07/07/25 1111 106/56 98.1 °F (36.7 °C) Oral 94 18 96 % --   07/07/25 1005 111/68 -- -- 79 -- 93 % --        Flowsheet Rows      Flowsheet Row First Filed Value   Admission Height 154.9 cm (61\") Documented at 07/04/2025 2300   Admission Weight 81.6 kg (179 lb 14.3 oz) Documented at 07/04/2025 2300            Body mass index is 32.82 kg/m².      Intake/Output Summary (Last 24 hours) at 7/8/2025 0916  Last data filed at 7/7/2025 2000  Gross per 24 hour   Intake 480 ml   Output --   Net 480 ml        TELEMETRY:     Atrial fibrillation rates controlled 80s    Physical Exam:  Physical Exam     Constitutional:       General: she is not in acute distress.     Appearance: Normal appearance. .   HENT:      Head: Normocephalic and atraumatic.   Eyes:      Extraocular Movements: Extraocular movements intact.      Pupils: Pupils are equal, round, and reactive to light.   Cardiovascular:      Irregular, normal rate in the 80s, no new rubs murmurs or gallops    Pulmonary:      Effort: Pulmonary effort is normal.      Breath sounds: diminished breath sounds.   Abdominal:      General: Abdomen not distended. Bowel sounds are normal.      Palpations: Abdomen is soft.   Musculoskeletal:      Cervical back: Normal " range of motion.   Skin:     General: Skin is warm and dry.      1+ BLE edema  Neurological:      General: No focal deficit present.      Mental Status: He is alert and oriented to person, place, and time. Mental status is at baseline.   Psychiatric:         Mood and Affect: Mood normal.         Behavior: Behavior normal.         Thought Content: Thought content normal.     Results Review:   I reviewed the patient's new clinical results.    CBC    Results from last 7 days   Lab Units 07/08/25 0522 07/07/25 0225 07/06/25  0208 07/05/25  0115 07/03/25  0902 07/02/25  1330 07/02/25  0156 07/01/25  1436   WBC 10*3/mm3 3.55 4.88 4.60 4.99 4.17  --  7.11 5.99   HEMOGLOBIN g/dL 10.1* 10.3* 10.3* 10.8* 8.1* 9.5* 8.1* 9.7*   PLATELETS 10*3/mm3 362 214 211 239 123*  --  232 199     BMP   Results from last 7 days   Lab Units 07/08/25  0522 07/07/25  0225 07/06/25  0208 07/05/25  0115 07/03/25  0902 07/02/25  0156 07/01/25  1436   SODIUM mmol/L 137 134* 133* 138 141 137 138   POTASSIUM mmol/L 4.6 3.8 3.7 4.1 3.1* 4.2 4.2   CHLORIDE mmol/L 100 97* 93* 96* 106 99 99   CO2 mmol/L 28.0 30.0* 30.8* 29.4* 24.0 24.1 24.2   BUN mg/dL 12.0 10.8 14.6 15.8 16.6 18.9 16.4   CREATININE mg/dL 0.66 0.57 0.77 0.90 0.62 0.84 0.98   GLUCOSE mg/dL 110* 94 119* 118* 159* 169* 208*   MAGNESIUM mg/dL  --   --  1.9 1.8 1.7  --   --    PHOSPHORUS mg/dL  --   --   --  3.7 3.3  --   --      Cr Clearance Estimated Creatinine Clearance: 75.4 mL/min (by C-G formula based on SCr of 0.66 mg/dL).  Coag     HbA1C   Lab Results   Component Value Date    HGBA1C 6.06 (H) 05/19/2025     Blood Glucose   Glucose   Date/Time Value Ref Range Status   07/08/2025 0734 98 70 - 105 mg/dL Final     Comment:     Serial Number: [id number]:  [id number]   07/07/2025 1941 129 (H) 70 - 105 mg/dL Final     Comment:     Serial Number: [id number]:  [id number]   07/07/2025 1617 118 (H) 70 - 105 mg/dL Final     Comment:     Serial Number: [id number]:  [id number]  "  07/07/2025 1109 119 (H) 70 - 105 mg/dL Final     Comment:     Serial Number: [id number]:  [id number]   07/07/2025 0744 99 70 - 105 mg/dL Final     Comment:     Serial Number: [id number]:  [id number]   07/06/2025 2037 155 (H) 70 - 105 mg/dL Final     Comment:     Serial Number: [id number]:  [id number]   07/06/2025 1636 122 (H) 70 - 105 mg/dL Final     Comment:     Serial Number: [id number]:  [id number]   07/06/2025 1125 140 (H) 70 - 105 mg/dL Final     Comment:     Serial Number: [id number]:  [id number]     Infection   Results from last 7 days   Lab Units 07/05/25  0115 07/01/25  1549   PROCALCITONIN ng/mL 1.47* 0.26*     CMP   Results from last 7 days   Lab Units 07/08/25  0522 07/07/25  0225 07/06/25  0208 07/05/25  0115 07/03/25  0902 07/02/25  0156 07/01/25  1436   SODIUM mmol/L 137 134* 133* 138 141 137 138   POTASSIUM mmol/L 4.6 3.8 3.7 4.1 3.1* 4.2 4.2   CHLORIDE mmol/L 100 97* 93* 96* 106 99 99   CO2 mmol/L 28.0 30.0* 30.8* 29.4* 24.0 24.1 24.2   BUN mg/dL 12.0 10.8 14.6 15.8 16.6 18.9 16.4   CREATININE mg/dL 0.66 0.57 0.77 0.90 0.62 0.84 0.98   GLUCOSE mg/dL 110* 94 119* 118* 159* 169* 208*   ALBUMIN g/dL 3.0*  --   --  3.8  --   --  3.7   BILIRUBIN mg/dL 0.3  --   --  0.5  --   --  0.3   ALK PHOS U/L 79  --   --  113  --   --  64   AST (SGOT) U/L 32  --   --  34*  --   --  13   ALT (SGPT) U/L 23  --   --  51*  --   --  19     ABG      UA          ASTER  No results found for: \"POCMETH\", \"POCAMPHET\", \"POCBARBITUR\", \"POCBENZO\", \"POCCOCAINE\", \"POCOPIATES\", \"POCOXYCODO\", \"POCPHENCYC\", \"POCPROPOXY\", \"POCTHC\", \"POCTRICYC\"  Lysis Labs   Results from last 7 days   Lab Units 07/08/25  0522 07/07/25  0225 07/06/25  0208 07/05/25  0115 07/03/25  0902 07/02/25  1330 07/02/25  0156 07/01/25  1436   HEMOGLOBIN g/dL 10.1* 10.3* 10.3* 10.8* 8.1* 9.5* 8.1* 9.7*   PLATELETS 10*3/mm3 362 214 211 239 123*  --  232 199   CREATININE mg/dL 0.66 0.57 0.77 0.90 0.62  --  0.84 0.98     Radiology(recent) " No radiology results for the last day    Imaging Results (Last 24 Hours)       Procedure Component Value Units Date/Time    XR Outside Chest [[id number]] Resulted: 07/07/25 0924     Updated: 07/07/25 0924    Narrative:      This procedure was auto-finalized with no dictation required.            Results from last 7 days   Lab Units 07/01/25  1549   HSTROP T ng/L 34*       EKG         I personally viewed and interpreted the patient's EKG/Telemetry data:        ECHOCARDIOGRAM:     Results for orders placed during the hospital encounter of 07/01/25    Adult Transesophageal Echo (ANN) W/ Cont if Necessary Per Protocol (Cardiology Department) 07/03/2025  3:34 PM    Interpretation Summary    Left ventricular systolic function is normal. Estimated left ventricular EF = 60% Left ventricular ejection fraction appears to be 56 - 60%.    Left ventricular wall thickness is consistent with mild to moderate concentric hypertrophy.    Left ventricular diastolic function is consistent with (grade I) impaired relaxation.    The left atrial cavity is mild to moderately dilated.    Abnormal mitral valve structure consistent with dilated annulus.    Estimated right ventricular systolic pressure from tricuspid regurgitation is normal (<35 mmHg).    There is a trivial pericardial effusion.    Procedure indication  Atrial fibrillation with uncontrollable rate    conscious sedation administered by anesthesia  Timeout before procedure    consent obtained before procedure      Procedure note  after obtaining a valid consent patient was sedated by Anesthesia and a ANN probe was easily placed into esophagus with multiplane imaging with 2D, color and Doppler followed by bubble study with agitated saline without any complications    ANN  Findings    Mild to moderate left atrial enlargement with mild to moderate central MR without any shunt or clot  EF is normal with EF of 60% with mild LVH  Mild RVH with normal RV systolic  Mild TR without pulm  hypertension and trivial effusion noted    Plan  Proceed with cardioversion    Procedures done  Transesophageal echocardiogram    Electronically signed by Bull Wiggins MD, 07/03/25, 3:34 PM EDT.          Assessment & Plan            Atrial fibrillation with RVR    Acute on chronic heart failure with preserved ejection fraction (HFpEF)    Severe protein-calorie malnutrition        ASSESSMENT:    Heart failure with preserved ejection fraction  Chest x-ray consistent with pulmonary edema  proBNP 11,000  Receiving IV diuretics  Recent TEEJuly 3, 2025 with a EF of 60%, moderate concentric LVH, mild TR     Paroxysmal atrial fibrillation  Recent cardioversion    Hypertension  Stable on current medical therapy     Anemia/multiple myeloma  Hemoglobin 10  Follows regularly with hematology    PLAN:    Continue anticoagulation with Eliquis  Discontinue metoprolol  Start sotalol loading  EP consult pending for possible cardioversion, choice for antiarrhythmics if different than sotalol  Hold diltiazem with other beta-blockers at this time  oral Lasix twice daily      Further recommendations to follow findings and clinical course  Ion Felton MD, PhD      Ion Felton MD  07/08/25  09:16 EDT

## 2025-07-08 NOTE — CONSULTS
[facility] [place] [facility]   CONSULT NOTE    Referring Provider: Giovanny Harding DO    Reason for Consultation: atrial fibrillation      Patient Care Team:  Chula Sanchez APRN as PCP - General (Nurse Practitioner)  Reva Jaeger MD as PCP - Family Medicine  Neftali Barrett MD as Consulting Physician (Hematology and Oncology)      Chief complaint: shortness of breath    History of present illness:  Phuong Altamirano is a 70 y.o. female with past medical history of  atrial fibrillation s/p ANN guided cardioversion on 7/3/2025, diabetes, hypertension, dyslipidemia who presented to the [facility] ED with shortness of breath. Patient was recently seen in the [facility] ED where she was found to be in afib w/RVR and cardioverted. She then returned to [facility] ED the following day with shortness of breath and CHF symptoms. She was transferred here for further evaluation.    She eventually went back into atrial fibrillation over the weekend and required IV amiodarone and PRN IV digoxin. EP was consulted for possible cardioversion and medication management. She is currently on metoprolol and Eliquis.   Patient denies chest pain, palpitations, syncope, dizziness or lightheadedness.    ROS  As above in HPI    History  Past Medical History:   Diagnosis Date    Arthritis 2010    Asthma 1 month    Cancer 12 years    Multiple mylenoma    Depression 2010    Diabetes mellitus 1.5 years    High blood pressure     High cholesterol 2005    Hypothyroidism     Lupus 2010    Sleep apnea Now       Past Surgical History:   Procedure Laterality Date    BREAST LUMPECTOMY Right 1983    benign    DILATATION AND CURETTAGE  1995    LAPAROSCOPIC TUBAL LIGATION  1979    OVARY SURGERY  1985    remoal of left ovary       Family History   Problem Relation Age of Onset    Heart disease Father     Pancreatic cancer Sister 59    Heart disease Sister     Stroke Sister     Pneumonia Brother        Social History     Tobacco  Use    Smoking status: Former     Current packs/day: 0.00     Average packs/day: 1 pack/day for 21.0 years (21.0 ttl pk-yrs)     Types: Cigarettes     Start date:      Quit date:      Years since quittin.5    Smokeless tobacco: Never   Vaping Use    Vaping status: Never Used   Substance Use Topics    Alcohol use: Yes     Comment: Very occasionally    Drug use: No        Medications Prior to Admission   Medication Sig Dispense Refill Last Dose/Taking    acyclovir (ZOVIRAX) 400 MG tablet Take 1 tablet by mouth 2 (Two) Times a Day. 60 tablet 3 2025 Morning    apixaban (ELIQUIS) 5 MG tablet tablet Take 1 tablet by mouth 2 (Two) Times a Day for 30 days. 60 tablet 0 2025 Morning    ferrous sulfate 325 (65 FE) MG tablet Take 1 tablet by mouth Daily With Breakfast for 30 days. 30 tablet 0 2025 Morning    furosemide (Lasix) 20 MG tablet Take 1 tablet by mouth 2 (Two) Times a Day for 30 days.   2025 Morning    hydroxychloroquine (PLAQUENIL) 200 MG tablet Every 12 (Twelve) Hours.   2025 Morning    levothyroxine (SYNTHROID, LEVOTHROID) 100 MCG tablet Take 1 tablet by mouth Every Morning Before Breakfast.  1 2025 Morning    metFORMIN (GLUCOPHAGE) 500 MG tablet Take 1 tablet by mouth Daily With Dinner.   7/3/2025 Evening    metoprolol tartrate (LOPRESSOR) 50 MG tablet Take 1 tablet by mouth Every 12 (Twelve) Hours for 30 days. 60 tablet 0 2025 Morning    omeprazole (priLOSEC) 40 MG capsule Take 1 capsule by mouth Daily.   2025 Morning    sertraline (ZOLOFT) 25 MG tablet Take 1 tablet by mouth Daily.  1 2025 Morning    sulfamethoxazole-trimethoprim (BACTRIM DS,SEPTRA DS) 800-160 MG per tablet Take 1 tablet by mouth 3 (Three) Times a Week. Take 1 tablet on ,  and . 12 tablet 3 2025 Morning    acetaminophen (TYLENOL) 325 MG tablet Take 2 tablets by mouth Every 6 (Six) Hours As Needed for Mild Pain.       acetaminophen (TYLENOL) 325 MG tablet Take 2 tablets  by mouth Every 4 (Four) Hours As Needed for Mild Pain.       albuterol sulfate HFA (Ventolin HFA) 108 (90 Base) MCG/ACT inhaler Inhale 1 puff Every 6 (Six) Hours As Needed.       dexAMETHasone (DECADRON) 4 MG tablet Take 5 tablets by mouth Daily With Breakfast. Take with food on Days 1, 2, 8, 9, 15 and 16. 30 tablet 3 2025 Morning    lenalidomide (REVLIMID) 25 MG capsule Take 1 capsule by mouth See Admin Instructions. Take 1 capsule by mouth daily on days 1-14, OFF 7 days of each 21 day cycle. Take with or without food. Swallow capsules whole, do not crush, break or chew. 14 capsule 0 2025 Morning    ondansetron (ZOFRAN) 8 MG tablet Take 1 tablet by mouth 3 (Three) Times a Day As Needed for Nausea or Vomiting. 30 tablet 5     [] oxyCODONE (ROXICODONE) 5 MG immediate release tablet Take 1 tablet by mouth Every 6 (Six) Hours As Needed for Moderate Pain for up to 3 days. 12 tablet 0     promethazine (PHENERGAN) 12.5 MG tablet Take 1 tablet by mouth Every 6 (Six) Hours As Needed for Nausea or Vomiting. 30 tablet 2          Patient has no known allergies.    Scheduled Meds:  acyclovir, 400 mg, Oral, BID  apixaban, 5 mg, Oral, BID  furosemide, 20 mg, Oral, BID Diuretics  hydroxychloroquine, 200 mg, Oral, Q12H  insulin lispro, 2-7 Units, Subcutaneous, 4x Daily AC & at Bedtime  levothyroxine, 100 mcg, Oral, Q AM  [START ON 2025] metoprolol succinate XL, 50 mg, Oral, Q12H  pantoprazole, 40 mg, Oral, Q AM  potassium chloride, 10 mEq, Oral, Daily  sertraline, 25 mg, Oral, Daily  sodium chloride, 10 mL, Intravenous, Q12H        Continuous Infusions:       PRN Meds:    acetaminophen **OR** acetaminophen **OR** acetaminophen    aluminum-magnesium hydroxide-simethicone    Calcium Replacement - Follow Nurse / BPA Driven Protocol    dextrose    dextrose    glucagon (human recombinant)    Magnesium Cardiology Dose Replacement - Follow Nurse / BPA Driven Protocol    melatonin    nitroglycerin    ondansetron     "Phosphorus Replacement - Follow Nurse / BPA Driven Protocol    Potassium Replacement - Follow Nurse / BPA Driven Protocol    promethazine    sodium chloride    sodium chloride      VITAL SIGNS  Vitals:    07/08/25 0405 07/08/25 0700 07/08/25 0841 07/08/25 1100   BP: 97/59 116/59 101/60 93/50   BP Location: Left arm Left arm  Right arm   Patient Position: Lying Lying  Sitting   Pulse: 88 82 84 (!) 122   Resp: 21 23  19   Temp: 97.7 °F (36.5 °C) 97.6 °F (36.4 °C)  97.9 °F (36.6 °C)   TempSrc: Oral Oral  Oral   SpO2: 94% 94%  94%   Weight: 78.8 kg (173 lb 11.6 oz)      Height:           Flowsheet Rows      Flowsheet Row First Filed Value   Admission Height 154.9 cm (61\") Documented at 07/04/2025 2300   Admission Weight 81.6 kg (179 lb 14.3 oz) Documented at 07/04/2025 2300             TELEMETRY: atrial fibrillation    Physical Exam  VITALS REVIEWED    General:      well developed, in no acute distress.    Head:      normocephalic and atraumatic.    Eyes:      PERRL/EOM intact, conjunctiva and sclera clear with out nystagmus.    Neck:      no masses, thyromegaly,  trachea central with normal respiratory effort and PMI displaced laterally  Lungs:      Clear to auscultation bilaterally  Heart:       Irregular rhythm and rate  Msk:      no deformity or scoliosis noted of thoracic or lumbar spine.    Pulses:      pulses normal in all 4 extremities.    Extremities:       +1 lower extremity edema    Neurologic:      no focal deficits.   alert oriented x3  Skin:      intact without lesions or rashes.    Psych:      alert and cooperative; normal mood and affect; normal attention span and concentration.        LAB RESULTS (LAST 7 DAYS)    CMP   Results from last 7 days   Lab Units 07/08/25  0522 07/07/25  0225 07/06/25  0208 07/05/25  0115   SODIUM mmol/L 137 134* 133* 138   POTASSIUM mmol/L 4.6 3.8 3.7 4.1   CHLORIDE mmol/L 100 97* 93* 96*   CO2 mmol/L 28.0 30.0* 30.8* 29.4*   BUN mg/dL 12.0 10.8 14.6 15.8   CREATININE mg/dL " 0.66 0.57 0.77 0.90   GLUCOSE mg/dL 110* 94 119* 118*   ALBUMIN g/dL 3.0*  --   --  3.8   BILIRUBIN mg/dL 0.3  --   --  0.5   ALK PHOS U/L 79  --   --  113   AST (SGOT) U/L 32  --   --  34*   ALT (SGPT) U/L 23  --   --  51*       BMP  Results from last 7 days   Lab Units 07/08/25 0522 07/07/25 0225 07/06/25  0208 07/05/25  0115 07/03/25  0902   SODIUM mmol/L 137 134* 133* 138 141   POTASSIUM mmol/L 4.6 3.8 3.7 4.1 3.1*   CHLORIDE mmol/L 100 97* 93* 96* 106   CO2 mmol/L 28.0 30.0* 30.8* 29.4* 24.0   BUN mg/dL 12.0 10.8 14.6 15.8 16.6   CREATININE mg/dL 0.66 0.57 0.77 0.90 0.62   GLUCOSE mg/dL 110* 94 119* 118* 159*   MAGNESIUM mg/dL 2.2  --  1.9 1.8 1.7   PHOSPHORUS mg/dL  --   --   --  3.7 3.3       CBC  Results from last 7 days   Lab Units 07/08/25 0522 07/07/25 0225 07/06/25  0208   WBC 10*3/mm3 3.55 4.88 4.60   RBC 10*6/mm3 3.56* 3.66* 3.70*   HEMOGLOBIN g/dL 10.1* 10.3* 10.3*   HEMATOCRIT % 32.1* 33.5* 33.6*   MCV fL 90.2 91.5 90.8   PLATELETS 10*3/mm3 362 214 211       ProBNP        TROPONIN  Results from last 7 days   Lab Units 07/01/25  1549   HSTROP T ng/L 34*       Creatinine Clearance  Estimated Creatinine Clearance: 75.4 mL/min (by C-G formula based on SCr of 0.66 mg/dL).      Radiology  No radiology results for the last day    EKG    I personally viewed and interpreted the patient's EKG/Telemetry data:  Telemetry Scan   Final Result      Telemetry Scan   Final Result      ECG 12 Lead Drug Monitoring; Sotalol   Final Result   HEART RATE=85  bpm   RR Aqwvfjnc=320  ms   TN Interval=  ms   P Horizontal Axis=  deg   P Front Axis=  deg   QRSD Interval=71  ms   QT Xetwhrci=914  ms   HToK=034  ms   QRS Axis=73  deg   T Wave Axis=232  deg   - ABNORMAL ECG -   Atrial fibrillation   Nonspecific  T abnormalities, diffuse leads   When compared with ECG of 08-Jul-2025 05:56:40,   Significant change in rhythm   Electronically Signed By: Jasson Davalos ([facility]) 2025-07-08 15:12:37   Date and Time of Study:2025-07-08  10:39:56      Telemetry Scan   Final Result      ECG 12 Lead Drug Monitoring; Amiodarone   Final Result   HEART RATE=71  bpm   RR Xqltxnyw=092  ms   MO Interval=  ms   P Horizontal Axis=  deg   P Front Axis=  deg   QRSD Interval=69  ms   QT Kpfgjoel=767  ms   SKrC=219  ms   QRS Axis=81  deg   T Wave Axis=250  deg   - ABNORMAL ECG -   A fib   Consider  anterolateral infarct   When compared with ECG of 07-Jul-2025 05:37:32,   No significant change   Electronically Signed By: Grzegorz Ferguson ([facility]) 2025-07-08 06:21:41   Date and Time of Study:2025-07-08 05:56:40      Telemetry Scan   Final Result      Telemetry Scan   Final Result      Telemetry Scan   Final Result      Telemetry Scan   Final Result      Telemetry Scan   Final Result      Telemetry Scan   Final Result      ECG 12 Lead Drug Monitoring; Amiodarone   Final Result   HEART RATE=77  bpm   RR Auhursun=284  ms   MO Interval=  ms   P Horizontal Axis=  deg   P Front Axis=  deg   QRSD Interval=78  ms   QT Djtfyqef=864  ms   EAqK=875  ms   QRS Axis=79  deg   T Wave Axis=-78  deg   - ABNORMAL ECG -   A fib   Consider  anterolateral infarct   When compared with ECG of 06-Jul-2025 06:21:33,   Significant change in rhythm: previously atrial fibrillation   Significant repolarization change   Electronically Signed By: Grzegorz Ferguson ([facility]) 2025-07-08 06:21:49   Date and Time of Study:2025-07-07 05:37:32      Telemetry Scan   Final Result      Telemetry Scan   Final Result      Telemetry Scan   Final Result      Telemetry Scan   Final Result      Telemetry Scan   Final Result      Telemetry Scan   Final Result      ECG 12 Lead Drug Monitoring; Amiodarone   Final Result   HEART NKLW=291  bpm   RR Utlxaoyd=079  ms   MO Interval=  ms   P Horizontal Axis=  deg   P Front Axis=  deg   QRSD Interval=82  ms   QT Oqzgblnz=284  ms   BEhN=297  ms   QRS Axis=74  deg   T Wave Axis=267  deg   - ABNORMAL ECG -   Atrial fibrillation   Prolonged QT interval   When compared with ECG of  05-Jul-2025 23:41:40,   Significant rate increase   Significant repolarization change   Electronically Signed By: Grzegorz Ferguson ([facility]) 2025-07-08 06:21:53   Date and Time of Study:2025-07-06 06:21:33      Telemetry Scan   Final Result      ECG 12 Lead QT Measurement   Final Result   HEART IBKB=600  bpm   RR Ureastxy=922  ms   GA Interval=  ms   P Horizontal Axis=  deg   P Front Axis=  deg   QRSD Interval=83  ms   QT Lyfbgidl=124  ms   ZYoG=669  ms   QRS Axis=75  deg   T Wave Axis=174  deg   - ABNORMAL ECG -   Atrial fibrillation   Nonspecific  T abnormalities, diffuse leads   When compared with ECG of 05-Jul-2025 10:10:46,   Significant rate decrease   Electronically Signed By: Jasson Davalos ([facility]) 2025-07-06 22:58:37   Date and Time of Study:2025-07-05 23:41:40      Telemetry Scan   Final Result      Telemetry Scan   Final Result      Telemetry Scan   Final Result      Telemetry Scan   Final Result      Telemetry Scan   Final Result      ECG 12 Lead Rhythm Change   Final Result   HEART FWKW=983  bpm   RR Xmsmmjyz=729  ms   GA Interval=  ms   P Horizontal Axis=  deg   P Front Axis=  deg   QRSD Interval=81  ms   QT Fniaqoxb=707  ms   PBiE=768  ms   QRS Axis=72  deg   T Wave Axis=210  deg   - ABNORMAL ECG -   Atrial fibrillation   Ventricular premature complex   Abnormal T, consider ischemia, diffuse leads   When compared with ECG of 05-Jul-2025 02:34:26,   Significant change in rhythm: previously sinus   Significant axis, voltage or hypertrophy change   Electronically Signed By: Jasson Davalos ([facility]) 2025-07-06 22:58:46   Date and Time of Study:2025-07-05 10:10:46      Telemetry Scan   Final Result      ECG 12 Lead Tachycardia   Final Result   HEART RATE=88  bpm   RR Kgicwnxk=956  ms   GA Xchkrxyu=323  ms   P Horizontal Axis=10  deg   P Front Axis=56  deg   QRSD Interval=69  ms   QT Sboxfijk=124  ms   UTjL=645  ms   QRS Axis=87  deg   T Wave Axis=179  deg   - ABNORMAL ECG -   Sinus rhythm   Low voltage, precordial leads    Consider  anteroseptal infarct   Nonspecific  T abnormalities, lateral leads   When compared with ECG of 03-Jul-2025 14:06:04,   Significant rate increase   Electronically Signed By: Jasson Davalos ([facility]) 2025-07-06 22:58:57   Date and Time of Study:2025-07-05 02:34:26      Telemetry Scan   Final Result      Telemetry Scan   Final Result      ECG 12 Lead Drug Monitoring; Amiodarone    (Results Pending)       ECHOCARDIOGRAM:  Results for orders placed during the hospital encounter of 07/01/25    Adult Transesophageal Echo (ANN) W/ Cont if Necessary Per Protocol (Cardiology Department) 07/03/2025 8801    Interpretation Summary    Left ventricular systolic function is normal. Estimated left ventricular EF = 60% Left ventricular ejection fraction appears to be 56 - 60%.    Left ventricular wall thickness is consistent with mild to moderate concentric hypertrophy.    Left ventricular diastolic function is consistent with (grade I) impaired relaxation.    The left atrial cavity is mild to moderately dilated.    Abnormal mitral valve structure consistent with dilated annulus.    Estimated right ventricular systolic pressure from tricuspid regurgitation is normal (<35 mmHg).    There is a trivial pericardial effusion.    Procedure indication  Atrial fibrillation with uncontrollable rate    conscious sedation administered by anesthesia  Timeout before procedure    consent obtained before procedure      Procedure note  after obtaining a valid consent patient was sedated by Anesthesia and a ANN probe was easily placed into esophagus with multiplane imaging with 2D, color and Doppler followed by bubble study with agitated saline without any complications    ANN  Findings    Mild to moderate left atrial enlargement with mild to moderate central MR without any shunt or clot  EF is normal with EF of 60% with mild LVH  Mild RVH with normal RV systolic  Mild TR without pulm hypertension and trivial effusion noted    Plan  Proceed with  cardioversion    Procedures done  Transesophageal echocardiogram    Electronically signed by Bull Wiggins MD, 07/03/25, 3:34 PM EDT.      STRESS MYOVIEW:      CARDIAC CATHETERIZATION:  No results found for this or any previous visit.      Pertinent cardiac workup:  ANN-guided cardioversion 7/3/2025, return to sinus rhythm  EKG 7/5/2025 sinus rhythm  EKG 7/5/2025 atrial fibrillation, rate 135 bpm  EKG 7/8/2025 atrial fibrillation, rate 85 bpm  ANN 7/3/2025 mild-mod LA enlargement, EF 60%, mild RVH with normal RVSF, mild TR      Assessment & Plan       Atrial fibrillation with RVR    Acute on chronic heart failure with preserved ejection fraction (HFpEF)    Severe protein-calorie malnutrition      PLAN    Atrial fibrillation  -was on IV amiodarone, discontinued 7/6/2025  -on Toprol-XL and Eliquis  -previous ANN/cardioversion on 7/3/2025  -discussed medication with cardioversion vs ablation    HFpEF  -Chest x-ray consistent with pulmonary edema  -proBNP 11,000  -Receiving IV diuretics  -Recent ANN 7/3/2025 with a EF of 60%, moderate concentric LVH, mild TR       I discussed the patients findings and my recommendations with patient and nurse.  Further recommendations per Dr. Jimenez.        Cardiology attending addendum :     I have personally performed a face-to-face diagnostic evaluation, physical exam and reviewed data on this patient.  I have reviewed documentation done by me and nurse practitioner  and corrected as needed.  And agree with the different components of documentation.Greater than 50% of the time spent in the care of this patient was provided by attending consultant/me.     Patient is a 70-year-old female, no prior history of any cardiac issues, she does have multiple myeloma diagnosed several years back but only recently therapy was initiated due to progression of disease.      Patient was admitted to the hospital earlier this month with A-fib with rapid ventricular rates.  She had ANN  guided cardioversion on 7/3/2025 with return to sinus rhythm.    Patient was placed on metoprolol and Eliquis and was discharged home.  However within 48 hours she went back into A-fib, some rhythm strips do suggest atrial flutter as well.    I had a long discussion with the patient about rhythm management options.  Since she is symptomatic and rates are elevated, I think it is best to start her on antiarrhythmic therapy with sotalol and plan for cardioversion if she does not self convert by Thursday.    After that, we can also preemptively schedule her for ablation.  In her case my concern is her being on anticoagulation specially that she is getting ready to start chemotherapy for her multiple myeloma, which could lead to pancytopenia.  I think that if ablation is pursued, it is probably best in her case to perform concomitant A-fib ablation with watchman implantation.        Electronically signed by Jaswant Jimenez MD, 07/08/25, 6:11 PM EDT.

## 2025-07-08 NOTE — PROGRESS NOTES
[facility] MEDICINE SERVICE  DAILY PROGRESS NOTE    NAME: Phuong Altamirano  : 1955  MRN: [id number]      LOS: 4 days     PROVIDER OF SERVICE: Giovanny Harding DO    Chief Complaint: Atrial fibrillation with RVR    Subjective:     Interval History:  History taken from: patient    Feels better; still with palpitations; HR controlled at rest; increases with exertion; denies chest pain or SOB         Review of Systems:   Review of Systems    Objective:     Vital Signs  Temp:  [97.6 °F (36.4 °C)-98.8 °F (37.1 °C)] 97.6 °F (36.4 °C)  Heart Rate:  [] 82  Resp:  [16-23] 23  BP: ()/(54-71) 116/59   Body mass index is 32.82 kg/m².    Physical Exam  Physical Exam  General: Sitting up in chair; NAD  CV: irregularly irregular rhythm; controlled HR   Lungs: CTA bilaterally  Abdomen: soft, NT, ND        Diagnostic Data    Results from last 7 days   Lab Units 25  0522   WBC 10*3/mm3 3.55   HEMOGLOBIN g/dL 10.1*   HEMATOCRIT % 32.1*   PLATELETS 10*3/mm3 362   GLUCOSE mg/dL 110*   CREATININE mg/dL 0.66   BUN mg/dL 12.0   SODIUM mmol/L 137   POTASSIUM mmol/L 4.6   AST (SGOT) U/L 32   ALT (SGPT) U/L 23   ALK PHOS U/L 79   BILIRUBIN mg/dL 0.3   ANION GAP mmol/L 9.0       No radiology results for the last day      I reviewed the patient's new clinical results.    Assessment/Plan:     Active and Resolved Problems  Active Hospital Problems    Diagnosis  POA    **Atrial fibrillation with RVR [I48.91]  Yes    Severe protein-calorie malnutrition [E43]  Yes    Acute on chronic heart failure with preserved ejection fraction (HFpEF) [I50.33]  Unknown      Resolved Hospital Problems   No resolved problems to display.         RVR A fib  S/P ANN cardioversion 7/3 with restoration to sinus rhythm however with recurrence of RVR A fib   And went into RVR on .  Received multiple doses of dig.  Blood pressure remains low normal  Continue rate/rhythm control per Cardiology   Noted plan for likely sotalol   AC with  Eliquis   Patient probably would not be a candidate for amiodarone given concern for ILD, underlying lung issues           Acute on chronic HFpEF, decompensated due to RVR A fib  She presents with sudden onset SOB, orthopnea, has bilateral pedal edema 1 +  BNP elevated 11,212 compared to 7447 on 7/1  X ray prior to transfer showed increased vascular congestion  S/p diuresis with  IV Lasix twice daily  Transitioned to low dose lasix BID  Monitor volume status     History of RA  Continue plaquenil      Multiple myeloma  On therapy with daratumumab/bortezomib/dexamethasone/lenalidomide.   Plan to resume treatments 7/7.   Continue to follow with primary hematologist upon discharge   She is on acyclovir for prophylaxis      Type 2 DM  Insulin SS  Holding metformin while inpatient   Accu-Checks      Possible undiagnosed COPD/ILD  Continue bronchodilators as needed  Avoid albuterol given RVR A fib, may use atrovent and symbicort or levalbuterol if needed  Follow outpatient with Pulmonology      Hypothyroidism   Continue home dose levothyroxine 100 mcg once daily   Obtain updated TSH     VTE Prophylaxis:  Pharmacologic & mechanical VTE prophylaxis orders are present.              Disposition Planning: Home at d/c once A.fib under control; hopefully 1-2 days       VTE Prophylaxis:  Pharmacologic & mechanical VTE prophylaxis orders are present.             Disposition Planning:         Code Status and Medical Interventions: CPR (Attempt to Resuscitate); Full Support   Ordered at: 07/04/25 4247     Code Status (Patient has no pulse and is not breathing):    CPR (Attempt to Resuscitate)     Medical Interventions (Patient has pulse or is breathing):    Full Support       Signature: Electronically signed by Giovanny Harding DO, 07/08/25, 08:22 EDT.  [facility], [place] Hospitalist Team

## 2025-07-08 NOTE — PLAN OF CARE
Goal Outcome Evaluation:      Pt continues to be in afib with rates . Dr. Wiggins was consulted but out of town so new consult placed to Dr. Jimenez. Awaiting recs from EP.     Miralax started for constipation.       Problem: Adult Inpatient Plan of Care  Goal: Plan of Care Review  Outcome: Progressing  Goal: Patient-Specific Goal (Individualized)  Outcome: Progressing  Goal: Absence of Hospital-Acquired Illness or Injury  Outcome: Progressing  Goal: Optimal Comfort and Wellbeing  Outcome: Progressing  Goal: Readiness for Transition of Care  Outcome: Progressing

## 2025-07-08 NOTE — CASE MANAGEMENT/SOCIAL WORK
Continued Stay Note   Albert     Patient Name: Phuong Altamirano  MRN: [id number]  Today's Date: 7/8/2025    Admit Date: 7/4/2025    Plan: Home at discharge.   Discharge Plan       Row Name 07/08/25 0951       Plan    Plan Comments CM contacted [facility] to test claim sotalol. Cost would be $13. Per cardiology, EP consult pending for possible cardioversion/antiarrhythmic recommendations.                Karis Beaver RN     Office Phone: [phone]  Office Cell: [phone]

## 2025-07-08 NOTE — PLAN OF CARE
Problem: Adult Inpatient Plan of Care  Goal: Plan of Care Review  Outcome: Progressing  Goal: Patient-Specific Goal (Individualized)  Outcome: Progressing  Goal: Absence of Hospital-Acquired Illness or Injury  Outcome: Progressing  Intervention: Identify and Manage Fall Risk  Recent Flowsheet Documentation  Taken 7/8/2025 0400 by Chely, Christa, LPN  Safety Promotion/Fall Prevention:   safety round/check completed   room organization consistent   nonskid shoes/slippers when out of bed   fall prevention program maintained   clutter free environment maintained   assistive device/personal items within reach  Taken 7/8/2025 0200 by Chely, Christa, LPN  Safety Promotion/Fall Prevention:   safety round/check completed   room organization consistent   nonskid shoes/slippers when out of bed   fall prevention program maintained   clutter free environment maintained   assistive device/personal items within reach   activity supervised  Intervention: Prevent Skin Injury  Recent Flowsheet Documentation  Taken 7/8/2025 0400 by Christa Mo LPN  Body Position: position changed independently  Skin Protection: transparent dressing maintained  Taken 7/8/2025 0200 by Christa Mo LPN  Body Position: position changed independently  Intervention: Prevent Infection  Recent Flowsheet Documentation  Taken 7/8/2025 0400 by Christa Mo LPN  Infection Prevention:   single patient room provided   rest/sleep promoted   hand hygiene promoted   equipment surfaces disinfected  Taken 7/8/2025 0200 by Christa Mo LPN  Infection Prevention:   single patient room provided   rest/sleep promoted   hand hygiene promoted   equipment surfaces disinfected  Goal: Optimal Comfort and Wellbeing  Outcome: Progressing  Goal: Readiness for Transition of Care  Outcome: Progressing     Problem: Sepsis/Septic Shock  Goal: Optimal Coping  Outcome: Progressing  Intervention: Support Patient and Family Response  Recent Flowsheet  Documentation  Taken 7/8/2025 0400 by Christa Mo LPN  Supportive Measures: active listening utilized  Goal: Absence of Bleeding  Outcome: Progressing  Goal: Blood Glucose Level Within Target Range  Outcome: Progressing  Intervention: Optimize Glycemic Control  Recent Flowsheet Documentation  Taken 7/8/2025 0400 by Christa Mo LPN  Hyperglycemia Management: blood glucose monitored  Hypoglycemia Management: blood glucose monitored  Goal: Absence of Infection Signs and Symptoms  Outcome: Progressing  Intervention: Initiate Sepsis Management  Recent Flowsheet Documentation  Taken 7/8/2025 0400 by Christa Mo LPN  Infection Prevention:   single patient room provided   rest/sleep promoted   hand hygiene promoted   equipment surfaces disinfected  Taken 7/8/2025 0200 by Christa Mo LPN  Infection Prevention:   single patient room provided   rest/sleep promoted   hand hygiene promoted   equipment surfaces disinfected  Intervention: Promote Recovery  Recent Flowsheet Documentation  Taken 7/8/2025 0400 by Christa Mo LPN  Activity Management: activity encouraged  Sleep/Rest Enhancement:   awakenings minimized   consistent schedule promoted  Goal: Optimal Nutrition Delivery  Outcome: Progressing  Intervention: Optimize Nutrition Delivery  Recent Flowsheet Documentation  Taken 7/8/2025 0400 by Christa Mo LPN  Nutrition Support Management: weight trending reviewed     Problem: Fall Injury Risk  Goal: Absence of Fall and Fall-Related Injury  Outcome: Progressing  Intervention: Promote Injury-Free Environment  Recent Flowsheet Documentation  Taken 7/8/2025 0400 by Chely, Christa, LPN  Safety Promotion/Fall Prevention:   safety round/check completed   room organization consistent   nonskid shoes/slippers when out of bed   fall prevention program maintained   clutter free environment maintained   assistive device/personal items within reach  Taken 7/8/2025 0200 by Chely  Christa, LPN  Safety Promotion/Fall Prevention:   safety round/check completed   room organization consistent   nonskid shoes/slippers when out of bed   fall prevention program maintained   clutter free environment maintained   assistive device/personal items within reach   activity supervised     Problem: Malnutrition  Goal: Improved Nutritional Intake  Outcome: Progressing  Intervention: Promote and Optimize Oral Intake  Recent Flowsheet Documentation  Taken 7/8/2025 0400 by Christa Mo LPN  Oral Nutrition Promotion: rest periods promoted  Intervention: Optimize Nutrition Delivery  Recent Flowsheet Documentation  Taken 7/8/2025 0400 by Christa Mo LPN  Nutrition Support Management: weight trending reviewed

## 2025-07-09 LAB
ANION GAP SERPL CALCULATED.3IONS-SCNC: 8.9 MMOL/L (ref 5–15)
BUN SERPL-MCNC: 13.8 MG/DL (ref 8–23)
BUN/CREAT SERPL: 20.9 (ref 7–25)
CALCIUM SPEC-SCNC: 8.8 MG/DL (ref 8.6–10.5)
CHLORIDE SERPL-SCNC: 101 MMOL/L (ref 98–107)
CO2 SERPL-SCNC: 29.1 MMOL/L (ref 22–29)
CREAT SERPL-MCNC: 0.66 MG/DL (ref 0.57–1)
DEPRECATED RDW RBC AUTO: 51.7 FL (ref 37–54)
EGFRCR SERPLBLD CKD-EPI 2021: 94.5 ML/MIN/1.73
ERYTHROCYTE [DISTWIDTH] IN BLOOD BY AUTOMATED COUNT: 15.6 % (ref 12.3–15.4)
GLUCOSE BLDC GLUCOMTR-MCNC: 104 MG/DL (ref 70–105)
GLUCOSE BLDC GLUCOMTR-MCNC: 110 MG/DL (ref 70–105)
GLUCOSE BLDC GLUCOMTR-MCNC: 127 MG/DL (ref 70–105)
GLUCOSE BLDC GLUCOMTR-MCNC: 128 MG/DL (ref 70–105)
GLUCOSE SERPL-MCNC: 107 MG/DL (ref 65–99)
HCT VFR BLD AUTO: 32.2 % (ref 34–46.6)
HGB BLD-MCNC: 10 G/DL (ref 12–15.9)
LYMPHOCYTES # BLD MANUAL: 1.62 10*3/MM3 (ref 0.7–3.1)
LYMPHOCYTES NFR BLD MANUAL: 8 % (ref 5–12)
MAGNESIUM SERPL-MCNC: 2.3 MG/DL (ref 1.6–2.4)
MCH RBC QN AUTO: 28.2 PG (ref 26.6–33)
MCHC RBC AUTO-ENTMCNC: 31.1 G/DL (ref 31.5–35.7)
MCV RBC AUTO: 90.7 FL (ref 79–97)
MONOCYTES # BLD: 0.23 10*3/MM3 (ref 0.1–0.9)
NEUTROPHILS # BLD AUTO: 1.04 10*3/MM3 (ref 1.7–7)
NEUTROPHILS NFR BLD MANUAL: 36 % (ref 42.7–76)
PLAT MORPH BLD: NORMAL
PLATELET # BLD AUTO: 229 10*3/MM3 (ref 140–450)
PMV BLD AUTO: 9.9 FL (ref 6–12)
POTASSIUM SERPL-SCNC: 4.4 MMOL/L (ref 3.5–5.2)
QT INTERVAL: 347 MS
QT INTERVAL: 350 MS
QT INTERVAL: 363 MS
QTC INTERVAL: 452 MS
QTC INTERVAL: 467 MS
QTC INTERVAL: 475 MS
RBC # BLD AUTO: 3.55 10*6/MM3 (ref 3.77–5.28)
RBC MORPH BLD: NORMAL
SCAN SLIDE: NORMAL
SODIUM SERPL-SCNC: 139 MMOL/L (ref 136–145)
VARIANT LYMPHS NFR BLD MANUAL: 56 % (ref 19.6–45.3)
WBC MORPH BLD: NORMAL
WBC NRBC COR # BLD AUTO: 2.9 10*3/MM3 (ref 3.4–10.8)

## 2025-07-09 PROCEDURE — 93010 ELECTROCARDIOGRAM REPORT: CPT | Performed by: INTERNAL MEDICINE

## 2025-07-09 PROCEDURE — 93005 ELECTROCARDIOGRAM TRACING: CPT | Performed by: INTERNAL MEDICINE

## 2025-07-09 PROCEDURE — 99233 SBSQ HOSP IP/OBS HIGH 50: CPT | Performed by: INTERNAL MEDICINE

## 2025-07-09 PROCEDURE — 83735 ASSAY OF MAGNESIUM: CPT | Performed by: INTERNAL MEDICINE

## 2025-07-09 PROCEDURE — 82948 REAGENT STRIP/BLOOD GLUCOSE: CPT

## 2025-07-09 PROCEDURE — 99232 SBSQ HOSP IP/OBS MODERATE 35: CPT | Performed by: NURSE PRACTITIONER

## 2025-07-09 PROCEDURE — 25010000002 ONDANSETRON PER 1 MG: Performed by: HOSPITALIST

## 2025-07-09 PROCEDURE — 82948 REAGENT STRIP/BLOOD GLUCOSE: CPT | Performed by: STUDENT IN AN ORGANIZED HEALTH CARE EDUCATION/TRAINING PROGRAM

## 2025-07-09 PROCEDURE — 80048 BASIC METABOLIC PNL TOTAL CA: CPT | Performed by: INTERNAL MEDICINE

## 2025-07-09 PROCEDURE — 85025 COMPLETE CBC W/AUTO DIFF WBC: CPT

## 2025-07-09 PROCEDURE — 85007 BL SMEAR W/DIFF WBC COUNT: CPT

## 2025-07-09 RX ADMIN — Medication 10 ML: at 08:37

## 2025-07-09 RX ADMIN — HYDROXYCHLOROQUINE SULFATE 200 MG: 200 TABLET, FILM COATED ORAL at 08:29

## 2025-07-09 RX ADMIN — ACYCLOVIR 400 MG: 200 CAPSULE ORAL at 20:49

## 2025-07-09 RX ADMIN — FUROSEMIDE 20 MG: 20 TABLET ORAL at 17:18

## 2025-07-09 RX ADMIN — POLYETHYLENE GLYCOL 3350 17 G: 17 POWDER, FOR SOLUTION ORAL at 08:34

## 2025-07-09 RX ADMIN — LEVOTHYROXINE SODIUM 100 MCG: 0.1 TABLET ORAL at 06:07

## 2025-07-09 RX ADMIN — SOTALOL HYDROCHLORIDE 80 MG: 80 TABLET ORAL at 20:50

## 2025-07-09 RX ADMIN — Medication 5 MG: at 23:32

## 2025-07-09 RX ADMIN — ACYCLOVIR 400 MG: 200 CAPSULE ORAL at 08:28

## 2025-07-09 RX ADMIN — HYDROXYCHLOROQUINE SULFATE 200 MG: 200 TABLET, FILM COATED ORAL at 20:50

## 2025-07-09 RX ADMIN — ONDANSETRON 4 MG: 2 INJECTION, SOLUTION INTRAMUSCULAR; INTRAVENOUS at 20:54

## 2025-07-09 RX ADMIN — APIXABAN 5 MG: 5 TABLET, FILM COATED ORAL at 20:49

## 2025-07-09 RX ADMIN — FUROSEMIDE 20 MG: 20 TABLET ORAL at 08:28

## 2025-07-09 RX ADMIN — SOTALOL HYDROCHLORIDE 80 MG: 80 TABLET ORAL at 08:28

## 2025-07-09 RX ADMIN — Medication 10 ML: at 20:50

## 2025-07-09 RX ADMIN — APIXABAN 5 MG: 5 TABLET, FILM COATED ORAL at 08:28

## 2025-07-09 RX ADMIN — PANTOPRAZOLE SODIUM 40 MG: 40 TABLET, DELAYED RELEASE ORAL at 06:07

## 2025-07-09 RX ADMIN — POTASSIUM CHLORIDE 10 MEQ: 750 TABLET, EXTENDED RELEASE ORAL at 08:28

## 2025-07-09 NOTE — PLAN OF CARE
Goal Outcome Evaluation:           Progress: no change          Pt resting comfortably in bed overnight on RA. Sotalol started at 2100. Baseline EKG QTC of 436. EKG at 2300 with QTC of 452. Pt remains in Afib, rate appears the same before and after sotalol. BP soft but stable, VSS otherwise. Pt still constipated but denied need for escalating meds at this time. Plan for follow up with cardiology today.

## 2025-07-09 NOTE — PLAN OF CARE
Goal Outcome Evaluation:     Pt is schedule for Cardioversion 7/10, Consent is signed. Pt refuse all fall precautions.

## 2025-07-09 NOTE — PROGRESS NOTES
[facility]   PROGRESS NOTE      Referring Provider: Giovanny Harding DO    Reason for follow-up: atrial fibrillation     Patient Care Team:  Chula Sanchez APRN as PCP - General (Nurse Practitioner)  Reva Jaeger MD as PCP - Family Medicine  Neftali Barrett MD as Consulting Physician (Hematology and Oncology)      SUBJECTIVE    Patient doing okay today. Started on sotalol yesterday. Remains in afib. No new complaints.     ROS    Allergies:  Patient has no known allergies.    Personal History:    Past Medical History:   Diagnosis Date    Arthritis 2010    Asthma 1 month    Cancer 12 years    Multiple mylenoma    Depression 2010    Diabetes mellitus 1.5 years    High blood pressure     High cholesterol 2005    Hypothyroidism     Lupus 2010    Sleep apnea Now       Past Surgical History:   Procedure Laterality Date    BREAST LUMPECTOMY Right 1983    benign    DILATATION AND CURETTAGE      LAPAROSCOPIC TUBAL LIGATION  1979    OVARY SURGERY  1985    remoal of left ovary       Family History   Problem Relation Age of Onset    Heart disease Father     Pancreatic cancer Sister 59    Heart disease Sister     Stroke Sister     Pneumonia Brother        Social History     Tobacco Use    Smoking status: Former     Current packs/day: 0.00     Average packs/day: 1 pack/day for 21.0 years (21.0 ttl pk-yrs)     Types: Cigarettes     Start date:      Quit date:      Years since quittin.5    Smokeless tobacco: Never   Vaping Use    Vaping status: Never Used   Substance Use Topics    Alcohol use: Yes     Comment: Very occasionally    Drug use: No        Medications Prior to Admission   Medication Sig Dispense Refill Last Dose/Taking    acyclovir (ZOVIRAX) 400 MG tablet Take 1 tablet by mouth 2 (Two) Times a Day. 60 tablet 3 2025 Morning    apixaban (ELIQUIS) 5 MG tablet tablet Take 1 tablet by mouth 2 (Two) Times a Day for 30 days. 60 tablet 0 2025 Morning     ferrous sulfate 325 (65 FE) MG tablet Take 1 tablet by mouth Daily With Breakfast for 30 days. 30 tablet 0 7/4/2025 Morning    furosemide (Lasix) 20 MG tablet Take 1 tablet by mouth 2 (Two) Times a Day for 30 days.   7/4/2025 Morning    hydroxychloroquine (PLAQUENIL) 200 MG tablet Every 12 (Twelve) Hours.   7/4/2025 Morning    levothyroxine (SYNTHROID, LEVOTHROID) 100 MCG tablet Take 1 tablet by mouth Every Morning Before Breakfast.  1 7/4/2025 Morning    metFORMIN (GLUCOPHAGE) 500 MG tablet Take 1 tablet by mouth Daily With Dinner.   7/3/2025 Evening    metoprolol tartrate (LOPRESSOR) 50 MG tablet Take 1 tablet by mouth Every 12 (Twelve) Hours for 30 days. 60 tablet 0 7/4/2025 Morning    omeprazole (priLOSEC) 40 MG capsule Take 1 capsule by mouth Daily.   7/4/2025 Morning    sertraline (ZOLOFT) 25 MG tablet Take 1 tablet by mouth Daily.  1 7/4/2025 Morning    sulfamethoxazole-trimethoprim (BACTRIM DS,SEPTRA DS) 800-160 MG per tablet Take 1 tablet by mouth 3 (Three) Times a Week. Take 1 tablet on Mondays, Wednesdays and Fridays. 12 tablet 3 7/4/2025 Morning    acetaminophen (TYLENOL) 325 MG tablet Take 2 tablets by mouth Every 6 (Six) Hours As Needed for Mild Pain.       acetaminophen (TYLENOL) 325 MG tablet Take 2 tablets by mouth Every 4 (Four) Hours As Needed for Mild Pain.       albuterol sulfate HFA (Ventolin HFA) 108 (90 Base) MCG/ACT inhaler Inhale 1 puff Every 6 (Six) Hours As Needed.       dexAMETHasone (DECADRON) 4 MG tablet Take 5 tablets by mouth Daily With Breakfast. Take with food on Days 1, 2, 8, 9, 15 and 16. 30 tablet 3 7/1/2025 Morning    lenalidomide (REVLIMID) 25 MG capsule Take 1 capsule by mouth See Admin Instructions. Take 1 capsule by mouth daily on days 1-14, OFF 7 days of each 21 day cycle. Take with or without food. Swallow capsules whole, do not crush, break or chew. 14 capsule 0 6/25/2025 Morning    ondansetron (ZOFRAN) 8 MG tablet Take 1 tablet by mouth 3 (Three) Times a Day As Needed  "for Nausea or Vomiting. 30 tablet 5     [] oxyCODONE (ROXICODONE) 5 MG immediate release tablet Take 1 tablet by mouth Every 6 (Six) Hours As Needed for Moderate Pain for up to 3 days. 12 tablet 0     promethazine (PHENERGAN) 12.5 MG tablet Take 1 tablet by mouth Every 6 (Six) Hours As Needed for Nausea or Vomiting. 30 tablet 2          OBJECTIVE    VITAL SIGNS  Vitals:    25 1715 25 2014 25 2318 25 0405   BP: 111/67 119/64 106/58 101/56   BP Location:  Right arm Left arm Left arm   Patient Position:  Sitting Lying Lying   Pulse: 103 109 87 98   Resp:  18 16 18   Temp:  98.1 °F (36.7 °C) 97.7 °F (36.5 °C) 97.9 °F (36.6 °C)   TempSrc:  Oral Oral Oral   SpO2:  92% 94% 94%   Weight:    79 kg (174 lb 2.6 oz)   Height:         Flowsheet Rows      Flowsheet Row First Filed Value   Admission Height 154.9 cm (61\") Documented at 2025   Admission Weight 81.6 kg (179 lb 14.3 oz) Documented at 2025             TELEMETRY: atrial fibrillation    Physical Exam  VITALS REVIEWED    General:      well developed, in no acute distress.    Head:      normocephalic and atraumatic.    Eyes:      PERRL/EOM intact, conjunctiva and sclera clear with out nystagmus.    Neck:      no masses, thyromegaly,  trachea central with normal respiratory effort and PMI displaced laterally  Lungs:      Clear to auscultation bilaterally  Heart:       Irregular rhythm  Msk:      no deformity or scoliosis noted of thoracic or lumbar spine.    Pulses:      pulses normal in all 4 extremities.    Extremities:       No lower extremity edema    Neurologic:      no focal deficits.   alert oriented x3  Skin:      intact without lesions or rashes.    Psych:      alert and cooperative; normal mood and affect; normal attention span and concentration.      LAB RESULTS (LAST 7 DAYS)  I have reviewed new clinical results.    CMP   Results from last 7 days   Lab Units 25  0405 25  0522 25  0225 " 07/06/25  0208 07/05/25  0115 07/03/25  0902   SODIUM mmol/L 139 137 134* 133* 138 141   POTASSIUM mmol/L 4.4 4.6 3.8 3.7 4.1 3.1*   CHLORIDE mmol/L 101 100 97* 93* 96* 106   CO2 mmol/L 29.1* 28.0 30.0* 30.8* 29.4* 24.0   BUN mg/dL 13.8 12.0 10.8 14.6 15.8 16.6   CREATININE mg/dL 0.66 0.66 0.57 0.77 0.90 0.62   GLUCOSE mg/dL 107* 110* 94 119* 118* 159*   ALBUMIN g/dL  --  3.0*  --   --  3.8  --    BILIRUBIN mg/dL  --  0.3  --   --  0.5  --    ALK PHOS U/L  --  79  --   --  113  --    AST (SGOT) U/L  --  32  --   --  34*  --    ALT (SGPT) U/L  --  23  --   --  51*  --      CBC  Results from last 7 days   Lab Units 07/09/25  0405 07/08/25  0522 07/07/25  0225 07/06/25  0208 07/05/25  0115 07/03/25  0902 07/02/25  1330   WBC 10*3/mm3 2.90* 3.55 4.88 4.60 4.99 4.17  --    RBC 10*6/mm3 3.55* 3.56* 3.66* 3.70* 3.84 2.89*  --    HEMOGLOBIN g/dL 10.0* 10.1* 10.3* 10.3* 10.8* 8.1* 9.5*   HEMATOCRIT % 32.2* 32.1* 33.5* 33.6* 34.9 29.8* 30.7*   MCV fL 90.7 90.2 91.5 90.8 90.9 103.1*  --    PLATELETS 10*3/mm3 229 362 214 211 239 123*  --      ProBNP      TROPONIN      CoAg      Creatinine Clearance  Estimated Creatinine Clearance: 75.5 mL/min (by C-G formula based on SCr of 0.66 mg/dL).    Radiology  No radiology results for the last day    EKG    I personally viewed and interpreted the patient's EKG/Telemetry data:  ECG 12 Lead Drug Monitoring; Sotalol   Preliminary Result   HEART CNWD=518  bpm   RR Uzhwyxpp=136  ms   UT Interval=  ms   P Horizontal Axis=  deg   P Front Axis=  deg   QRSD Interval=81  ms   QT Ogjdzgho=561  ms   IBhB=174  ms   QRS Axis=90  deg   T Wave Axis=262  deg   - ABNORMAL ECG -   Atrial fibrillation   Date and Time of Study:2025-07-09 06:30:10      Telemetry Scan   Final Result      Telemetry Scan   Final Result      Telemetry Scan   Final Result      ECG 12 Lead QT Measurement   Final Result   HEART GVBX=371  bpm   RR Hbvyydhb=147  ms   UT Interval=  ms   P Horizontal Axis=  deg   P Front Axis=  deg    QRSD Interval=73  ms   QT Fhoipnde=084  ms   EIfP=914  ms   QRS Axis=74  deg   T Wave Axis=241  deg   - ABNORMAL ECG -   Atrial fibrillation   Probable  anterior infarct, age indeterminate   When compared with ECG of 08-Jul-2025 10:39:56,   Significant repolarization change   Electronically Signed By: Grzegorz Ferguson ([facility]) 2025-07-09 06:13:37   Date and Time of Study:2025-07-08 22:59:11      Telemetry Scan   Final Result      Telemetry Scan   Final Result      Telemetry Scan   Final Result      ECG 12 Lead Drug Monitoring; Sotalol   Final Result   HEART RATE=85  bpm   RR Dqdhaxvi=805  ms   AL Interval=  ms   P Horizontal Axis=  deg   P Front Axis=  deg   QRSD Interval=71  ms   QT Pphfscqf=206  ms   CClE=995  ms   QRS Axis=73  deg   T Wave Axis=232  deg   - ABNORMAL ECG -   Atrial fibrillation   Nonspecific  T abnormalities, diffuse leads   When compared with ECG of 08-Jul-2025 05:56:40,   Significant change in rhythm   Electronically Signed By: Jasson Davalos ([facility]) 2025-07-08 15:12:37   Date and Time of Study:2025-07-08 10:39:56      Telemetry Scan   Final Result      ECG 12 Lead Drug Monitoring; Amiodarone   Final Result   HEART RATE=71  bpm   RR Bqkhjymg=235  ms   AL Interval=  ms   P Horizontal Axis=  deg   P Front Axis=  deg   QRSD Interval=69  ms   QT Ujhtceno=821  ms   YBdJ=537  ms   QRS Axis=81  deg   T Wave Axis=250  deg   - ABNORMAL ECG -   A fib   Consider  anterolateral infarct   When compared with ECG of 07-Jul-2025 05:37:32,   No significant change   Electronically Signed By: Grzegorz Ferguson ([facility]) 2025-07-08 06:21:41   Date and Time of Study:2025-07-08 05:56:40      Telemetry Scan   Final Result      Telemetry Scan   Final Result      Telemetry Scan   Final Result      Telemetry Scan   Final Result      Telemetry Scan   Final Result      Telemetry Scan   Final Result      ECG 12 Lead Drug Monitoring; Amiodarone   Final Result   HEART RATE=77  bpm   RR Gkvflasy=891  ms   AL Interval=  ms   P Horizontal Axis=   deg   P Front Axis=  deg   QRSD Interval=78  ms   QT Gutrlbzg=974  ms   RUtB=196  ms   QRS Axis=79  deg   T Wave Axis=-78  deg   - ABNORMAL ECG -   A fib   Consider  anterolateral infarct   When compared with ECG of 06-Jul-2025 06:21:33,   Significant change in rhythm: previously atrial fibrillation   Significant repolarization change   Electronically Signed By: Grzegorz Ferguson ([facility]) 2025-07-08 06:21:49   Date and Time of Study:2025-07-07 05:37:32      Telemetry Scan   Final Result      Telemetry Scan   Final Result      Telemetry Scan   Final Result      Telemetry Scan   Final Result      Telemetry Scan   Final Result      Telemetry Scan   Final Result      ECG 12 Lead Drug Monitoring; Amiodarone   Final Result   HEART QTPR=517  bpm   RR Xmybqjay=189  ms   FL Interval=  ms   P Horizontal Axis=  deg   P Front Axis=  deg   QRSD Interval=82  ms   QT Hveovbtc=117  ms   VYgR=176  ms   QRS Axis=74  deg   T Wave Axis=267  deg   - ABNORMAL ECG -   Atrial fibrillation   Prolonged QT interval   When compared with ECG of 05-Jul-2025 23:41:40,   Significant rate increase   Significant repolarization change   Electronically Signed By: Grzegorz Ferguson ([facility]) 2025-07-08 06:21:53   Date and Time of Study:2025-07-06 06:21:33      Telemetry Scan   Final Result      ECG 12 Lead QT Measurement   Final Result   HEART EBHA=809  bpm   RR Ewlueflr=864  ms   FL Interval=  ms   P Horizontal Axis=  deg   P Front Axis=  deg   QRSD Interval=83  ms   QT Ldoupaos=054  ms   PXsO=776  ms   QRS Axis=75  deg   T Wave Axis=174  deg   - ABNORMAL ECG -   Atrial fibrillation   Nonspecific  T abnormalities, diffuse leads   When compared with ECG of 05-Jul-2025 10:10:46,   Significant rate decrease   Electronically Signed By: Jasson Davalos ([facility]) 2025-07-06 22:58:37   Date and Time of Study:2025-07-05 23:41:40      Telemetry Scan   Final Result      Telemetry Scan   Final Result      Telemetry Scan   Final Result      Telemetry Scan   Final Result      Telemetry  Scan   Final Result      ECG 12 Lead Rhythm Change   Final Result   HEART UUQF=397  bpm   RR Lpumrfol=185  ms   IL Interval=  ms   P Horizontal Axis=  deg   P Front Axis=  deg   QRSD Interval=81  ms   QT Juixwobt=281  ms   IOtD=197  ms   QRS Axis=72  deg   T Wave Axis=210  deg   - ABNORMAL ECG -   Atrial fibrillation   Ventricular premature complex   Abnormal T, consider ischemia, diffuse leads   When compared with ECG of 05-Jul-2025 02:34:26,   Significant change in rhythm: previously sinus   Significant axis, voltage or hypertrophy change   Electronically Signed By: Jasson Davalos ([facility]) 2025-07-06 22:58:46   Date and Time of Study:2025-07-05 10:10:46      Telemetry Scan   Final Result      ECG 12 Lead Tachycardia   Final Result   HEART RATE=88  bpm   RR Prypaxtv=288  ms   IL Crypdjdp=431  ms   P Horizontal Axis=10  deg   P Front Axis=56  deg   QRSD Interval=69  ms   QT Ugmhiosj=635  ms   BTmN=595  ms   QRS Axis=87  deg   T Wave Axis=179  deg   - ABNORMAL ECG -   Sinus rhythm   Low voltage, precordial leads   Consider  anteroseptal infarct   Nonspecific  T abnormalities, lateral leads   When compared with ECG of 03-Jul-2025 14:06:04,   Significant rate increase   Electronically Signed By: Jasson Davalos ([facility]) 2025-07-06 22:58:57   Date and Time of Study:2025-07-05 02:34:26      Telemetry Scan   Final Result      Telemetry Scan   Final Result      ECG 12 Lead Drug Monitoring; Amiodarone    (Results Pending)   ECG 12 Lead QT Measurement    (Results Pending)   ECG 12 Lead QT Measurement    (Results Pending)       ECHOCARDIOGRAM:  Results for orders placed during the hospital encounter of 07/01/25    Adult Transesophageal Echo (ANN) W/ Cont if Necessary Per Protocol (Cardiology Department) 07/03/2025 1084    Interpretation Summary    Left ventricular systolic function is normal. Estimated left ventricular EF = 60% Left ventricular ejection fraction appears to be 56 - 60%.    Left ventricular wall thickness is consistent with  mild to moderate concentric hypertrophy.    Left ventricular diastolic function is consistent with (grade I) impaired relaxation.    The left atrial cavity is mild to moderately dilated.    Abnormal mitral valve structure consistent with dilated annulus.    Estimated right ventricular systolic pressure from tricuspid regurgitation is normal (<35 mmHg).    There is a trivial pericardial effusion.    Procedure indication  Atrial fibrillation with uncontrollable rate    conscious sedation administered by anesthesia  Timeout before procedure    consent obtained before procedure      Procedure note  after obtaining a valid consent patient was sedated by Anesthesia and a ANN probe was easily placed into esophagus with multiplane imaging with 2D, color and Doppler followed by bubble study with agitated saline without any complications    ANN  Findings    Mild to moderate left atrial enlargement with mild to moderate central MR without any shunt or clot  EF is normal with EF of 60% with mild LVH  Mild RVH with normal RV systolic  Mild TR without pulm hypertension and trivial effusion noted    Plan  Proceed with cardioversion    Procedures done  Transesophageal echocardiogram    Electronically signed by Bull Wiggins MD, 07/03/25, 3:34 PM EDT.      STRESS MYOVIEW:      CARDIAC CATHETERIZATION:  No results found for this or any previous visit.      Pertinent cardiac workup:  ANN-guided cardioversion 7/3/2025, return to sinus rhythm  EKG 7/5/2025 sinus rhythm  EKG 7/5/2025 atrial fibrillation, rate 135 bpm  EKG 7/8/2025 atrial fibrillation, rate 85 bpm  ANN 7/3/2025 mild-mod LA enlargement, EF 60%, mild RVH with normal RVSF, mild TR      ASSESSMENT/PLAN      Atrial fibrillation with RVR    Acute on chronic heart failure with preserved ejection fraction (HFpEF)    Severe protein-calorie malnutrition      PLAN  Atrial fibrillation  -was on IV amiodarone, discontinued 7/6/2025  -on Toprol-XL and Eliquis  -previous  ANN/cardioversion on 7/3/2025  -discussed medication with cardioversion vs ablation     HFpEF  -Chest x-ray consistent with pulmonary edema  -proBNP 11,000  -Receiving IV diuretics  -Recent ANN 7/3/2025 with a EF of 60%, moderate concentric LVH, mild TR     Patient is a 70-year-old female, no prior history of any cardiac issues, she does have multiple myeloma diagnosed several years back but only recently therapy was initiated due to progression of disease.       Patient was admitted to the hospital earlier this month with A-fib with rapid ventricular rates.  She had ANN guided cardioversion on 7/3/2025 with return to sinus rhythm.     Patient was placed on metoprolol and Eliquis and was discharged home.  However within 48 hours she went back into A-fib, some rhythm strips do suggest atrial flutter as well.     I had a long discussion with the patient about rhythm management options.  Since she is symptomatic and rates are elevated, I think it is best to start her on antiarrhythmic therapy with sotalol and plan for cardioversion if she does not self convert by Thursday.     After that, we can also preemptively schedule her for ablation.  In her case my concern is her being on anticoagulation specially that she is getting ready to start chemotherapy for her multiple myeloma, which could lead to pancytopenia.  I think that if ablation is pursued, it is probably best in her case to perform concomitant A-fib ablation with watchman implantation.      7/9/2025  Patient on sotalol. Had first dose last night. Will arrange for cardioversion tomorrow if still in afib. Continue current therapy.             STANLEY Slade  07/09/25  07:43 EDT  Electronically signed by STANLEY Slade, 07/09/25, 10:22 AM EDT.

## 2025-07-09 NOTE — CASE MANAGEMENT/SOCIAL WORK
Continued Stay Note  LINDSEY Price     Patient Name: Phuong Altamirano  MRN: [id number]  Today's Date: 7/9/2025    Admit Date: 7/4/2025    Plan: Home at discharge.   Discharge Plan       Row Name 07/09/25 1221       Plan    Plan Comments DC Barriers: Continued monitoring with sotalol, scheduled to possibly have cardioversion performed tomorrow, 7/10 if still in afib per cardiac EP recommendations.           Karis Beaver RN     Office Phone: [phone]  Office Cell: [phone]

## 2025-07-09 NOTE — PROGRESS NOTES
CARDIOLOGY FOLLOW-UP PROGRESS NOTE      Reason for follow-up:    Atrial fibrillation      Attending: Giovanny Harding DO  Seen and examined greater than 50% of total encounter time and medical decision making ProForma me      Subjective .   Remains with rate controlled atrial fibrillation  Sotalol started, following QT serially  Off sertraline with pharmacologic interference  Remains on Plaquenil  Up to bed encouraged ambulation 3 times daily  Chest pain-free    History reviewed and interpreted by me demonstrates rate controlled atrial fibrillation     Review of Systems   Constitutional: Negative for malaise/fatigue.   HENT: Negative.     Eyes: Negative.    Cardiovascular:  Negative for chest pain, dyspnea on exertion, irregular heartbeat, leg swelling and palpitations.   Respiratory:  Negative for cough and shortness of breath.    Endocrine: Negative.    Hematologic/Lymphatic: Negative.    Skin:  Negative for color change and nail changes.   Musculoskeletal: Negative.    Gastrointestinal: Negative.    Genitourinary: Negative.    Neurological:  Negative for light-headedness.   Psychiatric/Behavioral: Negative.     Allergic/Immunologic: Negative.          Allergies: Patient has no known allergies.    Scheduled Meds:acyclovir, 400 mg, Oral, BID  apixaban, 5 mg, Oral, BID  furosemide, 20 mg, Oral, BID Diuretics  hydroxychloroquine, 200 mg, Oral, Q12H  insulin lispro, 2-7 Units, Subcutaneous, 4x Daily AC & at Bedtime  levothyroxine, 100 mcg, Oral, Q AM  pantoprazole, 40 mg, Oral, Q AM  potassium chloride, 10 mEq, Oral, Daily  [Held by provider] sertraline, 25 mg, Oral, Daily  sodium chloride, 10 mL, Intravenous, Q12H  sotalol, 80 mg, Oral, Q12H        Continuous Infusions:     PRN Meds:.  acetaminophen **OR** acetaminophen **OR** acetaminophen    aluminum-magnesium hydroxide-simethicone    Calcium Replacement - Follow Nurse / BPA Driven Protocol    dextrose    dextrose    glucagon (human recombinant)    Magnesium  "Cardiology Dose Replacement - Follow Nurse / BPA Driven Protocol    melatonin    nitroglycerin    ondansetron    Pharmacy Consult    Phosphorus Replacement - Follow Nurse / BPA Driven Protocol    polyethylene glycol    Potassium Replacement - Follow Nurse / BPA Driven Protocol    promethazine    sodium chloride    sodium chloride    Objective     VITAL SIGNS  Patient Vitals for the past 24 hrs:   BP Temp Temp src Pulse Resp SpO2 Weight   07/09/25 0811 108/55 98 °F (36.7 °C) Oral 89 -- 97 % --   07/09/25 0405 101/56 97.9 °F (36.6 °C) Oral 98 18 94 % 79 kg (174 lb 2.6 oz)   07/08/25 2318 106/58 97.7 °F (36.5 °C) Oral 87 16 94 % --   07/08/25 2014 119/64 98.1 °F (36.7 °C) Oral 109 18 92 % --   07/08/25 1715 111/67 -- -- 103 -- -- --   07/08/25 1500 94/52 97.8 °F (36.6 °C) Oral 80 16 94 % --   07/08/25 1100 93/50 97.9 °F (36.6 °C) Oral (!) 122 19 94 % --        Flowsheet Rows      Flowsheet Row First Filed Value   Admission Height 154.9 cm (61\") Documented at 07/04/2025 2300   Admission Weight 81.6 kg (179 lb 14.3 oz) Documented at 07/04/2025 2300            Body mass index is 32.91 kg/m².      Intake/Output Summary (Last 24 hours) at 7/9/2025 1031  Last data filed at 7/9/2025 0800  Gross per 24 hour   Intake 720 ml   Output --   Net 720 ml        TELEMETRY:     Atrial fibrillation rates 70s    Physical Exam:  Physical Exam     Constitutional:       General: she is not in acute distress.     Appearance: Normal appearance. .   HENT:      Head: Normocephalic and atraumatic.   Eyes:      Extraocular Movements: Extraocular movements intact.      Pupils: Pupils are equal, round, and reactive to light.   Cardiovascular:      Rate and Rhythm: Normal rate and irregular rhythm.   Pulmonary:      Effort: Pulmonary effort is normal.      Breath sounds: Normal breath sounds.   Abdominal:      General: Abdomen not distended. Bowel sounds are normal.      Palpations: Abdomen is soft.   Musculoskeletal:      Cervical back: Normal " range of motion.   Skin:     General: Skin is warm and dry.   Neurological:      General: No focal deficit present.      Mental Status: she is alert and oriented to person, place, and time. Mental status is at baseline.   Psychiatric:         Mood and Affect: Mood normal.         Behavior: Behavior normal.         Thought Content: Thought content normal.     Unchanged from prior    Results Review:   I reviewed the patient's new clinical results.    CBC    Results from last 7 days   Lab Units 07/09/25  0405 07/08/25  0522 07/07/25 0225 07/06/25  0208 07/05/25  0115 07/03/25  0902 07/02/25  1330   WBC 10*3/mm3 2.90* 3.55 4.88 4.60 4.99 4.17  --    HEMOGLOBIN g/dL 10.0* 10.1* 10.3* 10.3* 10.8* 8.1* 9.5*   PLATELETS 10*3/mm3 229 362 214 211 239 123*  --      BMP   Results from last 7 days   Lab Units 07/09/25 0405 07/08/25 0522 07/07/25 0225 07/06/25  0208 07/05/25  0115 07/03/25  0902   SODIUM mmol/L 139 137 134* 133* 138 141   POTASSIUM mmol/L 4.4 4.6 3.8 3.7 4.1 3.1*   CHLORIDE mmol/L 101 100 97* 93* 96* 106   CO2 mmol/L 29.1* 28.0 30.0* 30.8* 29.4* 24.0   BUN mg/dL 13.8 12.0 10.8 14.6 15.8 16.6   CREATININE mg/dL 0.66 0.66 0.57 0.77 0.90 0.62   GLUCOSE mg/dL 107* 110* 94 119* 118* 159*   MAGNESIUM mg/dL 2.3 2.2  --  1.9 1.8 1.7   PHOSPHORUS mg/dL  --   --   --   --  3.7 3.3     Cr Clearance Estimated Creatinine Clearance: 75.5 mL/min (by C-G formula based on SCr of 0.66 mg/dL).  Coag     HbA1C   Lab Results   Component Value Date    HGBA1C 6.06 (H) 05/19/2025     Blood Glucose   Glucose   Date/Time Value Ref Range Status   07/09/2025 0719 104 70 - 105 mg/dL Final     Comment:     Serial Number: [id number]:  [id number]   07/08/2025 2014 139 (H) 70 - 105 mg/dL Final     Comment:     Serial Number: [id number]:  [id number]   07/08/2025 1537 116 (H) 70 - 105 mg/dL Final     Comment:     Serial Number: [id number]:  [id number]   07/08/2025 1123 121 (H) 70 - 105 mg/dL Final     Comment:     Serial  "Number: [id number]:  [id number]   07/08/2025 0734 98 70 - 105 mg/dL Final     Comment:     Serial Number: [id number]:  [id number]   07/07/2025 1941 129 (H) 70 - 105 mg/dL Final     Comment:     Serial Number: [id number]:  [id number]   07/07/2025 1617 118 (H) 70 - 105 mg/dL Final     Comment:     Serial Number: [id number]:  [id number]   07/07/2025 1109 119 (H) 70 - 105 mg/dL Final     Comment:     Serial Number: [id number]:  [id number]     Infection   Results from last 7 days   Lab Units 07/05/25  0115   PROCALCITONIN ng/mL 1.47*     CMP   Results from last 7 days   Lab Units 07/09/25  0405 07/08/25  0522 07/07/25  0225 07/06/25  0208 07/05/25  0115 07/03/25  0902   SODIUM mmol/L 139 137 134* 133* 138 141   POTASSIUM mmol/L 4.4 4.6 3.8 3.7 4.1 3.1*   CHLORIDE mmol/L 101 100 97* 93* 96* 106   CO2 mmol/L 29.1* 28.0 30.0* 30.8* 29.4* 24.0   BUN mg/dL 13.8 12.0 10.8 14.6 15.8 16.6   CREATININE mg/dL 0.66 0.66 0.57 0.77 0.90 0.62   GLUCOSE mg/dL 107* 110* 94 119* 118* 159*   ALBUMIN g/dL  --  3.0*  --   --  3.8  --    BILIRUBIN mg/dL  --  0.3  --   --  0.5  --    ALK PHOS U/L  --  79  --   --  113  --    AST (SGOT) U/L  --  32  --   --  34*  --    ALT (SGPT) U/L  --  23  --   --  51*  --      ABG      UA      ASTER  No results found for: \"POCMETH\", \"POCAMPHET\", \"POCBARBITUR\", \"POCBENZO\", \"POCCOCAINE\", \"POCOPIATES\", \"POCOXYCODO\", \"POCPHENCYC\", \"POCPROPOXY\", \"POCTHC\", \"POCTRICYC\"  Lysis Labs   Results from last 7 days   Lab Units 07/09/25  0405 07/08/25  0522 07/07/25  0225 07/06/25  0208 07/05/25  0115 07/03/25  0902 07/02/25  1330   HEMOGLOBIN g/dL 10.0* 10.1* 10.3* 10.3* 10.8* 8.1* 9.5*   PLATELETS 10*3/mm3 229 362 214 211 239 123*  --    CREATININE mg/dL 0.66 0.66 0.57 0.77 0.90 0.62  --      Radiology(recent) No radiology results for the last day    Imaging Results (Last 24 Hours)       ** No results found for the last 24 hours. **                  EKG         I personally viewed and " interpreted the patient's EKG/Telemetry data:        ECHOCARDIOGRAM:     Results for orders placed during the hospital encounter of 07/01/25    Adult Transesophageal Echo (ANN) W/ Cont if Necessary Per Protocol (Cardiology Department) 07/03/2025 1535    Interpretation Summary    Left ventricular systolic function is normal. Estimated left ventricular EF = 60% Left ventricular ejection fraction appears to be 56 - 60%.    Left ventricular wall thickness is consistent with mild to moderate concentric hypertrophy.    Left ventricular diastolic function is consistent with (grade I) impaired relaxation.    The left atrial cavity is mild to moderately dilated.    Abnormal mitral valve structure consistent with dilated annulus.    Estimated right ventricular systolic pressure from tricuspid regurgitation is normal (<35 mmHg).    There is a trivial pericardial effusion.    Procedure indication  Atrial fibrillation with uncontrollable rate    conscious sedation administered by anesthesia  Timeout before procedure    consent obtained before procedure      Procedure note  after obtaining a valid consent patient was sedated by Anesthesia and a ANN probe was easily placed into esophagus with multiplane imaging with 2D, color and Doppler followed by bubble study with agitated saline without any complications    ANN  Findings    Mild to moderate left atrial enlargement with mild to moderate central MR without any shunt or clot  EF is normal with EF of 60% with mild LVH  Mild RVH with normal RV systolic  Mild TR without pulm hypertension and trivial effusion noted    Plan  Proceed with cardioversion    Procedures done  Transesophageal echocardiogram    Electronically signed by Bull Wiggins MD, 07/03/25, 3:34 PM EDT.            Assessment & Plan            Atrial fibrillation with RVR    Acute on chronic heart failure with preserved ejection fraction (HFpEF)    Severe protein-calorie malnutrition        ASSESSMENT:    Heart  failure with preserved ejection fraction  Chest x-ray consistent with pulmonary edema  proBNP 11,000  Recent TEEJuly 3, 2025 with a EF of 60%, moderate concentric LVH, mild TR     Paroxysmal atrial fibrillation  Recent cardioversion     Hypertension  Stable on current medical therapy     Anemia/multiple myeloma  Hemoglobin 10  Follows regularly with hematology    PLAN:    Start sotalol 80 twice daily, follow QTc on serial EKG  Key potassium greater than 4 magnesium greater than 2    Anticoagulated with eliquis  Discontinued beta-blockers and diltiazem  Avoid hypotension  Oral diuresis stable dosing  Ambulate 3 times daily with assistance  Potential for cardioversion tomorrow if patient does not self convert per EP  Ion Felton MD, PhD

## 2025-07-09 NOTE — PROGRESS NOTES
[facility] MEDICINE SERVICE  DAILY PROGRESS NOTE    NAME: Phuong Altamirano  : 1955  MRN: [id number]      LOS: 5 days     PROVIDER OF SERVICE: Giovanny Harding DO    Chief Complaint: Atrial fibrillation with RVR    Subjective:     Interval History:  History taken from: patient    Feels better; still with palpitations; denies chest pain or SOB: remains in A.fib        Review of Systems:   Review of Systems    Objective:     Vital Signs  Temp:  [97.7 °F (36.5 °C)-98.1 °F (36.7 °C)] 97.9 °F (36.6 °C)  Heart Rate:  [] 92  Resp:  [16-18] 17  BP: ()/(52-67) 103/54   Body mass index is 32.91 kg/m².    Physical Exam  Physical Exam  General: Sitting up in chair; pleasant; NAD  CV: irregularly irregular rhythm; slightly elevated and fluctuating HR   Lungs: CTA bilaterally  Abdomen: soft, NT, ND        Diagnostic Data    Results from last 7 days   Lab Units 25  0405 25  0522   WBC 10*3/mm3 2.90* 3.55   HEMOGLOBIN g/dL 10.0* 10.1*   HEMATOCRIT % 32.2* 32.1*   PLATELETS 10*3/mm3 229 362   GLUCOSE mg/dL 107* 110*   CREATININE mg/dL 0.66 0.66   BUN mg/dL 13.8 12.0   SODIUM mmol/L 139 137   POTASSIUM mmol/L 4.4 4.6   AST (SGOT) U/L  --  32   ALT (SGPT) U/L  --  23   ALK PHOS U/L  --  79   BILIRUBIN mg/dL  --  0.3   ANION GAP mmol/L 8.9 9.0       No radiology results for the last day      I reviewed the patient's new clinical results.    Assessment/Plan:     Active and Resolved Problems  Active Hospital Problems    Diagnosis  POA    **Atrial fibrillation with RVR [I48.91]  Yes    Severe protein-calorie malnutrition [E43]  Yes    Acute on chronic heart failure with preserved ejection fraction (HFpEF) [I50.33]  Unknown      Resolved Hospital Problems   No resolved problems to display.         RVR A fib  S/P ANN cardioversion 7/3 with restoration to sinus rhythm however with recurrence of RVR A fib   And went into RVR on .  Received multiple doses of digoxin Blood pressure remains low  normal  Started on sotalol per Cardiology/EP  Plan to monitor on this medication  If remains in A.fib, will plan for repeat Cardioversion in AM  AC with Eliquis   Patient probably would not be a candidate for amiodarone given concern for ILD, underlying lung issues           Acute on chronic HFpEF, decompensated due to RVR A fib  She presents with sudden onset SOB, orthopnea, has bilateral pedal edema 1 +  BNP elevated 11,212 compared to 7447 on 7/1  X ray prior to transfer showed increased vascular congestion  S/p diuresis with  IV Lasix twice daily  Transitioned to low dose lasix BID  Monitor volume status     History of RA  Continue plaquenil      Multiple myeloma  On therapy with daratumumab/bortezomib/dexamethasone/lenalidomide.   Plan to resume treatments 7/7.   Continue to follow with primary hematologist upon discharge   She is on acyclovir for prophylaxis      Type 2 DM  Insulin SS  Holding metformin while inpatient   Accu-Checks      Possible undiagnosed COPD/ILD  Continue bronchodilators as needed  Avoid albuterol given RVR A fib, may use atrovent and symbicort or levalbuterol if needed  Follow outpatient with Pulmonology      Hypothyroidism   Continue home dose levothyroxine 100 mcg once daily   Obtain updated TSH     VTE Prophylaxis:  Pharmacologic & mechanical VTE prophylaxis orders are present.              Disposition Planning: Home at d/c once A.fib under control; hopefully 1-2 days       VTE Prophylaxis:  Pharmacologic & mechanical VTE prophylaxis orders are present.             Disposition Planning:         Code Status and Medical Interventions: CPR (Attempt to Resuscitate); Full Support   Ordered at: 07/04/25 0386     Code Status (Patient has no pulse and is not breathing):    CPR (Attempt to Resuscitate)     Medical Interventions (Patient has pulse or is breathing):    Full Support       Signature: Electronically signed by Giovanny Harding DO, 07/09/25, 12:36 EDT.  [facility]  [facility]

## 2025-07-10 ENCOUNTER — READMISSION MANAGEMENT (OUTPATIENT)
Dept: CALL CENTER | Facility: HOSPITAL | Age: 70
End: 2025-07-10
Payer: MEDICARE

## 2025-07-10 ENCOUNTER — APPOINTMENT (OUTPATIENT)
Dept: CARDIOLOGY | Facility: HOSPITAL | Age: 70
End: 2025-07-10
Payer: MEDICARE

## 2025-07-10 VITALS
BODY MASS INDEX: 33.09 KG/M2 | SYSTOLIC BLOOD PRESSURE: 113 MMHG | TEMPERATURE: 97.6 F | HEART RATE: 71 BPM | WEIGHT: 175.27 LBS | HEIGHT: 61 IN | DIASTOLIC BLOOD PRESSURE: 67 MMHG | RESPIRATION RATE: 16 BRPM | OXYGEN SATURATION: 95 %

## 2025-07-10 PROBLEM — I48.91 ATRIAL FIBRILLATION WITH RVR: Status: RESOLVED | Noted: 2025-07-01 | Resolved: 2025-07-10

## 2025-07-10 PROBLEM — I50.33 ACUTE ON CHRONIC HEART FAILURE WITH PRESERVED EJECTION FRACTION (HFPEF): Status: RESOLVED | Noted: 2025-07-05 | Resolved: 2025-07-10

## 2025-07-10 LAB
ANION GAP SERPL CALCULATED.3IONS-SCNC: 11.9 MMOL/L (ref 5–15)
ANISOCYTOSIS BLD QL: ABNORMAL
BUN SERPL-MCNC: 11.6 MG/DL (ref 8–23)
BUN/CREAT SERPL: 17.6 (ref 7–25)
CALCIUM SPEC-SCNC: 9 MG/DL (ref 8.6–10.5)
CHLORIDE SERPL-SCNC: 101 MMOL/L (ref 98–107)
CO2 SERPL-SCNC: 24.1 MMOL/L (ref 22–29)
CREAT SERPL-MCNC: 0.66 MG/DL (ref 0.57–1)
DEPRECATED RDW RBC AUTO: 53.1 FL (ref 37–54)
EGFRCR SERPLBLD CKD-EPI 2021: 94.5 ML/MIN/1.73
ERYTHROCYTE [DISTWIDTH] IN BLOOD BY AUTOMATED COUNT: 15.7 % (ref 12.3–15.4)
GLUCOSE BLDC GLUCOMTR-MCNC: 109 MG/DL (ref 70–105)
GLUCOSE SERPL-MCNC: 113 MG/DL (ref 65–99)
HCT VFR BLD AUTO: 33.7 % (ref 34–46.6)
HGB BLD-MCNC: 10.1 G/DL (ref 12–15.9)
LYMPHOCYTES # BLD MANUAL: 1.19 10*3/MM3 (ref 0.7–3.1)
LYMPHOCYTES NFR BLD MANUAL: 8 % (ref 5–12)
MAGNESIUM SERPL-MCNC: 2.4 MG/DL (ref 1.6–2.4)
MCH RBC QN AUTO: 27.7 PG (ref 26.6–33)
MCHC RBC AUTO-ENTMCNC: 30 G/DL (ref 31.5–35.7)
MCV RBC AUTO: 92.3 FL (ref 79–97)
MONOCYTES # BLD: 0.22 10*3/MM3 (ref 0.1–0.9)
NEUTROPHILS # BLD AUTO: 1.3 10*3/MM3 (ref 1.7–7)
NEUTROPHILS NFR BLD MANUAL: 46 % (ref 42.7–76)
NEUTS BAND NFR BLD MANUAL: 2 % (ref 0–5)
PLAT MORPH BLD: NORMAL
PLATELET # BLD AUTO: 216 10*3/MM3 (ref 140–450)
PMV BLD AUTO: 9.8 FL (ref 6–12)
POTASSIUM SERPL-SCNC: 4.4 MMOL/L (ref 3.5–5.2)
QT INTERVAL: 401 MS
QT INTERVAL: 434 MS
QT INTERVAL: 488 MS
QTC INTERVAL: 469 MS
QTC INTERVAL: 472 MS
QTC INTERVAL: 508 MS
RBC # BLD AUTO: 3.65 10*6/MM3 (ref 3.77–5.28)
SCAN SLIDE: NORMAL
SODIUM SERPL-SCNC: 137 MMOL/L (ref 136–145)
VARIANT LYMPHS NFR BLD MANUAL: 44 % (ref 19.6–45.3)
WBC MORPH BLD: NORMAL
WBC NRBC COR # BLD AUTO: 2.71 10*3/MM3 (ref 3.4–10.8)

## 2025-07-10 PROCEDURE — 93005 ELECTROCARDIOGRAM TRACING: CPT | Performed by: INTERNAL MEDICINE

## 2025-07-10 PROCEDURE — 82948 REAGENT STRIP/BLOOD GLUCOSE: CPT | Performed by: STUDENT IN AN ORGANIZED HEALTH CARE EDUCATION/TRAINING PROGRAM

## 2025-07-10 PROCEDURE — 93010 ELECTROCARDIOGRAM REPORT: CPT | Performed by: INTERNAL MEDICINE

## 2025-07-10 PROCEDURE — 83735 ASSAY OF MAGNESIUM: CPT | Performed by: INTERNAL MEDICINE

## 2025-07-10 PROCEDURE — 80048 BASIC METABOLIC PNL TOTAL CA: CPT

## 2025-07-10 PROCEDURE — 85007 BL SMEAR W/DIFF WBC COUNT: CPT

## 2025-07-10 PROCEDURE — 85025 COMPLETE CBC W/AUTO DIFF WBC: CPT

## 2025-07-10 PROCEDURE — 99232 SBSQ HOSP IP/OBS MODERATE 35: CPT | Performed by: NURSE PRACTITIONER

## 2025-07-10 RX ORDER — SOTALOL HYDROCHLORIDE 80 MG/1
80 TABLET ORAL EVERY 12 HOURS SCHEDULED
Qty: 60 TABLET | Refills: 0 | Status: SHIPPED | OUTPATIENT
Start: 2025-07-10

## 2025-07-10 RX ADMIN — LEVOTHYROXINE SODIUM 100 MCG: 0.1 TABLET ORAL at 04:46

## 2025-07-10 RX ADMIN — HYDROXYCHLOROQUINE SULFATE 200 MG: 200 TABLET, FILM COATED ORAL at 08:28

## 2025-07-10 RX ADMIN — ACYCLOVIR 400 MG: 200 CAPSULE ORAL at 08:28

## 2025-07-10 RX ADMIN — APIXABAN 5 MG: 5 TABLET, FILM COATED ORAL at 08:28

## 2025-07-10 RX ADMIN — POTASSIUM CHLORIDE 10 MEQ: 750 TABLET, EXTENDED RELEASE ORAL at 08:28

## 2025-07-10 RX ADMIN — PANTOPRAZOLE SODIUM 40 MG: 40 TABLET, DELAYED RELEASE ORAL at 04:46

## 2025-07-10 RX ADMIN — Medication 10 ML: at 08:29

## 2025-07-10 RX ADMIN — SOTALOL HYDROCHLORIDE 80 MG: 80 TABLET ORAL at 08:28

## 2025-07-10 RX ADMIN — FUROSEMIDE 20 MG: 20 TABLET ORAL at 08:28

## 2025-07-10 NOTE — DISCHARGE INSTR - APPOINTMENTS
Follow up-STANLEY Barakat as your provider is on maternity leave.  Your appointment is 7/16/25 at 1:00pm Please arrive 15 minutes early.  Bring current photo id, insurance cards, and a list of your medications.     Hosptial follow up appointment with Dr. Wiggins on 8/4/25 at 11:30am.

## 2025-07-10 NOTE — PLAN OF CARE
Goal Outcome Evaluation:      This nurse assumed care of this patient at 2300. Pt converted to NSR some time around 2100 this shift. EKG confirmed. Pt remained NPO after MN. Morning EKG confirms pt still in NSR with rates in the 70s. Morning labs pending. Pt able to make needs known. VSS. No complaints at this time.              Problem: Adult Inpatient Plan of Care  Goal: Absence of Hospital-Acquired Illness or Injury  Intervention: Identify and Manage Fall Risk  Recent Flowsheet Documentation  Taken 7/10/2025 0600 by Tiffanie Rockwell RN  Safety Promotion/Fall Prevention:   safety round/check completed   room organization consistent   nonskid shoes/slippers when out of bed   fall prevention program maintained   clutter free environment maintained   assistive device/personal items within reach  Taken 7/10/2025 0445 by Tiffanie Rockwell RN  Safety Promotion/Fall Prevention:   safety round/check completed   room organization consistent   nonskid shoes/slippers when out of bed   fall prevention program maintained   clutter free environment maintained   assistive device/personal items within reach  Taken 7/10/2025 0230 by Tiffanie Rockwell RN  Safety Promotion/Fall Prevention:   safety round/check completed   room organization consistent   nonskid shoes/slippers when out of bed   fall prevention program maintained   clutter free environment maintained   assistive device/personal items within reach  Taken 7/10/2025 0030 by Tiffanie Rockwell RN  Safety Promotion/Fall Prevention:   safety round/check completed   room organization consistent   nonskid shoes/slippers when out of bed   fall prevention program maintained   clutter free environment maintained   assistive device/personal items within reach  Intervention: Prevent Skin Injury  Recent Flowsheet Documentation  Taken 7/10/2025 0600 by Tiffanie Rockwell RN  Body Position: weight shifting  Taken 7/10/2025 0445 by Tiffanie Rockwell, DIETER  Body  Position: weight shifting  Skin Protection: transparent dressing maintained  Taken 7/10/2025 0230 by Tiffanie Rockwell RN  Body Position: weight shifting  Taken 7/10/2025 0030 by Tiffanie Rockwell RN  Body Position: weight shifting  Skin Protection: transparent dressing maintained  Intervention: Prevent and Manage VTE (Venous Thromboembolism) Risk  Recent Flowsheet Documentation  Taken 7/10/2025 0030 by Tiffanie Rockwell RN  VTE Prevention/Management: (ambulatory)   bilateral   SCDs (sequential compression devices) off  Intervention: Prevent Infection  Recent Flowsheet Documentation  Taken 7/10/2025 0600 by Tiffanie Rockwell RN  Infection Prevention:   single patient room provided   rest/sleep promoted   personal protective equipment utilized   hand hygiene promoted   equipment surfaces disinfected  Taken 7/10/2025 0445 by Tiffanie Rockwell RN  Infection Prevention:   single patient room provided   rest/sleep promoted   personal protective equipment utilized   hand hygiene promoted   equipment surfaces disinfected  Taken 7/10/2025 0230 by Tiffanie Rockwell RN  Infection Prevention:   single patient room provided   rest/sleep promoted   personal protective equipment utilized   hand hygiene promoted   equipment surfaces disinfected  Taken 7/10/2025 0030 by Tiffanie Rockwell RN  Infection Prevention:   single patient room provided   rest/sleep promoted   personal protective equipment utilized   hand hygiene promoted   equipment surfaces disinfected  Goal: Optimal Comfort and Wellbeing  Intervention: Monitor Pain and Promote Comfort  Recent Flowsheet Documentation  Taken 7/10/2025 0445 by Tiffanie Rockwell RN  Pain Management Interventions:   care clustered   pain management plan reviewed with patient/caregiver   pillow support provided   position adjusted   quiet environment facilitated   relaxation techniques promoted  Taken 7/10/2025 0030 by Tiffanie Rockwell RN  Pain Management  Interventions:   care clustered   pain management plan reviewed with patient/caregiver   pillow support provided   position adjusted   quiet environment facilitated   relaxation techniques promoted

## 2025-07-10 NOTE — DISCHARGE SUMMARY
[facility] Medicine Services  Discharge Summary    Date of Service: 7/10/2025  Patient Name: Phuong Altamirano  : 1955  MRN: [id number]    Date of Admission: 2025  Discharge Diagnosis: Atrial fibrillation with RVR  Date of Discharge: 7/10/2025  Primary Care Physician: Chula Sanchez APRN      Presenting Problem:   Atrial fibrillation with RVR [I48.91]    Active and Resolved Hospital Problems:  Active Hospital Problems    Diagnosis POA    Severe protein-calorie malnutrition [E43] Yes      Resolved Hospital Problems    Diagnosis POA    **Atrial fibrillation with RVR [I48.91] Yes    Acute on chronic heart failure with preserved ejection fraction (HFpEF) [I50.33] Unknown     RVR A fib  S/P ANN cardioversion 7/3 with restoration to sinus rhythm however with recurrence of RVR A fib   And went into RVR on .  Received multiple doses of digoxin Blood pressure remains low normal  Started on sotalol per Cardiology/EP  Plan to monitor on this medication  If remains in A.fib, will plan for repeat Cardioversion in AM  AC with Eliquis   Patient probably would not be a candidate for amiodarone given concern for ILD, underlying lung issues        Acute on chronic HFpEF, decompensated due to RVR A fib  She presents with sudden onset SOB, orthopnea, has bilateral pedal edema 1 +  BNP elevated 11,212 compared to 7447 on   X ray prior to transfer showed increased vascular congestion  S/p diuresis, continue oral diuretics  -Patient transition to sotalol with significant improvement/resolution of A-fib RVR.   - Due to being started on sotalol patient sertraline was discontinued.  Outpatient follow-up with primary care provider about alternative option.     History of RA  Continue plaquenil      Multiple myeloma  On therapy with daratumumab/bortezomib/dexamethasone/lenalidomide.   Plan to resume treatments .   Continue to follow with primary hematologist upon discharge   She is on acyclovir for  "prophylaxis      Type 2 DM  - Restart home medication@discharge     Possible undiagnosed COPD/ILD  Follow outpatient with Pulmonology      Hypothyroidism   Continue home dose levothyroxine 100 mcg     Depression-discontinuation of sertraline.  Outpatient follow-up for alternative medication.  Patient stable at this time.  Denies any current symptoms Suggestive of withdrawal.    Hospital Course     HPI:    \"Phuong Altamirano is a 70 y.o. female with a with a PMH of HFpEF, multiple myeloma, hypertension, hyperlipidemia, type 2 diabetes, hypothyroidism, rheumatoid arthritis, lupus, GERD, anxiety/depression, recently diagnosed with new A fib S/P cardioversion yesterday after which she converted to sinus rhythm and was discharged who presented to [facility] on 7/4/2025 with elevated HR and sudden onset dyspnea. She was initially seen at [place] where she was found in RVR A fib rates in 130s. X ray showed increased vascular congestion and she received 20 mg iv lasix prior to transfer. She denies chest pain, dizziness, syncope, fever or chills. The patient was subsequently transfer to [facility] for further evaluation and management. \"    Hospital Course:  Had a significant prolonged hospitalization of which patient treated for acute decompensated heart failure along with A-fib RVR.  Patient was diuresed appropriately during hospitalization with transition to oral diuretics at time of discharge.  Cardiology okay with disposition at that time with sotalol.  Patient was verbally told that she will need to follow-up with pulmonology at time of discharge for continued monitoring of possible ILD/COPD.        DISCHARGE Follow Up Recommendations for labs and diagnostics: BMP        Day of Discharge     Vital Signs:  Temp:  [97.3 °F (36.3 °C)-98.4 °F (36.9 °C)] 97.6 °F (36.4 °C)  Heart Rate:  [] 71  Resp:  [14-20] 16  BP: (113-154)/(64-79) 113/67  Flow (L/min) (Oxygen Therapy):  [2] 2    Physical Exam:  Physical " Exam  Vitals and nursing note reviewed.   Constitutional:       General: She is not in acute distress.     Appearance: Normal appearance. She is normal weight. She is not ill-appearing or toxic-appearing.   HENT:      Head: Normocephalic and atraumatic.      Nose: Nose normal.      Mouth/Throat:      Mouth: Mucous membranes are moist.      Pharynx: Oropharynx is clear.   Eyes:      General: No scleral icterus.     Extraocular Movements: Extraocular movements intact.      Conjunctiva/sclera: Conjunctivae normal.   Cardiovascular:      Rate and Rhythm: Normal rate and regular rhythm.      Pulses: Normal pulses.      Heart sounds: Normal heart sounds. No murmur heard.     No friction rub. No gallop.   Pulmonary:      Effort: Pulmonary effort is normal. No respiratory distress.      Breath sounds: Normal breath sounds. No wheezing, rhonchi or rales.   Abdominal:      General: Abdomen is flat. Bowel sounds are normal. There is no distension.      Palpations: Abdomen is soft.      Tenderness: There is no abdominal tenderness. There is no guarding.   Musculoskeletal:         General: Normal range of motion.      Cervical back: Normal range of motion. No rigidity or tenderness.      Right lower leg: No edema.      Left lower leg: No edema.   Skin:     General: Skin is warm.      Coloration: Skin is not jaundiced or pale.   Neurological:      General: No focal deficit present.      Mental Status: She is alert and oriented to person, place, and time. Mental status is at baseline.   Psychiatric:         Mood and Affect: Mood normal.         Behavior: Behavior normal.         Thought Content: Thought content normal.         Judgment: Judgment normal.            Pertinent  and/or Most Recent Results     LAB RESULTS:      Lab 07/10/25  0505 07/09/25  0405 07/08/25  0522 07/07/25  0225 07/06/25  0208 07/05/25  0115   WBC 2.71* 2.90* 3.55 4.88 4.60 4.99   HEMOGLOBIN 10.1* 10.0* 10.1* 10.3* 10.3* 10.8*   HEMATOCRIT 33.7* 32.2* 32.1*  33.5* 33.6* 34.9   PLATELETS 216 229 362 214 211 239   NEUTROS ABS 1.30* 1.04* 1.24* 2.49 2.09 4.26   IMMATURE GRANS (ABS)  --   --  0.00 0.01 0.01 0.01   LYMPHS ABS  --   --  2.05 1.95 2.01 0.41*   MONOS ABS  --   --  0.26 0.42 0.48 0.31   EOS ABS  --   --  0.00 0.00 0.00 0.00   MCV 92.3 90.7 90.2 91.5 90.8 90.9   PROCALCITONIN  --   --   --   --   --  1.47*         Lab 07/10/25  0505 07/09/25  0405 07/08/25  0522 07/07/25  0225 07/06/25  0208 07/05/25  0115   SODIUM 137 139 137 134* 133* 138   POTASSIUM 4.4 4.4 4.6 3.8 3.7 4.1   CHLORIDE 101 101 100 97* 93* 96*   CO2 24.1 29.1* 28.0 30.0* 30.8* 29.4*   ANION GAP 11.9 8.9 9.0 7.0 9.2 12.6   BUN 11.6 13.8 12.0 10.8 14.6 15.8   CREATININE 0.66 0.66 0.66 0.57 0.77 0.90   EGFR 94.5 94.5 94.5 97.9 83.1 68.9   GLUCOSE 113* 107* 110* 94 119* 118*   CALCIUM 9.0 8.8 8.9 8.7 8.6 9.2   MAGNESIUM 2.4 2.3 2.2  --  1.9 1.8   PHOSPHORUS  --   --   --   --   --  3.7         Lab 07/08/25  0522 07/05/25  0115   TOTAL PROTEIN 5.9* 6.8   ALBUMIN 3.0* 3.8   GLOBULIN 2.9 3.0   ALT (SGPT) 23 51*   AST (SGOT) 32 34*   BILIRUBIN 0.3 0.5   ALK PHOS 79 113         Lab 07/05/25  0115   PROBNP 11,212.0*                 Brief Urine Lab Results  (Last result in the past 365 days)        Color   Clarity   Blood   Leuk Est   Nitrite   Protein   CREAT   Urine HCG        06/30/25 1211 Yellow   Clear   Negative   Negative   Negative   100 mg/dL (2+)                 Microbiology Results (last 10 days)       ** No results found for the last 240 hours. **            CT Angiogram Chest Pulmonary Embolism  Result Date: 7/1/2025  Impression: Impression: 1.No evidence of pulmonary embolism. 2.Mild hazy mosaic attenuation noted throughout the lungs. Additionally there is mild patchy opacities noted within the posterior aspect of the left lower lobe. These findings are nonspecific and may be due to small airway disease. Mild infection is not excluded. 3.Additional findings as above. Electronically Signed:  Pierre Cook,   7/1/2025 4:00 PM EDT  Workstation ID: [id number]    XR Chest 1 View  Result Date: 7/1/2025  Impression: Impression: Mild diffuse hazy attenuation of the lungs could reflect a low-grade infectious/inflammatory process versus chronic change. No focal airspace consolidation. No pleural disease. Electronically Signed: Leeroy Tian MD  7/1/2025 3:10 PM EDT  Workstation ID: [id number]    CT Abdomen Pelvis With Contrast  Result Date: 6/13/2025  Impression: Impression: 1.No acute intra-abdominal or intrapelvic process. 2.Ancillary findings as described above. Electronically Signed: Aleena Toussaint MD  6/13/2025 11:37 PM EDT  Workstation ID: [id number]      Results for orders placed during the hospital encounter of 07/01/25    Duplex Venous Lower Extremity - Left CAR 07/02/2025 10:39 AM    Interpretation Summary    Normal left lower extremity venous duplex scan.      Results for orders placed during the hospital encounter of 07/01/25    Duplex Venous Lower Extremity - Left CAR 07/02/2025 10:39 AM    Interpretation Summary    Normal left lower extremity venous duplex scan.      Results for orders placed during the hospital encounter of 07/01/25    Adult Transesophageal Echo (ANN) W/ Cont if Necessary Per Protocol (Cardiology Department) 07/03/2025  3:34 PM    Interpretation Summary    Left ventricular systolic function is normal. Estimated left ventricular EF = 60% Left ventricular ejection fraction appears to be 56 - 60%.    Left ventricular wall thickness is consistent with mild to moderate concentric hypertrophy.    Left ventricular diastolic function is consistent with (grade I) impaired relaxation.    The left atrial cavity is mild to moderately dilated.    Abnormal mitral valve structure consistent with dilated annulus.    Estimated right ventricular systolic pressure from tricuspid regurgitation is normal (<35 mmHg).    There is a trivial pericardial effusion.    Procedure indication  Atrial fibrillation  with uncontrollable rate    conscious sedation administered by anesthesia  Timeout before procedure    consent obtained before procedure      Procedure note  after obtaining a valid consent patient was sedated by Anesthesia and a ANN probe was easily placed into esophagus with multiplane imaging with 2D, color and Doppler followed by bubble study with agitated saline without any complications    ANN  Findings    Mild to moderate left atrial enlargement with mild to moderate central MR without any shunt or clot  EF is normal with EF of 60% with mild LVH  Mild RVH with normal RV systolic  Mild TR without pulm hypertension and trivial effusion noted    Plan  Proceed with cardioversion    Procedures done  Transesophageal echocardiogram    Electronically signed by Bull Wiggins MD, 07/03/25, 3:34 PM EDT.      Labs Pending at Discharge:  Pending Results       None            Procedures Performed           Consults:   Consults       Date and Time Order Name Status Description    7/5/2025  2:27 AM Inpatient Cardiology Consult Completed     7/1/2025  6:06 PM Inpatient Cardiology Consult Completed               Discharge Details        Discharge Medications        New Medications        Instructions Start Date   sotalol 80 MG tablet  Commonly known as: BETAPACE   80 mg, Oral, Every 12 Hours Scheduled             Continue These Medications        Instructions Start Date   acyclovir 400 MG tablet  Commonly known as: ZOVIRAX   400 mg, Oral, 2 Times Daily      dexAMETHasone 4 MG tablet  Commonly known as: DECADRON   20 mg, Oral, Daily With Breakfast, Take with food on Days 1, 2, 8, 9, 15 and 16.      Eliquis 5 MG tablet tablet  Generic drug: apixaban   5 mg, Oral, 2 Times Daily      ferrous sulfate 325 (65 FE) MG tablet   325 mg, Oral, Daily With Breakfast      furosemide 20 MG tablet  Commonly known as: Lasix   20 mg, Oral, 2 Times Daily      lenalidomide 25 MG capsule  Commonly known as: REVLIMID   25 mg, Oral, See Admin  Instructions, Take 1 capsule by mouth daily on days 1-14, OFF 7 days of each 21 day cycle. Take with or without food. Swallow capsules whole, do not crush, break or chew.      levothyroxine 100 MCG tablet  Commonly known as: SYNTHROID, LEVOTHROID   100 mcg, Every Morning Before Breakfast      metFORMIN 500 MG tablet  Commonly known as: GLUCOPHAGE   Take 1 tablet by mouth Daily With Dinner.      omeprazole 40 MG capsule  Commonly known as: priLOSEC   40 mg, Daily      ondansetron 8 MG tablet  Commonly known as: ZOFRAN   8 mg, Oral, 3 Times Daily PRN      Plaquenil 200 MG tablet  Generic drug: hydroxychloroquine   Every 12 Hours      promethazine 12.5 MG tablet  Commonly known as: PHENERGAN   12.5 mg, Oral, Every 6 Hours PRN      sulfamethoxazole-trimethoprim 800-160 MG per tablet  Commonly known as: BACTRIM DS,SEPTRA DS   1 tablet, Oral, 3 Times Weekly, Take 1 tablet on Mondays, Wednesdays and Fridays.      Tylenol 325 MG tablet  Generic drug: acetaminophen   650 mg, Every 6 Hours PRN      acetaminophen 325 MG tablet  Commonly known as: TYLENOL   650 mg, Oral, Every 4 Hours PRN      Ventolin  (90 Base) MCG/ACT inhaler  Generic drug: albuterol sulfate HFA   Inhale 1 puff Every 6 (Six) Hours As Needed.             Stop These Medications      metoprolol tartrate 50 MG tablet  Commonly known as: LOPRESSOR     oxyCODONE 5 MG immediate release tablet  Commonly known as: ROXICODONE     sertraline 25 MG tablet  Commonly known as: ZOLOFT              No Known Allergies      Discharge Disposition:   Home or Self Care    Diet:  Hospital:No active diet order        Discharge Activity:         CODE STATUS:  Code Status and Medical Interventions: CPR (Attempt to Resuscitate); Full Support   Ordered at: 07/04/25 6945     Code Status (Patient has no pulse and is not breathing):    CPR (Attempt to Resuscitate)     Medical Interventions (Patient has pulse or is breathing):    Full Support         Future Appointments   Date Time  Provider Department Center   7/14/2025  8:00 AM [facility] INJECTION CHAIR [facility]   7/14/2025  8:15 AM Neftali Barrett MD [facility] ONC NA WIN   7/14/2025  8:45 AM INF ROOM 30 - CHAIR [facility]   7/17/2025  1:45 PM INF ROOM 33D - CHAIR [facility]   7/21/2025  9:30 AM INF ROOM 33A - CHAIR [facility]   7/24/2025  1:45 PM INF ROOM 28 - CHAIR [facility] NA [facility]AG   7/28/2025  8:45 AM INF ROOM 35 - CHAIR [facility] CC NA LAG   8/4/2025 11:30 AM Bull Wiggins MD [facility] CAR [facility] WIN   8/5/2025  1:30 PM Gris Mcgee APRN [facility] CAR NA P [facility] NA           Time spent on Discharge including face to face service:  40 minutes    Signature: Electronically signed by Matt Miguel MD, 07/10/25, 14:18 EDT.  [facility] Hospitalist Team

## 2025-07-10 NOTE — PROGRESS NOTES
Patient Name: Phuong Altamirano  YOB: 1955  MRN: [id number]  Admission date: 7/4/2025  Reason for Encounter: Follow-up/Progress Note      [facility] Clinical Nutrition Progress Note       Nutrition Intervention Updates: Continue current diet and encourage good PO intakes.        Subjective: Check on for PO intakes.  S/P cardioversion this AM.  Noted with plans to D/C today.         PO Diet: Diet: Cardiac, Diabetic; Healthy Heart (2-3 Na+); Consistent Carbohydrate; Fluid Consistency: Thin (IDDSI 0)   PO Supplements: None    PO Intake:  %       Current nutrition support:    Nutrition support review:        Labs: Reviewed, management per attending        GI Function:  Last documented BM 7/9 (yesterday)        Brief Weight Review:    Wt Readings from Last 1 Encounters:   07/10/25 0446 79.5 kg (175 lb 4.3 oz)   07/09/25 0405 79 kg (174 lb 2.6 oz)   07/08/25 0405 78.8 kg (173 lb 11.6 oz)   07/07/25 0456 81.1 kg (178 lb 12.7 oz)   07/06/25 0327 81.6 kg (179 lb 14.3 oz)   07/05/25 0525 76.6 kg (168 lb 14 oz)   07/04/25 2300 81.6 kg (179 lb 14.3 oz)        Results from last 7 days   Lab Units 07/10/25  0505 07/09/25  0405 07/08/25  0522 07/06/25  0208 07/05/25  0115   SODIUM mmol/L 137 139 137   < > 138   POTASSIUM mmol/L 4.4 4.4 4.6   < > 4.1   CHLORIDE mmol/L 101 101 100   < > 96*   CO2 mmol/L 24.1 29.1* 28.0   < > 29.4*   BUN mg/dL 11.6 13.8 12.0   < > 15.8   CREATININE mg/dL 0.66 0.66 0.66   < > 0.90   CALCIUM mg/dL 9.0 8.8 8.9   < > 9.2   BILIRUBIN mg/dL  --   --  0.3  --  0.5   ALK PHOS U/L  --   --  79  --  113   ALT (SGPT) U/L  --   --  23  --  51*   AST (SGOT) U/L  --   --  32  --  34*   GLUCOSE mg/dL 113* 107* 110*   < > 118*    < > = values in this interval not displayed.     Results from last 7 days   Lab Units 07/10/25  0505 07/09/25  0405 07/08/25  0522 07/06/25  0208 07/05/25  0115   MAGNESIUM mg/dL 2.4 2.3 2.2   < > 1.8   PHOSPHORUS mg/dL  --   --   --   --  3.7   PLATELETS 10*3/mm3 216  229 362   < > 239   HEMOGLOBIN g/dL 10.1* 10.0* 10.1*   < > 10.8*   HEMATOCRIT % 33.7* 32.2* 32.1*   < > 34.9    < > = values in this interval not displayed.     Lab Results   Component Value Date    HGBA1C 6.06 (H) 05/19/2025       Electronically signed by:  Candice Diaz RD  07/10/25 09:07 EDT

## 2025-07-10 NOTE — PROGRESS NOTES
[facility]   PROGRESS NOTE      Referring Provider: Matt Miguel MD    Reason for follow-up: atrial fibrillation     Patient Care Team:  Chula Sanchez APRN as PCP - General (Nurse Practitioner)  Reva Jaeger MD as PCP - Family Medicine  Neftali Barrett MD as Consulting Physician (Hematology and Oncology)      SUBJECTIVE    Patient doing well today. Converted to sinus rhythm. No need for cardioversion.     ROS    Allergies:  Patient has no known allergies.    Personal History:    Past Medical History:   Diagnosis Date    Arthritis 2010    Asthma 1 month    Cancer 12 years    Multiple mylenoma    Depression 2010    Diabetes mellitus 1.5 years    High blood pressure     High cholesterol 2005    Hypothyroidism     Lupus 2010    Sleep apnea Now       Past Surgical History:   Procedure Laterality Date    BREAST LUMPECTOMY Right 1983    benign    DILATATION AND CURETTAGE      LAPAROSCOPIC TUBAL LIGATION  1979    OVARY SURGERY  1985    remoal of left ovary       Family History   Problem Relation Age of Onset    Heart disease Father     Pancreatic cancer Sister 59    Heart disease Sister     Stroke Sister     Pneumonia Brother        Social History     Tobacco Use    Smoking status: Former     Current packs/day: 0.00     Average packs/day: 1 pack/day for 21.0 years (21.0 ttl pk-yrs)     Types: Cigarettes     Start date:      Quit date:      Years since quittin.5    Smokeless tobacco: Never   Vaping Use    Vaping status: Never Used   Substance Use Topics    Alcohol use: Yes     Comment: Very occasionally    Drug use: No        Medications Prior to Admission   Medication Sig Dispense Refill Last Dose/Taking    acyclovir (ZOVIRAX) 400 MG tablet Take 1 tablet by mouth 2 (Two) Times a Day. 60 tablet 3 2025 Morning    apixaban (ELIQUIS) 5 MG tablet tablet Take 1 tablet by mouth 2 (Two) Times a Day for 30 days. 60 tablet 0 2025 Morning    ferrous sulfate 325  (65 FE) MG tablet Take 1 tablet by mouth Daily With Breakfast for 30 days. 30 tablet 0 7/4/2025 Morning    furosemide (Lasix) 20 MG tablet Take 1 tablet by mouth 2 (Two) Times a Day for 30 days.   7/4/2025 Morning    hydroxychloroquine (PLAQUENIL) 200 MG tablet Every 12 (Twelve) Hours.   7/4/2025 Morning    levothyroxine (SYNTHROID, LEVOTHROID) 100 MCG tablet Take 1 tablet by mouth Every Morning Before Breakfast.  1 7/4/2025 Morning    metFORMIN (GLUCOPHAGE) 500 MG tablet Take 1 tablet by mouth Daily With Dinner.   7/3/2025 Evening    metoprolol tartrate (LOPRESSOR) 50 MG tablet Take 1 tablet by mouth Every 12 (Twelve) Hours for 30 days. 60 tablet 0 7/4/2025 Morning    omeprazole (priLOSEC) 40 MG capsule Take 1 capsule by mouth Daily.   7/4/2025 Morning    sertraline (ZOLOFT) 25 MG tablet Take 1 tablet by mouth Daily.  1 7/4/2025 Morning    sulfamethoxazole-trimethoprim (BACTRIM DS,SEPTRA DS) 800-160 MG per tablet Take 1 tablet by mouth 3 (Three) Times a Week. Take 1 tablet on Mondays, Wednesdays and Fridays. 12 tablet 3 7/4/2025 Morning    acetaminophen (TYLENOL) 325 MG tablet Take 2 tablets by mouth Every 6 (Six) Hours As Needed for Mild Pain.       acetaminophen (TYLENOL) 325 MG tablet Take 2 tablets by mouth Every 4 (Four) Hours As Needed for Mild Pain.       albuterol sulfate HFA (Ventolin HFA) 108 (90 Base) MCG/ACT inhaler Inhale 1 puff Every 6 (Six) Hours As Needed.       dexAMETHasone (DECADRON) 4 MG tablet Take 5 tablets by mouth Daily With Breakfast. Take with food on Days 1, 2, 8, 9, 15 and 16. 30 tablet 3 7/1/2025 Morning    lenalidomide (REVLIMID) 25 MG capsule Take 1 capsule by mouth See Admin Instructions. Take 1 capsule by mouth daily on days 1-14, OFF 7 days of each 21 day cycle. Take with or without food. Swallow capsules whole, do not crush, break or chew. 14 capsule 0 6/25/2025 Morning    ondansetron (ZOFRAN) 8 MG tablet Take 1 tablet by mouth 3 (Three) Times a Day As Needed for Nausea or  "Vomiting. 30 tablet 5     [] oxyCODONE (ROXICODONE) 5 MG immediate release tablet Take 1 tablet by mouth Every 6 (Six) Hours As Needed for Moderate Pain for up to 3 days. 12 tablet 0     promethazine (PHENERGAN) 12.5 MG tablet Take 1 tablet by mouth Every 6 (Six) Hours As Needed for Nausea or Vomiting. 30 tablet 2          OBJECTIVE    VITAL SIGNS  Vitals:    25 1718 25 2047 07/10/25 0044 07/10/25 0446   BP: 122/67 154/79 123/64 131/67   BP Location:  Right arm Right arm Right arm   Patient Position:  Sitting Lying Lying   Pulse: 116 120 80 73   Resp:  20 18 17   Temp:  98 °F (36.7 °C) 97.9 °F (36.6 °C) 97.3 °F (36.3 °C)   TempSrc:  Oral Oral Oral   SpO2:  98% 98% 100%   Weight:    79.5 kg (175 lb 4.3 oz)   Height:         Flowsheet Rows      Flowsheet Row First Filed Value   Admission Height 154.9 cm (61\") Documented at 2025   Admission Weight 81.6 kg (179 lb 14.3 oz) Documented at 2025             TELEMETRY: sinus rhythm    Physical Exam  VITALS REVIEWED    General:      well developed, in no acute distress.    Head:      normocephalic and atraumatic.    Eyes:      PERRL/EOM intact, conjunctiva and sclera clear with out nystagmus.    Neck:      no masses, thyromegaly,  trachea central with normal respiratory effort and PMI displaced laterally  Lungs:      Clear to auscultation bilaterally  Heart:       Regular rhythm and rate  Msk:      no deformity or scoliosis noted of thoracic or lumbar spine.    Pulses:      pulses normal in all 4 extremities.    Extremities:       No lower extremity edema    Neurologic:      no focal deficits.   alert oriented x3  Skin:      intact without lesions or rashes.    Psych:      alert and cooperative; normal mood and affect; normal attention span and concentration.      LAB RESULTS (LAST 7 DAYS)  I have reviewed new clinical results.    CMP   Results from last 7 days   Lab Units 07/10/25  0505 25  0405 25  0522 25  0225 " 07/06/25  0208 07/05/25  0115 07/03/25  0902   SODIUM mmol/L 137 139 137 134* 133* 138 141   POTASSIUM mmol/L 4.4 4.4 4.6 3.8 3.7 4.1 3.1*   CHLORIDE mmol/L 101 101 100 97* 93* 96* 106   CO2 mmol/L 24.1 29.1* 28.0 30.0* 30.8* 29.4* 24.0   BUN mg/dL 11.6 13.8 12.0 10.8 14.6 15.8 16.6   CREATININE mg/dL 0.66 0.66 0.66 0.57 0.77 0.90 0.62   GLUCOSE mg/dL 113* 107* 110* 94 119* 118* 159*   ALBUMIN g/dL  --   --  3.0*  --   --  3.8  --    BILIRUBIN mg/dL  --   --  0.3  --   --  0.5  --    ALK PHOS U/L  --   --  79  --   --  113  --    AST (SGOT) U/L  --   --  32  --   --  34*  --    ALT (SGPT) U/L  --   --  23  --   --  51*  --      CBC  Results from last 7 days   Lab Units 07/10/25  0505 07/09/25  0405 07/08/25  0522 07/07/25  0225 07/06/25  0208 07/05/25  0115 07/03/25  0902   WBC 10*3/mm3 2.71* 2.90* 3.55 4.88 4.60 4.99 4.17   RBC 10*6/mm3 3.65* 3.55* 3.56* 3.66* 3.70* 3.84 2.89*   HEMOGLOBIN g/dL 10.1* 10.0* 10.1* 10.3* 10.3* 10.8* 8.1*   HEMATOCRIT % 33.7* 32.2* 32.1* 33.5* 33.6* 34.9 29.8*   MCV fL 92.3 90.7 90.2 91.5 90.8 90.9 103.1*   PLATELETS 10*3/mm3 216 229 362 214 211 239 123*     ProBNP      TROPONIN      CoAg      Creatinine Clearance  Estimated Creatinine Clearance: 75.8 mL/min (by C-G formula based on SCr of 0.66 mg/dL).    Radiology  No radiology results for the last day    EKG    I personally viewed and interpreted the patient's EKG/Telemetry data:  Telemetry Scan   Final Result      ECG 12 Lead Drug Monitoring; Sotalol   Preliminary Result   HEART RATE=72  bpm   RR Ilvzbdqo=808  ms   MO Qdutjhxx=971  ms   P Horizontal Axis=26  deg   P Front Axis=58  deg   QRSD Interval=71  ms   QT Nsyfawcv=449  ms   AEuH=926  ms   QRS Axis=85  deg   T Wave Axis=234  deg   - ABNORMAL ECG -   Sinus rhythm   Atrial premature complex   Low voltage, precordial leads   Nonspecific T abnormalities, diffuse leads   Date and Time of Study:2025-07-10 06:01:45      Telemetry Scan   Final Result      ECG 12 Lead QT Measurement    Preliminary Result   HEART RATE=82  bpm   RR Ajxvjwne=977  ms   NH Zbgxirjq=728  ms   P Horizontal Axis=8  deg   P Front Axis=67  deg   QRSD Interval=74  ms   QT Azfydmrg=717  ms   LTpL=790  ms   QRS Axis=76  deg   T Wave Axis=238  deg   - ABNORMAL ECG -   Sinus rhythm   Low voltage, precordial leads   Abnormal T, consider ischemia, diffuse leads   Date and Time of Study:2025-07-09 23:02:40      Telemetry Scan   Final Result      Telemetry Scan   Final Result      Telemetry Scan   Final Result      Telemetry Scan   Final Result      ECG 12 Lead QT Measurement   Final Result   HEART ECBU=581  bpm   RR Wypiqcxn=930  ms   NH Interval=  ms   P Horizontal Axis=  deg   P Front Axis=  deg   QRSD Interval=75  ms   QT Emdpnufv=552  ms   GXsD=662  ms   QRS Axis=133  deg   T Wave Axis=-12  deg   - ABNORMAL ECG -   Atrial fibrillation   Anterolateral  infarct, age indeterminate   When compared with ECG of 09-Jul-2025 06:30:10,   No significant change   Electronically Signed By: Zion Hua ([facility]) 2025-07-09 22:01:44   Date and Time of Study:2025-07-09 10:21:41      Telemetry Scan   Final Result      ECG 12 Lead Drug Monitoring; Sotalol   Final Result   HEART EXEI=161  bpm   RR Ubigadma=333  ms   NH Interval=  ms   P Horizontal Axis=  deg   P Front Axis=  deg   QRSD Interval=81  ms   QT Mxhukswp=798  ms   QRyA=838  ms   QRS Axis=90  deg   T Wave Axis=262  deg   - ABNORMAL ECG -   Atrial fibrillation   When compared with ECG of 08-Jul-2025 22:59:11,   No significant change   Electronically Signed By: Zion Hua ([facility]) 2025-07-09 22:01:51   Date and Time of Study:2025-07-09 06:30:10      Telemetry Scan   Final Result      Telemetry Scan   Final Result      Telemetry Scan   Final Result      ECG 12 Lead QT Measurement   Final Result   HEART ZDTK=270  bpm   RR Imqscgwv=997  ms   NH Interval=  ms   P Horizontal Axis=  deg   P Front Axis=  deg   QRSD Interval=73  ms   QT Ttmqkrxs=399  ms   IOgM=276  ms   QRS Axis=74   deg   T Wave Axis=241  deg   - ABNORMAL ECG -   Atrial fibrillation   Probable  anterior infarct, age indeterminate   When compared with ECG of 08-Jul-2025 10:39:56,   Significant repolarization change   Electronically Signed By: Grzegorz Ferguson ([facility]) 2025-07-09 06:13:37   Date and Time of Study:2025-07-08 22:59:11      Telemetry Scan   Final Result      Telemetry Scan   Final Result      Telemetry Scan   Final Result      ECG 12 Lead Drug Monitoring; Sotalol   Final Result   HEART RATE=85  bpm   RR Xoimsetc=916  ms   DC Interval=  ms   P Horizontal Axis=  deg   P Front Axis=  deg   QRSD Interval=71  ms   QT Kzsrguno=405  ms   KTeA=671  ms   QRS Axis=73  deg   T Wave Axis=232  deg   - ABNORMAL ECG -   Atrial fibrillation   Nonspecific  T abnormalities, diffuse leads   When compared with ECG of 08-Jul-2025 05:56:40,   Significant change in rhythm   Electronically Signed By: Jasson Davalos ([facility]) 2025-07-08 15:12:37   Date and Time of Study:2025-07-08 10:39:56      Telemetry Scan   Final Result      ECG 12 Lead Drug Monitoring; Amiodarone   Final Result   HEART RATE=71  bpm   RR Mtzwcfzp=300  ms   DC Interval=  ms   P Horizontal Axis=  deg   P Front Axis=  deg   QRSD Interval=69  ms   QT Cewynftx=216  ms   RBqT=454  ms   QRS Axis=81  deg   T Wave Axis=250  deg   - ABNORMAL ECG -   A fib   Consider  anterolateral infarct   When compared with ECG of 07-Jul-2025 05:37:32,   No significant change   Electronically Signed By: Grzegorz Ferguson ([facility]) 2025-07-08 06:21:41   Date and Time of Study:2025-07-08 05:56:40      Telemetry Scan   Final Result      Telemetry Scan   Final Result      Telemetry Scan   Final Result      Telemetry Scan   Final Result      Telemetry Scan   Final Result      Telemetry Scan   Final Result      ECG 12 Lead Drug Monitoring; Amiodarone   Final Result   HEART RATE=77  bpm   RR Bfjupiwi=712  ms   DC Interval=  ms   P Horizontal Axis=  deg   P Front Axis=  deg   QRSD Interval=78  ms   QT Meueptov=651  ms    ZKaA=259  ms   QRS Axis=79  deg   T Wave Axis=-78  deg   - ABNORMAL ECG -   A fib   Consider  anterolateral infarct   When compared with ECG of 06-Jul-2025 06:21:33,   Significant change in rhythm: previously atrial fibrillation   Significant repolarization change   Electronically Signed By: Grzegorz Ferguson ([facility]) 2025-07-08 06:21:49   Date and Time of Study:2025-07-07 05:37:32      Telemetry Scan   Final Result      Telemetry Scan   Final Result      Telemetry Scan   Final Result      Telemetry Scan   Final Result      Telemetry Scan   Final Result      Telemetry Scan   Final Result      ECG 12 Lead Drug Monitoring; Amiodarone   Final Result   HEART CPET=642  bpm   RR Oufjanos=508  ms   SC Interval=  ms   P Horizontal Axis=  deg   P Front Axis=  deg   QRSD Interval=82  ms   QT Cqosemtd=315  ms   OYqO=062  ms   QRS Axis=74  deg   T Wave Axis=267  deg   - ABNORMAL ECG -   Atrial fibrillation   Prolonged QT interval   When compared with ECG of 05-Jul-2025 23:41:40,   Significant rate increase   Significant repolarization change   Electronically Signed By: Grzegorz Ferguson ([facility]) 2025-07-08 06:21:53   Date and Time of Study:2025-07-06 06:21:33      Telemetry Scan   Final Result      ECG 12 Lead QT Measurement   Final Result   HEART GYQX=196  bpm   RR Gbgevyeg=388  ms   SC Interval=  ms   P Horizontal Axis=  deg   P Front Axis=  deg   QRSD Interval=83  ms   QT Hvrsnjui=940  ms   DSzZ=930  ms   QRS Axis=75  deg   T Wave Axis=174  deg   - ABNORMAL ECG -   Atrial fibrillation   Nonspecific  T abnormalities, diffuse leads   When compared with ECG of 05-Jul-2025 10:10:46,   Significant rate decrease   Electronically Signed By: Jasson Davalos ([facility]) 2025-07-06 22:58:37   Date and Time of Study:2025-07-05 23:41:40      Telemetry Scan   Final Result      Telemetry Scan   Final Result      Telemetry Scan   Final Result      Telemetry Scan   Final Result      Telemetry Scan   Final Result      ECG 12 Lead Rhythm Change   Final Result   HEART  PRGB=676  bpm   RR Xcgothux=958  ms   DE Interval=  ms   P Horizontal Axis=  deg   P Front Axis=  deg   QRSD Interval=81  ms   QT Ibrqmmrt=147  ms   KIjG=099  ms   QRS Axis=72  deg   T Wave Axis=210  deg   - ABNORMAL ECG -   Atrial fibrillation   Ventricular premature complex   Abnormal T, consider ischemia, diffuse leads   When compared with ECG of 05-Jul-2025 02:34:26,   Significant change in rhythm: previously sinus   Significant axis, voltage or hypertrophy change   Electronically Signed By: Jasson Davalos ([facility]) 2025-07-06 22:58:46   Date and Time of Study:2025-07-05 10:10:46      Telemetry Scan   Final Result      ECG 12 Lead Tachycardia   Final Result   HEART RATE=88  bpm   RR Sajjgkrn=780  ms   DE Yczbdgcw=349  ms   P Horizontal Axis=10  deg   P Front Axis=56  deg   QRSD Interval=69  ms   QT Nhnhvfhi=710  ms   HCsH=823  ms   QRS Axis=87  deg   T Wave Axis=179  deg   - ABNORMAL ECG -   Sinus rhythm   Low voltage, precordial leads   Consider  anteroseptal infarct   Nonspecific  T abnormalities, lateral leads   When compared with ECG of 03-Jul-2025 14:06:04,   Significant rate increase   Electronically Signed By: Jasson Davalos ([facility]) 2025-07-06 22:58:57   Date and Time of Study:2025-07-05 02:34:26      Telemetry Scan   Final Result      Telemetry Scan   Final Result      ECG 12 Lead QT Measurement    (Results Pending)   ECG 12 Lead QT Measurement    (Results Pending)   ECG 12 Lead QT Measurement    (Results Pending)       ECHOCARDIOGRAM:  Results for orders placed during the hospital encounter of 07/01/25    Adult Transesophageal Echo (ANN) W/ Cont if Necessary Per Protocol (Cardiology Department) 07/03/2025  3:34 PM    Interpretation Summary    Left ventricular systolic function is normal. Estimated left ventricular EF = 60% Left ventricular ejection fraction appears to be 56 - 60%.    Left ventricular wall thickness is consistent with mild to moderate concentric hypertrophy.    Left ventricular diastolic function is  consistent with (grade I) impaired relaxation.    The left atrial cavity is mild to moderately dilated.    Abnormal mitral valve structure consistent with dilated annulus.    Estimated right ventricular systolic pressure from tricuspid regurgitation is normal (<35 mmHg).    There is a trivial pericardial effusion.    Procedure indication  Atrial fibrillation with uncontrollable rate    conscious sedation administered by anesthesia  Timeout before procedure    consent obtained before procedure      Procedure note  after obtaining a valid consent patient was sedated by Anesthesia and a ANN probe was easily placed into esophagus with multiplane imaging with 2D, color and Doppler followed by bubble study with agitated saline without any complications    ANN  Findings    Mild to moderate left atrial enlargement with mild to moderate central MR without any shunt or clot  EF is normal with EF of 60% with mild LVH  Mild RVH with normal RV systolic  Mild TR without pulm hypertension and trivial effusion noted    Plan  Proceed with cardioversion    Procedures done  Transesophageal echocardiogram    Electronically signed by Bull Wiggins MD, 07/03/25, 3:34 PM EDT.      STRESS MYOVIEW:      CARDIAC CATHETERIZATION:  No results found for this or any previous visit.      Pertinent cardiac workup:  ANN-guided cardioversion 7/3/2025, return to sinus rhythm  EKG 7/5/2025 sinus rhythm  EKG 7/5/2025 atrial fibrillation, rate 135 bpm  EKG 7/8/2025 atrial fibrillation, rate 85 bpm  ANN 7/3/2025 mild-mod LA enlargement, EF 60%, mild RVH with normal RVSF, mild TR      ASSESSMENT/PLAN      Atrial fibrillation with RVR    Acute on chronic heart failure with preserved ejection fraction (HFpEF)    Severe protein-calorie malnutrition      PLAN  Atrial fibrillation  -was on IV amiodarone, discontinued 7/6/2025  -on Toprol-XL and Eliquis  -previous ANN/cardioversion on 7/3/2025  -discussed medication with cardioversion vs ablation      HFpEF  -Chest x-ray consistent with pulmonary edema  -proBNP 11,000  -Receiving IV diuretics  -Recent ANN 7/3/2025 with a EF of 60%, moderate concentric LVH, mild TR     Patient is a 70-year-old female, no prior history of any cardiac issues, she does have multiple myeloma diagnosed several years back but only recently therapy was initiated due to progression of disease.       Patient was admitted to the hospital earlier this month with A-fib with rapid ventricular rates.  She had ANN guided cardioversion on 7/3/2025 with return to sinus rhythm.     Patient was placed on metoprolol and Eliquis and was discharged home.  However within 48 hours she went back into A-fib, some rhythm strips do suggest atrial flutter as well.     I had a long discussion with the patient about rhythm management options.  Since she is symptomatic and rates are elevated, I think it is best to start her on antiarrhythmic therapy with sotalol and plan for cardioversion if she does not self convert by Thursday.     After that, we can also preemptively schedule her for ablation.  In her case my concern is her being on anticoagulation specially that she is getting ready to start chemotherapy for her multiple myeloma, which could lead to pancytopenia.  I think that if ablation is pursued, it is probably best in her case to perform concomitant A-fib ablation with watchman implantation.      7/9/2025  Patient on sotalol. Had first dose last night. Will arrange for cardioversion tomorrow if still in afib. Continue current therapy.     7/10/2025  Patient converted to sinus rhythm on sotalol. No need for cardioversion. If patient returns to afib, will likely need ablation per Dr. Jimenez's conversation with patient. Stable for discharge today.        STANLEY Slade  07/10/25  08:22 EDT    Electronically signed by STANLEY Slade, 07/10/25, 8:24 AM EDT.

## 2025-07-10 NOTE — CASE MANAGEMENT/SOCIAL WORK
Discharge Planning Assessment  [facility]     Patient Name: Phuong Altamirano  MRN: [id number]  Today's Date: 7/10/2025    Admit Date: 7/4/2025    Plan: Home at discharge.       Discharge Plan       Row Name 07/10/25 1249       Plan    Plan Comments CM met with patient at bedside. 2nd IMM reviewed, verbal consent, declined copy. Patient denies any dc needs at this time.    Final Discharge Disposition Code 01 - home or self-care    Final Note Home                     Expected Discharge Date and Time       Expected Discharge Date Expected Discharge Time    Jul 10, 2025                Patient Forms       Row Name 07/10/25 1249       Patient Forms    Important Message from Medicare (IMM) Delivered  2nd IMM reviewed    Delivered to Patient    Method of delivery In person                  Case Management Discharge Note      Final Note: Home         Selected Continued Care - Discharged on 7/10/2025 Admission date: 7/4/2025 - Discharge disposition: Home or Self Care         Transportation Services  Transportation: Private Transportation  Private: Car    Final Discharge Disposition Code: 01 - home or self-care    Met with patient in room.  Maintained distance greater than six feet and spent less than 15 minutes in the room.   JACKI SchaefferN, RN, CCM    [place] [id number]    Office: [phone]  Fax: [phone]

## 2025-07-10 NOTE — CASE MANAGEMENT/SOCIAL WORK
Continued Stay Note  [facility]     Patient Name: Phuong Altamirano  MRN: [id number]  Today's Date: 7/10/2025    Admit Date: 7/4/2025     Discharge Plan       Row Name 07/10/25 1119       Plan    Plan Comments Discharge  called and secured a hosptial follow up with PCP STANLEY Barakat on 7/16/25 at 1:00pm. AVS was updated and CM made aware.           Ciro Grijalva, Discharge   [facility]  Care Coordination  [address] [place] [id number]  Office: [phone]  Fax: [phone]

## 2025-07-10 NOTE — DISCHARGE INSTRUCTIONS
Please follow up with cardiology and PCP for continued monitoring. They will need to montior your renal function and how your tolerating the medications. They will also need to start you on an alternative to sertaline due to it affecting the qtc interval of your heart which is also affected by the sotolol medication that was started on.

## 2025-07-11 ENCOUNTER — APPOINTMENT (OUTPATIENT)
Dept: GENERAL RADIOLOGY | Facility: HOSPITAL | Age: 70
End: 2025-07-11
Payer: MEDICARE

## 2025-07-11 ENCOUNTER — HOSPITAL ENCOUNTER (INPATIENT)
Facility: HOSPITAL | Age: 70
LOS: 5 days | Discharge: HOME | End: 2025-07-17
Attending: HOSPITALIST | Admitting: INTERNAL MEDICINE
Payer: MEDICARE

## 2025-07-11 DIAGNOSIS — R06.00 DYSPNEA, UNSPECIFIED TYPE: Primary | ICD-10-CM

## 2025-07-11 DIAGNOSIS — D72.819 LEUKOPENIA, UNSPECIFIED TYPE: ICD-10-CM

## 2025-07-11 DIAGNOSIS — E83.51 HYPOCALCEMIA: ICD-10-CM

## 2025-07-11 DIAGNOSIS — I48.19 PERSISTENT ATRIAL FIBRILLATION: ICD-10-CM

## 2025-07-11 DIAGNOSIS — R50.9 FEVER, UNSPECIFIED FEVER CAUSE: ICD-10-CM

## 2025-07-11 DIAGNOSIS — R00.0 TACHYCARDIA: ICD-10-CM

## 2025-07-11 LAB
ALBUMIN SERPL-MCNC: 3.2 G/DL (ref 3.5–5.2)
ALBUMIN/GLOB SERPL: 1.5 G/DL
ALP SERPL-CCNC: 83 U/L (ref 39–117)
ALT SERPL W P-5'-P-CCNC: 22 U/L (ref 1–33)
AMPHET+METHAMPHET UR QL: NEGATIVE
AMPHETAMINES UR QL: NEGATIVE
ANION GAP SERPL CALCULATED.3IONS-SCNC: 10.9 MMOL/L (ref 5–15)
APTT PPP: 32.2 SECONDS (ref 22.7–35.4)
AST SERPL-CCNC: 19 U/L (ref 1–32)
BACTERIA UR QL AUTO: NORMAL /HPF
BARBITURATES UR QL SCN: NEGATIVE
BENZODIAZ UR QL SCN: POSITIVE
BILIRUB SERPL-MCNC: 0.4 MG/DL (ref 0–1.2)
BILIRUB UR QL STRIP: NEGATIVE
BUN SERPL-MCNC: 15.7 MG/DL (ref 8–23)
BUN/CREAT SERPL: 20.1 (ref 7–25)
BUPRENORPHINE SERPL-MCNC: NEGATIVE NG/ML
CALCIUM SPEC-SCNC: 7.9 MG/DL (ref 8.6–10.5)
CANNABINOIDS SERPL QL: NEGATIVE
CHLORIDE SERPL-SCNC: 107 MMOL/L (ref 98–107)
CLARITY UR: CLEAR
CO2 SERPL-SCNC: 21.1 MMOL/L (ref 22–29)
COCAINE UR QL: NEGATIVE
COLOR UR: YELLOW
CREAT SERPL-MCNC: 0.78 MG/DL (ref 0.57–1)
D DIMER PPP FEU-MCNC: 0.3 MCGFEU/ML (ref 0–0.7)
D-LACTATE SERPL-SCNC: 0.4 MMOL/L (ref 0.3–2)
DEPRECATED RDW RBC AUTO: 51.6 FL (ref 37–54)
EGFRCR SERPLBLD CKD-EPI 2021: 81.8 ML/MIN/1.73
ERYTHROCYTE [DISTWIDTH] IN BLOOD BY AUTOMATED COUNT: 15.6 % (ref 12.3–15.4)
ETHANOL UR QL: <0.01 %
GEN 5 1HR TROPONIN T REFLEX: 50 NG/L
GLOBULIN UR ELPH-MCNC: 2.1 GM/DL
GLUCOSE SERPL-MCNC: 103 MG/DL (ref 65–99)
GLUCOSE UR STRIP-MCNC: NEGATIVE MG/DL
HCT VFR BLD AUTO: 35.5 % (ref 34–46.6)
HGB BLD-MCNC: 10.8 G/DL (ref 12–15.9)
HGB UR QL STRIP.AUTO: NEGATIVE
HOLD SPECIMEN: NORMAL
HOLD SPECIMEN: NORMAL
HYALINE CASTS UR QL AUTO: NORMAL /LPF
INR PPP: 1.75 (ref 0.9–1.1)
KETONES UR QL STRIP: NEGATIVE
LEUKOCYTE ESTERASE UR QL STRIP.AUTO: NEGATIVE
LIPASE SERPL-CCNC: 43 U/L (ref 13–60)
LYMPHOCYTES # BLD MANUAL: 0.22 10*3/MM3 (ref 0.7–3.1)
LYMPHOCYTES NFR BLD MANUAL: 8 % (ref 5–12)
MAGNESIUM SERPL-MCNC: 1.7 MG/DL (ref 1.6–2.4)
MCH RBC QN AUTO: 27.6 PG (ref 26.6–33)
MCHC RBC AUTO-ENTMCNC: 30.4 G/DL (ref 31.5–35.7)
MCV RBC AUTO: 90.8 FL (ref 79–97)
METHADONE UR QL SCN: NEGATIVE
MONOCYTES # BLD: 0.11 10*3/MM3 (ref 0.1–0.9)
NEUTROPHILS # BLD AUTO: 1.06 10*3/MM3 (ref 1.7–7)
NEUTROPHILS NFR BLD MANUAL: 76 % (ref 42.7–76)
NITRITE UR QL STRIP: NEGATIVE
NT-PROBNP SERPL-MCNC: 2906 PG/ML (ref 0–900)
OPIATES UR QL: NEGATIVE
OXYCODONE UR QL SCN: NEGATIVE
PCP UR QL SCN: NEGATIVE
PH UR STRIP.AUTO: <=5 [PH] (ref 5–8)
PHOSPHATE SERPL-MCNC: 3.1 MG/DL (ref 2.5–4.5)
PLAT MORPH BLD: NORMAL
PLATELET # BLD AUTO: 212 10*3/MM3 (ref 140–450)
PMV BLD AUTO: 10.4 FL (ref 6–12)
POTASSIUM SERPL-SCNC: 3.6 MMOL/L (ref 3.5–5.2)
PROT SERPL-MCNC: 5.3 G/DL (ref 6–8.5)
PROT UR QL STRIP: ABNORMAL
PROTHROMBIN TIME: 20.5 SECONDS (ref 11.7–14.2)
RBC # BLD AUTO: 3.91 10*6/MM3 (ref 3.77–5.28)
RBC # UR STRIP: NORMAL /HPF
RBC MORPH BLD: NORMAL
REF LAB TEST METHOD: NORMAL
SCAN SLIDE: NORMAL
SODIUM SERPL-SCNC: 139 MMOL/L (ref 136–145)
SP GR UR STRIP: 1.01 (ref 1–1.03)
SQUAMOUS #/AREA URNS HPF: NORMAL /HPF
TRICYCLICS UR QL SCN: NEGATIVE
TROPONIN T % DELTA: 9
TROPONIN T NUMERIC DELTA: 4 NG/L
TROPONIN T SERPL HS-MCNC: 46 NG/L
TSH SERPL DL<=0.05 MIU/L-ACNC: 6.22 UIU/ML (ref 0.27–4.2)
UROBILINOGEN UR QL STRIP: ABNORMAL
VARIANT LYMPHS NFR BLD MANUAL: 16 % (ref 19.6–45.3)
WBC # UR STRIP: NORMAL /HPF
WBC MORPH BLD: NORMAL
WBC NRBC COR # BLD AUTO: 1.39 10*3/MM3 (ref 3.4–10.8)

## 2025-07-11 PROCEDURE — 84443 ASSAY THYROID STIM HORMONE: CPT | Performed by: OCCUPATIONAL THERAPIST

## 2025-07-11 PROCEDURE — 36415 COLL VENOUS BLD VENIPUNCTURE: CPT

## 2025-07-11 PROCEDURE — 85025 COMPLETE CBC W/AUTO DIFF WBC: CPT | Performed by: OCCUPATIONAL THERAPIST

## 2025-07-11 PROCEDURE — 82077 ASSAY SPEC XCP UR&BREATH IA: CPT | Performed by: OCCUPATIONAL THERAPIST

## 2025-07-11 PROCEDURE — 25010000002 FUROSEMIDE PER 20 MG: Performed by: OCCUPATIONAL THERAPIST

## 2025-07-11 PROCEDURE — 94799 UNLISTED PULMONARY SVC/PX: CPT

## 2025-07-11 PROCEDURE — 25010000002 ONDANSETRON PER 1 MG: Performed by: OCCUPATIONAL THERAPIST

## 2025-07-11 PROCEDURE — 83735 ASSAY OF MAGNESIUM: CPT | Performed by: OCCUPATIONAL THERAPIST

## 2025-07-11 PROCEDURE — 80306 DRUG TEST PRSMV INSTRMNT: CPT | Performed by: OCCUPATIONAL THERAPIST

## 2025-07-11 PROCEDURE — 93005 ELECTROCARDIOGRAM TRACING: CPT | Performed by: HOSPITALIST

## 2025-07-11 PROCEDURE — 81001 URINALYSIS AUTO W/SCOPE: CPT | Performed by: OCCUPATIONAL THERAPIST

## 2025-07-11 PROCEDURE — 83605 ASSAY OF LACTIC ACID: CPT

## 2025-07-11 PROCEDURE — 99285 EMERGENCY DEPT VISIT HI MDM: CPT

## 2025-07-11 PROCEDURE — 25010000002 METHYLPREDNISOLONE PER 125 MG: Performed by: OCCUPATIONAL THERAPIST

## 2025-07-11 PROCEDURE — 84145 PROCALCITONIN (PCT): CPT | Performed by: HOSPITALIST

## 2025-07-11 PROCEDURE — 94640 AIRWAY INHALATION TREATMENT: CPT

## 2025-07-11 PROCEDURE — 93005 ELECTROCARDIOGRAM TRACING: CPT

## 2025-07-11 PROCEDURE — 84439 ASSAY OF FREE THYROXINE: CPT | Performed by: HOSPITALIST

## 2025-07-11 PROCEDURE — 83690 ASSAY OF LIPASE: CPT | Performed by: OCCUPATIONAL THERAPIST

## 2025-07-11 PROCEDURE — 25810000003 SODIUM CHLORIDE 0.9 % SOLUTION: Performed by: EMERGENCY MEDICINE

## 2025-07-11 PROCEDURE — 83880 ASSAY OF NATRIURETIC PEPTIDE: CPT | Performed by: OCCUPATIONAL THERAPIST

## 2025-07-11 PROCEDURE — 87040 BLOOD CULTURE FOR BACTERIA: CPT | Performed by: OCCUPATIONAL THERAPIST

## 2025-07-11 PROCEDURE — 71045 X-RAY EXAM CHEST 1 VIEW: CPT

## 2025-07-11 PROCEDURE — 84484 ASSAY OF TROPONIN QUANT: CPT | Performed by: OCCUPATIONAL THERAPIST

## 2025-07-11 PROCEDURE — 85007 BL SMEAR W/DIFF WBC COUNT: CPT | Performed by: OCCUPATIONAL THERAPIST

## 2025-07-11 PROCEDURE — 85379 FIBRIN DEGRADATION QUANT: CPT | Performed by: OCCUPATIONAL THERAPIST

## 2025-07-11 PROCEDURE — 85610 PROTHROMBIN TIME: CPT | Performed by: OCCUPATIONAL THERAPIST

## 2025-07-11 PROCEDURE — 80053 COMPREHEN METABOLIC PANEL: CPT | Performed by: OCCUPATIONAL THERAPIST

## 2025-07-11 PROCEDURE — 84100 ASSAY OF PHOSPHORUS: CPT | Performed by: OCCUPATIONAL THERAPIST

## 2025-07-11 PROCEDURE — 85730 THROMBOPLASTIN TIME PARTIAL: CPT | Performed by: OCCUPATIONAL THERAPIST

## 2025-07-11 RX ORDER — ONDANSETRON 2 MG/ML
4 INJECTION INTRAMUSCULAR; INTRAVENOUS ONCE
Status: COMPLETED | OUTPATIENT
Start: 2025-07-11 | End: 2025-07-11

## 2025-07-11 RX ORDER — IPRATROPIUM BROMIDE AND ALBUTEROL SULFATE 2.5; .5 MG/3ML; MG/3ML
3 SOLUTION RESPIRATORY (INHALATION) ONCE
Status: COMPLETED | OUTPATIENT
Start: 2025-07-11 | End: 2025-07-11

## 2025-07-11 RX ORDER — METHYLPREDNISOLONE SODIUM SUCCINATE 125 MG/2ML
125 INJECTION, POWDER, LYOPHILIZED, FOR SOLUTION INTRAMUSCULAR; INTRAVENOUS ONCE
Status: COMPLETED | OUTPATIENT
Start: 2025-07-11 | End: 2025-07-11

## 2025-07-11 RX ORDER — ACETAMINOPHEN 500 MG
1000 TABLET ORAL ONCE
Status: COMPLETED | OUTPATIENT
Start: 2025-07-11 | End: 2025-07-11

## 2025-07-11 RX ORDER — FUROSEMIDE 10 MG/ML
20 INJECTION INTRAMUSCULAR; INTRAVENOUS ONCE
Status: COMPLETED | OUTPATIENT
Start: 2025-07-11 | End: 2025-07-11

## 2025-07-11 RX ADMIN — METHYLPREDNISOLONE SODIUM SUCCINATE 125 MG: 125 INJECTION, POWDER, FOR SOLUTION INTRAMUSCULAR; INTRAVENOUS at 21:17

## 2025-07-11 RX ADMIN — FUROSEMIDE 20 MG: 10 INJECTION, SOLUTION INTRAMUSCULAR; INTRAVENOUS at 21:17

## 2025-07-11 RX ADMIN — IPRATROPIUM BROMIDE AND ALBUTEROL SULFATE 3 ML: .5; 3 SOLUTION RESPIRATORY (INHALATION) at 21:59

## 2025-07-11 RX ADMIN — ONDANSETRON 4 MG: 2 INJECTION, SOLUTION INTRAMUSCULAR; INTRAVENOUS at 21:17

## 2025-07-11 RX ADMIN — SODIUM CHLORIDE 1000 ML: 9 INJECTION, SOLUTION INTRAVENOUS at 20:23

## 2025-07-11 RX ADMIN — ACETAMINOPHEN 1000 MG: 500 TABLET, FILM COATED ORAL at 20:22

## 2025-07-11 NOTE — Clinical Note
Hemostasis started on the right femoral vein. Figure 8 suturing was used in achieving hemostasis. Closure device deployed in the vessel. Hemostasis achieved successfully. Closure device additional comment: Femostop applied

## 2025-07-11 NOTE — OUTREACH NOTE
Prep Survey      Flowsheet Row Responses   [facility]? [facility]   Is LACE score < 7 ? No   Eligibility Readm Mgmt   Discharge diagnosis Atrial fibrillation with RVR s/p ANN cardioversion   Does the patient have one of the following disease processes/diagnoses(primary or secondary)? Other   Does the patient have Home health ordered? No   Is there a DME ordered? No   Prep survey completed? Yes            DAVID HERZOG - Registered Nurse

## 2025-07-11 NOTE — ED PROVIDER NOTES
Subjective   History of Present Illness  Patient is a 70-year-old female with past medical history significant for asthma, diabetes, and hypothyroidism presenting to the ED for evaluation of irregular heart rate.  Patient reports that she was discharged from hospital yesterday and started having palpitations again around 4 PM.  She reports she has had some bodyaches, chills and shortness of breath.  Patient does not use home oxygen.  She recently started Eliquis.  Patient states that she has had some diarrhea with nausea and no vomiting.  No cough.  Patient denies hematochezia, melena, dysuria, and abdominal pain.  No nasal congestion, rhinorrhea, ear pain, sore throat or headache.        Review of Systems   Constitutional:  Positive for chills and fatigue.   Gastrointestinal:  Negative for abdominal pain and blood in stool.       Past Medical History:   Diagnosis Date    Arthritis     Asthma 1 month    Cancer 12 years    Multiple mylenoma    Depression 2010    Diabetes mellitus 1.5 years    High blood pressure     High cholesterol     Hypothyroidism     Lupus 2010    Sleep apnea Now       No Known Allergies    Past Surgical History:   Procedure Laterality Date    BREAST LUMPECTOMY Right     benign    DILATATION AND CURETTAGE      LAPAROSCOPIC TUBAL LIGATION  1979    OVARY SURGERY  1985    remoal of left ovary       Family History   Problem Relation Age of Onset    Heart disease Father     Pancreatic cancer Sister 59    Heart disease Sister     Stroke Sister     Pneumonia Brother        Social History     Socioeconomic History    Marital status:    Tobacco Use    Smoking status: Former     Current packs/day: 0.00     Average packs/day: 1 pack/day for 21.0 years (21.0 ttl pk-yrs)     Types: Cigarettes     Start date:      Quit date:      Years since quittin.5    Smokeless tobacco: Never   Vaping Use    Vaping status: Never Used   Substance and Sexual Activity    Alcohol use: Not  Currently     Comment: Very occasionally    Drug use: No    Sexual activity: Not Currently     Partners: Male     Birth control/protection: Tubal ligation           Objective   Physical Exam  Vitals and nursing note reviewed.   Constitutional:       General: She is not in acute distress.     Appearance: Normal appearance. She is ill-appearing. She is not toxic-appearing or diaphoretic.      Interventions: Nasal cannula in place.   HENT:      Head: Normocephalic and atraumatic.      Right Ear: External ear normal.      Left Ear: External ear normal.      Nose: Nose normal. No congestion or rhinorrhea.      Mouth/Throat:      Mouth: Mucous membranes are moist.   Eyes:      General: No scleral icterus.        Right eye: No discharge.         Left eye: No discharge.      Extraocular Movements: Extraocular movements intact.      Conjunctiva/sclera: Conjunctivae normal.      Pupils: Pupils are equal, round, and reactive to light.   Neck:      Vascular: No carotid bruit.   Cardiovascular:      Rate and Rhythm: Regular rhythm. Tachycardia present.      Pulses: Normal pulses.      Heart sounds: Murmur heard.      No friction rub. No gallop.   Pulmonary:      Effort: No accessory muscle usage.      Breath sounds: No stridor or decreased air movement. Rales present. No decreased breath sounds, wheezing or rhonchi.   Abdominal:      General: Abdomen is flat. Bowel sounds are normal. There is no distension.      Palpations: Abdomen is soft.      Tenderness: There is no abdominal tenderness. There is no guarding.   Musculoskeletal:         General: No tenderness. Normal range of motion.      Cervical back: Normal range of motion and neck supple. No rigidity or tenderness.      Right lower leg: Edema present.      Left lower leg: Edema present.   Lymphadenopathy:      Cervical: No cervical adenopathy.   Skin:     General: Skin is warm and dry.      Capillary Refill: Capillary refill takes 2 to 3 seconds.      Coloration: Skin is  not jaundiced.      Findings: No bruising, erythema or rash.   Neurological:      General: No focal deficit present.      Mental Status: She is alert.   Psychiatric:         Mood and Affect: Mood normal.         Behavior: Behavior normal.         Procedures           ED Course      Labs Reviewed   COMPREHENSIVE METABOLIC PANEL - Abnormal; Notable for the following components:       Result Value    Glucose 103 (*)     CO2 21.1 (*)     Calcium 7.9 (*)     Total Protein 5.3 (*)     Albumin 3.2 (*)     All other components within normal limits    Narrative:     GFR Categories in Chronic Kidney Disease (CKD)              GFR Category          GFR (mL/min/1.73)    Interpretation  G1                    90 or greater        Normal or high (1)  G2                    60-89                Mild decrease (1)  G3a                   45-59                Mild to moderate decrease  G3b                   30-44                Moderate to severe decrease  G4                    15-29                Severe decrease  G5                    14 or less           Kidney failure    (1)In the absence of evidence of kidney disease, neither GFR category G1 or G2 fulfill the criteria for CKD.    eGFR calculation 2021 CKD-EPI creatinine equation, which does not include race as a factor   PROTIME-INR - Abnormal; Notable for the following components:    Protime 20.5 (*)     INR 1.75 (*)     All other components within normal limits   URINALYSIS W/ CULTURE IF INDICATED - Abnormal; Notable for the following components:    Protein, UA 30 mg/dL (1+) (*)     All other components within normal limits    Narrative:     In absence of clinical symptoms, the presence of pyuria, bacteria, and/or nitrites on the urinalysis result does not correlate with infection.   BNP (IN-HOUSE) - Abnormal; Notable for the following components:    proBNP 2,906.0 (*)     All other components within normal limits    Narrative:     This assay is used as an aid in the diagnosis of  individuals suspected of having heart failure. It can be used as an aid in the diagnosis of acute decompensated heart failure (ADHF) in patients presenting with signs and symptoms of ADHF to the emergency department (ED). In addition, NT-proBNP of <300 pg/mL indicates ADHF is not likely.    Age Range Result Interpretation  NT-proBNP Concentration (pg/mL:      <50             Positive            >450                   Gray                 300-450                    Negative             <300    50-75           Positive            >900                  Gray                300-900                  Negative            <300      >75             Positive            >1800                  Gray                300-1800                  Negative            <300   TROPONIN - Abnormal; Notable for the following components:    HS Troponin T 46 (*)     All other components within normal limits    Narrative:     High Sensitive Troponin T Reference Range:  <14.0 ng/L- Negative Female for AMI  <22.0 ng/L- Negative Male for AMI  >=14 - Abnormal Female indicating possible myocardial injury.  >=22 - Abnormal Male indicating possible myocardial injury.   Clinicians would have to utilize clinical acumen, EKG, Troponin, and serial changes to determine if it is an Acute Myocardial Infarction or myocardial injury due to an underlying chronic condition.        TSH RFX ON ABNORMAL TO FREE T4 - Abnormal; Notable for the following components:    TSH 6.220 (*)     All other components within normal limits   URINE DRUG SCREEN - Abnormal; Notable for the following components:    Benzodiazepine Screen, Urine Positive (*)     All other components within normal limits    Narrative:     Cutoff For Drugs Screened:    Amphetamines               500 ng/ml  Barbiturates               200 ng/ml  Benzodiazepines            150 ng/ml  Cocaine                    150 ng/ml  Methadone                  200 ng/ml  Opiates                    100  ng/ml  Phencyclidine               25 ng/ml  THC                         50 ng/ml  Methamphetamine            500 ng/ml  Tricyclic Antidepressants  300 ng/ml  Oxycodone                  100 ng/ml  Buprenorphine               10 ng/ml    The normal value for all drugs tested is negative. This report includes unconfirmed screening results, with the cutoff values listed, to be used for medical treatment purposes only.  Unconfirmed results must not be used for non-medical purposes such as employment or legal testing.  Clinical consideration should be applied to any drug of abuse test, particularly when unconfirmed results are used.    All urine drugs of abuse requests without chain of custody are for medical screening purposes only.  False positives are possible.     CBC WITH AUTO DIFFERENTIAL - Abnormal; Notable for the following components:    WBC 1.39 (*)     Hemoglobin 10.8 (*)     MCHC 30.4 (*)     RDW 15.6 (*)     All other components within normal limits    Narrative:     Instrument flags present, additional testing may be recommended.  The previously reported component NRBC is no longer being reported. Previous result was 0.0 /100 WBC (Reference Range: 0.0-0.2 /100 WBC) on 7/11/2025 at 2247 EDT.   HIGH SENSITIVITIY TROPONIN T 1HR - Abnormal; Notable for the following components:    HS Troponin T 50 (*)     All other components within normal limits    Narrative:     High Sensitive Troponin T Reference Range:  <14.0 ng/L- Negative Female for AMI  <22.0 ng/L- Negative Male for AMI  >=14 - Abnormal Female indicating possible myocardial injury.  >=22 - Abnormal Male indicating possible myocardial injury.   Clinicians would have to utilize clinical acumen, EKG, Troponin, and serial changes to determine if it is an Acute Myocardial Infarction or myocardial injury due to an underlying chronic condition.        MANUAL DIFFERENTIAL - Abnormal; Notable for the following components:    Lymphocyte % 16.0 (*)     Neutrophils  "Absolute 1.06 (*)     Lymphocytes Absolute 0.22 (*)     All other components within normal limits   PROCALCITONIN - Abnormal; Notable for the following components:    Procalcitonin 1.15 (*)     All other components within normal limits    Narrative:     As a Marker for Sepsis (Non-Neonates):    1. <0.5 ng/mL represents a low risk of severe sepsis and/or septic shock.  2. >2 ng/mL represents a high risk of severe sepsis and/or septic shock.    As a Marker for Lower Respiratory Tract Infections that require antibiotic therapy:    PCT on Admission    Antibiotic Therapy       6-12 Hrs later    >0.5                Strongly Recommended  >0.25 - <0.5        Recommended   0.1 - 0.25          Discouraged              Remeasure/reassess PCT  <0.1                Strongly Discouraged     Remeasure/reassess PCT    As 28 day mortality risk marker: \"Change in Procalcitonin Result\" (>80% or <=80%) if Day 0 (or Day 1) and Day 4 values are available. Refer to [email or web address]    Change in PCT <=80%  A decrease of PCT levels below or equal to 80% defines a positive change in PCT test result representing a higher risk for 28-day all-cause mortality of patients diagnosed with severe sepsis for septic shock.    Change in PCT >80%  A decrease of PCT levels of more than 80% defines a negative change in PCT result representing a lower risk for 28-day all-cause mortality of patients diagnosed with severe sepsis or septic shock.      RESPIRATORY PANEL PCR W/ COVID-19 (SARS-COV-2), NP SWAB IN UTM/VTP, 2 HR TAT - Normal    Narrative:     In the setting of a positive respiratory panel with a viral infection PLUS a negative procalcitonin without other underlying concern for bacterial infection, consider observing off antibiotics or discontinuation of antibiotics and continue supportive care. If the respiratory panel is positive for atypical bacterial infection (Bordetella pertussis, Chlamydophila pneumoniae, or Mycoplasma " "pneumoniae), consider antibiotic de-escalation to target atypical bacterial infection.   LIPASE - Normal   APTT - Normal   D-DIMER, QUANTITATIVE - Normal    Narrative:     According to the assay 's published package insert, a normal (<0.50 MCGFEU/mL) D-dimer result in conjunction with a non-high clinical probability assessment, excludes deep vein thrombosis (DVT) and pulmonary embolism (PE) with high sensitivity.    D-dimer values increase with age and this can make VTE exclusion of an older population difficult. To address this, the [employer], based on best available evidence and recent guidelines, recommends that clinicians use age-adjusted D-dimer thresholds in patients greater than 50 years of age with: a) a low probability of PE who do not meet all Pulmonary Embolism Rule Out Criteria, or b) in those with intermediate probability of PE.   The formula for an age-adjusted D-dimer cut-off is \"age/100\".  For example, a 60 year old patient would have an age-adjusted cut-off of 0.60 MCGFEU/mL and an 80 year old 0.80 MCGFEU/mL.   MAGNESIUM - Normal   PHOSPHORUS - Normal   T4, FREE - Normal   POC LACTATE - Normal   BLOOD CULTURE   BLOOD CULTURE   GASTROINTESTINAL PANEL, PCR (PREFERRED) DOES NOT INCLUDE CDIFF   CLOSTRIDIOIDES DIFFICILE TOXIN    Narrative:     The following orders were created for panel order Clostridioides difficile Toxin - Stool, Per Rectum.  Procedure                               Abnormality         Status                     ---------                               -----------         ------                     Clostridioides difficile...[[id number]]                                                   Please view results for these tests on the individual orders.   CLOSTRIDIOIDES DIFFICILE EIA   MRSA SCREEN, PCR   ETHANOL    Narrative:     Not for legal purposes.   GRN NA HEP NO GEL   SCAN SLIDE   URINALYSIS, MICROSCOPIC ONLY   POTASSIUM   POC LACTATE   POCT GLUCOSE " FINGERSTICK   POCT GLUCOSE FINGERSTICK   POCT GLUCOSE FINGERSTICK   POCT GLUCOSE FINGERSTICK   CBC AND DIFFERENTIAL    Narrative:     The following orders were created for panel order CBC & Differential.  Procedure                               Abnormality         Status                     ---------                               -----------         ------                     CBC Auto Differential[[id number]]        Abnormal            Final result               Scan Slide[[id number]]                                       Final result                 Please view results for these tests on the individual orders.   EXTRA TUBES    Narrative:     The following orders were created for panel order Extra Tubes.  Procedure                               Abnormality         Status                     ---------                               -----------         ------                     Grn Na Hep No Gel[[id number]]                                Final result               Gold Top - SST[[id number]]                                   Final result                 Please view results for these tests on the individual orders.   GOLD TOP - SST     XR Chest 1 View  Result Date: 7/11/2025  Impression: Mild chronic changes appear stable. No active disease is seen. Electronically Signed: Slava Herman MD  7/11/2025 8:43 PM EDT  Workstation ID: [id number]    Medications   Potassium Replacement - Follow Nurse / BPA Driven Protocol (has no administration in time range)   Magnesium Standard Dose Replacement - Follow Nurse / BPA Driven Protocol (has no administration in time range)   Phosphorus Replacement - Follow Nurse / BPA Driven Protocol (has no administration in time range)   Calcium Replacement - Follow Nurse / BPA Driven Protocol (has no administration in time range)   Pharmacy to dose vancomycin (has no administration in time range)   Pharmacy To Dose: Cefepime (has no administration in time range)   apixaban (ELIQUIS) tablet 5 mg (5 mg  Oral Given 7/12/25 0145)   acyclovir (ZOVIRAX) capsule 400 mg (has no administration in time range)   albuterol (PROVENTIL) nebulizer solution 0.083% 2.5 mg/3mL (has no administration in time range)   ferrous sulfate tablet 325 mg (has no administration in time range)   furosemide (LASIX) tablet 20 mg (has no administration in time range)   levothyroxine (SYNTHROID, LEVOTHROID) tablet 100 mcg (has no administration in time range)   pantoprazole (PROTONIX) EC tablet 40 mg (has no administration in time range)   promethazine (PHENERGAN) tablet 12.5 mg (has no administration in time range)   sotalol (BETAPACE) tablet 80 mg (has no administration in time range)   sulfamethoxazole-trimethoprim (BACTRIM DS,SEPTRA DS) 800-160 MG per tablet 1 tablet (has no administration in time range)   vancomycin IVPB 1500 mg in 0.9% NaCl (Premix) 500 mL (has no administration in time range)   cefepime 2000 mg IVPB in 100 mL NS (MBP) (has no administration in time range)   sodium chloride 0.9 % flush 10 mL (10 mL Intravenous Given 7/12/25 0147)   sodium chloride 0.9 % flush 10 mL (has no administration in time range)   sodium chloride 0.9 % infusion 40 mL (has no administration in time range)   nitroglycerin (NITROSTAT) SL tablet 0.4 mg (has no administration in time range)   acetaminophen (TYLENOL) tablet 650 mg (has no administration in time range)     Or   acetaminophen (TYLENOL) 160 MG/5ML oral solution 650 mg (has no administration in time range)     Or   acetaminophen (TYLENOL) suppository 650 mg (has no administration in time range)   sennosides-docusate (PERICOLACE) 8.6-50 MG per tablet 2 tablet (has no administration in time range)     And   polyethylene glycol (MIRALAX) packet 17 g (has no administration in time range)     And   bisacodyl (DULCOLAX) EC tablet 5 mg (has no administration in time range)     And   bisacodyl (DULCOLAX) suppository 10 mg (has no administration in time range)   melatonin tablet 5 mg (has no  administration in time range)   calcium carbonate (TUMS) chewable tablet 500 mg (200 mg elemental) (has no administration in time range)   dextrose (GLUTOSE) oral gel 15 g (has no administration in time range)   dextrose (D50W) (25 g/50 mL) IV injection 25 g (has no administration in time range)   glucagon (GLUCAGEN) injection 1 mg (has no administration in time range)   insulin lispro (HUMALOG/ADMELOG) injection 2-7 Units (has no administration in time range)   potassium chloride (KLOR-CON M20) CR tablet 40 mEq (40 mEq Oral Given 7/12/25 0144)   acetaminophen (TYLENOL) tablet 1,000 mg (1,000 mg Oral Given 7/11/25 2022)   sodium chloride 0.9 % bolus 1,000 mL (0 mL Intravenous Stopped 7/11/25 2252)   furosemide (LASIX) injection 20 mg (20 mg Intravenous Given 7/11/25 2117)   ipratropium-albuterol (DUO-NEB) nebulizer solution 3 mL (3 mL Nebulization Given 7/11/25 2159)   methylPREDNISolone sodium succinate (SOLU-Medrol) injection 125 mg (125 mg Intravenous Given 7/11/25 2117)   ondansetron (ZOFRAN) injection 4 mg (4 mg Intravenous Given 7/11/25 2117)   cefepime 2000 mg IVPB in 100 mL NS (MBP) (2,000 mg Intravenous New Bag 7/12/25 0137)                                                      Medical Decision Making  Patient is a 70-year-old female who presented with the above complaint.  She had a normal evaluation and exam.  Patient was placed on the monitor and IV was established.  Patient was placed on 2 L nasal cannula to maintain SpO2 in the 90s.    EKG independently interpreted by Dr. Caldwell and reviewed by myself.  Sinus tachycardia, rate 112.  Regular rate.  T wave abnormalities which were also present on prior performed on 7/10/2025.  Weight on that EKG was 72 and regular.    Imaging independently interpreted by radiology and reviewed by myself.  Chest x-ray was negative for acute change.    UDS positive for benzodiazepines.  Respiratory panel negative.  Urinalysis negative.  CBC showed 1.39 white count and 10.8  hemoglobin.  POC lactate 0.4.  Initial troponin 46 with a 1 hour repeat of 50.  These are comparable to patient's baseline.  Calcium 7.9, albumin 3.2, TSH 6.22 with a free T4 of 1.68.  Patient was placed on electrolyte replacement protocol.  BNP 2906.  Blood cultures pending.  PTT normal. D-dimer was negative.    Patient was given DuoNeb, Lasix, Zofran, Solu-Medrol and Tylenol in the ED.  At reassessment, patient's heart rate is down in the 80s and breathing is less labored.  She is afebrile and hemodynamically stable.  Patient was discussed with Dr. Flores of hospitalist team, who agrees to admit for further management.  Patient agrees with plan.    Chart review: Patient was admitted on 7/1/2025.  She was cardioverted on 7/3/2025 but reverted on 7/4/2025.  Patient went into A-fib with RVR on 7/6/2025.  She was started on sotalol and Eliquis.    Problems Addressed:  Dyspnea, unspecified type: complicated acute illness or injury  Fever, unspecified fever cause: complicated acute illness or injury  Hypocalcemia: complicated acute illness or injury  Leukopenia, unspecified type: complicated acute illness or injury  Tachycardia: complicated acute illness or injury    Amount and/or Complexity of Data Reviewed  Labs: ordered.  Radiology: ordered.    Risk  OTC drugs.  Prescription drug management.  Decision regarding hospitalization.        Final diagnoses:   Hypocalcemia   Dyspnea, unspecified type   Fever, unspecified fever cause   Tachycardia   Leukopenia, unspecified type       ED Disposition  ED Disposition       ED Disposition   Decision to Admit    Condition   --    Comment   Level of Care: Med/Surg [1]   Diagnosis: Febrile neutropenia [[id number]]   Certification: I Certify That Inpatient Hospital Services Are Medically Necessary For Greater Than 2 Midnights                 No follow-up provider specified.       Medication List      No changes were made to your prescriptions during this visit.            Cindy Nava  AARON Lopez  07/12/25 0247

## 2025-07-11 NOTE — ED TRIAGE NOTES
Pt arrived via PV c/o irregular HR. PT was admitted on 07/01 for afib RVR. Pt was discharged yesterday and now feels her HR is irregular again

## 2025-07-12 ENCOUNTER — READMISSION MANAGEMENT (OUTPATIENT)
Dept: CALL CENTER | Facility: HOSPITAL | Age: 70
End: 2025-07-12
Payer: MEDICARE

## 2025-07-12 ENCOUNTER — APPOINTMENT (OUTPATIENT)
Dept: CT IMAGING | Facility: HOSPITAL | Age: 70
End: 2025-07-12
Payer: MEDICARE

## 2025-07-12 PROBLEM — R50.81 FEBRILE NEUTROPENIA: Status: ACTIVE | Noted: 2025-07-12

## 2025-07-12 PROBLEM — D70.9 FEBRILE NEUTROPENIA: Status: ACTIVE | Noted: 2025-07-12

## 2025-07-12 LAB
B PARAPERT DNA SPEC QL NAA+PROBE: NOT DETECTED
B PERT DNA SPEC QL NAA+PROBE: NOT DETECTED
C PNEUM DNA NPH QL NAA+NON-PROBE: NOT DETECTED
FLUAV SUBTYP SPEC NAA+PROBE: NOT DETECTED
FLUBV RNA NPH QL NAA+NON-PROBE: NOT DETECTED
GLUCOSE BLDC GLUCOMTR-MCNC: 109 MG/DL (ref 70–105)
GLUCOSE BLDC GLUCOMTR-MCNC: 115 MG/DL (ref 70–105)
GLUCOSE BLDC GLUCOMTR-MCNC: 153 MG/DL (ref 70–105)
GLUCOSE BLDC GLUCOMTR-MCNC: 156 MG/DL (ref 70–105)
HADV DNA SPEC NAA+PROBE: NOT DETECTED
HCOV 229E RNA SPEC QL NAA+PROBE: NOT DETECTED
HCOV HKU1 RNA SPEC QL NAA+PROBE: NOT DETECTED
HCOV NL63 RNA SPEC QL NAA+PROBE: NOT DETECTED
HCOV OC43 RNA SPEC QL NAA+PROBE: NOT DETECTED
HMPV RNA NPH QL NAA+NON-PROBE: NOT DETECTED
HPIV1 RNA ISLT QL NAA+PROBE: NOT DETECTED
HPIV2 RNA SPEC QL NAA+PROBE: NOT DETECTED
HPIV3 RNA NPH QL NAA+PROBE: NOT DETECTED
HPIV4 P GENE NPH QL NAA+PROBE: NOT DETECTED
M PNEUMO IGG SER IA-ACNC: NOT DETECTED
MRSA DNA SPEC QL NAA+PROBE: NORMAL
POTASSIUM SERPL-SCNC: 4.6 MMOL/L (ref 3.5–5.2)
PROCALCITONIN SERPL-MCNC: 1.15 NG/ML (ref 0–0.25)
QT INTERVAL: 335 MS
QT INTERVAL: 390 MS
QTC INTERVAL: 459 MS
QTC INTERVAL: 463 MS
RHINOVIRUS RNA SPEC NAA+PROBE: NOT DETECTED
RSV RNA NPH QL NAA+NON-PROBE: NOT DETECTED
SARS-COV-2 RNA RESP QL NAA+PROBE: NOT DETECTED
T4 FREE SERPL-MCNC: 1.68 NG/DL (ref 0.92–1.68)

## 2025-07-12 PROCEDURE — 63710000001 INSULIN LISPRO (HUMAN) PER 5 UNITS: Performed by: HOSPITALIST

## 2025-07-12 PROCEDURE — 82948 REAGENT STRIP/BLOOD GLUCOSE: CPT | Performed by: HOSPITALIST

## 2025-07-12 PROCEDURE — 0202U NFCT DS 22 TRGT SARS-COV-2: CPT | Performed by: HOSPITALIST

## 2025-07-12 PROCEDURE — 93005 ELECTROCARDIOGRAM TRACING: CPT | Performed by: INTERNAL MEDICINE

## 2025-07-12 PROCEDURE — 93010 ELECTROCARDIOGRAM REPORT: CPT | Performed by: INTERNAL MEDICINE

## 2025-07-12 PROCEDURE — 99222 1ST HOSP IP/OBS MODERATE 55: CPT | Performed by: STUDENT IN AN ORGANIZED HEALTH CARE EDUCATION/TRAINING PROGRAM

## 2025-07-12 PROCEDURE — 84132 ASSAY OF SERUM POTASSIUM: CPT | Performed by: HOSPITALIST

## 2025-07-12 PROCEDURE — 93005 ELECTROCARDIOGRAM TRACING: CPT | Performed by: HOSPITALIST

## 2025-07-12 PROCEDURE — 25010000002 CEFEPIME PER 500 MG: Performed by: HOSPITALIST

## 2025-07-12 PROCEDURE — 71250 CT THORAX DX C-: CPT

## 2025-07-12 PROCEDURE — 87641 MR-STAPH DNA AMP PROBE: CPT | Performed by: HOSPITALIST

## 2025-07-12 PROCEDURE — 82948 REAGENT STRIP/BLOOD GLUCOSE: CPT

## 2025-07-12 RX ORDER — LEVOTHYROXINE SODIUM 100 UG/1
100 TABLET ORAL
Status: DISCONTINUED | OUTPATIENT
Start: 2025-07-12 | End: 2025-07-17 | Stop reason: HOSPADM

## 2025-07-12 RX ORDER — PANTOPRAZOLE SODIUM 40 MG/1
40 TABLET, DELAYED RELEASE ORAL
Status: DISCONTINUED | OUTPATIENT
Start: 2025-07-12 | End: 2025-07-17 | Stop reason: HOSPADM

## 2025-07-12 RX ORDER — ACYCLOVIR 200 MG/1
400 CAPSULE ORAL 2 TIMES DAILY
Status: DISCONTINUED | OUTPATIENT
Start: 2025-07-12 | End: 2025-07-17 | Stop reason: HOSPADM

## 2025-07-12 RX ORDER — PROMETHAZINE HYDROCHLORIDE 12.5 MG/1
12.5 TABLET ORAL EVERY 6 HOURS PRN
Status: DISCONTINUED | OUTPATIENT
Start: 2025-07-12 | End: 2025-07-17 | Stop reason: HOSPADM

## 2025-07-12 RX ORDER — AMOXICILLIN 250 MG
2 CAPSULE ORAL 2 TIMES DAILY PRN
Status: DISCONTINUED | OUTPATIENT
Start: 2025-07-12 | End: 2025-07-17 | Stop reason: HOSPADM

## 2025-07-12 RX ORDER — ACETAMINOPHEN 160 MG/5ML
650 SOLUTION ORAL EVERY 4 HOURS PRN
Status: DISCONTINUED | OUTPATIENT
Start: 2025-07-12 | End: 2025-07-17 | Stop reason: HOSPADM

## 2025-07-12 RX ORDER — IBUPROFEN 600 MG/1
1 TABLET ORAL
Status: DISCONTINUED | OUTPATIENT
Start: 2025-07-12 | End: 2025-07-17 | Stop reason: HOSPADM

## 2025-07-12 RX ORDER — ALBUTEROL SULFATE 0.83 MG/ML
2.5 SOLUTION RESPIRATORY (INHALATION) EVERY 6 HOURS PRN
Status: DISCONTINUED | OUTPATIENT
Start: 2025-07-12 | End: 2025-07-17 | Stop reason: HOSPADM

## 2025-07-12 RX ORDER — INSULIN LISPRO 100 [IU]/ML
2-7 INJECTION, SOLUTION INTRAVENOUS; SUBCUTANEOUS
Status: DISCONTINUED | OUTPATIENT
Start: 2025-07-12 | End: 2025-07-17 | Stop reason: HOSPADM

## 2025-07-12 RX ORDER — DEXTROSE MONOHYDRATE 25 G/50ML
25 INJECTION, SOLUTION INTRAVENOUS
Status: DISCONTINUED | OUTPATIENT
Start: 2025-07-12 | End: 2025-07-17 | Stop reason: HOSPADM

## 2025-07-12 RX ORDER — ACETAMINOPHEN 650 MG/1
650 SUPPOSITORY RECTAL EVERY 4 HOURS PRN
Status: DISCONTINUED | OUTPATIENT
Start: 2025-07-12 | End: 2025-07-17 | Stop reason: HOSPADM

## 2025-07-12 RX ORDER — SULFAMETHOXAZOLE AND TRIMETHOPRIM 800; 160 MG/1; MG/1
1 TABLET ORAL 3 TIMES WEEKLY
Status: DISCONTINUED | OUTPATIENT
Start: 2025-07-14 | End: 2025-07-17 | Stop reason: HOSPADM

## 2025-07-12 RX ORDER — CALCIUM CARBONATE 500 MG/1
1 TABLET, CHEWABLE ORAL 2 TIMES DAILY PRN
Status: DISCONTINUED | OUTPATIENT
Start: 2025-07-12 | End: 2025-07-17 | Stop reason: HOSPADM

## 2025-07-12 RX ORDER — SODIUM CHLORIDE 0.9 % (FLUSH) 0.9 %
10 SYRINGE (ML) INJECTION EVERY 12 HOURS SCHEDULED
Status: DISCONTINUED | OUTPATIENT
Start: 2025-07-12 | End: 2025-07-17 | Stop reason: HOSPADM

## 2025-07-12 RX ORDER — SOTALOL HYDROCHLORIDE 80 MG/1
80 TABLET ORAL EVERY 12 HOURS SCHEDULED
Status: DISCONTINUED | OUTPATIENT
Start: 2025-07-12 | End: 2025-07-17 | Stop reason: HOSPADM

## 2025-07-12 RX ORDER — BISACODYL 10 MG
10 SUPPOSITORY, RECTAL RECTAL DAILY PRN
Status: DISCONTINUED | OUTPATIENT
Start: 2025-07-12 | End: 2025-07-17 | Stop reason: HOSPADM

## 2025-07-12 RX ORDER — SODIUM CHLORIDE 0.9 % (FLUSH) 0.9 %
10 SYRINGE (ML) INJECTION AS NEEDED
Status: DISCONTINUED | OUTPATIENT
Start: 2025-07-12 | End: 2025-07-17 | Stop reason: HOSPADM

## 2025-07-12 RX ORDER — BISACODYL 5 MG/1
5 TABLET, DELAYED RELEASE ORAL DAILY PRN
Status: DISCONTINUED | OUTPATIENT
Start: 2025-07-12 | End: 2025-07-17 | Stop reason: HOSPADM

## 2025-07-12 RX ORDER — POLYETHYLENE GLYCOL 3350 17 G/17G
17 POWDER, FOR SOLUTION ORAL DAILY PRN
Status: DISCONTINUED | OUTPATIENT
Start: 2025-07-12 | End: 2025-07-17 | Stop reason: HOSPADM

## 2025-07-12 RX ORDER — NICOTINE POLACRILEX 4 MG
15 LOZENGE BUCCAL
Status: DISCONTINUED | OUTPATIENT
Start: 2025-07-12 | End: 2025-07-17 | Stop reason: HOSPADM

## 2025-07-12 RX ORDER — SERTRALINE HYDROCHLORIDE 25 MG/1
25 TABLET, FILM COATED ORAL DAILY
COMMUNITY
End: 2025-07-17 | Stop reason: HOSPADM

## 2025-07-12 RX ORDER — FERROUS SULFATE 325(65) MG
325 TABLET ORAL
Status: DISCONTINUED | OUTPATIENT
Start: 2025-07-12 | End: 2025-07-17 | Stop reason: HOSPADM

## 2025-07-12 RX ORDER — FUROSEMIDE 20 MG/1
20 TABLET ORAL
Status: DISCONTINUED | OUTPATIENT
Start: 2025-07-12 | End: 2025-07-17 | Stop reason: HOSPADM

## 2025-07-12 RX ORDER — SODIUM CHLORIDE 9 MG/ML
40 INJECTION, SOLUTION INTRAVENOUS AS NEEDED
Status: DISCONTINUED | OUTPATIENT
Start: 2025-07-12 | End: 2025-07-17 | Stop reason: HOSPADM

## 2025-07-12 RX ORDER — AMLODIPINE BESYLATE 10 MG/1
10 TABLET ORAL DAILY
COMMUNITY
End: 2025-07-17 | Stop reason: HOSPADM

## 2025-07-12 RX ORDER — NITROGLYCERIN 0.4 MG/1
0.4 TABLET SUBLINGUAL
Status: DISCONTINUED | OUTPATIENT
Start: 2025-07-12 | End: 2025-07-17 | Stop reason: HOSPADM

## 2025-07-12 RX ORDER — FUROSEMIDE 20 MG/1
20 TABLET ORAL 2 TIMES DAILY
COMMUNITY

## 2025-07-12 RX ORDER — VANCOMYCIN/0.9 % SOD CHLORIDE 1.5G/250ML
20 PLASTIC BAG, INJECTION (ML) INTRAVENOUS ONCE
Status: COMPLETED | OUTPATIENT
Start: 2025-07-12 | End: 2025-07-12

## 2025-07-12 RX ORDER — POTASSIUM CHLORIDE 1500 MG/1
40 TABLET, EXTENDED RELEASE ORAL EVERY 4 HOURS
Status: COMPLETED | OUTPATIENT
Start: 2025-07-12 | End: 2025-07-12

## 2025-07-12 RX ORDER — METOPROLOL TARTRATE 50 MG
50 TABLET ORAL 2 TIMES DAILY
COMMUNITY
End: 2025-07-17 | Stop reason: HOSPADM

## 2025-07-12 RX ORDER — ACETAMINOPHEN 325 MG/1
650 TABLET ORAL EVERY 4 HOURS PRN
Status: DISCONTINUED | OUTPATIENT
Start: 2025-07-12 | End: 2025-07-17 | Stop reason: HOSPADM

## 2025-07-12 RX ADMIN — INSULIN LISPRO 2 UNITS: 100 INJECTION, SOLUTION INTRAVENOUS; SUBCUTANEOUS at 20:57

## 2025-07-12 RX ADMIN — Medication 10 ML: at 08:32

## 2025-07-12 RX ADMIN — ACYCLOVIR 400 MG: 200 CAPSULE ORAL at 20:56

## 2025-07-12 RX ADMIN — LEVOTHYROXINE SODIUM 100 MCG: 0.1 TABLET ORAL at 05:48

## 2025-07-12 RX ADMIN — Medication 10 ML: at 01:47

## 2025-07-12 RX ADMIN — CEFEPIME 2000 MG: 2 INJECTION, POWDER, FOR SOLUTION INTRAVENOUS at 17:30

## 2025-07-12 RX ADMIN — FUROSEMIDE 20 MG: 20 TABLET ORAL at 17:30

## 2025-07-12 RX ADMIN — PANTOPRAZOLE SODIUM 40 MG: 40 TABLET, DELAYED RELEASE ORAL at 05:48

## 2025-07-12 RX ADMIN — APIXABAN 5 MG: 5 TABLET, FILM COATED ORAL at 01:45

## 2025-07-12 RX ADMIN — POTASSIUM CHLORIDE 40 MEQ: 1500 TABLET, EXTENDED RELEASE ORAL at 01:44

## 2025-07-12 RX ADMIN — Medication 5 MG: at 23:01

## 2025-07-12 RX ADMIN — CEFEPIME 2000 MG: 2 INJECTION, POWDER, FOR SOLUTION INTRAVENOUS at 08:31

## 2025-07-12 RX ADMIN — APIXABAN 5 MG: 5 TABLET, FILM COATED ORAL at 20:57

## 2025-07-12 RX ADMIN — CEFEPIME 2000 MG: 2 INJECTION, POWDER, FOR SOLUTION INTRAVENOUS at 01:37

## 2025-07-12 RX ADMIN — Medication 1500 MG: at 02:51

## 2025-07-12 RX ADMIN — FERROUS SULFATE TAB 325 MG (65 MG ELEMENTAL FE) 325 MG: 325 (65 FE) TAB at 08:31

## 2025-07-12 RX ADMIN — ACYCLOVIR 400 MG: 200 CAPSULE ORAL at 12:21

## 2025-07-12 RX ADMIN — POTASSIUM CHLORIDE 40 MEQ: 1500 TABLET, EXTENDED RELEASE ORAL at 05:48

## 2025-07-12 RX ADMIN — FUROSEMIDE 20 MG: 20 TABLET ORAL at 08:31

## 2025-07-12 RX ADMIN — INSULIN LISPRO 2 UNITS: 100 INJECTION, SOLUTION INTRAVENOUS; SUBCUTANEOUS at 08:34

## 2025-07-12 RX ADMIN — SOTALOL HYDROCHLORIDE 80 MG: 80 TABLET ORAL at 20:57

## 2025-07-12 RX ADMIN — APIXABAN 5 MG: 5 TABLET, FILM COATED ORAL at 08:34

## 2025-07-12 NOTE — PLAN OF CARE
Problem: Adult Inpatient Plan of Care  Goal: Plan of Care Review  Outcome: Progressing  Flowsheets (Taken 7/12/2025 0436)  Progress: no change  Plan of Care Reviewed With: patient  Goal: Patient-Specific Goal (Individualized)  Outcome: Progressing  Goal: Absence of Hospital-Acquired Illness or Injury  Outcome: Progressing  Intervention: Identify and Manage Fall Risk  Description: Perform standard risk assessment on admission using a validated tool or comprehensive approach appropriate to the patient; reassess fall risk frequently, with change in status or transfer to another level of care.Communicate risk to interprofessional healthcare team; ensure fall risk visible cue.Determine need for increased observation, equipment and environmental modification, as well as use of supportive, nonskid footwear.Adjust safety measures to individual needs and identified risk factors.Reinforce the importance of active participation with fall risk prevention, safety, and physical activity with the patient and family.Perform regular intentional rounding to assess need for position change, pain assessment and personal needs, including assistance with toileting.  Recent Flowsheet Documentation  Taken 7/12/2025 0432 by Braxton Logan LPN  Safety Promotion/Fall Prevention:   assistive device/personal items within reach   clutter free environment maintained   room organization consistent   safety round/check completed  Taken 7/12/2025 0251 by Braxotn Logan LPN  Safety Promotion/Fall Prevention:   assistive device/personal items within reach   clutter free environment maintained   room organization consistent   safety round/check completed  Taken 7/12/2025 0116 by Braxton Logan LPN  Safety Promotion/Fall Prevention:   assistive device/personal items within reach   clutter free environment maintained   room organization consistent   safety round/check completed  Intervention: Prevent Skin Injury  Description: Perform a screening  for skin injury risk, such as pressure or moisture-associated skin damage on admission and at regular intervals throughout hospital stay.Keep all areas of skin (especially folds) clean and dry.Maintain adequate skin hydration.Relieve and redistribute pressure and protect bony prominences and skin at risk for injury; implement measures based on patient-specific risk factors.Match turning and repositioning schedule to clinical condition.Encourage weight shift frequently; assist with reposition if unable to complete independently.Float heels off bed; avoid pressure on the Achilles tendon.Keep skin free from extended contact with medical devices.Optimize nutrition and hydration.Encourage functional activity and mobility, as early as tolerated.Use aids (e.g., slide boards, mechanical lift) during transfer.  Recent Flowsheet Documentation  Taken 7/12/2025 0432 by Braxton Logan LPN  Body Position:   position changed independently   left  Taken 7/12/2025 0251 by Braxton Logan LPN  Body Position:   position changed independently   right  Taken 7/12/2025 0116 by Braxton Logan LPN  Body Position:   position changed independently   left  Goal: Optimal Comfort and Wellbeing  Outcome: Progressing  Goal: Readiness for Transition of Care  Outcome: Progressing     Problem: Fluid Volume Excess  Goal: Fluid Balance  Outcome: Progressing   Goal Outcome Evaluation:  Plan of Care Reviewed With: patient        Progress: no change

## 2025-07-12 NOTE — PROGRESS NOTES
[facility] MEDICINE SERVICE  DAILY PROGRESS NOTE    NAME: Phuong Altamirano  : 1955  MRN: [id number]      LOS: 0 days     PROVIDER OF SERVICE: Telyl Gaitan MD    Chief Complaint: Febrile neutropenia    Subjective:     Interval History: Patient did have some fevers and chills last night but overall feels somewhat better today.  She does still have shortness of breath.        Review of Systems:   Review of Systems    Objective:     Vital Signs  Temp:  [97.5 °F (36.4 °C)-101 °F (38.3 °C)] 98.3 °F (36.8 °C)  Heart Rate:  [] 84  Resp:  [14-28] 16  BP: (115-167)/(49-84) 129/74  Flow (L/min) (Oxygen Therapy):  [1-2] 1   Body mass index is 32.2 kg/m².    Physical Exam  Physical Exam  General Appearance:  Alert, cooperative, no distress, appears stated age  Head:  Normocephalic, without obvious abnormality, atraumatic  Eyes:  PERRL, conjunctiva/corneas clear, EOM's intact, fundi benign, both eyes  Ears:  Normal TM's and external ear canals, both ears  Nose: Nares normal, septum midline, mucosa normal, no drainage or sinus tenderness  Throat: Lips, mucosa, and tongue normal; teeth and gums normal  Neck: Supple, symmetrical, trachea midline, no adenopathy, thyroid: not enlarged, symmetric, no tenderness/mass/nodules, no carotid bruit or JVD  Lungs:   Clear to auscultation bilaterally, respirations unlabored  Heart:  Regular rate and rhythm, S1, S2 normal, no murmur, rub or gallop  Abdomen:  Soft, non-tender, bowel sounds active all four quadrants,  no masses, no organomegaly  Extremities: Extremities normal, atraumatic, no cyanosis or edema  Pulses: 2+ and symmetric  Skin: Skin color, texture, turgor normal, no rashes or lesions  Neurologic: Normal         Diagnostic Data    Results from last 7 days   Lab Units 25   WBC 10*3/mm3 1.39*  --    HEMOGLOBIN g/dL 10.8*  --    HEMATOCRIT % 35.5  --    PLATELETS 10*3/mm3 212  --    GLUCOSE mg/dL  --  103*   CREATININE mg/dL  --  0.78    BUN mg/dL  --  15.7   SODIUM mmol/L  --  139   POTASSIUM mmol/L  --  3.6   AST (SGOT) U/L  --  19   ALT (SGPT) U/L  --  22   ALK PHOS U/L  --  83   BILIRUBIN mg/dL  --  0.4   ANION GAP mmol/L  --  10.9       CT Chest Without Contrast Diagnostic  Result Date: 7/12/2025  Impression: Findings of likely mild interstitial and alveolar pulmonary edema with small bilateral pleural effusions. There is an additional small area of consolidation seen posteriorly within the left lower lobe, possible small area of pneumonia. Electronically Signed: Timur Ledbetter MD  7/12/2025 12:58 PM EDT  Workstation ID: [id number]    XR Chest 1 View  Result Date: 7/11/2025  Impression: Mild chronic changes appear stable. No active disease is seen. Electronically Signed: Slava Herman MD  7/11/2025 8:43 PM EDT  Workstation ID: [id number]        I reviewed the patient's new clinical results.    Assessment/Plan:     Active and Resolved Problems  Active Hospital Problems    Diagnosis  POA    **Febrile neutropenia [D70.9, R50.81]  Yes      Resolved Hospital Problems   No resolved problems to display.       Neutropenic fever with possible bacterial pneumonia, unspecified organism  - Patient presents with fever and neutropenia on admission with initial workup unremarkable, including no evidence of UTI and with normal chest x-ray  - CT of the chest was performed this morning which does show questionable infiltrate in the lower lung, particular the left lower lung which could be consistent with pneumonia, especially given patient's recent admission to the hospital  - Initiated on broad-spectrum IV antibiotics cefepime  - Patient is already on Bactrim for prophylactic treatment  - Elevated procalcitonin is also consistent with bacterial pneumonia  - Follow-up blood cultures  - Encourage I-S, flutter valve for pulmonary toileting  - Can consider using G-CSF injection if ANC remains below 1000    Multiple myeloma  - Holding treatment in the setting of  acute active infection    Atrial fibrillation  - Patient underwent recent cardioversion 1 week prior to hospitalization here  - Continue sotalol, Eliquis  - Monitor QTc    DM type II, controlled  - Continue sliding scale insulin    Hypothyroidism  - Continue Synthroid    Chronic diastolic heart failure  - Patient appears to be compensated and slightly volume depleted  - Holding diuretics for now        VTE Prophylaxis:  Pharmacologic VTE prophylaxis orders are present.             Disposition Planning:     Barriers to Discharge: Improvement in neutropenic fever and pneumonia  Anticipated Date of Discharge: 7/15  Place of Discharge: Home      Time: 45 minutes     Code Status and Medical Interventions: CPR (Attempt to Resuscitate); Full Support   Ordered at: 07/12/25 0103     Code Status (Patient has no pulse and is not breathing):    CPR (Attempt to Resuscitate)     Medical Interventions (Patient has pulse or is breathing):    Full Support     Level Of Support Discussed With:    Patient       Signature: Electronically signed by Telly Gaitan MD, 07/12/25, 13:02 EDT.  [facility] Hospitalist Team

## 2025-07-12 NOTE — OUTREACH NOTE
Medical Week 1 Survey      Flowsheet Row Responses   [facility] facility patient discharged from? [facility]   Does the patient have one of the following disease processes/diagnoses(primary or secondary)? Other   Week 1 attempt successful? No   Unsuccessful attempts Attempt 2   Revoke Readmitted            Mariam RUIZ - Registered Nurse

## 2025-07-12 NOTE — PLAN OF CARE
Problem: Adult Inpatient Plan of Care  Goal: Plan of Care Review  7/12/2025 0543 by Braxton Logan LPN  Outcome: Progressing  Flowsheets (Taken 7/12/2025 0543)  Progress: no change  Plan of Care Reviewed With: patient  7/12/2025 0436 by Braxton Logan LPN  Outcome: Progressing  Flowsheets (Taken 7/12/2025 0436)  Progress: no change  Plan of Care Reviewed With: patient  Goal: Patient-Specific Goal (Individualized)  7/12/2025 0543 by Braxton Logan LPN  Outcome: Progressing  7/12/2025 0436 by Braxton Logan LPN  Outcome: Progressing  Goal: Absence of Hospital-Acquired Illness or Injury  7/12/2025 0543 by Braxton Logan LPN  Outcome: Progressing  7/12/2025 0436 by Braxton Logan LPN  Outcome: Progressing  Intervention: Identify and Manage Fall Risk  Description: Perform standard risk assessment on admission using a validated tool or comprehensive approach appropriate to the patient; reassess fall risk frequently, with change in status or transfer to another level of care.Communicate risk to interprofessional healthcare team; ensure fall risk visible cue.Determine need for increased observation, equipment and environmental modification, as well as use of supportive, nonskid footwear.Adjust safety measures to individual needs and identified risk factors.Reinforce the importance of active participation with fall risk prevention, safety, and physical activity with the patient and family.Perform regular intentional rounding to assess need for position change, pain assessment and personal needs, including assistance with toileting.  Recent Flowsheet Documentation  Taken 7/12/2025 0432 by Braxton Logan LPN  Safety Promotion/Fall Prevention:   assistive device/personal items within reach   clutter free environment maintained   room organization consistent   safety round/check completed  Taken 7/12/2025 0251 by Braxton Logan LPN  Safety Promotion/Fall Prevention:   assistive device/personal items within  reach   clutter free environment maintained   room organization consistent   safety round/check completed  Taken 7/12/2025 0116 by Braxton Logan LPN  Safety Promotion/Fall Prevention:   assistive device/personal items within reach   clutter free environment maintained   room organization consistent   safety round/check completed  Intervention: Prevent Skin Injury  Description: Perform a screening for skin injury risk, such as pressure or moisture-associated skin damage on admission and at regular intervals throughout hospital stay.Keep all areas of skin (especially folds) clean and dry.Maintain adequate skin hydration.Relieve and redistribute pressure and protect bony prominences and skin at risk for injury; implement measures based on patient-specific risk factors.Match turning and repositioning schedule to clinical condition.Encourage weight shift frequently; assist with reposition if unable to complete independently.Float heels off bed; avoid pressure on the Achilles tendon.Keep skin free from extended contact with medical devices.Optimize nutrition and hydration.Encourage functional activity and mobility, as early as tolerated.Use aids (e.g., slide boards, mechanical lift) during transfer.  Recent Flowsheet Documentation  Taken 7/12/2025 0432 by Braxton Logan LPN  Body Position:   position changed independently   left  Taken 7/12/2025 0251 by Braxton Logan LPN  Body Position:   position changed independently   right  Taken 7/12/2025 0116 by Braxton Logan LPN  Body Position:   position changed independently   left  Goal: Optimal Comfort and Wellbeing  7/12/2025 0543 by Braxton Logan LPN  Outcome: Progressing  7/12/2025 0436 by Braxton Logan LPN  Outcome: Progressing  Goal: Readiness for Transition of Care  7/12/2025 0543 by Braxton Logan LPN  Outcome: Progressing  7/12/2025 0436 by Braxton Logan LPN  Outcome: Progressing     Problem: Fluid Volume Excess  Goal: Fluid Balance  7/12/2025 0543  by Braxton Logan LPN  Outcome: Progressing  7/12/2025 0436 by Braxton Logan LPN  Outcome: Progressing   Goal Outcome Evaluation:  Plan of Care Reviewed With: patient        Progress: no change

## 2025-07-12 NOTE — CONSULTS
Hematology/Oncology Inpatient Consultation    Patient name: Phuong Altamirano  : 1955  MRN: [id number]  Referring Provider: Dr. Flores  Reason for Consultation: Febrile neutropenia    Chief complaint: Chills    History of present illness:    Phuong Altamirano is a 70 y.o. female who presented to [facility] on 2025 with complaints of chills.  Current medical history of multiple myeloma on quadruplet therapy, lupus on Plaquenil, paroxysmal atrial fibrillation on chronic anticoagulation with Eliquis, chronic diastolic heart failure, hyperlipidemia, type 2 diabetes mellitus, ENRICO, GERD, former smoker.  Presented with complaints of chills and was found in the ED to have a temperature of 101 °F.  She had been discharged from the hospital on 7/10/2025 after a 1 week stay for atrial fibrillation with RVR requiring cardioversion and initiation of sotalol and diuresis for diastolic CHF exacerbation.  She noted that she did well until 4 PM the day of presentation when she had a sudden onset of chills, body aches, nausea, vomiting, diarrhea and shortness of breath.  In the ED a chest x-ray showed chronic changes with no acute airspace disease.  Respiratory panel and UA were negative.  Blood cultures were drawn.  CBC showed a WBC of 1.39, hemoglobin 10.8, MCV 90.8, platelets 212,000, ANC 1060.  She was initiated on broad-spectrum antibiotics and admitted for a diagnosis of febrile neutropenia.    25  Hematology/Oncology was consulted on a patient known to us and established in our office with Dr. Barrett for a diagnosis of multiple myeloma.  The patient was initially seen in 2025 for referral due to longstanding monoclonal gammopathy.  It was first noted around  when she underwent testing to investigate arthralgias.  At the time of the initial consultation she was largely asymptomatic.  It was suspected that she had smoldering myeloma rather than monoclonal gammopathy of undetermined  significance.  Her protein electrophoresis revealed an increase in her M spike to 1.4 g/dL and an increase in her free lambda light chains to 309 mg/L changing her ratio significantly.  Due to this it was decided to perform a bone marrow biopsy.    Bone marrow biopsy and aspiration indicated that her bone marrow was hypercellular with 65% of nucleated precursors being plasma cells exhibiting an abnormal immunophenotype and lambda light chain restriction.  The karyotype revealed a normal female complement; however, the FISH panel indicated a complex picture with gain of chromosome 1 q. 21, deletion of 13 q., loss of MAF/16 q., and aneuploidy with gain of chromosomes 7, 9, and 15.  Based on the bone marrow results she was considered to have multiple myeloma despite the relatively lower monoclonal protein in the blood.  In addition her results indicated poor prognostic markers.  Due to this she was considered a candidate for treatment.    6/23/2025-C1 D1 lenalidomide, daratumumab, bortezomib, dexamethasone  6/26/2025 C1 day 4  6/30/2025-seen in the office by Dr. Barrett.  Further treatment held due to nausea and vomiting with plans to resume on 7/7/2025.    7/12/25: pt seen this morning. Reported having some fever/chills and nausea and vomiting prior to admission. Also had some shortness of breath. On Supplemental O2 now. IV antibiotics ongoing.    He/She  has a past medical history of Arthritis (2010), Asthma (1 month), Cancer (12 years), Depression (2010), Diabetes mellitus (1.5 years), High blood pressure, High cholesterol (2005), Hypothyroidism, Lupus (2010), and Sleep apnea (Now).    PCP: Chula Sanchez APRN    History:  Past Medical History:   Diagnosis Date    Arthritis 2010    Asthma 1 month    Cancer 12 years    Multiple mylenoma    Depression 2010    Diabetes mellitus 1.5 years    High blood pressure     High cholesterol 2005    Hypothyroidism     Lupus 2010    Sleep apnea Now   ,   Past Surgical  History:   Procedure Laterality Date    BREAST LUMPECTOMY Right 1983    benign    DILATATION AND CURETTAGE      LAPAROSCOPIC TUBAL LIGATION  1979    OVARY SURGERY  1985    remoal of left ovary   ,   Family History   Problem Relation Age of Onset    Heart disease Father     Pancreatic cancer Sister 59    Heart disease Sister     Stroke Sister     Pneumonia Brother    ,   Social History     Tobacco Use    Smoking status: Former     Current packs/day: 0.00     Average packs/day: 1 pack/day for 21.0 years (21.0 ttl pk-yrs)     Types: Cigarettes     Start date:      Quit date:      Years since quittin.5    Smokeless tobacco: Never   Vaping Use    Vaping status: Never Used   Substance Use Topics    Alcohol use: Not Currently     Comment: Very occasionally    Drug use: No   ,   Medications Prior to Admission   Medication Sig Dispense Refill Last Dose/Taking    acetaminophen (TYLENOL) 325 MG tablet Take 2 tablets by mouth Every 6 (Six) Hours As Needed for Mild Pain.       acetaminophen (TYLENOL) 325 MG tablet Take 2 tablets by mouth Every 4 (Four) Hours As Needed for Mild Pain.       acyclovir (ZOVIRAX) 400 MG tablet Take 1 tablet by mouth 2 (Two) Times a Day. 60 tablet 3     albuterol sulfate HFA (Ventolin HFA) 108 (90 Base) MCG/ACT inhaler Inhale 1 puff Every 6 (Six) Hours As Needed.       apixaban (ELIQUIS) 5 MG tablet tablet Take 1 tablet by mouth 2 (Two) Times a Day for 30 days. 60 tablet 0     dexAMETHasone (DECADRON) 4 MG tablet Take 5 tablets by mouth Daily With Breakfast. Take with food on Days 1, 2, 8, 9, 15 and 16. 30 tablet 3     ferrous sulfate 325 (65 FE) MG tablet Take 1 tablet by mouth Daily With Breakfast for 30 days. 30 tablet 0     furosemide (Lasix) 20 MG tablet Take 1 tablet by mouth 2 (Two) Times a Day for 30 days.       hydroxychloroquine (PLAQUENIL) 200 MG tablet Every 12 (Twelve) Hours.       lenalidomide (REVLIMID) 25 MG capsule Take 1 capsule by mouth See Admin Instructions. Take  1 capsule by mouth daily on days 1-14, OFF 7 days of each 21 day cycle. Take with or without food. Swallow capsules whole, do not crush, break or chew. 14 capsule 0     levothyroxine (SYNTHROID, LEVOTHROID) 100 MCG tablet Take 1 tablet by mouth Every Morning Before Breakfast.  1     metFORMIN (GLUCOPHAGE) 500 MG tablet Take 1 tablet by mouth Daily With Dinner.       omeprazole (priLOSEC) 40 MG capsule Take 1 capsule by mouth Daily.       ondansetron (ZOFRAN) 8 MG tablet Take 1 tablet by mouth 3 (Three) Times a Day As Needed for Nausea or Vomiting. 30 tablet 5     promethazine (PHENERGAN) 12.5 MG tablet Take 1 tablet by mouth Every 6 (Six) Hours As Needed for Nausea or Vomiting. 30 tablet 2     sotalol (BETAPACE) 80 MG tablet Take 1 tablet by mouth Every 12 (Twelve) Hours. 60 tablet 0     sulfamethoxazole-trimethoprim (BACTRIM DS,SEPTRA DS) 800-160 MG per tablet Take 1 tablet by mouth 3 (Three) Times a Week. Take 1 tablet on Mondays, Wednesdays and Fridays. 12 tablet 3    , Scheduled Meds:  acyclovir, 400 mg, Oral, BID  apixaban, 5 mg, Oral, Q12H  cefepime, 2,000 mg, Intravenous, Q8H  ferrous sulfate, 325 mg, Oral, Daily With Breakfast  furosemide, 20 mg, Oral, BID Diuretics  insulin lispro, 2-7 Units, Subcutaneous, 4x Daily AC & at Bedtime  levothyroxine, 100 mcg, Oral, Q AM  pantoprazole, 40 mg, Oral, Q AM  sodium chloride, 10 mL, Intravenous, Q12H  sotalol, 80 mg, Oral, Q12H  [START ON 7/14/2025] sulfamethoxazole-trimethoprim, 1 tablet, Oral, Once per day on Monday Wednesday Friday    , Continuous Infusions:  Pharmacy To Dose:,     , PRN Meds:    acetaminophen **OR** acetaminophen **OR** acetaminophen    albuterol    senna-docusate sodium **AND** polyethylene glycol **AND** bisacodyl **AND** bisacodyl    calcium carbonate    Calcium Replacement - Follow Nurse / BPA Driven Protocol    dextrose    dextrose    glucagon (human recombinant)    Magnesium Standard Dose Replacement - Follow Nurse / BPA Driven Protocol     "melatonin    nitroglycerin    Pharmacy To Dose:    Phosphorus Replacement - Follow Nurse / BPA Driven Protocol    Potassium Replacement - Follow Nurse / BPA Driven Protocol    promethazine    sodium chloride    sodium chloride   Allergies:  Patient has no known allergies.    Subjective     ROS:  Review of Systems     Objective   Vital Signs:   /69 (BP Location: Left arm, Patient Position: Sitting)   Pulse 87   Temp 97.5 °F (36.4 °C) (Oral)   Resp 14   Ht 154.9 cm (61\")   Wt 77.3 kg (170 lb 6.7 oz)   LMP 10/17/2014   SpO2 99%   BMI 32.20 kg/m²     Physical Exam: (performed by MD)  Physical Exam    Results Review:  Lab Results (last 48 hours)       Procedure Component Value Units Date/Time    MRSA Screen, PCR (Inpatient) - Swab, Nares [[id number]]  (Normal) Collected: 07/12/25 0131    Specimen: Swab from Nares Updated: 07/12/25 0337     MRSA PCR No MRSA Detected    Narrative:      The negative predictive value of this diagnostic test is high and should only be used to consider de-escalating anti-MRSA therapy. A positive result may indicate colonization with MRSA and must be correlated clinically.    Respiratory Panel PCR w/COVID-19(SARS-CoV-2) MEGHAN/ULYSSES/WIN/PAD/COR/DEBBIE In-House, NP Swab in UTM/VTM, 2 HR TAT - Swab, Nasopharynx [[id number]]  (Normal) Collected: 07/12/25 0007    Specimen: Swab from Nasopharynx Updated: 07/12/25 0104     ADENOVIRUS, PCR Not Detected     Coronavirus 229E Not Detected     Coronavirus HKU1 Not Detected     Coronavirus NL63 Not Detected     Coronavirus OC43 Not Detected     COVID19 Not Detected     Human Metapneumovirus Not Detected     Human Rhinovirus/Enterovirus Not Detected     Influenza A PCR Not Detected     Influenza B PCR Not Detected     Parainfluenza Virus 1 Not Detected     Parainfluenza Virus 2 Not Detected     Parainfluenza Virus 3 Not Detected     Parainfluenza Virus 4 Not Detected     RSV, PCR Not Detected     Bordetella pertussis pcr Not Detected     Bordetella " "parapertussis PCR Not Detected     Chlamydophila pneumoniae PCR Not Detected     Mycoplasma pneumo by PCR Not Detected    Narrative:      In the setting of a positive respiratory panel with a viral infection PLUS a negative procalcitonin without other underlying concern for bacterial infection, consider observing off antibiotics or discontinuation of antibiotics and continue supportive care. If the respiratory panel is positive for atypical bacterial infection (Bordetella pertussis, Chlamydophila pneumoniae, or Mycoplasma pneumoniae), consider antibiotic de-escalation to target atypical bacterial infection.    Procalcitonin [[id number]]  (Abnormal) Collected: 07/11/25 2139    Specimen: Blood Updated: 07/12/25 0011     Procalcitonin 1.15 ng/mL     Narrative:      As a Marker for Sepsis (Non-Neonates):    1. <0.5 ng/mL represents a low risk of severe sepsis and/or septic shock.  2. >2 ng/mL represents a high risk of severe sepsis and/or septic shock.    As a Marker for Lower Respiratory Tract Infections that require antibiotic therapy:    PCT on Admission    Antibiotic Therapy       6-12 Hrs later    >0.5                Strongly Recommended  >0.25 - <0.5        Recommended   0.1 - 0.25          Discouraged              Remeasure/reassess PCT  <0.1                Strongly Discouraged     Remeasure/reassess PCT    As 28 day mortality risk marker: \"Change in Procalcitonin Result\" (>80% or <=80%) if Day 0 (or Day 1) and Day 4 values are available. Refer to http://www.[employer]-pct-calculator.com    Change in PCT <=80%  A decrease of PCT levels below or equal to 80% defines a positive change in PCT test result representing a higher risk for 28-day all-cause mortality of patients diagnosed with severe sepsis for septic shock.    Change in PCT >80%  A decrease of PCT levels of more than 80% defines a negative change in PCT result representing a lower risk for 28-day all-cause mortality of patients diagnosed with severe sepsis or " septic shock.       T4, Free [[id number]]  (Normal) Collected: 07/11/25 2139    Specimen: Blood Updated: 07/12/25 0011     Free T4 1.68 ng/dL     Urine Drug Screen - Urine, Clean Catch [[id number]]  (Abnormal) Collected: 07/11/25 2251    Specimen: Urine, Clean Catch Updated: 07/11/25 5309     THC, Screen, Urine Negative     Phencyclidine (PCP), Urine Negative     Cocaine Screen, Urine Negative     Methamphetamine, Ur Negative     Opiate Screen Negative     Amphetamine Screen, Urine Negative     Benzodiazepine Screen, Urine Positive     Tricyclic Antidepressants Screen Negative     Methadone Screen, Urine Negative     Barbiturates Screen, Urine Negative     Oxycodone Screen, Urine Negative     Buprenorphine, Screen, Urine Negative    Narrative:      Cutoff For Drugs Screened:    Amphetamines               500 ng/ml  Barbiturates               200 ng/ml  Benzodiazepines            150 ng/ml  Cocaine                    150 ng/ml  Methadone                  200 ng/ml  Opiates                    100 ng/ml  Phencyclidine               25 ng/ml  THC                         50 ng/ml  Methamphetamine            500 ng/ml  Tricyclic Antidepressants  300 ng/ml  Oxycodone                  100 ng/ml  Buprenorphine               10 ng/ml    The normal value for all drugs tested is negative. This report includes unconfirmed screening results, with the cutoff values listed, to be used for medical treatment purposes only.  Unconfirmed results must not be used for non-medical purposes such as employment or legal testing.  Clinical consideration should be applied to any drug of abuse test, particularly when unconfirmed results are used.    All urine drugs of abuse requests without chain of custody are for medical screening purposes only.  False positives are possible.      Urinalysis With Culture If Indicated - Urine, Clean Catch [[id number]]  (Abnormal) Collected: 07/11/25 2251    Specimen: Urine, Clean Catch Updated: 07/11/25 0013      Color, UA Yellow     Appearance, UA Clear     pH, UA <=5.0     Specific Gravity, UA 1.009     Glucose, UA Negative     Ketones, UA Negative     Bilirubin, UA Negative     Blood, UA Negative     Protein, UA 30 mg/dL (1+)     Leuk Esterase, UA Negative     Nitrite, UA Negative     Urobilinogen, UA 0.2 E.U./dL    Narrative:      In absence of clinical symptoms, the presence of pyuria, bacteria, and/or nitrites on the urinalysis result does not correlate with infection.    Urinalysis, Microscopic Only - Urine, Clean Catch [[id number]] Collected: 07/11/25 2251    Specimen: Urine, Clean Catch Updated: 07/11/25 2349     RBC, UA 0-2 /HPF      WBC, UA 0-2 /HPF      Comment: Urine culture not indicated.        Bacteria, UA None Seen /HPF      Squamous Epithelial Cells, UA 0-2 /HPF      Hyaline Casts, UA None Seen /LPF      Methodology Automated Microscopy    Manual Differential [[id number]]  (Abnormal) Collected: 07/11/25 2138    Specimen: Blood Updated: 07/11/25 2300     Neutrophil % 76.0 %      Lymphocyte % 16.0 %      Monocyte % 8.0 %      Neutrophils Absolute 1.06 10*3/mm3      Lymphocytes Absolute 0.22 10*3/mm3      Monocytes Absolute 0.11 10*3/mm3      RBC Morphology Normal     WBC Morphology Normal     Platelet Morphology Normal    CBC & Differential [[id number]]  (Abnormal) Collected: 07/11/25 2138    Specimen: Blood Updated: 07/11/25 2300    Narrative:      The following orders were created for panel order CBC & Differential.  Procedure                               Abnormality         Status                     ---------                               -----------         ------                     CBC Auto Differential[[id number]]        Abnormal            Final result               Scan Slide[[id number]]                                       Final result                 Please view results for these tests on the individual orders.    CBC Auto Differential [[id number]]  (Abnormal) Collected: 07/11/25 2138    Specimen:  Blood Updated: 07/11/25 2300     WBC 1.39 10*3/mm3      RBC 3.91 10*6/mm3      Hemoglobin 10.8 g/dL      Hematocrit 35.5 %      MCV 90.8 fL      MCH 27.6 pg      MCHC 30.4 g/dL      RDW 15.6 %      RDW-SD 51.6 fl      MPV 10.4 fL      Platelets 212 10*3/mm3     Narrative:      Instrument flags present, additional testing may be recommended.  The previously reported component NRBC is no longer being reported. Previous result was 0.0 /100 WBC (Reference Range: 0.0-0.2 /100 WBC) on 7/11/2025 at 2247 EDT.    Scan Slide [[id number]] Collected: 07/11/25 2138    Specimen: Blood Updated: 07/11/25 2300     Scan Slide --     Comment: See Manual Differential Results       High Sensitivity Troponin T 1Hr [[id number]]  (Abnormal) Collected: 07/11/25 2139    Specimen: Blood Updated: 07/11/25 2243     HS Troponin T 50 ng/L      Troponin T Numeric Delta 4 ng/L      Troponin T % Delta 9    Narrative:      High Sensitive Troponin T Reference Range:  <14.0 ng/L- Negative Female for AMI  <22.0 ng/L- Negative Male for AMI  >=14 - Abnormal Female indicating possible myocardial injury.  >=22 - Abnormal Male indicating possible myocardial injury.   Clinicians would have to utilize clinical acumen, EKG, Troponin, and serial changes to determine if it is an Acute Myocardial Infarction or myocardial injury due to an underlying chronic condition.         Extra Tubes [[id number]] Collected: 07/11/25 2033    Specimen: Blood Updated: 07/11/25 2231    Narrative:      The following orders were created for panel order Extra Tubes.  Procedure                               Abnormality         Status                     ---------                               -----------         ------                     Grn Na Hep No Gel[[id number]]                                Final result               Gold Top - [facility][[id number]]                                   Final result                 Please view results for these tests on the individual orders.    Grn Na Hep No  Gel [[id number]] Collected: 07/11/25 2138    Specimen: Blood Updated: 07/11/25 2231     Extra Tube Hold for add-ons.     Comment: Auto resulted.       High Sensitivity Troponin T [[id number]]  (Abnormal) Collected: 07/11/25 2033    Specimen: Blood Updated: 07/11/25 2130     HS Troponin T 46 ng/L     Narrative:      High Sensitive Troponin T Reference Range:  <14.0 ng/L- Negative Female for AMI  <22.0 ng/L- Negative Male for AMI  >=14 - Abnormal Female indicating possible myocardial injury.  >=22 - Abnormal Male indicating possible myocardial injury.   Clinicians would have to utilize clinical acumen, EKG, Troponin, and serial changes to determine if it is an Acute Myocardial Infarction or myocardial injury due to an underlying chronic condition.         Magnesium [[id number]]  (Normal) Collected: 07/11/25 2033    Specimen: Blood Updated: 07/11/25 2122     Magnesium 1.7 mg/dL     Comprehensive Metabolic Panel [[id number]]  (Abnormal) Collected: 07/11/25 2033    Specimen: Blood Updated: 07/11/25 2121     Glucose 103 mg/dL      BUN 15.7 mg/dL      Creatinine 0.78 mg/dL      Sodium 139 mmol/L      Potassium 3.6 mmol/L      Chloride 107 mmol/L      CO2 21.1 mmol/L      Calcium 7.9 mg/dL      Total Protein 5.3 g/dL      Albumin 3.2 g/dL      ALT (SGPT) 22 U/L      AST (SGOT) 19 U/L      Alkaline Phosphatase 83 U/L      Total Bilirubin 0.4 mg/dL      Globulin 2.1 gm/dL      A/G Ratio 1.5 g/dL      BUN/Creatinine Ratio 20.1     Anion Gap 10.9 mmol/L      eGFR 81.8 mL/min/1.73     Narrative:      GFR Categories in Chronic Kidney Disease (CKD)              GFR Category          GFR (mL/min/1.73)    Interpretation  G1                    90 or greater        Normal or high (1)  G2                    60-89                Mild decrease (1)  G3a                   45-59                Mild to moderate decrease  G3b                   30-44                Moderate to severe decrease  G4                    15-29                Severe  "decrease  G5                    14 or less           Kidney failure    (1)In the absence of evidence of kidney disease, neither GFR category G1 or G2 fulfill the criteria for CKD.    eGFR calculation 2021 CKD-EPI creatinine equation, which does not include race as a factor    Lipase [[id number]]  (Normal) Collected: 07/11/25 2033    Specimen: Blood Updated: 07/11/25 2121     Lipase 43 U/L     D-dimer, Quantitative [[id number]]  (Normal) Collected: 07/11/25 2033    Specimen: Blood Updated: 07/11/25 2057     D-Dimer, Quantitative 0.30 MCGFEU/mL     Narrative:      According to the assay 's published package insert, a normal (<0.50 MCGFEU/mL) D-dimer result in conjunction with a non-high clinical probability assessment, excludes deep vein thrombosis (DVT) and pulmonary embolism (PE) with high sensitivity.    D-dimer values increase with age and this can make VTE exclusion of an older population difficult. To address this, the [employer], based on best available evidence and recent guidelines, recommends that clinicians use age-adjusted D-dimer thresholds in patients greater than 50 years of age with: a) a low probability of PE who do not meet all Pulmonary Embolism Rule Out Criteria, or b) in those with intermediate probability of PE.   The formula for an age-adjusted D-dimer cut-off is \"age/100\".  For example, a 60 year old patient would have an age-adjusted cut-off of 0.60 MCGFEU/mL and an 80 year old 0.80 MCGFEU/mL.    aPTT [[id number]]  (Normal) Collected: 07/11/25 2033    Specimen: Blood Updated: 07/11/25 2057     PTT 32.2 seconds     Protime-INR [[id number]]  (Abnormal) Collected: 07/11/25 2033    Specimen: Blood Updated: 07/11/25 2054     Protime 20.5 Seconds      INR 1.75    BNP [[id number]]  (Abnormal) Collected: 07/11/25 2015    Specimen: Blood Updated: 07/11/25 2047     proBNP 2,906.0 pg/mL     Narrative:      This assay is used as an aid in the diagnosis of individuals suspected " of having heart failure. It can be used as an aid in the diagnosis of acute decompensated heart failure (ADHF) in patients presenting with signs and symptoms of ADHF to the emergency department (ED). In addition, NT-proBNP of <300 pg/mL indicates ADHF is not likely.    Age Range Result Interpretation  NT-proBNP Concentration (pg/mL:      <50             Positive            >450                   Gray                 300-450                    Negative             <300    50-75           Positive            >900                  Gray                300-900                  Negative            <300      >75             Positive            >1800                  Gray                300-1800                  Negative            <300    Phosphorus [[id number]]  (Normal) Collected: 07/11/25 2015    Specimen: Blood Updated: 07/11/25 2047     Phosphorus 3.1 mg/dL     TSH Rfx On Abnormal To Free T4 [[id number]]  (Abnormal) Collected: 07/11/25 2015    Specimen: Blood Updated: 07/11/25 2047     TSH 6.220 uIU/mL     Ethanol [[id number]] Collected: 07/11/25 2015    Specimen: Blood Updated: 07/11/25 2047     Ethanol % <0.010 %     Narrative:      Not for legal purposes.    Gold Top - SST [[id number]] Collected: 07/11/25 2033    Specimen: Blood Updated: 07/11/25 2045     Extra Tube Hold for add-ons.     Comment: Auto resulted.       POC Lactate [[id number]]  (Normal) Collected: 07/11/25 2038    Specimen: Blood Updated: 07/11/25 2040     Lactate 0.4 mmol/L      Comment: Serial Number: [id number]:  [id number]       Blood Culture - Blood, Arm, Right [[id number]] Collected: 07/11/25 2015    Specimen: Blood from Arm, Right Updated: 07/11/25 2019    Blood Culture - Blood, Arm, Left [[id number]] Collected: 07/11/25 2015    Specimen: Blood from Arm, Left Updated: 07/11/25 2019             Pending Results:     Imaging Reviewed:   XR Chest 1 View  Result Date: 7/11/2025  Impression: Mild chronic changes appear stable. No active disease is  seen. Electronically Signed: Slava Herman MD  7/11/2025 8:43 PM EDT  Workstation ID: [id number]           Assessment & Plan       Multiple myeloma  - High risk based on gain of chromosome 1 q. and the loss of MAF/16 q.  - Began quadruplet therapy with daratumumab, bortezomib, dexamethasone, and lenalidomide on 6/23/2025  - Treatment has been on hold, last treatment was cycle 1 day 4  - Continue to hold    Febrile neutropenia  - Tmax 101 °F, currently afebrile  -ANC 1060  - Chest x-ray, respiratory viral panel, UA negative  -CT PE protocol from 7/1/2025 during the last visit did show mild patchy opacities within the left lower lobe  - Blood cultures and stool studies pending  - Continue broad-spectrum antibiotics while infectious workup in progress  Consider G-CSF support for ANC<1000.    Atrial fibrillation  - Recently cardioverted on 7/3/2025  - On sotalol and Eliquis    Electronically signed by STANLEY Bee, 07/12/25, 7:47 AM EDT.    I have reviewed and confirmed the accuracy of the patient's history: Chief complaint, HPI, ROS, Subjective, and Past Family Social History as entered by the APRN/PA.  Pertinent changes have been made to the sections to reflect my personal evaluation and exam findings. Kelvin Wei MD 07/12/25       Thank you for this consult. We will be happy to follow along with you.

## 2025-07-12 NOTE — H&P
[facility] Medicine Services  History & Physical    Patient Name: Phuong Altamirano  : 1955  MRN: [id number]  Primary Care Physician:  Chula Sanchez APRN  Date of admission: 2025  Date and Time of Service: 2025 at 1:05 AM    Subjective      Chief Complaint: chills    History of Present Illness: Phuong Altamirano is a 70 y.o. female with a CMH of morbid obesity, ENRICO, former smoker, hyperlipidemia, type 2 diabetes mellitus, paroxysmal A-fib on Eliquis, chronic diastolic heart failure, multiple myeloma on Revlimid 8, asthma, lupus on Plaquenil, hypothyroidism, GERD who presented to [facility] on 2025 with chills.    She was discharged yesterday from the hospital after 1 week stay for atrial fibrillation with RVR requiring cardioversion and initiation of sotalol, diuresis for diastolic CHF exacerbation.  She did well until 4 PM when she developed sudden onset of chills, body aches, nausea vomiting and diarrhea and short of breath.    Febrile in the ED temp 101, tachycardic sinus tach heart rate 115, /81, wean down off oxygen  Labs significant for bicytopenia  Chest x-ray showed chronic changes, no acute airspace disease    She received 125 mg IV Solu-Medrol, DuoNeb, 1 L IV fluid bolus, Tylenol and Lasix 20 mg IV      Review of Systems negative except as mention HPI    Personal History     Past Medical History:   Diagnosis Date    Arthritis 2010    Asthma 1 month    Cancer 12 years    Multiple mylenoma    Depression 2010    Diabetes mellitus 1.5 years    High blood pressure     High cholesterol 2005    Hypothyroidism     Lupus 2010    Sleep apnea Now       Past Surgical History:   Procedure Laterality Date    BREAST LUMPECTOMY Right 1983    benign    DILATATION AND CURETTAGE      LAPAROSCOPIC TUBAL LIGATION  1979    OVARY SURGERY  1985    remoal of left ovary       Family History: family history includes Heart disease in her father and sister; Pancreatic cancer (age  of onset: 59) in her sister; Pneumonia in her brother; Stroke in her sister. Otherwise pertinent FHx was reviewed and not pertinent to current issue.    Social History:  reports that she quit smoking about 30 years ago. Her smoking use included cigarettes. She started smoking about 51 years ago. She has a 21 pack-year smoking history. She has never used smokeless tobacco. She reports current alcohol use. She reports that she does not use drugs.    Home Medications:  Prior to Admission Medications       Prescriptions Last Dose Informant Patient Reported? Taking?    acetaminophen (TYLENOL) 325 MG tablet   Yes No    Take 2 tablets by mouth Every 6 (Six) Hours As Needed for Mild Pain.    acetaminophen (TYLENOL) 325 MG tablet   No No    Take 2 tablets by mouth Every 4 (Four) Hours As Needed for Mild Pain.    acyclovir (ZOVIRAX) 400 MG tablet   No No    Take 1 tablet by mouth 2 (Two) Times a Day.    albuterol sulfate HFA (Ventolin HFA) 108 (90 Base) MCG/ACT inhaler   Yes No    Inhale 1 puff Every 6 (Six) Hours As Needed.    apixaban (ELIQUIS) 5 MG tablet tablet   No No    Take 1 tablet by mouth 2 (Two) Times a Day for 30 days.    dexAMETHasone (DECADRON) 4 MG tablet   No No    Take 5 tablets by mouth Daily With Breakfast. Take with food on Days 1, 2, 8, 9, 15 and 16.    ferrous sulfate 325 (65 FE) MG tablet   No No    Take 1 tablet by mouth Daily With Breakfast for 30 days.    furosemide (Lasix) 20 MG tablet   No No    Take 1 tablet by mouth 2 (Two) Times a Day for 30 days.    hydroxychloroquine (PLAQUENIL) 200 MG tablet   Yes No    Every 12 (Twelve) Hours.    lenalidomide (REVLIMID) 25 MG capsule   No No    Take 1 capsule by mouth See Admin Instructions. Take 1 capsule by mouth daily on days 1-14, OFF 7 days of each 21 day cycle. Take with or without food. Swallow capsules whole, do not crush, break or chew.    levothyroxine (SYNTHROID, LEVOTHROID) 100 MCG tablet   Yes No    Take 1 tablet by mouth Every Morning Before  Breakfast.    metFORMIN (GLUCOPHAGE) 500 MG tablet   Yes No    Take 1 tablet by mouth Daily With Dinner.    omeprazole (priLOSEC) 40 MG capsule   Yes No    Take 1 capsule by mouth Daily.    ondansetron (ZOFRAN) 8 MG tablet   No No    Take 1 tablet by mouth 3 (Three) Times a Day As Needed for Nausea or Vomiting.    promethazine (PHENERGAN) 12.5 MG tablet   No No    Take 1 tablet by mouth Every 6 (Six) Hours As Needed for Nausea or Vomiting.    sotalol (BETAPACE) 80 MG tablet   No No    Take 1 tablet by mouth Every 12 (Twelve) Hours.    sulfamethoxazole-trimethoprim (BACTRIM DS,SEPTRA DS) 800-160 MG per tablet   No No    Take 1 tablet by mouth 3 (Three) Times a Week. Take 1 tablet on Mondays, Wednesdays and Fridays.              Allergies:  No Known Allergies    Objective      Vitals:   Temp:  [99.5 °F (37.5 °C)-101 °F (38.3 °C)] 99.5 °F (37.5 °C)  Heart Rate:  [] 94  Resp:  [16-28] 28  BP: (115-167)/(49-84) 115/49  Body mass index is 33.12 kg/m².  Physical Exam  Constitutional:       Appearance: Normal appearance.   HENT:      Head: Normocephalic and atraumatic.   Eyes:      Pupils: Pupils are equal, round, and reactive to light.   Cardiovascular:      Rate and Rhythm: Regular rhythm. Tachycardia present.      Pulses: Normal pulses.      Heart sounds: Normal heart sounds.   Pulmonary:      Effort: Pulmonary effort is normal.      Breath sounds: Rales present.   Abdominal:      General: Abdomen is flat. Bowel sounds are normal.      Palpations: Abdomen is soft.   Musculoskeletal:      Right lower leg: Edema present.      Left lower leg: Edema present.   Skin:     General: Skin is warm.      Capillary Refill: Capillary refill takes less than 2 seconds.   Neurological:      General: No focal deficit present.      Mental Status: She is alert.   Psychiatric:         Mood and Affect: Mood normal.         Diagnostic Data:  Lab Results (last 24 hours)       Procedure Component Value Units Date/Time    Respiratory Panel  PCR w/COVID-19(SARS-CoV-2) MEGHAN/ULYSSES/WIN/PAD/COR/DEBBIE In-House, NP Swab in UTM/VTM, 2 HR TAT - Swab, Nasopharynx [[id number]]  (Normal) Collected: 07/12/25 0007    Specimen: Swab from Nasopharynx Updated: 07/12/25 0104     ADENOVIRUS, PCR Not Detected     Coronavirus 229E Not Detected     Coronavirus HKU1 Not Detected     Coronavirus NL63 Not Detected     Coronavirus OC43 Not Detected     COVID19 Not Detected     Human Metapneumovirus Not Detected     Human Rhinovirus/Enterovirus Not Detected     Influenza A PCR Not Detected     Influenza B PCR Not Detected     Parainfluenza Virus 1 Not Detected     Parainfluenza Virus 2 Not Detected     Parainfluenza Virus 3 Not Detected     Parainfluenza Virus 4 Not Detected     RSV, PCR Not Detected     Bordetella pertussis pcr Not Detected     Bordetella parapertussis PCR Not Detected     Chlamydophila pneumoniae PCR Not Detected     Mycoplasma pneumo by PCR Not Detected    Narrative:      In the setting of a positive respiratory panel with a viral infection PLUS a negative procalcitonin without other underlying concern for bacterial infection, consider observing off antibiotics or discontinuation of antibiotics and continue supportive care. If the respiratory panel is positive for atypical bacterial infection (Bordetella pertussis, Chlamydophila pneumoniae, or Mycoplasma pneumoniae), consider antibiotic de-escalation to target atypical bacterial infection.    Procalcitonin [[id number]]  (Abnormal) Collected: 07/11/25 2139    Specimen: Blood Updated: 07/12/25 0011     Procalcitonin 1.15 ng/mL     Narrative:      As a Marker for Sepsis (Non-Neonates):    1. <0.5 ng/mL represents a low risk of severe sepsis and/or septic shock.  2. >2 ng/mL represents a high risk of severe sepsis and/or septic shock.    As a Marker for Lower Respiratory Tract Infections that require antibiotic therapy:    PCT on Admission    Antibiotic Therapy       6-12 Hrs later    >0.5                Strongly  "Recommended  >0.25 - <0.5        Recommended   0.1 - 0.25          Discouraged              Remeasure/reassess PCT  <0.1                Strongly Discouraged     Remeasure/reassess PCT    As 28 day mortality risk marker: \"Change in Procalcitonin Result\" (>80% or <=80%) if Day 0 (or Day 1) and Day 4 values are available. Refer to http://[email or web address]-pct-calculator.com    Change in PCT <=80%  A decrease of PCT levels below or equal to 80% defines a positive change in PCT test result representing a higher risk for 28-day all-cause mortality of patients diagnosed with severe sepsis for septic shock.    Change in PCT >80%  A decrease of PCT levels of more than 80% defines a negative change in PCT result representing a lower risk for 28-day all-cause mortality of patients diagnosed with severe sepsis or septic shock.       T4, Free [[id number]]  (Normal) Collected: 07/11/25 2139    Specimen: Blood Updated: 07/12/25 0011     Free T4 1.68 ng/dL     Urine Drug Screen - Urine, Clean Catch [[id number]]  (Abnormal) Collected: 07/11/25 2251    Specimen: Urine, Clean Catch Updated: 07/11/25 2355     THC, Screen, Urine Negative     Phencyclidine (PCP), Urine Negative     Cocaine Screen, Urine Negative     Methamphetamine, Ur Negative     Opiate Screen Negative     Amphetamine Screen, Urine Negative     Benzodiazepine Screen, Urine Positive     Tricyclic Antidepressants Screen Negative     Methadone Screen, Urine Negative     Barbiturates Screen, Urine Negative     Oxycodone Screen, Urine Negative     Buprenorphine, Screen, Urine Negative    Narrative:      Cutoff For Drugs Screened:    Amphetamines               500 ng/ml  Barbiturates               200 ng/ml  Benzodiazepines            150 ng/ml  Cocaine                    150 ng/ml  Methadone                  200 ng/ml  Opiates                    100 ng/ml  Phencyclidine               25 ng/ml  THC                         50 ng/ml  Methamphetamine            500 ng/ml  Tricyclic " Antidepressants  300 ng/ml  Oxycodone                  100 ng/ml  Buprenorphine               10 ng/ml    The normal value for all drugs tested is negative. This report includes unconfirmed screening results, with the cutoff values listed, to be used for medical treatment purposes only.  Unconfirmed results must not be used for non-medical purposes such as employment or legal testing.  Clinical consideration should be applied to any drug of abuse test, particularly when unconfirmed results are used.    All urine drugs of abuse requests without chain of custody are for medical screening purposes only.  False positives are possible.      Urinalysis With Culture If Indicated - Urine, Clean Catch [[id number]]  (Abnormal) Collected: 07/11/25 2251    Specimen: Urine, Clean Catch Updated: 07/11/25 2349     Color, UA Yellow     Appearance, UA Clear     pH, UA <=5.0     Specific Gravity, UA 1.009     Glucose, UA Negative     Ketones, UA Negative     Bilirubin, UA Negative     Blood, UA Negative     Protein, UA 30 mg/dL (1+)     Leuk Esterase, UA Negative     Nitrite, UA Negative     Urobilinogen, UA 0.2 E.U./dL    Narrative:      In absence of clinical symptoms, the presence of pyuria, bacteria, and/or nitrites on the urinalysis result does not correlate with infection.    Urinalysis, Microscopic Only - Urine, Clean Catch [[id number]] Collected: 07/11/25 2251    Specimen: Urine, Clean Catch Updated: 07/11/25 2349     RBC, UA 0-2 /HPF      WBC, UA 0-2 /HPF      Comment: Urine culture not indicated.        Bacteria, UA None Seen /HPF      Squamous Epithelial Cells, UA 0-2 /HPF      Hyaline Casts, UA None Seen /LPF      Methodology Automated Microscopy    Manual Differential [[id number]]  (Abnormal) Collected: 07/11/25 2138    Specimen: Blood Updated: 07/11/25 2300     Neutrophil % 76.0 %      Lymphocyte % 16.0 %      Monocyte % 8.0 %      Neutrophils Absolute 1.06 10*3/mm3      Lymphocytes Absolute 0.22 10*3/mm3      Monocytes  Absolute 0.11 10*3/mm3      RBC Morphology Normal     WBC Morphology Normal     Platelet Morphology Normal    CBC & Differential [[id number]]  (Abnormal) Collected: 07/11/25 2138    Specimen: Blood Updated: 07/11/25 2300    Narrative:      The following orders were created for panel order CBC & Differential.  Procedure                               Abnormality         Status                     ---------                               -----------         ------                     CBC Auto Differential[[id number]]        Abnormal            Final result               Scan Slide[[id number]]                                       Final result                 Please view results for these tests on the individual orders.    CBC Auto Differential [[id number]]  (Abnormal) Collected: 07/11/25 2138    Specimen: Blood Updated: 07/11/25 2300     WBC 1.39 10*3/mm3      RBC 3.91 10*6/mm3      Hemoglobin 10.8 g/dL      Hematocrit 35.5 %      MCV 90.8 fL      MCH 27.6 pg      MCHC 30.4 g/dL      RDW 15.6 %      RDW-SD 51.6 fl      MPV 10.4 fL      Platelets 212 10*3/mm3     Narrative:      Instrument flags present, additional testing may be recommended.  The previously reported component NRBC is no longer being reported. Previous result was 0.0 /100 WBC (Reference Range: 0.0-0.2 /100 WBC) on 7/11/2025 at 2247 EDT.    Scan Slide [[id number]] Collected: 07/11/25 2138    Specimen: Blood Updated: 07/11/25 2300     Scan Slide --     Comment: See Manual Differential Results       High Sensitivity Troponin T 1Hr [[id number]]  (Abnormal) Collected: 07/11/25 2139    Specimen: Blood Updated: 07/11/25 2243     HS Troponin T 50 ng/L      Troponin T Numeric Delta 4 ng/L      Troponin T % Delta 9    Narrative:      High Sensitive Troponin T Reference Range:  <14.0 ng/L- Negative Female for AMI  <22.0 ng/L- Negative Male for AMI  >=14 - Abnormal Female indicating possible myocardial injury.  >=22 - Abnormal Male indicating possible myocardial injury.    Clinicians would have to utilize clinical acumen, EKG, Troponin, and serial changes to determine if it is an Acute Myocardial Infarction or myocardial injury due to an underlying chronic condition.         Extra Tubes [[id number]] Collected: 07/11/25 2033    Specimen: Blood Updated: 07/11/25 2231    Narrative:      The following orders were created for panel order Extra Tubes.  Procedure                               Abnormality         Status                     ---------                               -----------         ------                     Grn Na Hep No Gel[[id number]]                                Final result               Gold Top - SST[[id number]]                                   Final result                 Please view results for these tests on the individual orders.    Grn Na Hep No Gel [[id number]] Collected: 07/11/25 2138    Specimen: Blood Updated: 07/11/25 2231     Extra Tube Hold for add-ons.     Comment: Auto resulted.       High Sensitivity Troponin T [[id number]]  (Abnormal) Collected: 07/11/25 2033    Specimen: Blood Updated: 07/11/25 2130     HS Troponin T 46 ng/L     Narrative:      High Sensitive Troponin T Reference Range:  <14.0 ng/L- Negative Female for AMI  <22.0 ng/L- Negative Male for AMI  >=14 - Abnormal Female indicating possible myocardial injury.  >=22 - Abnormal Male indicating possible myocardial injury.   Clinicians would have to utilize clinical acumen, EKG, Troponin, and serial changes to determine if it is an Acute Myocardial Infarction or myocardial injury due to an underlying chronic condition.         Magnesium [[id number]]  (Normal) Collected: 07/11/25 2033    Specimen: Blood Updated: 07/11/25 2122     Magnesium 1.7 mg/dL     Comprehensive Metabolic Panel [[id number]]  (Abnormal) Collected: 07/11/25 2033    Specimen: Blood Updated: 07/11/25 2121     Glucose 103 mg/dL      BUN 15.7 mg/dL      Creatinine 0.78 mg/dL      Sodium 139 mmol/L      Potassium 3.6 mmol/L       Chloride 107 mmol/L      CO2 21.1 mmol/L      Calcium 7.9 mg/dL      Total Protein 5.3 g/dL      Albumin 3.2 g/dL      ALT (SGPT) 22 U/L      AST (SGOT) 19 U/L      Alkaline Phosphatase 83 U/L      Total Bilirubin 0.4 mg/dL      Globulin 2.1 gm/dL      A/G Ratio 1.5 g/dL      BUN/Creatinine Ratio 20.1     Anion Gap 10.9 mmol/L      eGFR 81.8 mL/min/1.73     Narrative:      GFR Categories in Chronic Kidney Disease (CKD)              GFR Category          GFR (mL/min/1.73)    Interpretation  G1                    90 or greater        Normal or high (1)  G2                    60-89                Mild decrease (1)  G3a                   45-59                Mild to moderate decrease  G3b                   30-44                Moderate to severe decrease  G4                    15-29                Severe decrease  G5                    14 or less           Kidney failure    (1)In the absence of evidence of kidney disease, neither GFR category G1 or G2 fulfill the criteria for CKD.    eGFR calculation 2021 CKD-EPI creatinine equation, which does not include race as a factor    Lipase [[id number]]  (Normal) Collected: 07/11/25 2033    Specimen: Blood Updated: 07/11/25 2121     Lipase 43 U/L     D-dimer, Quantitative [[id number]]  (Normal) Collected: 07/11/25 2033    Specimen: Blood Updated: 07/11/25 2057     D-Dimer, Quantitative 0.30 MCGFEU/mL     Narrative:      According to the assay 's published package insert, a normal (<0.50 MCGFEU/mL) D-dimer result in conjunction with a non-high clinical probability assessment, excludes deep vein thrombosis (DVT) and pulmonary embolism (PE) with high sensitivity.    D-dimer values increase with age and this can make VTE exclusion of an older population difficult. To address this, the [employer] College [employer], based on best available evidence and recent guidelines, recommends that clinicians use age-adjusted D-dimer thresholds in patients greater than 50 years of  "age with: a) a low probability of PE who do not meet all Pulmonary Embolism Rule Out Criteria, or b) in those with intermediate probability of PE.   The formula for an age-adjusted D-dimer cut-off is \"age/100\".  For example, a 60 year old patient would have an age-adjusted cut-off of 0.60 MCGFEU/mL and an 80 year old 0.80 MCGFEU/mL.    aPTT [[id number]]  (Normal) Collected: 07/11/25 2033    Specimen: Blood Updated: 07/11/25 2057     PTT 32.2 seconds     Protime-INR [[id number]]  (Abnormal) Collected: 07/11/25 2033    Specimen: Blood Updated: 07/11/25 2054     Protime 20.5 Seconds      INR 1.75    BNP [[id number]]  (Abnormal) Collected: 07/11/25 2015    Specimen: Blood Updated: 07/11/25 2047     proBNP 2,906.0 pg/mL     Narrative:      This assay is used as an aid in the diagnosis of individuals suspected of having heart failure. It can be used as an aid in the diagnosis of acute decompensated heart failure (ADHF) in patients presenting with signs and symptoms of ADHF to the emergency department (ED). In addition, NT-proBNP of <300 pg/mL indicates ADHF is not likely.    Age Range Result Interpretation  NT-proBNP Concentration (pg/mL:      <50             Positive            >450                   Gray                 300-450                    Negative             <300    50-75           Positive            >900                  Gray                300-900                  Negative            <300      >75             Positive            >1800                  Gray                300-1800                  Negative            <300    Phosphorus [[id number]]  (Normal) Collected: 07/11/25 2015    Specimen: Blood Updated: 07/11/25 2047     Phosphorus 3.1 mg/dL     TSH Rfx On Abnormal To Free T4 [[id number]]  (Abnormal) Collected: 07/11/25 2015    Specimen: Blood Updated: 07/11/25 2047     TSH 6.220 uIU/mL     Ethanol [[id number]] Collected: 07/11/25 2015    Specimen: Blood Updated: 07/11/25 2047     Ethanol % <0.010 %     " Narrative:      Not for legal purposes.    Gold Top - SST [[id number]] Collected: 07/11/25 2033    Specimen: Blood Updated: 07/11/25 2045     Extra Tube Hold for add-ons.     Comment: Auto resulted.       POC Lactate [[id number]]  (Normal) Collected: 07/11/25 2038    Specimen: Blood Updated: 07/11/25 2040     Lactate 0.4 mmol/L      Comment: Serial Number: [id number]:  [id number]       Blood Culture - Blood, Arm, Right [[id number]] Collected: 07/11/25 2015    Specimen: Blood from Arm, Right Updated: 07/11/25 2019    Blood Culture - Blood, Arm, Left [[id number]] Collected: 07/11/25 2015    Specimen: Blood from Arm, Left Updated: 07/11/25 2019             Imaging Results (Last 24 Hours)       Procedure Component Value Units Date/Time    XR Chest 1 View [[id number]] Collected: 07/11/25 2042     Updated: 07/11/25 2045    Narrative:      XR CHEST 1 VW    Date of Exam: 7/11/2025 8:37 PM EDT    Indication: soa    Comparison: CXR 7/4/2025, CXR 7/1/2025, CXR 5/18/2025    Findings:  The heart is unchanged in size.    The lungs are well-expanded. Mild chronic changes appear stable.    Bony structures appear intact.      Impression:      Impression:  Mild chronic changes appear stable.    No active disease is seen.      Electronically Signed: Slava Herman MD    7/11/2025 8:43 PM EDT    Workstation ID: [id number]              Assessment & Plan      Active and Resolved Problems  Febrile neutropenia  Multiple myeloma  Paroxysmal A-fib  Chronic diastolic heart failure  Type 2 diabetes mellitus  Long-term current use of anticoagulant    Plan:  Start broad-spectrum antibiotics  Blood culture x 2 ordered, normal lactic acid, PCT elevated  RSP and UA negative, MRSA screen, stool studies  No obvious source infection  Placed on telemetry  Neutropenic precautions  Daily CBC  Consult hematology, appreciate input  Continue with sotalol, Eliquis, p.o. Lasix  Hold Revlimid  Hold metformin, ISS  PT/OT  Consult CM      VTE  Prophylaxis:  Pharmacologic VTE prophylaxis orders are present.        The patient desires to be as follows:    CODE STATUS:    Code Status (Patient has no pulse and is not breathing): CPR (Attempt to Resuscitate)  Medical Interventions (Patient has pulse or is breathing): Full Support  Level Of Support Discussed With: Patient        Admission Status:  I believe this patient meets inpatient status.    Expected Length of Stay: Greater than 2 midnight stay    PDMP and Medication Dispenses via Sidebar reviewed and consistent with patient reported medications.    I discussed the patient's findings and my recommendations with patient, family, and nursing staff.      Signature:     This document has been electronically signed by Patrick Flores MD on July 12, 2025 01:05 EDT   [facility]

## 2025-07-13 ENCOUNTER — APPOINTMENT (OUTPATIENT)
Dept: GENERAL RADIOLOGY | Facility: HOSPITAL | Age: 70
End: 2025-07-13
Payer: MEDICARE

## 2025-07-13 LAB
ANION GAP SERPL CALCULATED.3IONS-SCNC: 10 MMOL/L (ref 5–15)
ANISOCYTOSIS BLD QL: ABNORMAL
BUN SERPL-MCNC: 16 MG/DL (ref 8–23)
BUN/CREAT SERPL: 24.6 (ref 7–25)
CALCIUM SPEC-SCNC: 8.8 MG/DL (ref 8.6–10.5)
CHLORIDE SERPL-SCNC: 100 MMOL/L (ref 98–107)
CO2 SERPL-SCNC: 24 MMOL/L (ref 22–29)
CREAT SERPL-MCNC: 0.65 MG/DL (ref 0.57–1)
DEPRECATED RDW RBC AUTO: 52.8 FL (ref 37–54)
EGFRCR SERPLBLD CKD-EPI 2021: 94.9 ML/MIN/1.73
ERYTHROCYTE [DISTWIDTH] IN BLOOD BY AUTOMATED COUNT: 15.8 % (ref 12.3–15.4)
GLUCOSE BLDC GLUCOMTR-MCNC: 104 MG/DL (ref 70–105)
GLUCOSE BLDC GLUCOMTR-MCNC: 106 MG/DL (ref 70–105)
GLUCOSE BLDC GLUCOMTR-MCNC: 121 MG/DL (ref 70–105)
GLUCOSE BLDC GLUCOMTR-MCNC: 145 MG/DL (ref 70–105)
GLUCOSE SERPL-MCNC: 109 MG/DL (ref 65–99)
HCT VFR BLD AUTO: 32.6 % (ref 34–46.6)
HGB BLD-MCNC: 9.8 G/DL (ref 12–15.9)
LYMPHOCYTES # BLD MANUAL: 1.2 10*3/MM3 (ref 0.7–3.1)
LYMPHOCYTES NFR BLD MANUAL: 6 % (ref 5–12)
MAGNESIUM SERPL-MCNC: 2.2 MG/DL (ref 1.6–2.4)
MCH RBC QN AUTO: 27.8 PG (ref 26.6–33)
MCHC RBC AUTO-ENTMCNC: 30.1 G/DL (ref 31.5–35.7)
MCV RBC AUTO: 92.4 FL (ref 79–97)
MONOCYTES # BLD: 0.13 10*3/MM3 (ref 0.1–0.9)
NEUTROPHILS # BLD AUTO: 0.85 10*3/MM3 (ref 1.7–7)
NEUTROPHILS NFR BLD MANUAL: 37 % (ref 42.7–76)
NEUTS BAND NFR BLD MANUAL: 2 % (ref 0–5)
PLAT MORPH BLD: NORMAL
PLATELET # BLD AUTO: 191 10*3/MM3 (ref 140–450)
PMV BLD AUTO: 10.6 FL (ref 6–12)
POTASSIUM SERPL-SCNC: 4 MMOL/L (ref 3.5–5.2)
RBC # BLD AUTO: 3.53 10*6/MM3 (ref 3.77–5.28)
SCAN SLIDE: NORMAL
SODIUM SERPL-SCNC: 134 MMOL/L (ref 136–145)
VARIANT LYMPHS NFR BLD MANUAL: 55 % (ref 19.6–45.3)
WBC MORPH BLD: NORMAL
WBC NRBC COR # BLD AUTO: 2.19 10*3/MM3 (ref 3.4–10.8)

## 2025-07-13 PROCEDURE — P9041 ALBUMIN (HUMAN),5%, 50ML: HCPCS | Performed by: STUDENT IN AN ORGANIZED HEALTH CARE EDUCATION/TRAINING PROGRAM

## 2025-07-13 PROCEDURE — 83735 ASSAY OF MAGNESIUM: CPT | Performed by: HOSPITALIST

## 2025-07-13 PROCEDURE — 25010000002 ALBUMIN HUMAN 5% PER 50 ML: Performed by: STUDENT IN AN ORGANIZED HEALTH CARE EDUCATION/TRAINING PROGRAM

## 2025-07-13 PROCEDURE — 85025 COMPLETE CBC W/AUTO DIFF WBC: CPT | Performed by: HOSPITALIST

## 2025-07-13 PROCEDURE — 82948 REAGENT STRIP/BLOOD GLUCOSE: CPT | Performed by: HOSPITALIST

## 2025-07-13 PROCEDURE — 80048 BASIC METABOLIC PNL TOTAL CA: CPT | Performed by: HOSPITALIST

## 2025-07-13 PROCEDURE — 85007 BL SMEAR W/DIFF WBC COUNT: CPT | Performed by: HOSPITALIST

## 2025-07-13 PROCEDURE — 25010000002 DIAZEPAM PER 5 MG: Performed by: STUDENT IN AN ORGANIZED HEALTH CARE EDUCATION/TRAINING PROGRAM

## 2025-07-13 PROCEDURE — 25010000002 CEFEPIME PER 500 MG: Performed by: HOSPITALIST

## 2025-07-13 PROCEDURE — 93005 ELECTROCARDIOGRAM TRACING: CPT | Performed by: INTERNAL MEDICINE

## 2025-07-13 PROCEDURE — 25010000002 FILGRASTIM 300 MCG/0.5ML SOLUTION PREFILLED SYRINGE: Performed by: STUDENT IN AN ORGANIZED HEALTH CARE EDUCATION/TRAINING PROGRAM

## 2025-07-13 PROCEDURE — 93010 ELECTROCARDIOGRAM REPORT: CPT | Performed by: INTERNAL MEDICINE

## 2025-07-13 PROCEDURE — 93005 ELECTROCARDIOGRAM TRACING: CPT | Performed by: STUDENT IN AN ORGANIZED HEALTH CARE EDUCATION/TRAINING PROGRAM

## 2025-07-13 PROCEDURE — 97162 PT EVAL MOD COMPLEX 30 MIN: CPT

## 2025-07-13 PROCEDURE — 63710000001 PROMETHAZINE PER 12.5 MG: Performed by: HOSPITALIST

## 2025-07-13 PROCEDURE — 82948 REAGENT STRIP/BLOOD GLUCOSE: CPT

## 2025-07-13 PROCEDURE — 71045 X-RAY EXAM CHEST 1 VIEW: CPT

## 2025-07-13 RX ORDER — ALBUMIN HUMAN 50 G/1000ML
250 SOLUTION INTRAVENOUS ONCE
Status: COMPLETED | OUTPATIENT
Start: 2025-07-13 | End: 2025-07-13

## 2025-07-13 RX ORDER — DIAZEPAM 10 MG/2ML
2.5 INJECTION, SOLUTION INTRAMUSCULAR; INTRAVENOUS ONCE
Status: COMPLETED | OUTPATIENT
Start: 2025-07-13 | End: 2025-07-13

## 2025-07-13 RX ORDER — ALBUMIN HUMAN 50 G/1000ML
250 SOLUTION INTRAVENOUS ONCE
Status: COMPLETED | OUTPATIENT
Start: 2025-07-14 | End: 2025-07-14

## 2025-07-13 RX ORDER — HYDROXYZINE HYDROCHLORIDE 25 MG/1
25 TABLET, FILM COATED ORAL 3 TIMES DAILY PRN
Status: DISCONTINUED | OUTPATIENT
Start: 2025-07-13 | End: 2025-07-17 | Stop reason: HOSPADM

## 2025-07-13 RX ORDER — PROCHLORPERAZINE EDISYLATE 5 MG/ML
10 INJECTION INTRAMUSCULAR; INTRAVENOUS EVERY 6 HOURS PRN
Status: DISCONTINUED | OUTPATIENT
Start: 2025-07-13 | End: 2025-07-17 | Stop reason: HOSPADM

## 2025-07-13 RX ADMIN — DIAZEPAM 2.5 MG: 10 INJECTION, SOLUTION INTRAMUSCULAR; INTRAVENOUS at 20:29

## 2025-07-13 RX ADMIN — ALBUMIN (HUMAN) 250 ML: 12.5 INJECTION, SOLUTION INTRAVENOUS at 23:03

## 2025-07-13 RX ADMIN — PANTOPRAZOLE SODIUM 40 MG: 40 TABLET, DELAYED RELEASE ORAL at 05:06

## 2025-07-13 RX ADMIN — CEFEPIME 2000 MG: 2 INJECTION, POWDER, FOR SOLUTION INTRAVENOUS at 17:29

## 2025-07-13 RX ADMIN — APIXABAN 5 MG: 5 TABLET, FILM COATED ORAL at 20:40

## 2025-07-13 RX ADMIN — ACYCLOVIR 400 MG: 200 CAPSULE ORAL at 20:40

## 2025-07-13 RX ADMIN — FILGRASTIM 300 MCG: 300 INJECTION, SOLUTION INTRAVENOUS; SUBCUTANEOUS at 17:27

## 2025-07-13 RX ADMIN — HYDROXYZINE HYDROCHLORIDE 25 MG: 25 TABLET, FILM COATED ORAL at 18:20

## 2025-07-13 RX ADMIN — HYDROXYZINE HYDROCHLORIDE 25 MG: 25 TABLET, FILM COATED ORAL at 10:38

## 2025-07-13 RX ADMIN — PROMETHAZINE HYDROCHLORIDE 12.5 MG: 12.5 TABLET ORAL at 08:44

## 2025-07-13 RX ADMIN — Medication 10 ML: at 08:46

## 2025-07-13 RX ADMIN — LEVOTHYROXINE SODIUM 100 MCG: 0.1 TABLET ORAL at 05:06

## 2025-07-13 RX ADMIN — SOTALOL HYDROCHLORIDE 80 MG: 80 TABLET ORAL at 10:35

## 2025-07-13 RX ADMIN — SOTALOL HYDROCHLORIDE 80 MG: 80 TABLET ORAL at 20:40

## 2025-07-13 RX ADMIN — FERROUS SULFATE TAB 325 MG (65 MG ELEMENTAL FE) 325 MG: 325 (65 FE) TAB at 08:41

## 2025-07-13 RX ADMIN — ACYCLOVIR 400 MG: 200 CAPSULE ORAL at 08:41

## 2025-07-13 RX ADMIN — FUROSEMIDE 20 MG: 20 TABLET ORAL at 17:34

## 2025-07-13 RX ADMIN — Medication 10 ML: at 20:36

## 2025-07-13 RX ADMIN — FUROSEMIDE 20 MG: 20 TABLET ORAL at 08:41

## 2025-07-13 RX ADMIN — CEFEPIME 2000 MG: 2 INJECTION, POWDER, FOR SOLUTION INTRAVENOUS at 00:58

## 2025-07-13 RX ADMIN — CEFEPIME 2000 MG: 2 INJECTION, POWDER, FOR SOLUTION INTRAVENOUS at 08:40

## 2025-07-13 RX ADMIN — APIXABAN 5 MG: 5 TABLET, FILM COATED ORAL at 08:41

## 2025-07-13 NOTE — PLAN OF CARE
Goal Outcome Evaluation:              Outcome Evaluation: pt alert and able to make needs known, family at bed side, call light in reach

## 2025-07-13 NOTE — PLAN OF CARE
Goal Outcome Evaluation:  Plan of Care Reviewed With: patient, child           Outcome Evaluation: Pt is a 71 YO F admitted with Afib, hx of multiple myeloma. Pt reports living at home is typcially independent with all ADLs, ambulation without AD and reports no recent falls. Pt this date demonstrates excellent mobiltiy, requiring no physical assistance with bed mobiltiy, transfers or ambulation. Pt appears safe to return home when medically appropriate  and PT to sign off at this time.    Anticipated Discharge Disposition (PT): home                         No

## 2025-07-13 NOTE — SIGNIFICANT NOTE
07/13/25 1251   OTHER   Discipline occupational therapist   Rehab Time/Intention   Session Not Performed other (see comments)  (PT reporting pt is functioning independently. OT will complete orders. Please re-consult if pt's functional status declines during current hospitalization.)

## 2025-07-13 NOTE — THERAPY EVALUATION
Patient Name: Phuong Altamirano  : 1955    MRN: [id number]                              Today's Date: 2025       Admit Date: 2025    Visit Dx:     ICD-10-CM ICD-9-CM   1. Dyspnea, unspecified type  R06.00 786.09   2. Hypocalcemia  E83.51 275.41   3. Fever, unspecified fever cause  R50.9 780.60   4. Tachycardia  R00.0 785.0   5. Leukopenia, unspecified type  D72.819 288.50     Patient Active Problem List   Diagnosis    IgG lambda MGUS    Leukopenia    ALEE positive    Bilateral hip pain    Right ankle pain    Shortness of breath    Multiple myeloma not having achieved remission    Chronic diastolic congestive heart failure    Essential (primary) hypertension    Atrial fibrillation with rapid ventricular response    Severe protein-calorie malnutrition    Febrile neutropenia     Past Medical History:   Diagnosis Date    Arthritis 2010    Asthma 1 month    Cancer 12 years    Multiple mylenoma    Depression 2010    Diabetes mellitus 1.5 years    High blood pressure     High cholesterol 2005    Hypothyroidism     Lupus 2010    Sleep apnea Now     Past Surgical History:   Procedure Laterality Date    BREAST LUMPECTOMY Right     benign    DILATATION AND CURETTAGE      LAPAROSCOPIC TUBAL LIGATION  1979    OVARY SURGERY  1985    remoal of left ovary      General Information       Row Name 25          Physical Therapy Time and Intention    Document Type evaluation  -EL     Mode of Treatment individual therapy;physical therapy  -       Row Name 25          General Information    Prior Level of Function independent:;all household mobility;ADL's;work;driving  -       Row Name 25          Living Environment    Current Living Arrangements home  -     People in Home alone  -       Row Name 25          Cognition    Orientation Status (Cognition) oriented x 4  -EL               User Key  (r) = Recorded By, (t) = Taken By, (c) = Cosigned By      Initials Name  Provider Type    Terry Danielle PT Physical Therapist                   Mobility       Row Name 07/13/25 1705          Bed Mobility    Bed Mobility bed mobility (all) activities  -EL     All Activities, [place] (Bed Mobility) modified independence  -EL       Row Name 07/13/25 1705          Bed-Chair Transfer    Bed-Chair [place] (Transfers) standby assist  -EL       Row Name 07/13/25 1705          Sit-Stand Transfer    Sit-Stand [place] (Transfers) standby assist  -       Row Name 07/13/25 1705          Gait/Stairs (Locomotion)    [place] Level (Gait) standby assist  -EL     Patient was able to Ambulate yes  -EL     Distance in Feet (Gait) 45  -EL     Comment, (Gait/Stairs) No significant balance deficits noted  -EL               User Key  (r) = Recorded By, (t) = Taken By, (c) = Cosigned By      Initials Name Provider Type    Terry Danielle PT Physical Therapist                   Obj/Interventions       Row Name 07/13/25 1705          Range of Motion Comprehensive    General Range of Motion bilateral lower extremity ROM WFL  -EL       Row Name 07/13/25 1705          Strength Comprehensive (MMT)    General Manual Muscle Testing (MMT) Assessment no strength deficits identified  -EL       Row Name 07/13/25 1705          Sensory Assessment (Somatosensory)    Sensory Assessment (Somatosensory) sensation intact  -               User Key  (r) = Recorded By, (t) = Taken By, (c) = Cosigned By      Initials Name Provider Type    Terry Danielle PT Physical Therapist                   Goals/Plan    No documentation.                  Clinical Impression       Row Name 07/13/25 1706          Pain    Pretreatment Pain Rating 0/10 - no pain  -EL     Posttreatment Pain Rating 0/10 - no pain  -EL       [facility] Name 07/13/25 1706          Plan of Care Review    Plan of Care Reviewed With patient;child  -EL     Outcome Evaluation Pt is a 71 YO F admitted with Afib, hx of multiple myeloma. Pt reports living at home  is typcially independent with all ADLs, ambulation without AD and reports no recent falls. Pt this date demonstrates excellent mobiltiy, requiring no physical assistance with bed mobiltiy, transfers or ambulation. Pt appears safe to return home when medically appropriate  and PT to sign off at this time.  -EL       Row Name 07/13/25 1706          Therapy Assessment/Plan (PT)    Therapy Frequency (PT) evaluation only  -       Row Name 07/13/25 1706          Vital Signs    Pre Patient Position Supine  -EL     Intra Patient Position Standing  -EL     Post Patient Position Sitting  -EL       Row Name 07/13/25 1706          Positioning and Restraints    Pre-Treatment Position in bed  -EL     Post Treatment Position bed  -EL     In Bed sitting EOB;call light within reach;encouraged to call for assist  -EL               User Key  (r) = Recorded By, (t) = Taken By, (c) = Cosigned By      Initials Name Provider Type    Terry Danielle, PT Physical Therapist                   Outcome Measures       Row Name 07/13/25 1711 07/13/25 0809       How much help from another person do you currently need...    Turning from your back to your side while in flat bed without using bedrails? 4  -EL 4  -JW    Moving from lying on back to sitting on the side of a flat bed without bedrails? 4  -EL 4  -JW    Moving to and from a bed to a chair (including a wheelchair)? 4  -EL 4  -JW    Standing up from a chair using your arms (e.g., wheelchair, bedside chair)? 4  -EL 4  -JW    Climbing 3-5 steps with a railing? 4  -EL 4  -JW    To walk in hospital room? 4  -EL 4  -JW    AM-PAC 6 Clicks Score (PT) 24  -EL 24  -JW    Highest Level of Mobility Goal Walk 250 Feet or More - 8  -EL Walk 250 Feet or More - 8  -JW      Row Name 07/13/25 1711          Functional Assessment    Outcome Measure Options AM-PAC 6 Clicks Basic Mobility (PT)  -EL               User Key  (r) = Recorded By, (t) = Taken By, (c) = Cosigned By      Initials Name Provider Type     Terry Danielle, MILAN Physical Therapist    Marbin Mcdermott, RN Registered Nurse                                 Physical Therapy Education       Title: PT OT SLP Therapies (Done)       Topic: Physical Therapy (Done)       Point: Mobility training (Done)       Learning Progress Summary            Patient Acceptance, E,TB, VU by  at 7/13/2025 1711                      Point: Precautions (Done)       Learning Progress Summary            Patient Acceptance, E,TB, VU by  at 7/13/2025 1711                                      User Key       Initials Effective Dates Name Provider Type Discipline     06/23/20 -  Terry Huddleston PT Physical Therapist PT                  PT Recommendation and Plan     Outcome Evaluation: Pt is a 71 YO F admitted with Afib, hx of multiple myeloma. Pt reports living at home is typcially independent with all ADLs, ambulation without AD and reports no recent falls. Pt this date demonstrates excellent mobiltiy, requiring no physical assistance with bed mobiltiy, transfers or ambulation. Pt appears safe to return home when medically appropriate  and PT to sign off at this time.     Time Calculation:   PT Evaluation Complexity  History, PT Evaluation Complexity: 1-2 personal factors and/or comorbidities  Examination of Body Systems (PT Eval Complexity): total of 3 or more elements  Clinical Presentation (PT Evaluation Complexity): evolving  Clinical Decision Making (PT Evaluation Complexity): moderate complexity  Overall Complexity (PT Evaluation Complexity): moderate complexity     PT Charges       Row Name 07/13/25 1712             Time Calculation    Start Time 1122  -EL      Stop Time 1136  -EL      Time Calculation (min) 14 min  -EL      PT Received On 07/13/25  -                User Key  (r) = Recorded By, (t) = Taken By, (c) = Cosigned By      Initials Name Provider Type    EL Terry Huddleston PT Physical Therapist                  Therapy Charges for Today       Code Description Service Date  Service Provider Modifiers Qty    82694293467 HC PT EVAL MOD COMPLEXITY 3 7/13/2025 Terry Huddleston, PT GP 1            PT G-Codes  Outcome Measure Options: AM-PAC 6 Clicks Basic Mobility (PT)  AM-PAC 6 Clicks Score (PT): 24  PT Discharge Summary  Anticipated Discharge Disposition (PT): home    Terry Huddleston PT  7/13/2025

## 2025-07-13 NOTE — PROGRESS NOTES
[facility] MEDICINE SERVICE  DAILY PROGRESS NOTE    NAME: Phuong Altamirano  : 1955  MRN: [id number]      LOS: 1 day     PROVIDER OF SERVICE: Telly Gaitan MD    Chief Complaint: Febrile neutropenia    Subjective:     Interval History: Patient states that she is feeling better today although did have some nausea this morning that is not well-controlled with Zofran.        Review of Systems:   Review of Systems    Objective:     Vital Signs  Temp:  [97.6 °F (36.4 °C)-98.3 °F (36.8 °C)] 97.8 °F (36.6 °C)  Heart Rate:  [73-92] 77  Resp:  [14-25] 25  BP: (129-135)/(62-74) 135/74  Flow (L/min) (Oxygen Therapy):  [1-2] 2   Body mass index is 32.2 kg/m².    Physical Exam  Physical Exam  General Appearance:  Alert, cooperative, no distress, appears stated age  Head:  Normocephalic, without obvious abnormality, atraumatic  Eyes:  PERRL, conjunctiva/corneas clear, EOM's intact, fundi benign, both eyes  Ears:  Normal TM's and external ear canals, both ears  Nose: Nares normal, septum midline, mucosa normal, no drainage or sinus tenderness  Throat: Lips, mucosa, and tongue normal; teeth and gums normal  Neck: Supple, symmetrical, trachea midline, no adenopathy, thyroid: not enlarged, symmetric, no tenderness/mass/nodules, no carotid bruit or JVD  Lungs:   Clear to auscultation bilaterally, respirations unlabored  Heart:  Regular rate and rhythm, S1, S2 normal, no murmur, rub or gallop  Abdomen:  Soft, non-tender, bowel sounds active all four quadrants,  no masses, no organomegaly  Extremities: Extremities normal, atraumatic, no cyanosis or edema  Pulses: 2+ and symmetric  Skin: Skin color, texture, turgor normal, no rashes or lesions  Neurologic: Normal         Diagnostic Data    Results from last 7 days   Lab Units 25  0109 25   WBC 10*3/mm3 2.19*   < >  --    HEMOGLOBIN g/dL 9.8*   < >  --    HEMATOCRIT % 32.6*   < >  --    PLATELETS 10*3/mm3 191   < >  --    GLUCOSE mg/dL 109*   --  103*   CREATININE mg/dL 0.65  --  0.78   BUN mg/dL 16.0  --  15.7   SODIUM mmol/L 134*  --  139   POTASSIUM mmol/L 4.0   < > 3.6   AST (SGOT) U/L  --   --  19   ALT (SGPT) U/L  --   --  22   ALK PHOS U/L  --   --  83   BILIRUBIN mg/dL  --   --  0.4   ANION GAP mmol/L 10.0  --  10.9    < > = values in this interval not displayed.       CT Chest Without Contrast Diagnostic  Result Date: 7/12/2025  Impression: Findings of likely mild interstitial and alveolar pulmonary edema with small bilateral pleural effusions. There is an additional small area of consolidation seen posteriorly within the left lower lobe, possible small area of pneumonia. Electronically Signed: Timur Ledbetter MD  7/12/2025 12:58 PM EDT  Workstation ID: [id number]    XR Chest 1 View  Result Date: 7/11/2025  Impression: Mild chronic changes appear stable. No active disease is seen. Electronically Signed: Slava Herman MD  7/11/2025 8:43 PM EDT  Workstation ID: [id number]        I reviewed the patient's new clinical results.    Assessment/Plan:     Active and Resolved Problems  Active Hospital Problems    Diagnosis  POA    **Febrile neutropenia [D70.9, R50.81]  Yes      Resolved Hospital Problems   No resolved problems to display.       Neutropenic fever with possible bacterial pneumonia, unspecified organism  - Patient presents with fever and neutropenia on admission with initial workup unremarkable, including no evidence of UTI and with normal chest x-ray  - CT of the chest was performed 7/12 which does show questionable infiltrate in the lower lung, particular the left lower lung which could be consistent with pneumonia, especially given patient's recent admission to the hospital  - Initiated on broad-spectrum IV antibiotics cefepime  - Patient is already on Bactrim for prophylactic treatment  - Elevated procalcitonin is also consistent with bacterial pneumonia  - Follow-up blood cultures  - Encourage I-S, flutter valve for pulmonary toileting  -  Will get patient 1 dose of G-CSF and continue until patient's ANC is consistently over 1000    Multiple myeloma  - Holding treatment in the setting of acute active infection    Atrial fibrillation  - Patient underwent recent cardioversion 1 week prior to hospitalization here  - Continue sotalol, Eliquis  - Monitor QTc    DM type II, controlled  - Continue sliding scale insulin    Hypothyroidism  - Continue Synthroid    Chronic diastolic heart failure  - Patient appears to be compensated and slightly volume depleted  - Holding diuretics for now        VTE Prophylaxis:  Pharmacologic VTE prophylaxis orders are present.             Disposition Planning:     Barriers to Discharge: Improvement in neutropenic fever and pneumonia  Anticipated Date of Discharge: 7/15  Place of Discharge: Home      Time: 45 minutes     Code Status and Medical Interventions: CPR (Attempt to Resuscitate); Full Support   Ordered at: 07/12/25 0103     Code Status (Patient has no pulse and is not breathing):    CPR (Attempt to Resuscitate)     Medical Interventions (Patient has pulse or is breathing):    Full Support     Level Of Support Discussed With:    Patient       Signature: Electronically signed by Telly Gaitan MD, 07/13/25, 11:31 EDT.  [facility] Hospitalist Team

## 2025-07-13 NOTE — PLAN OF CARE
Problem: Adult Inpatient Plan of Care  Goal: Plan of Care Review  Outcome: Progressing  Flowsheets (Taken 7/13/2025 0515)  Progress: improving  Plan of Care Reviewed With: patient  Goal: Patient-Specific Goal (Individualized)  Outcome: Progressing  Goal: Absence of Hospital-Acquired Illness or Injury  Outcome: Progressing  Intervention: Identify and Manage Fall Risk  Description: Perform standard risk assessment on admission using a validated tool or comprehensive approach appropriate to the patient; reassess fall risk frequently, with change in status or transfer to another level of care.Communicate risk to interprofessional healthcare team; ensure fall risk visible cue.Determine need for increased observation, equipment and environmental modification, as well as use of supportive, nonskid footwear.Adjust safety measures to individual needs and identified risk factors.Reinforce the importance of active participation with fall risk prevention, safety, and physical activity with the patient and family.Perform regular intentional rounding to assess need for position change, pain assessment and personal needs, including assistance with toileting.  Recent Flowsheet Documentation  Taken 7/13/2025 0422 by Braxton Logan LPN  Safety Promotion/Fall Prevention:   assistive device/personal items within reach   clutter free environment maintained   room organization consistent   safety round/check completed  Taken 7/13/2025 0230 by Braxton Logan LPN  Safety Promotion/Fall Prevention:   assistive device/personal items within reach   clutter free environment maintained   room organization consistent   safety round/check completed  Taken 7/13/2025 0059 by Braxton Logan LPN  Safety Promotion/Fall Prevention:   assistive device/personal items within reach   clutter free environment maintained   room organization consistent   safety round/check completed  Taken 7/12/2025 2258 by Braxton Logan LPN  Safety Promotion/Fall  Prevention:   assistive device/personal items within reach   clutter free environment maintained   room organization consistent   safety round/check completed  Taken 7/12/2025 2018 by Braxton Logan LPN  Safety Promotion/Fall Prevention:   assistive device/personal items within reach   clutter free environment maintained   room organization consistent   safety round/check completed  Intervention: Prevent Skin Injury  Description: Perform a screening for skin injury risk, such as pressure or moisture-associated skin damage on admission and at regular intervals throughout hospital stay.Keep all areas of skin (especially folds) clean and dry.Maintain adequate skin hydration.Relieve and redistribute pressure and protect bony prominences and skin at risk for injury; implement measures based on patient-specific risk factors.Match turning and repositioning schedule to clinical condition.Encourage weight shift frequently; assist with reposition if unable to complete independently.Float heels off bed; avoid pressure on the Achilles tendon.Keep skin free from extended contact with medical devices.Optimize nutrition and hydration.Encourage functional activity and mobility, as early as tolerated.Use aids (e.g., slide boards, mechanical lift) during transfer.  Recent Flowsheet Documentation  Taken 7/13/2025 0422 by Braxton Logan LPN  Body Position:   position changed independently   supine  Taken 7/13/2025 0230 by Braxton Logan LPN  Body Position:   position changed independently   left  Taken 7/13/2025 0059 by Braxton Logan LPN  Body Position:   position changed independently   supine  Taken 7/12/2025 2258 by Braxton Logan LPN  Body Position:   position changed independently   left  Taken 7/12/2025 2018 by Braxton Logan LPN  Body Position:   position changed independently   supine  Intervention: Prevent Infection  Description: Maintain skin and mucous membrane integrity; promote hand, oral and pulmonary  hygiene.Optimize fluid balance, nutrition, sleep and glycemic control to maximize infection resistance.Identify potential sources of infection early to prevent or mitigate progression of infection (e.g., wound, lines, devices).Evaluate ongoing need for invasive devices; remove promptly when no longer indicated.Review vaccination status.  Recent Flowsheet Documentation  Taken 7/13/2025 0422 by Braxton Logan LPN  Infection Prevention: environmental surveillance performed  Taken 7/12/2025 2018 by Braxton Logan LPN  Infection Prevention: environmental surveillance performed  Goal: Optimal Comfort and Wellbeing  Outcome: Progressing  Goal: Readiness for Transition of Care  Outcome: Progressing     Problem: Fluid Volume Excess  Goal: Fluid Balance  Outcome: Progressing   Goal Outcome Evaluation:  Plan of Care Reviewed With: patient        Progress: improving

## 2025-07-14 ENCOUNTER — PREP FOR SURGERY (OUTPATIENT)
Dept: OTHER | Facility: HOSPITAL | Age: 70
End: 2025-07-14
Payer: MEDICARE

## 2025-07-14 DIAGNOSIS — I48.19 PERSISTENT ATRIAL FIBRILLATION: Primary | ICD-10-CM

## 2025-07-14 LAB
ANION GAP SERPL CALCULATED.3IONS-SCNC: 10.3 MMOL/L (ref 5–15)
BUN SERPL-MCNC: 13.4 MG/DL (ref 8–23)
BUN/CREAT SERPL: 21.3 (ref 7–25)
BURR CELLS BLD QL SMEAR: ABNORMAL
CALCIUM SPEC-SCNC: 9.1 MG/DL (ref 8.6–10.5)
CHLORIDE SERPL-SCNC: 106 MMOL/L (ref 98–107)
CO2 SERPL-SCNC: 24.7 MMOL/L (ref 22–29)
CREAT SERPL-MCNC: 0.63 MG/DL (ref 0.57–1)
DACRYOCYTES BLD QL SMEAR: ABNORMAL
DEPRECATED RDW RBC AUTO: 51.8 FL (ref 37–54)
EGFRCR SERPLBLD CKD-EPI 2021: 95.6 ML/MIN/1.73
ERYTHROCYTE [DISTWIDTH] IN BLOOD BY AUTOMATED COUNT: 15.6 % (ref 12.3–15.4)
GLUCOSE BLDC GLUCOMTR-MCNC: 114 MG/DL (ref 70–105)
GLUCOSE BLDC GLUCOMTR-MCNC: 117 MG/DL (ref 70–105)
GLUCOSE BLDC GLUCOMTR-MCNC: 93 MG/DL (ref 70–105)
GLUCOSE BLDC GLUCOMTR-MCNC: 93 MG/DL (ref 70–105)
GLUCOSE SERPL-MCNC: 100 MG/DL (ref 65–99)
HCT VFR BLD AUTO: 33 % (ref 34–46.6)
HGB BLD-MCNC: 10.1 G/DL (ref 12–15.9)
LARGE PLATELETS: ABNORMAL
LYMPHOCYTES # BLD MANUAL: 1.95 10*3/MM3 (ref 0.7–3.1)
LYMPHOCYTES NFR BLD MANUAL: 2 % (ref 5–12)
MAGNESIUM SERPL-MCNC: 2.1 MG/DL (ref 1.6–2.4)
MCH RBC QN AUTO: 28 PG (ref 26.6–33)
MCHC RBC AUTO-ENTMCNC: 30.6 G/DL (ref 31.5–35.7)
MCV RBC AUTO: 91.4 FL (ref 79–97)
MONOCYTES # BLD: 0.05 10*3/MM3 (ref 0.1–0.9)
NEUTROPHILS # BLD AUTO: 0.57 10*3/MM3 (ref 1.7–7)
NEUTROPHILS NFR BLD MANUAL: 22 % (ref 42.7–76)
OVALOCYTES BLD QL SMEAR: ABNORMAL
PLATELET # BLD AUTO: 207 10*3/MM3 (ref 140–450)
PMV BLD AUTO: 10.1 FL (ref 6–12)
POIKILOCYTOSIS BLD QL SMEAR: ABNORMAL
POTASSIUM SERPL-SCNC: 3.8 MMOL/L (ref 3.5–5.2)
QT INTERVAL: 330 MS
QT INTERVAL: 366 MS
QTC INTERVAL: 440 MS
QTC INTERVAL: 451 MS
RBC # BLD AUTO: 3.61 10*6/MM3 (ref 3.77–5.28)
SCAN SLIDE: NORMAL
SMALL PLATELETS BLD QL SMEAR: ADEQUATE
SODIUM SERPL-SCNC: 141 MMOL/L (ref 136–145)
VARIANT LYMPHS NFR BLD MANUAL: 12 % (ref 0–5)
VARIANT LYMPHS NFR BLD MANUAL: 64 % (ref 19.6–45.3)
WBC MORPH BLD: NORMAL
WBC NRBC COR # BLD AUTO: 2.57 10*3/MM3 (ref 3.4–10.8)

## 2025-07-14 PROCEDURE — 82948 REAGENT STRIP/BLOOD GLUCOSE: CPT

## 2025-07-14 PROCEDURE — 63710000001 PROMETHAZINE PER 12.5 MG: Performed by: HOSPITALIST

## 2025-07-14 PROCEDURE — 25010000002 FILGRASTIM 300 MCG/0.5ML SOLUTION PREFILLED SYRINGE: Performed by: STUDENT IN AN ORGANIZED HEALTH CARE EDUCATION/TRAINING PROGRAM

## 2025-07-14 PROCEDURE — P9041 ALBUMIN (HUMAN),5%, 50ML: HCPCS | Performed by: STUDENT IN AN ORGANIZED HEALTH CARE EDUCATION/TRAINING PROGRAM

## 2025-07-14 PROCEDURE — 82948 REAGENT STRIP/BLOOD GLUCOSE: CPT | Performed by: HOSPITALIST

## 2025-07-14 PROCEDURE — 83735 ASSAY OF MAGNESIUM: CPT | Performed by: HOSPITALIST

## 2025-07-14 PROCEDURE — 25010000002 DIGOXIN PER 500 MCG: Performed by: INTERNAL MEDICINE

## 2025-07-14 PROCEDURE — 85007 BL SMEAR W/DIFF WBC COUNT: CPT | Performed by: HOSPITALIST

## 2025-07-14 PROCEDURE — 25010000002 ALBUMIN HUMAN 5% PER 50 ML: Performed by: STUDENT IN AN ORGANIZED HEALTH CARE EDUCATION/TRAINING PROGRAM

## 2025-07-14 PROCEDURE — 85025 COMPLETE CBC W/AUTO DIFF WBC: CPT | Performed by: HOSPITALIST

## 2025-07-14 PROCEDURE — 80048 BASIC METABOLIC PNL TOTAL CA: CPT | Performed by: HOSPITALIST

## 2025-07-14 PROCEDURE — 25010000002 CEFEPIME PER 500 MG: Performed by: HOSPITALIST

## 2025-07-14 PROCEDURE — 99232 SBSQ HOSP IP/OBS MODERATE 35: CPT | Performed by: INTERNAL MEDICINE

## 2025-07-14 PROCEDURE — 99221 1ST HOSP IP/OBS SF/LOW 40: CPT | Performed by: INTERNAL MEDICINE

## 2025-07-14 RX ORDER — DIGOXIN 0.25 MG/ML
250 INJECTION INTRAMUSCULAR; INTRAVENOUS
Status: DISCONTINUED | OUTPATIENT
Start: 2025-07-14 | End: 2025-07-16

## 2025-07-14 RX ORDER — DIGOXIN 0.25 MG/ML
250 INJECTION INTRAMUSCULAR; INTRAVENOUS EVERY 6 HOURS PRN
Status: DISCONTINUED | OUTPATIENT
Start: 2025-07-14 | End: 2025-07-16

## 2025-07-14 RX ADMIN — PANTOPRAZOLE SODIUM 40 MG: 40 TABLET, DELAYED RELEASE ORAL at 05:15

## 2025-07-14 RX ADMIN — HYDROXYZINE HYDROCHLORIDE 25 MG: 25 TABLET, FILM COATED ORAL at 18:16

## 2025-07-14 RX ADMIN — FUROSEMIDE 20 MG: 20 TABLET ORAL at 17:26

## 2025-07-14 RX ADMIN — SOTALOL HYDROCHLORIDE 80 MG: 80 TABLET ORAL at 20:58

## 2025-07-14 RX ADMIN — Medication 10 ML: at 20:58

## 2025-07-14 RX ADMIN — APIXABAN 5 MG: 5 TABLET, FILM COATED ORAL at 08:31

## 2025-07-14 RX ADMIN — LEVOTHYROXINE SODIUM 100 MCG: 0.1 TABLET ORAL at 05:15

## 2025-07-14 RX ADMIN — CEFEPIME 2000 MG: 2 INJECTION, POWDER, FOR SOLUTION INTRAVENOUS at 17:26

## 2025-07-14 RX ADMIN — CEFEPIME 2000 MG: 2 INJECTION, POWDER, FOR SOLUTION INTRAVENOUS at 08:30

## 2025-07-14 RX ADMIN — SULFAMETHOXAZOLE AND TRIMETHOPRIM 1 TABLET: 800; 160 TABLET ORAL at 08:31

## 2025-07-14 RX ADMIN — DIGOXIN 250 MCG: 250 INJECTION, SOLUTION INTRAMUSCULAR; INTRAVENOUS; PARENTERAL at 14:07

## 2025-07-14 RX ADMIN — FERROUS SULFATE TAB 325 MG (65 MG ELEMENTAL FE) 325 MG: 325 (65 FE) TAB at 08:32

## 2025-07-14 RX ADMIN — FUROSEMIDE 20 MG: 20 TABLET ORAL at 08:32

## 2025-07-14 RX ADMIN — PROMETHAZINE HYDROCHLORIDE 12.5 MG: 12.5 TABLET ORAL at 10:15

## 2025-07-14 RX ADMIN — CEFEPIME 2000 MG: 2 INJECTION, POWDER, FOR SOLUTION INTRAVENOUS at 01:28

## 2025-07-14 RX ADMIN — FILGRASTIM 300 MCG: 300 INJECTION, SOLUTION INTRAVENOUS; SUBCUTANEOUS at 17:27

## 2025-07-14 RX ADMIN — Medication 10 ML: at 08:33

## 2025-07-14 RX ADMIN — ACYCLOVIR 400 MG: 200 CAPSULE ORAL at 20:58

## 2025-07-14 RX ADMIN — APIXABAN 5 MG: 5 TABLET, FILM COATED ORAL at 20:58

## 2025-07-14 RX ADMIN — ALBUMIN (HUMAN) 250 ML: 12.5 INJECTION, SOLUTION INTRAVENOUS at 00:18

## 2025-07-14 RX ADMIN — ACYCLOVIR 400 MG: 200 CAPSULE ORAL at 08:32

## 2025-07-14 RX ADMIN — DIGOXIN 250 MCG: 0.25 INJECTION INTRAMUSCULAR; INTRAVENOUS at 20:58

## 2025-07-14 RX ADMIN — SOTALOL HYDROCHLORIDE 80 MG: 80 TABLET ORAL at 08:31

## 2025-07-14 NOTE — DISCHARGE INSTR - APPOINTMENTS
Follow up-Aidan Floyd NP in provider's office on 7/23/25 at 2:00pm.     Follow up with Dr. Bull Wiggins on 8/4/25 at 11:30am.    Important instructions for your upcoming appointments:  Please arrive 15 minutes early for your appointments.  Bring current photo id, insurance cards, and a list of your medications.

## 2025-07-14 NOTE — CONSULTS
Patient Name: Phuong Altamirano  YOB: 1955  MRN: [id number]  Admission date: 7/11/2025  Reason for Encounter: MST 2-3 or Nursing Admission Screen and history of malnutrition    [facility] Clinical Nutrition Assessment     Subjective    Subjective Information     7/14: Admitted for chills.  Oncology following for febrile neutropenia.  Cardiology consulted 7/14 for rapid heart rate.  Working with PT/OT.  RD visited patient at bedside.  Patient reports decrease in PO intakes, weight loss (noted significant per weight review), nausea with no vomiting, no chewing/swallowing issues noted, does not like Boost.  NFPE completed, consistent with nutrition diagnosis of malnutrition using AND/ASPEN criteria. See MSA below.     PES Statement: Severe chronic disease or condition related malnutrition multiple diseases, COPD, CHF as evidenced by reported intake <75% of estimated needs for >1 month, moderate fluid accumulation and weight loss >10% over 6 months.    Patient declines ONS - does not like Boost       Objective   H&P and Current Problems      H&P  Past Medical History:   Diagnosis Date    Arthritis 2010    Asthma 1 month    Cancer 12 years    Multiple mylenoma    Depression 2010    Diabetes mellitus 1.5 years    High blood pressure     High cholesterol 2005    Hypothyroidism     Lupus 2010    Sleep apnea Now      Past Surgical History:   Procedure Laterality Date    BREAST LUMPECTOMY Right 1983    benign    DILATATION AND CURETTAGE  1995    LAPAROSCOPIC TUBAL LIGATION  1979    OVARY SURGERY  1985    remoal of left ovary      Current Problems   Admission Diagnosis:  Hypocalcemia [E83.51]  Tachycardia [R00.0]  Febrile neutropenia [D70.9, R50.81]  Fever, unspecified fever cause [R50.9]  Leukopenia, unspecified type [D72.819]  Dyspnea, unspecified type [R06.00]    Problem List:    Febrile neutropenia    Persistent atrial fibrillation      Other Applicable Nutrition Information:   - See MSA 7/14/25    "    Anthropometrics       Height: 154.9 cm (61\")  Weight: 79 kg (174 lb 2.6 oz) (07/14/25 0400)  Weight Method: Bed scale  BMI (Calculated): 32.9       Trending Weight Changes 07/14/25: 174#, weight loss of 27 lbs (13.4%) over 3 month(s)    Significant?  Yes       Weight History  Wt Readings from Last 20 Encounters:   07/14/25 0400 79 kg (174 lb 2.6 oz)   07/14/25 0238 79 kg (174 lb 2.6 oz)   07/12/25 0116 77.3 kg (170 lb 6.7 oz)   07/11/25 1852 79.5 kg (175 lb 4.3 oz)   07/10/25 0446 79.5 kg (175 lb 4.3 oz)   07/09/25 0405 79 kg (174 lb 2.6 oz)   07/08/25 0405 78.8 kg (173 lb 11.6 oz)   07/07/25 0456 81.1 kg (178 lb 12.7 oz)   07/06/25 0327 81.6 kg (179 lb 14.3 oz)   07/05/25 0525 76.6 kg (168 lb 14 oz)   07/04/25 2300 81.6 kg (179 lb 14.3 oz)   07/03/25 1345 81.6 kg (180 lb)   07/03/25 0600 81.8 kg (180 lb 6.4 oz)   07/02/25 0553 81.9 kg (180 lb 8 oz)   07/01/25 2000 81.9 kg (180 lb 8 oz)   07/01/25 1410 83.7 kg (184 lb 9.6 oz)   07/01/25 1257 83.5 kg (184 lb)   06/30/25 1126 82.1 kg (181 lb)   06/26/25 1337 82.6 kg (182 lb 3.2 oz)   06/23/25 0934 82.3 kg (181 lb 6.4 oz)   06/18/25 1258 82.6 kg (182 lb)   06/13/25 2140 82.4 kg (181 lb 10.5 oz)   06/06/25 1027 83.5 kg (184 lb)   05/27/25 1215 84.6 kg (186 lb 8 oz)   05/19/25 0443 86.3 kg (190 lb 4.1 oz)   05/18/25 1400 85.3 kg (188 lb)   05/18/25 1142 85.6 kg (188 lb 11.4 oz)   04/28/25 1341 88 kg (194 lb)   04/11/25 0810 91.2 kg (201 lb)   04/04/25 0845 91.5 kg (201 lb 12.8 oz)   03/20/25 1227 90.5 kg (199 lb 9.6 oz)   10/17/19 0940 90.8 kg (200 lb 3.2 oz)   02/18/19 1143 89.4 kg (197 lb)   09/26/18 1323 88.9 kg (196 lb)   05/30/18 0920 88 kg (194 lb)        Labs      Comment: Reviewed, management per attending       Results from last 7 days   Lab Units 07/14/25  0436 07/13/25  0109 07/12/25  1629 07/11/25  2038 07/11/25  2033 07/11/25  2015 07/09/25  0405 07/08/25  0522   SODIUM mmol/L 141 134*  --   --  139  --    < > 137   POTASSIUM mmol/L 3.8 4.0 4.6  --  3.6  " --    < > 4.6   GLUCOSE mg/dL 100* 109*  --   --  103*  --    < > 110*   BUN mg/dL 13.4 16.0  --   --  15.7  --    < > 12.0   CREATININE mg/dL 0.63 0.65  --   --  0.78  --    < > 0.66   CALCIUM mg/dL 9.1 8.8  --   --  7.9*  --    < > 8.9   PHOSPHORUS mg/dL  --   --   --   --   --  3.1  --   --    MAGNESIUM mg/dL 2.1 2.2  --   --  1.7  --    < > 2.2   ALBUMIN g/dL  --   --   --   --  3.2*  --   --  3.0*   LACTATE mmol/L  --   --   --  0.4  --   --   --   --    BILIRUBIN mg/dL  --   --   --   --  0.4  --   --  0.3   ALK PHOS U/L  --   --   --   --  83  --   --  79   AST (SGOT) U/L  --   --   --   --  19  --   --  32   ALT (SGPT) U/L  --   --   --   --  22  --   --  23   PROBNP pg/mL  --   --   --   --   --  2,906.0*  --   --     < > = values in this interval not displayed.     Results from last 7 days   Lab Units 07/14/25  0436 07/13/25  0109 07/11/25  2138   PLATELETS 10*3/mm3 207 191 212   HEMOGLOBIN g/dL 10.1* 9.8* 10.8*   HEMATOCRIT % 33.0* 32.6* 35.5     Lab Results   Component Value Date    HGBA1C 6.06 (H) 05/19/2025          Medications       Scheduled Medications acyclovir, 400 mg, Oral, BID  apixaban, 5 mg, Oral, Q12H  cefepime, 2,000 mg, Intravenous, Q8H  digoxin, 250 mcg, Intravenous, Daily  ferrous sulfate, 325 mg, Oral, Daily With Breakfast  filgrastim (NEUPOGEN) injection, 300 mcg, Subcutaneous, Q PM  furosemide, 20 mg, Oral, BID Diuretics  insulin lispro, 2-7 Units, Subcutaneous, 4x Daily AC & at Bedtime  levothyroxine, 100 mcg, Oral, Q AM  pantoprazole, 40 mg, Oral, Q AM  sodium chloride, 10 mL, Intravenous, Q12H  sotalol, 80 mg, Oral, Q12H  sulfamethoxazole-trimethoprim, 1 tablet, Oral, Once per day on Monday Wednesday Friday        Infusions Pharmacy To Dose:,         PRN Medications   acetaminophen **OR** acetaminophen **OR** acetaminophen    albuterol    senna-docusate sodium **AND** polyethylene glycol **AND** bisacodyl **AND** bisacodyl    calcium carbonate    Calcium Replacement - Follow Nurse /  BPA Driven Protocol    dextrose    dextrose    glucagon (human recombinant)    hydrOXYzine    Magnesium Standard Dose Replacement - Follow Nurse / BPA Driven Protocol    melatonin    nitroglycerin    Pharmacy To Dose:    Phosphorus Replacement - Follow Nurse / BPA Driven Protocol    Potassium Replacement - Follow Nurse / BPA Driven Protocol    prochlorperazine    promethazine    sodium chloride    sodium chloride     Physical Findings      Chewing/Swallowing  Teeth Status: Mouth/Teeth WDL: .WDL except, teeth Teeth Symptoms: tooth/teeth missing  Chewing/Swallowing Issues: No issues identified at this time   Edema            Leg, Left Edema: 2+ (Mild) Leg, Right Edema: 2+ (Mild)  Ankle, Left Edema: 2+ (Mild) Ankle, Right Edema: 2+ (Mild)        Bowel Function  Stool Output  Perineal Care: perineum cleansed (07/12/25 2100)  Last Bowel Movement: 07/11/25   I/Os  Intake & Output (last 3 days)         07/11 0701 07/12 0700 07/12 0701 07/13 0700 07/13 0701 07/14 0700 07/14 0701  07/15 0700    P.O.  240 480 240    IV Piggyback 1000       Total Intake(mL/kg) 1000 (12.9) 240 (3.1) 480 (6.1) 240 (3)    Net +1000 +240 +480 +240            Urine Unmeasured Occurrence  2 x 2 x 3 x           Lines, Drains, Airways, & Wounds       Active LDAs       Name Placement date Placement time Site Days Last dressing change    Peripheral IV 07/14/25 0800 20 G Anterior;Right Forearm 07/14/25  0800  Forearm  less than 1                       Nutrition Focused Physical Exam     Trending NFPE 07/14/25: NFPE completed, consistent with nutrition diagnosis of malnutrition using AND/ASPEN criteria. See MSA below.        Malnutrition Severity Assessment      Patient meets criteria for : Severe Malnutrition  Malnutrition Type (Last 8 Hours)       Malnutrition Severity Assessment       Row Name 07/14/25 1600       Malnutrition Severity Assessment    Malnutrition Type Chronic Disease - Related Malnutrition      Row Name 07/14/25 1600        Insufficient Energy Intake     Insufficient Energy Intake Findings Severe    Insufficient Energy Intake  <75% of est. energy requirement for > or equal to 1 month      Row Name 07/14/25 1600       Unintentional Weight Loss     Unintentional Weight Loss Findings Severe    Unintentional Weight Loss  Weight loss greater than 7.5% in three months      Row Name 07/14/25 1600       Fluid Accumulation (Edema)    Fluid Acumulation Findings Moderate    Fluid Accumulation  Moderate equals 2+ pitting edema      Row Name 07/14/25 1600       Criteria Met (Must meet criteria for severity in at least 2 of these categories: M Wasting, Fat Loss, Fluid, Secondary Signs, Wt. Status, Intake)    Patient meets criteria for  Severe Malnutrition                   (1)   Current Nutrition Orders & Evaluation of Intake      Oral Nutrition     Food Allergies  and Intolerances NKFA   Current PO Diet Diet: Regular/House; Neutropenic/Low Microbial; Fluid Consistency: Thin (IDDSI 0)   Oral Nutrition Supplement None     Trending % PO Intake 07/14/25: %   (2)  Assessment & Plan   Nutrition Diagnosis and Goals       Nutrition Diagnosis 1 Severe chronic disease or condition related malnutrition multiple diseases, COPD, CHF as evidenced by reported intake <75% of estimated needs for >1 month, moderate fluid accumulation and weight loss >10% over 6 months.      Nutrition Diagnosis 2         Goal(s) Maintain PO Intake , Tolerates PO Diet , and Maintain Weight     Nutrition Intervention and Prescription       Intervention  Continue with current interventions and Completed NFPE      Diet Prescription Neutropenic     Supplement Prescription Patient declined    Education Provided  N/A   (3)  Monitoring/Evaluation       Monitor/Evaluation Per Protocol, PO Intake, Pertinent Labs, and Weight     RD Follow-Up Encounter 3-5 days     Electronically signed by:  Candice Diaz RD  07/14/25 15:53 EDT

## 2025-07-14 NOTE — SIGNIFICANT NOTE
07/14/25 1155   Readmission Indications   Is the patient and/or family able to complete the readmission assessment questions? Yes   Is this hospitalization related to the prior hospital diagnosis? No   Recommendation for rehospitalization   Did you speak with your physician prior to coming to the hospital No   Follow-up Appointments   Do you have a PCP? Yes   Did you have an appointment with PCP after your hospitalization? Yes   When was your appointment scheduled? 07/16/25   Did you go to appointment? No  (Was readmitted prior to appt)   Did you have an appointment with a Specialist? Yes   When was your appointment scheduled? 07/17/25   Did you go to appointment? No  (Retured to hospital prior to appt)   Are you current with the Pulmonary Clinic? No   Are you current with the CHF Clinic? No   Medications   Did you have newly prescribed medications at discharge? Yes   Did you understand the reasons for your medications at discharge and how to take them? Yes   Did you understand the side effects of your medications? Yes   Are you taking all of you prescribed medications? Yes   What pharmacy was used to fill prescription(s)? [facility]   Were medications picked up? Yes   Discharge Instructions   Did you understand your discharge instructions? Yes   Did your family/caregiver hear your instructions? Yes   Were you told to eat a special diet? Yes   Did you adhere to the diet? Yes   Were you given a number of someone to call if you had questions or concerns? Yes   Index discharge location/services   Where did you go upon discharge? Home   Do you have supportive family or friends in the home? Yes   Discharge Readiness   On a scale of 1-5 (5 being well prepared), how ready were you for discharge 5   Recommendation based on interview Goals of care discussion/advanced care planning   Palliative Care/Hospice   Are you current with Palliative Care? No   Are you current with Hospice Care? No   Advance Directives (For Healthcare)    Pre-existing AND/MOST/POLST Order No   Advance Directive Status Patient does not have advance directive   Have you reviewed your Advance Directive and is it valid for this stay? No   Literature Provided on Advance Directives No   Patient Requests Assistance on Advance Directives Patient Declined   Readmission Assessment Final Comments   Final Comments Prev. Admit: IP admit from  7/4/25 to for A-Fib with RVR post ANN with Cardioversion. Consulted Cardiology and Hem/Onc. Medication adjustments with cont cardiac monitoring. Dc home with new medication.  Current Admit: IP admit on 7/12/25 for neutropenic fever. Neutropenic precautions, IV ABX. Cardiology and Hem/Onc consulted. Monitoring labs and VS. Pt was readmitted prior to scheduled F/U appointments with PCP and Onc.       Met with patient in room wearing PPE: mask    Maintained distance greater than six feet and spent less than 15 minutes in the room    Megan Yost RN    Phone [phone]  Fax [phone]

## 2025-07-14 NOTE — CONSULTS
HP      Name: Phuong Altamirano ADMIT: 2025   : 1955  PCP: Chula Sanchez APRN    MRN: [id number] LOS: 2 days   AGE/SEX: 70 y.o. female  ROOM:      Chief Complaint   Patient presents with    Rapid Heart Rate       Subjective        History of present illness  Phuong Altamirano is a 70-year-old female patient multiple myeloma, diagnosed several years ago, recently started on chemotherapy.  Patient was admitted to the hospital in July and was found to be in A-fib with rapid ventricular rates and underwent cardioversion on 7/3/2025 with return to sinus rhythm.  She was admitted again a few days later this time in atrial flutter.  I saw her in consultation then and started her on sotalol and she self converted to sinus rhythm.  Patient was discharged home, she did have an episode of fever Tmax 101, evaluated by oncology however her A-fib recurred again extremely fast heart rates very uncomfortable and decided to present back to the hospital.    Past Medical History:   Diagnosis Date    Arthritis 2010    Asthma 1 month    Cancer 12 years    Multiple mylenoma    Depression 2010    Diabetes mellitus 1.5 years    High blood pressure     High cholesterol 2005    Hypothyroidism     Lupus 2010    Sleep apnea Now     Past Surgical History:   Procedure Laterality Date    BREAST LUMPECTOMY Right 1983    benign    DILATATION AND CURETTAGE      LAPAROSCOPIC TUBAL LIGATION  1979    OVARY SURGERY  1985    remoal of left ovary     Family History   Problem Relation Age of Onset    Heart disease Father     Pancreatic cancer Sister 59    Heart disease Sister     Stroke Sister     Pneumonia Brother      Social History     Tobacco Use    Smoking status: Former     Current packs/day: 0.00     Average packs/day: 1 pack/day for 21.0 years (21.0 ttl pk-yrs)     Types: Cigarettes     Start date:      Quit date:      Years since quittin.5    Smokeless tobacco: Never   Vaping Use    Vaping status: Never Used    Substance Use Topics    Alcohol use: Not Currently     Comment: Very occasionally    Drug use: No     Medications Prior to Admission   Medication Sig Dispense Refill Last Dose/Taking    acetaminophen (TYLENOL) 325 MG tablet Take 2 tablets by mouth Every 6 (Six) Hours As Needed for Mild Pain.   Taking As Needed    acyclovir (ZOVIRAX) 400 MG tablet Take 1 tablet by mouth 2 (Two) Times a Day. 60 tablet 3 7/11/2025 Morning    apixaban (ELIQUIS) 5 MG tablet tablet Take 1 tablet by mouth 2 (Two) Times a Day for 30 days. 60 tablet 0 7/11/2025 Morning    ferrous sulfate 325 (65 FE) MG tablet Take 1 tablet by mouth Daily With Breakfast for 30 days. 30 tablet 0 7/11/2025 Morning    furosemide (LASIX) 20 MG tablet Take 1 tablet by mouth 2 (Two) Times a Day.   7/11/2025 Morning    hydroxychloroquine (PLAQUENIL) 200 MG tablet Every 12 (Twelve) Hours.   7/11/2025 Morning    levothyroxine (SYNTHROID, LEVOTHROID) 100 MCG tablet Take 1 tablet by mouth Every Morning Before Breakfast.  1 7/11/2025 Morning    metFORMIN (GLUCOPHAGE) 500 MG tablet Take 1 tablet by mouth Daily With Dinner.   7/11/2025 Morning    metoprolol tartrate (LOPRESSOR) 50 MG tablet Take 1 tablet by mouth 2 (Two) Times a Day.   7/11/2025 Morning    omeprazole (priLOSEC) 40 MG capsule Take 1 capsule by mouth Daily.   7/11/2025 Morning    ondansetron (ZOFRAN) 8 MG tablet Take 1 tablet by mouth 3 (Three) Times a Day As Needed for Nausea or Vomiting. 30 tablet 5 Taking As Needed    promethazine (PHENERGAN) 12.5 MG tablet Take 1 tablet by mouth Every 6 (Six) Hours As Needed for Nausea or Vomiting. 30 tablet 2 Taking As Needed    sertraline (ZOLOFT) 25 MG tablet Take 1 tablet by mouth Daily.   7/11/2025 Morning    sotalol (BETAPACE) 80 MG tablet Take 1 tablet by mouth Every 12 (Twelve) Hours. 60 tablet 0 7/11/2025 Morning    sulfamethoxazole-trimethoprim (BACTRIM DS,SEPTRA DS) 800-160 MG per tablet Take 1 tablet by mouth 3 (Three) Times a Week. Take 1 tablet on  Mondays, Wednesdays and Fridays. 12 tablet 3 7/11/2025    albuterol sulfate HFA (Ventolin HFA) 108 (90 Base) MCG/ACT inhaler Inhale 1 puff Every 6 (Six) Hours As Needed.   More than a month    amLODIPine (NORVASC) 10 MG tablet Take 1 tablet by mouth Daily.       dexAMETHasone (DECADRON) 4 MG tablet Take 5 tablets by mouth Daily With Breakfast. Take with food on Days 1, 2, 8, 9, 15 and 16. 30 tablet 3     lenalidomide (REVLIMID) 25 MG capsule Take 1 capsule by mouth See Admin Instructions. Take 1 capsule by mouth daily on days 1-14, OFF 7 days of each 21 day cycle. Take with or without food. Swallow capsules whole, do not crush, break or chew. 14 capsule 0      Allergies:  Patient has no known allergies.    Review of systems    Constitutional: Negative.    Respiratory and cardiovascular: As detailed in HPI section.  Gastrointestinal: Negative for constipation, nausea and vomiting negative for abdominal distention, abdominal pain and diarrhea.   Genitourinary: Negative for difficulty urinating and flank pain.   Musculoskeletal: Negative for arthralgias, joint swelling and myalgias.   Skin: Negative for color change, rash and wound.   Neurological: Negative for dizziness, syncope, weakness and headaches.   Hematological: Negative for adenopathy.   Psychiatric/Behavioral: Negative for confusion.   All other systems reviewed and are negative.       Physical Exam  VITALS REVIEWED    General:      well developed, in no acute distress.    Head:      normocephalic and atraumatic.    Eyes:      PERRL/EOM intact, conjunctiva and sclera clear with out nystagmus.    Neck:      no masses, thyromegaly,  trachea central with normal respiratory effort and PMI displaced laterally  Lungs:      Clear to auscultation bilaterally  Heart:       Tachycardic  Msk:      no deformity or scoliosis noted of thoracic or lumbar spine.    Pulses:      pulses normal in all 4 extremities.    Extremities:       No lower extremity edema  Neurologic:       no focal deficits.   alert oriented x3  Skin:      intact without lesions or rashes.    Psych:      alert and cooperative; normal mood and affect; normal attention span and concentration.      Result Review :               Pertinent cardiac workup    EKG 7/13/2025 atrial fibrillation ventricular rate 126 bpm.  Echo 5/18/2025 ejection fraction 55 to 60%.        Assessment and Plan         Febrile neutropenia    Persistent atrial fibrillation      Phuong Altamirano is a 70-year-old female patient who has multiple myeloma for many years, recently started on chemotherapy, see oncology notes for details.  Patient started developing atrial fibrillation earlier this month, she was first cardioverted on 7/8/2025, A-fib however recurred and sotalol was initiated and self converted to sinus rhythm.  12-week later however arrhythmia recurred, heart rate is reaching 140 bpm, very symptomatic with it.    Since the patient failed antiarrhythmic therapy, I think the next option is to perform ablation.  Although she has multiple myeloma and chemotherapy is initiated, it is clear that she did have A-fib even before starting chemotherapy and therefore I do not think that A-fib is solely related to her receiving chemotherapy.  I think that she will benefit from ablation, risk indications and benefits were discussed at length with her and she is agreeable.          No follow-ups on file.  Patient was given instructions and counseling regarding her condition or for health maintenance advice. Please see specific information pulled into the AVS if appropriate.      Electronically signed by Jaswant Jimenez MD, 07/14/25, 2:45 PM EDT.

## 2025-07-14 NOTE — CASE MANAGEMENT/SOCIAL WORK
Discharge Planning Assessment   [facility]     Patient Name: Phuong Altamirano  MRN: [id number]  Today's Date: 7/14/2025    Admit Date: 7/11/2025    Plan: Home   Discharge Needs Assessment       Row Name 07/14/25 1146       Living Environment    People in Home alone    Current Living Arrangements home    Potentially Unsafe Housing Conditions none    In the past 12 months has the electric, gas, oil, or water company threatened to shut off services in your home? No    Primary Care Provided by self    Provides Primary Care For no one, unable/limited ability to care for self    Family Caregiver if Needed child(lacie), adult    Family Caregiver Names Daughter-Chancella    Quality of Family Relationships unable to assess    Able to Return to Prior Arrangements yes    Living Arrangement Comments Home       Resource/Environmental Concerns    Transportation Concerns none       Transportation Needs    In the past 12 months, has lack of transportation kept you from medical appointments or from getting medications? no    In the past 12 months, has lack of transportation kept you from meetings, work, or from getting things needed for daily living? No       Food Insecurity    Within the past 12 months, you worried that your food would run out before you got the money to buy more. Never true    Within the past 12 months, the food you bought just didn't last and you didn't have money to get more. Never true       Transition Planning    Patient/Family Anticipated Services at Transition none    Transportation Anticipated family or friend will provide       Discharge Needs Assessment    Equipment Currently Used at Home none    Concerns to be Addressed discharge planning    Do you want help finding or keeping work or a job? I do not need or want help    Do you want help with school or training? For example, starting or completing job training or getting a high school diploma, GED or equivalent No    Anticipated Changes Related to Illness  "none    Equipment Needed After Discharge none    Provided Post Acute Provider List? N/A    Provided Post Acute Provider Quality & Resource List? N/A    Offered/Gave Vendor List no                   Discharge Plan       Row Name 07/14/25 1147       Plan    Plan Home    Patient/Family in Agreement with Plan unable to assess    Plan Comments Met with pt at bedside, confirmed PCP and Pharm. States she is independent with ADLs and IADLs, denies financial concerns and daughter (Franklyn) can provide dc transportation. PT eval completed, stated that pt is \"functioning independently\" and OT signed off. Discussed dc plan, pt anticipates return home and declines HH or SNF needs at this time.                                                                                         DC Barriers: Neutropenic Precautions; IV ABX; ID and Hem/Onc and Cardiology Consulted                  Continued Care and Services - Admitted Since 7/11/2025    No active coordination exists.       Selected Continued Care - Episodes Includes continued care and service providers with selected services from the active episodes listed below      Oncology- External Fill Episode start date: 6/18/2025   There are no active outsourced providers for this episode.                 Expected Discharge Date and Time       Expected Discharge Date Expected Discharge Time    Jul 15, 2025            Demographic Summary       Row Name 07/14/25 1146       General Information    Referral Source admission list    Reason for Consult discharge planning    Preferred Language English       Contact Information    Permission Granted to Share Info With       Row Name 07/14/25 1145       General Information    Admission Type inpatient    Arrived From home    Required Notices Provided Important Message from Medicare                   Functional Status       Row Name 07/14/25 1146       Functional Status    Usual Activity Tolerance good    Current Activity Tolerance " fair       Physical Activity    On average, how many days per week do you engage in moderate to strenuous exercise (like a brisk walk)? 0 days    On average, how many minutes do you engage in exercise at this level? 0 min    Number of minutes of exercise per week 0       Assessment of Health Literacy    How often do you have someone help you read hospital materials? Occasionally    How often do you have problems learning about your medical condition because of difficulty understanding written information? Occasionally    How often do you have a problem understanding what is told to you about your medical condition? Sometimes    How confident are you filling out medical forms by yourself? Quite a bit    Health Literacy Good       Functional Status, IADL    Medications independent    Meal Preparation independent    Housekeeping independent    Laundry independent    Shopping independent    If for any reason you need help with day-to-day activities such as bathing, preparing meals, shopping, managing finances, etc., do you get the help you need? I get all the help I need       Mental Status    General Appearance WDL WDL       Mental Status Summary    Recent Changes in Mental Status/Cognitive Functioning no changes              Patient Forms       Row Name 07/14/25 1055       Patient Forms    Important Message from Medicare (IMM) Delivered  IMM given    Delivered to Patient    Method of delivery In person             Met with patient in room wearing PPE: mask    Maintained distance greater than six feet and spent less than 15 minutes in the room    Megan Yost RN    Phone [phone]  Fax [phone]

## 2025-07-14 NOTE — PROGRESS NOTES
Hematology/Oncology Inpatient Progress Note    PATIENT NAME: Phuong Altamirano  : 1955  MRN: [id number]    CHIEF COMPLAINT: Fevers and chills.     HISTORY OF PRESENT ILLNESS:      3/20/2025: Ms. 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 In early , she had a bone marrow biopsy and aspiration that revealed equivocal results, leading to a repeat bone marrow biopsy in . This disclosed a normocellular bone marrow with trilineage hematopoiesis and 20% plasma cells. On flow cytometry, only 1% of the plasma cells revealed monoclonal restriction. Continued observation at [facility] showed no evidence of progression. She was last seen sometime in  without new problems.Today, she reports being largely asymptomatic. She remains active and energetic, has a good appetite, and her weight has been stable. She is not experiencing fevers or diaphoresis. She does not report any chest pain. She does experience dyspnea with exertion, which she attributes to her lack of physical activity. She is not experiencing abdominal pain, diarrhea, or dysuria. On exam she is conversant and oriented. No jaundice and no distress but she appears chronically ill. No oral lesions. Respirations not labored. Lungs clear and heart regular. Abdomen protuberant and soft; the liver and spleen don't seem enlarged. No edema. Reviewed all the records and all laboratory exams. Discussed with her. 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 She might need a bone marrow aspiration and biopsy. She will have additional studies today and will see me with the results.      2025: In the office sooner than scheduled.   Her protein electrophoresis revealed a small increase in the M spike from 1.2 g/dL at the previous measurement available to 1.4 g/dL.  In addition the free lambda light chains increased from 11.1 mg/L to 309 mg/L, changing the ratio significantly.  She feels as well as she did at the time of the last visit.  On exam she is well-oriented and conversant.  She does not seem in any distress and she is not jaundiced.  The lungs are clear bilaterally and the heart regular.  The abdomen is protuberant and soft.  There is no edema.  Reviewed and discussed the laboratory exams with her.  I have asked her to allow me to obtain a bone marrow biopsy and aspiration as well as long bone survey and a 24-hour urine protein quantification and electrophoresis.  She will see me with results.     4/28/2025: She underwent a bone marrow biopsy and aspiration without any complications. The results indicate that her bone marrow is hypercellular, with 65% of nucleated precursors being plasma cells exhibiting an abnormal immunophenotype and lambda light chain restriction. The karyotype reveals a normal female complement; however, the FISH panel indicates a complex picture with gain of chromosome 1q21, deletion of 13q, loss of MAF/16q, and aneuploidy with gain of chromosomes 7, 9, and 15.On the basis of the above, Ms. Altamirano can be considered to have multiple myeloma, despite the relatively lower monoclonal protein in the blood. In addition the gain of chromosome 1q and the loss of MAF/16q ae considered poor prognostic markers. Under the new guidelines her condition can no longer be considered only smoldering myeloma and treatment is well justified. She's to have a PET scan for staging and new immunofixation and electrophoresis as well as light chain quantification. She's to see me with results.      5/19/2025: Ms. Altamirano is an established patient of mine. For some time she had been followed for what had been diagnosed with monoclonal  "gammopathy of undetermined significance. 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 At the time of the first visit with me she had an increase in the monoclonal bland and a decision was made to obtain staging studies again. This time her bone marrow contains at least 60% abnormal plasma cells. There is no suggestion of end organ damage and her condition can still be classified as smoldering myeloma but she is at high risk of developing complications and it is reasonable at this time to start treatment. I had a long discussion with her regarding the options available. On exam she is alert, conversant and oriented. No distress. No jaundice. No oral lesions. Respirations not labored. Lungs diminished and heart regular. Abdomen soft and minimal edema at this time. Reviewed the laboratory exams. To continue treatment for her congestive heart failure and to see me in the office after discharge.      5/27/2025: Was discharged from the hospital.  She was feeling better at the time of discharge.  Today she tells me she has not been feeling as well.  \"I do not have the umpf\".  Also frequently nauseated.  Her blood pressure has been much better controlled.  She has returned to her usual activities, including her work.  On exam she is oriented and conversant.  No distress.  No jaundice.  No edema.  Laboratory exams reviewed with her.  Treatment is well justified and discussed that with her despite the fact that she has no bone lesions and preserved renal function.  Her calcium has been within normal limits.  She does have mild normocytic anemia.  Discussed at length with her.  Explained the options of treatment.  Discussed with her the potential complications if treatment is not started.  She is agreeable to commencement of treatment with the clarification that she could stop at any point if she so decides.  She wants to go on a " vacation and will start after that vacation.     6/30/2025: Had been nauseated before the commencement of the treatment. She became much more nauseated and started vomiting. She was asked to hold the lenalidomide and to use the promethazine for the nausea. She feels much better today. She has been able to eat. No fevers. Denied chest pain or cough and has not had any more dyspnea. No abdominal pain or diarrhea. On exam alert and chronically ill appearing. No distress. No jaundice. No oral lesions and respirations not labored. Lungs diminished bilaterally and heart regular. Abdomen soft. No edema. Reviewed the laboratory exams. Discussed with her. To hold all treatment this week until fully recovered. UA and culture today. Hold the pravastatin for now. See me in 3 weeks.     07/12/25  Ms. Altamirano was initially seen in March 2025 for referral due to longstanding monoclonal gammopathy.  It was first noted around 2015 when she underwent testing to investigate arthralgias.  At the time of the initial consultation she was largely asymptomatic.  It was suspected that she had smoldering myeloma rather than monoclonal gammopathy of undetermined significance.  Her protein electrophoresis revealed an increase in her M spike to 1.4 g/dL and an increase in her free lambda light chains to 309 mg/L changing her ratio significantly.  Due to this it was decided to perform a bone marrow biopsy. Bone marrow biopsy and aspiration indicated that her bone marrow was hypercellular with 65% of nucleated precursors being plasma cells exhibiting an abnormal immunophenotype and lambda light chain restriction.  The karyotype revealed a normal female complement; however, the FISH panel indicated a complex picture with gain of chromosome 1 q. 21, deletion of 13 q., loss of MAF/16 q., and aneuploidy with gain of chromosomes 7, 9, and 15.  Based on the bone marrow results she was considered to have multiple myeloma despite the relatively lower  monoclonal protein in the blood.  In addition her results indicated poor prognostic markers.  Due to this she was considered a candidate for treatment.    Subjective   7/14/2025: Feels much better. Able to sleep and eat. No falls. No nausea or vomiting.     ROS:  Review of Systems   Constitutional:  Positive for activity change, fatigue and fever. Negative for appetite change, chills, diaphoresis and unexpected weight change.   HENT:  Negative for congestion, dental problem, drooling, ear discharge, ear pain, facial swelling, hearing loss, mouth sores, nosebleeds, postnasal drip, rhinorrhea, sinus pressure, sinus pain, sneezing, sore throat, tinnitus, trouble swallowing and voice change.    Eyes:  Negative for photophobia, pain, discharge, redness, itching and visual disturbance.   Respiratory:  Negative for apnea, cough, choking, chest tightness, shortness of breath, wheezing and stridor.    Cardiovascular:  Negative for chest pain, palpitations and leg swelling.   Gastrointestinal:  Negative for abdominal distention, abdominal pain, anal bleeding, blood in stool, constipation, diarrhea, nausea, rectal pain and vomiting.   Endocrine: Negative for cold intolerance, heat intolerance, polydipsia and polyuria.   Genitourinary:  Negative for decreased urine volume, difficulty urinating, dysuria, flank pain, frequency, genital sores, hematuria and urgency.   Musculoskeletal:  Negative for arthralgias, back pain, gait problem, joint swelling, myalgias, neck pain and neck stiffness.   Skin:  Negative for color change, pallor and rash.   Neurological:  Negative for dizziness, tremors, seizures, syncope, facial asymmetry, speech difficulty, weakness, light-headedness, numbness and headaches.   Hematological:  Negative for adenopathy. Does not bruise/bleed easily.   Psychiatric/Behavioral:  Negative for agitation, behavioral problems, confusion, decreased concentration, hallucinations, self-injury, sleep disturbance and  "suicidal ideas. The patient is not nervous/anxious.         MEDICATIONS:    Scheduled Meds:  acyclovir, 400 mg, Oral, BID  apixaban, 5 mg, Oral, Q12H  cefepime, 2,000 mg, Intravenous, Q8H  digoxin, 250 mcg, Intravenous, Daily  ferrous sulfate, 325 mg, Oral, Daily With Breakfast  filgrastim (NEUPOGEN) injection, 300 mcg, Subcutaneous, Q PM  furosemide, 20 mg, Oral, BID Diuretics  insulin lispro, 2-7 Units, Subcutaneous, 4x Daily AC & at Bedtime  levothyroxine, 100 mcg, Oral, Q AM  pantoprazole, 40 mg, Oral, Q AM  sodium chloride, 10 mL, Intravenous, Q12H  sotalol, 80 mg, Oral, Q12H  sulfamethoxazole-trimethoprim, 1 tablet, Oral, Once per day on Monday Wednesday Friday       Continuous Infusions:  Pharmacy To Dose:,        PRN Meds:    acetaminophen **OR** acetaminophen **OR** acetaminophen    albuterol    senna-docusate sodium **AND** polyethylene glycol **AND** bisacodyl **AND** bisacodyl    calcium carbonate    Calcium Replacement - Follow Nurse / BPA Driven Protocol    dextrose    dextrose    glucagon (human recombinant)    hydrOXYzine    Magnesium Standard Dose Replacement - Follow Nurse / BPA Driven Protocol    melatonin    nitroglycerin    Pharmacy To Dose:    Phosphorus Replacement - Follow Nurse / BPA Driven Protocol    Potassium Replacement - Follow Nurse / BPA Driven Protocol    prochlorperazine    promethazine    sodium chloride    sodium chloride     ALLERGIES:  No Known Allergies    Objective    VITALS:   /81 (BP Location: Left arm, Patient Position: Sitting)   Pulse 116   Temp 98.1 °F (36.7 °C) (Oral)   Resp 19   Ht 154.9 cm (61\")   Wt 79 kg (174 lb 2.6 oz)   LMP 10/17/2014   SpO2 95%   BMI 32.91 kg/m²     PHYSICAL EXAM: (performed by MD)  Physical Exam  Constitutional:       General: She is not in acute distress.     Appearance: She is ill-appearing. She is not toxic-appearing or diaphoretic.   HENT:      Head: Normocephalic and atraumatic.      Right Ear: External ear normal.      Left " Ear: External ear normal.      Nose: Nose normal.      Mouth/Throat:      Mouth: Mucous membranes are moist.      Pharynx: Oropharynx is clear. No oropharyngeal exudate or posterior oropharyngeal erythema.   Eyes:      General: No scleral icterus.        Right eye: No discharge.         Left eye: No discharge.      Conjunctiva/sclera: Conjunctivae normal.      Pupils: Pupils are equal, round, and reactive to light.   Cardiovascular:      Rate and Rhythm: Normal rate and regular rhythm.      Pulses: Normal pulses.      Heart sounds: No murmur heard.     No friction rub. No gallop.   Pulmonary:      Effort: No respiratory distress.      Breath sounds: No stridor. No wheezing, rhonchi or rales.   Abdominal:      General: Bowel sounds are normal. There is no distension.      Palpations: Abdomen is soft. There is no mass.      Tenderness: There is no abdominal tenderness. There is no right CVA tenderness, left CVA tenderness, guarding or rebound.      Hernia: No hernia is present.   Musculoskeletal:         General: No tenderness, deformity or signs of injury.      Cervical back: No rigidity.      Right lower leg: No edema.      Left lower leg: No edema.   Lymphadenopathy:      Cervical: No cervical adenopathy.   Skin:     Coloration: Skin is not jaundiced or pale.      Findings: No bruising, lesion or rash.   Neurological:      General: No focal deficit present.      Mental Status: She is alert and oriented to person, place, and time.      Cranial Nerves: No cranial nerve deficit.   Psychiatric:         Mood and Affect: Mood normal.         Behavior: Behavior normal.         Thought Content: Thought content normal.         Judgment: Judgment normal.     BRADLEY Barrett MD performed a physical exam on 7/14/2025 as documented above.    RECENT LABS:  Lab Results (last 24 hours)       Procedure Component Value Units Date/Time    POC Glucose 4x Daily Before Meals & at Bedtime [[id number]]  (Normal) Collected: 07/14/25 6952     Specimen: Blood Updated: 07/14/25 1156     Glucose 93 mg/dL      Comment: Serial Number: [id number]:  [id number]       POC Glucose 4x Daily Before Meals & at Bedtime [[id number]]  (Normal) Collected: 07/14/25 0738    Specimen: Blood Updated: 07/14/25 0741     Glucose 93 mg/dL      Comment: Serial Number: [id number]:  [id number]       CBC & Differential [[id number]]  (Abnormal) Collected: 07/14/25 0436    Specimen: Blood from Arm, Right Updated: 07/14/25 0522    Narrative:      The following orders were created for panel order CBC & Differential.  Procedure                               Abnormality         Status                     ---------                               -----------         ------                     CBC Auto Differential[[id number]]        Abnormal            Final result               Scan Slide[[id number]]                                       Final result                 Please view results for these tests on the individual orders.    Manual Differential [[id number]]  (Abnormal) Collected: 07/14/25 0436    Specimen: Blood from Arm, Right Updated: 07/14/25 0522     Neutrophil % 22.0 %      Lymphocyte % 64.0 %      Monocyte % 2.0 %      Atypical Lymphocyte % 12.0 %      Neutrophils Absolute 0.57 10*3/mm3      Lymphocytes Absolute 1.95 10*3/mm3      Monocytes Absolute 0.05 10*3/mm3      Crenated RBC's Slight/1+     Dacrocytes Slight/1+     Ovalocytes Slight/1+     Poikilocytes Slight/1+     WBC Morphology Normal     Platelet Estimate Adequate     Large Platelets Slight/1+    CBC Auto Differential [[id number]]  (Abnormal) Collected: 07/14/25 0436    Specimen: Blood from Arm, Right Updated: 07/14/25 0522     WBC 2.57 10*3/mm3      RBC 3.61 10*6/mm3      Hemoglobin 10.1 g/dL      Hematocrit 33.0 %      MCV 91.4 fL      MCH 28.0 pg      MCHC 30.6 g/dL      RDW 15.6 %      RDW-SD 51.8 fl      MPV 10.1 fL      Platelets 207 10*3/mm3     Narrative:      Instrument flags present, additional  testing may be recommended.  The previously reported component NRBC is no longer being reported. Previous result was 0.0 /100 WBC (Reference Range: 0.0-0.2 /100 WBC) on 7/14/2025 at 0451 EDT.    Scan Slide [[id number]] Collected: 07/14/25 0436    Specimen: Blood from Arm, Right Updated: 07/14/25 0522     Scan Slide --     Comment: See Manual Differential Results       Basic Metabolic Panel [[id number]]  (Abnormal) Collected: 07/14/25 0436    Specimen: Blood from Arm, Right Updated: 07/14/25 0506     Glucose 100 mg/dL      BUN 13.4 mg/dL      Creatinine 0.63 mg/dL      Sodium 141 mmol/L      Potassium 3.8 mmol/L      Chloride 106 mmol/L      CO2 24.7 mmol/L      Calcium 9.1 mg/dL      BUN/Creatinine Ratio 21.3     Anion Gap 10.3 mmol/L      eGFR 95.6 mL/min/1.73     Narrative:      GFR Categories in Chronic Kidney Disease (CKD)              GFR Category          GFR (mL/min/1.73)    Interpretation  G1                    90 or greater        Normal or high (1)  G2                    60-89                Mild decrease (1)  G3a                   45-59                Mild to moderate decrease  G3b                   30-44                Moderate to severe decrease  G4                    15-29                Severe decrease  G5                    14 or less           Kidney failure    (1)In the absence of evidence of kidney disease, neither GFR category G1 or G2 fulfill the criteria for CKD.    eGFR calculation 2021 CKD-EPI creatinine equation, which does not include race as a factor    Magnesium [[id number]]  (Normal) Collected: 07/14/25 0436    Specimen: Blood from Arm, Right Updated: 07/14/25 0506     Magnesium 2.1 mg/dL     POC Glucose Once [[id number]]  (Abnormal) Collected: 07/13/25 2032    Specimen: Blood Updated: 07/13/25 2035     Glucose 121 mg/dL      Comment: Serial Number: [id number]:  [id number]       Blood Culture - Blood, Arm, Left [[id number]]  (Normal) Collected: 07/11/25 2015    Specimen: Blood from Arm,  Left Updated: 07/13/25 2031     Blood Culture No growth at 2 days    Narrative:      Less than seven (7) mL's of blood was collected.  Insufficient quantity may yield false negative results.    Blood Culture - Blood, Arm, Right [[id number]]  (Normal) Collected: 07/11/25 2015    Specimen: Blood from Arm, Right Updated: 07/13/25 2031     Blood Culture No growth at 2 days    Narrative:      Less than seven (7) mL's of blood was collected.  Insufficient quantity may yield false negative results.    POC Glucose 4x Daily Before Meals & at Bedtime [[id number]]  (Abnormal) Collected: 07/13/25 1723    Specimen: Blood Updated: 07/13/25 1726     Glucose 106 mg/dL      Comment: Serial Number: [id number]:  [id number]             IMAGING REVIEWED:  XR Chest 1 View  Result Date: 7/13/2025  Impression: Cardiomegaly. No active disease. Electronically Signed: Wilmer Mondragon MD  7/13/2025 11:56 PM EDT  Workstation ID: [id number]      Assessment & Plan   ASSESSMENT:  Sepsis: On antibiotics.  Continue same treatment.  There is clear evidence of improvement.  Multiple myeloma: Treatment on hold at this time.  Dosing and monitoring of the white cell count and neutrophil count will be necessary going forward.  3.  Reviewed all records and discussed with her.    PLAN:  As above.    Neftali Barrett MD on 7/14/2025 at 1610.

## 2025-07-14 NOTE — PROGRESS NOTES
[facility] MEDICINE SERVICE  DAILY PROGRESS NOTE    NAME: Phuong Altamirano  : 1955  MRN: [id number]      LOS: 2 days     PROVIDER OF SERVICE: Giovanny Harding DO    Chief Complaint: Febrile neutropenia    Subjective:     Interval History:  History taken from: patient    Feels weak; denies cough, chest pain, or SOB: doesn't feel heart racing         Review of Systems:   Review of Systems    Objective:     Vital Signs  Temp:  [97.5 °F (36.4 °C)-98.1 °F (36.7 °C)] 98.1 °F (36.7 °C)  Heart Rate:  [] 116  Resp:  [-25] 19  BP: (120-146)/(62-88) 146/81  Flow (L/min) (Oxygen Therapy):  [1] 1   Body mass index is 32.91 kg/m².    Physical Exam  Physical Exam  General: Sitting up in chair; NAD  CV: Irregularly irregular rhythm; elevated and fluctuating HR  Lungs: CTA bilaterally  Abdomen: soft, NT, ND       Diagnostic Data    Results from last 7 days   Lab Units 25  0436 25  2138 25   WBC 10*3/mm3 2.57*   < >  --    HEMOGLOBIN g/dL 10.1*   < >  --    HEMATOCRIT % 33.0*   < >  --    PLATELETS 10*3/mm3 207   < >  --    GLUCOSE mg/dL 100*   < > 103*   CREATININE mg/dL 0.63   < > 0.78   BUN mg/dL 13.4   < > 15.7   SODIUM mmol/L 141   < > 139   POTASSIUM mmol/L 3.8   < > 3.6   AST (SGOT) U/L  --   --  19   ALT (SGPT) U/L  --   --  22   ALK PHOS U/L  --   --  83   BILIRUBIN mg/dL  --   --  0.4   ANION GAP mmol/L 10.3   < > 10.9    < > = values in this interval not displayed.       XR Chest 1 View  Result Date: 2025  Impression: Cardiomegaly. No active disease. Electronically Signed: Wilmer Mondragon MD  2025 11:56 PM EDT  Workstation ID: [id number]        I reviewed the patient's new clinical results.    Assessment/Plan:     Active and Resolved Problems  Active Hospital Problems    Diagnosis  POA    **Febrile neutropenia [D70.9, R50.81]  Yes      Resolved Hospital Problems   No resolved problems to display.       Neutropenic fever with possible bacterial pneumonia, unspecified  organism  - Patient presents with fever and neutropenia on admission with initial workup unremarkable, including no evidence of UTI and with normal chest x-ray  - CT of the chest was performed 7/12 which does show questionable infiltrate in the lower lung, particular the left lower lung which could be consistent with pneumonia, especially given patient's recent admission to the hospital  - Initiated on broad-spectrum IV antibiotics cefepime  - Patient is already on Bactrim for prophylactic treatment  - Elevated procalcitonin is also consistent with bacterial pneumonia  - cultures remain negative to date  - continue IV Cefepime for now  - WBC improving; s/p 1 dose of G-CSF   -appreciate Oncology's assistance     Multiple myeloma  - Holding treatment in the setting of acute active infection     Atrial fibrillation  - Patient underwent recent cardioversion 1 week prior to hospitalization here  -remains in RVR  - Continue sotalol, Eliquis  - Monitor Qtc  - defer dose/med adjustment to EP Cardiology      DM type II, controlled  - Continue sliding scale insulin     Hypothyroidism  - Continue Synthroid     Chronic diastolic heart failure  - Patient appears to be compensated and slightly volume depleted  - Holding diuretics for now       VTE Prophylaxis:  Pharmacologic VTE prophylaxis orders are present.             Disposition Planning:         Code Status and Medical Interventions: CPR (Attempt to Resuscitate); Full Support   Ordered at: 07/12/25 0103     Code Status (Patient has no pulse and is not breathing):    CPR (Attempt to Resuscitate)     Medical Interventions (Patient has pulse or is breathing):    Full Support     Level Of Support Discussed With:    Patient       Signature: Electronically signed by Giovanny Harding DO, 07/14/25, 13:06 EDT.  [facility] Hospitalist Team

## 2025-07-14 NOTE — PLAN OF CARE
Goal Outcome Evaluation:      Pt A/Ox4 and up in chair majority of shift. HR fluctuating between 110s-140s, pt asymptomatic, MD aware. Plan for ablation Wednesday 7/16. Pt able to voice concerns. Call light within reach.

## 2025-07-14 NOTE — NURSING NOTE
Around 1900 pts HR was sustaining in the 120s to the 140s. Upon assessment pt was asymptomatic. This RN notified on call Nicol LAUREN. Pt had increased anxiety today and was given atarax around 1820. This did not improve pts HR. Nicol ordered Diazepam 2.5mg once. Interventions were not effective. Pt was given scheduled sotalol and it did not help pts HR. CXR, EKG, and Albumin human 5% solution ordered per Nicol. Pt HR now sustaining 80 to 100. Orders to transfer to PCU in.

## 2025-07-15 ENCOUNTER — ANESTHESIA EVENT (OUTPATIENT)
Dept: CARDIOLOGY | Facility: HOSPITAL | Age: 70
End: 2025-07-15
Payer: MEDICARE

## 2025-07-15 LAB
ANION GAP SERPL CALCULATED.3IONS-SCNC: 8.3 MMOL/L (ref 5–15)
BUN SERPL-MCNC: 11.4 MG/DL (ref 8–23)
BUN/CREAT SERPL: 17.8 (ref 7–25)
CALCIUM SPEC-SCNC: 9.2 MG/DL (ref 8.6–10.5)
CHLORIDE SERPL-SCNC: 104 MMOL/L (ref 98–107)
CO2 SERPL-SCNC: 28.7 MMOL/L (ref 22–29)
CREAT SERPL-MCNC: 0.64 MG/DL (ref 0.57–1)
DEPRECATED RDW RBC AUTO: 49.4 FL (ref 37–54)
DOHLE BOD BLD QL SMEAR: PRESENT
EGFRCR SERPLBLD CKD-EPI 2021: 95.2 ML/MIN/1.73
ERYTHROCYTE [DISTWIDTH] IN BLOOD BY AUTOMATED COUNT: 15.3 % (ref 12.3–15.4)
GLUCOSE BLDC GLUCOMTR-MCNC: 106 MG/DL (ref 70–105)
GLUCOSE BLDC GLUCOMTR-MCNC: 114 MG/DL (ref 70–105)
GLUCOSE BLDC GLUCOMTR-MCNC: 121 MG/DL (ref 70–105)
GLUCOSE BLDC GLUCOMTR-MCNC: 144 MG/DL (ref 70–105)
GLUCOSE SERPL-MCNC: 105 MG/DL (ref 65–99)
HCT VFR BLD AUTO: 35.3 % (ref 34–46.6)
HGB BLD-MCNC: 11.1 G/DL (ref 12–15.9)
HOLD SPECIMEN: NORMAL
HYPOCHROMIA BLD QL: ABNORMAL
LYMPHOCYTES # BLD MANUAL: 2.22 10*3/MM3 (ref 0.7–3.1)
LYMPHOCYTES NFR BLD MANUAL: 4 % (ref 5–12)
MAGNESIUM SERPL-MCNC: 1.9 MG/DL (ref 1.6–2.4)
MCH RBC QN AUTO: 27.7 PG (ref 26.6–33)
MCHC RBC AUTO-ENTMCNC: 31.4 G/DL (ref 31.5–35.7)
MCV RBC AUTO: 88 FL (ref 79–97)
METAMYELOCYTES NFR BLD MANUAL: 2 % (ref 0–0)
MONOCYTES # BLD: 0.19 10*3/MM3 (ref 0.1–0.9)
MYELOCYTES NFR BLD MANUAL: 1 % (ref 0–0)
NEUTROPHILS # BLD AUTO: 2.27 10*3/MM3 (ref 1.7–7)
NEUTROPHILS NFR BLD MANUAL: 24 % (ref 42.7–76)
NEUTS BAND NFR BLD MANUAL: 23 % (ref 0–5)
PLAT MORPH BLD: NORMAL
PLATELET # BLD AUTO: 221 10*3/MM3 (ref 140–450)
PMV BLD AUTO: 10.3 FL (ref 6–12)
POIKILOCYTOSIS BLD QL SMEAR: ABNORMAL
POTASSIUM SERPL-SCNC: 3.3 MMOL/L (ref 3.5–5.2)
POTASSIUM SERPL-SCNC: 4 MMOL/L (ref 3.5–5.2)
QT INTERVAL: 322 MS
QT INTERVAL: 362 MS
QT INTERVAL: 406 MS
QTC INTERVAL: 442 MS
QTC INTERVAL: 466 MS
QTC INTERVAL: 472 MS
RBC # BLD AUTO: 4.01 10*6/MM3 (ref 3.77–5.28)
SCAN SLIDE: NORMAL
SODIUM SERPL-SCNC: 141 MMOL/L (ref 136–145)
TOXIC GRANULATION: ABNORMAL
VARIANT LYMPHS NFR BLD MANUAL: 1 % (ref 0–5)
VARIANT LYMPHS NFR BLD MANUAL: 45 % (ref 19.6–45.3)
WBC NRBC COR # BLD AUTO: 4.83 10*3/MM3 (ref 3.4–10.8)

## 2025-07-15 PROCEDURE — 25010000002 PROCHLORPERAZINE 10 MG/2ML SOLUTION: Performed by: INTERNAL MEDICINE

## 2025-07-15 PROCEDURE — 82948 REAGENT STRIP/BLOOD GLUCOSE: CPT | Performed by: HOSPITALIST

## 2025-07-15 PROCEDURE — 85025 COMPLETE CBC W/AUTO DIFF WBC: CPT | Performed by: HOSPITALIST

## 2025-07-15 PROCEDURE — 84132 ASSAY OF SERUM POTASSIUM: CPT | Performed by: FAMILY MEDICINE

## 2025-07-15 PROCEDURE — 25010000002 CEFEPIME PER 500 MG: Performed by: HOSPITALIST

## 2025-07-15 PROCEDURE — 83735 ASSAY OF MAGNESIUM: CPT | Performed by: HOSPITALIST

## 2025-07-15 PROCEDURE — 82948 REAGENT STRIP/BLOOD GLUCOSE: CPT

## 2025-07-15 PROCEDURE — 99232 SBSQ HOSP IP/OBS MODERATE 35: CPT | Performed by: INTERNAL MEDICINE

## 2025-07-15 PROCEDURE — 85007 BL SMEAR W/DIFF WBC COUNT: CPT | Performed by: HOSPITALIST

## 2025-07-15 PROCEDURE — 25010000002 FILGRASTIM 300 MCG/0.5ML SOLUTION PREFILLED SYRINGE: Performed by: STUDENT IN AN ORGANIZED HEALTH CARE EDUCATION/TRAINING PROGRAM

## 2025-07-15 PROCEDURE — 80048 BASIC METABOLIC PNL TOTAL CA: CPT | Performed by: HOSPITALIST

## 2025-07-15 PROCEDURE — 25010000002 DIGOXIN PER 500 MCG: Performed by: INTERNAL MEDICINE

## 2025-07-15 RX ORDER — POTASSIUM CHLORIDE 1500 MG/1
40 TABLET, EXTENDED RELEASE ORAL EVERY 4 HOURS
Status: COMPLETED | OUTPATIENT
Start: 2025-07-15 | End: 2025-07-15

## 2025-07-15 RX ADMIN — PANTOPRAZOLE SODIUM 40 MG: 40 TABLET, DELAYED RELEASE ORAL at 06:13

## 2025-07-15 RX ADMIN — ACYCLOVIR 400 MG: 200 CAPSULE ORAL at 20:24

## 2025-07-15 RX ADMIN — Medication 5 MG: at 22:51

## 2025-07-15 RX ADMIN — DIGOXIN 250 MCG: 250 INJECTION, SOLUTION INTRAMUSCULAR; INTRAVENOUS; PARENTERAL at 12:57

## 2025-07-15 RX ADMIN — APIXABAN 5 MG: 5 TABLET, FILM COATED ORAL at 20:24

## 2025-07-15 RX ADMIN — Medication 10 ML: at 09:37

## 2025-07-15 RX ADMIN — FUROSEMIDE 20 MG: 20 TABLET ORAL at 18:16

## 2025-07-15 RX ADMIN — CEFEPIME 2000 MG: 2 INJECTION, POWDER, FOR SOLUTION INTRAVENOUS at 09:36

## 2025-07-15 RX ADMIN — CEFEPIME 2000 MG: 2 INJECTION, POWDER, FOR SOLUTION INTRAVENOUS at 01:24

## 2025-07-15 RX ADMIN — ACYCLOVIR 400 MG: 200 CAPSULE ORAL at 09:36

## 2025-07-15 RX ADMIN — LEVOTHYROXINE SODIUM 100 MCG: 0.1 TABLET ORAL at 06:13

## 2025-07-15 RX ADMIN — APIXABAN 5 MG: 5 TABLET, FILM COATED ORAL at 09:36

## 2025-07-15 RX ADMIN — POTASSIUM CHLORIDE 40 MEQ: 1500 TABLET, EXTENDED RELEASE ORAL at 09:36

## 2025-07-15 RX ADMIN — SOTALOL HYDROCHLORIDE 80 MG: 80 TABLET ORAL at 20:24

## 2025-07-15 RX ADMIN — FILGRASTIM 300 MCG: 300 INJECTION, SOLUTION INTRAVENOUS; SUBCUTANEOUS at 17:40

## 2025-07-15 RX ADMIN — Medication 5 MG: at 01:24

## 2025-07-15 RX ADMIN — FERROUS SULFATE TAB 325 MG (65 MG ELEMENTAL FE) 325 MG: 325 (65 FE) TAB at 09:36

## 2025-07-15 RX ADMIN — Medication 10 ML: at 20:24

## 2025-07-15 RX ADMIN — SOTALOL HYDROCHLORIDE 80 MG: 80 TABLET ORAL at 09:36

## 2025-07-15 RX ADMIN — FUROSEMIDE 20 MG: 20 TABLET ORAL at 09:36

## 2025-07-15 RX ADMIN — PROCHLORPERAZINE EDISYLATE 10 MG: 5 INJECTION INTRAMUSCULAR; INTRAVENOUS at 09:41

## 2025-07-15 RX ADMIN — CEFEPIME 2000 MG: 2 INJECTION, POWDER, FOR SOLUTION INTRAVENOUS at 17:32

## 2025-07-15 RX ADMIN — POTASSIUM CHLORIDE 40 MEQ: 1500 TABLET, EXTENDED RELEASE ORAL at 12:57

## 2025-07-15 NOTE — PROGRESS NOTES
[facility] MEDICINE SERVICE  DAILY PROGRESS NOTE    NAME: Phuong Altamirano  : 1955  MRN: [id number]      LOS: 3 days     PROVIDER OF SERVICE: Giovanny Harding DO    Chief Complaint: Febrile neutropenia    Subjective:     Interval History:  History taken from: patient    Feeling overall better; still weak; denies cough or SOB         Review of Systems:   Review of Systems    Objective:     Vital Signs  Temp:  [97.2 °F (36.2 °C)-98.9 °F (37.2 °C)] 97.9 °F (36.6 °C)  Heart Rate:  [] 100  Resp:  [19-26] 25  BP: (118-150)/(63-95) 122/95   Body mass index is 32.82 kg/m².    Physical Exam  Physical Exam  General: Sitting up in chair; pleasant; NAD  CV: Irregularly irregular rhythm; controlled HR   Lungs: CTA bilaterally  Abdomen: soft, NT, ND  Extremities: No clubbing, cyanosis, or edema        Diagnostic Data    Results from last 7 days   Lab Units 07/15/25  0558 258 25   WBC 10*3/mm3 4.83   < >  --    HEMOGLOBIN g/dL 11.1*   < >  --    HEMATOCRIT % 35.3   < >  --    PLATELETS 10*3/mm3 221   < >  --    GLUCOSE mg/dL 105*   < > 103*   CREATININE mg/dL 0.64   < > 0.78   BUN mg/dL 11.4   < > 15.7   SODIUM mmol/L 141   < > 139   POTASSIUM mmol/L 3.3*   < > 3.6   AST (SGOT) U/L  --   --  19   ALT (SGPT) U/L  --   --  22   ALK PHOS U/L  --   --  83   BILIRUBIN mg/dL  --   --  0.4   ANION GAP mmol/L 8.3   < > 10.9    < > = values in this interval not displayed.       XR Chest 1 View  Result Date: 2025  Impression: Cardiomegaly. No active disease. Electronically Signed: Wilmer Mondragon MD  2025 11:56 PM EDT  Workstation ID: [id number]        I reviewed the patient's new clinical results.    Assessment/Plan:     Active and Resolved Problems  Active Hospital Problems    Diagnosis  POA    **Febrile neutropenia [D70.9, R50.81]  Yes    Persistent atrial fibrillation [I48.19]  Unknown      Resolved Hospital Problems   No resolved problems to display.       Neutropenic fever with  possible bacterial pneumonia, unspecified organism  - Patient presents with fever and neutropenia on admission with initial workup unremarkable, including no evidence of UTI and with normal chest x-ray  - CT of the chest was performed 7/12 which does show questionable infiltrate in the lower lung, particular the left lower lung which could be consistent with pneumonia, especially given patient's recent admission to the hospital  - Initiated on broad-spectrum IV antibiotics with cefepime  - Patient is already on Bactrim for prophylactic treatment  - Elevated procalcitonin is also consistent with bacterial pneumonia  - cultures remain negative to date  - continue IV Cefepime for now  - WBC improving; s/p 1 dose of G-CSF   -appreciate Oncology's assistance     Multiple myeloma  - Holding treatment in the setting of acute active infection  - Oncology following      Atrial fibrillation  - Patient underwent recent cardioversion 1 week prior to hospitalization here  -remains in A.fib, though rate better on my exam   - Continue sotalol, Eliquis per Cardiology   - Monitor Qtc  - noted plan for Ablation tomorrow      DM type II, controlled  - Continue sliding scale insulin     Hypothyroidism  - Continue Synthroid     Chronic diastolic heart failure  - Patient appears to be compensated and slightly volume depleted  - Holding diuretics for now       VTE Prophylaxis:  Pharmacologic VTE prophylaxis orders are present.             Disposition Planning: Possible d/c home in 1-2 days if continues to improve and pending response to ablation tomorrow         Code Status and Medical Interventions: CPR (Attempt to Resuscitate); Full Support   Ordered at: 07/12/25 0103     Code Status (Patient has no pulse and is not breathing):    CPR (Attempt to Resuscitate)     Medical Interventions (Patient has pulse or is breathing):    Full Support     Level Of Support Discussed With:    Patient       Signature: Electronically signed by Giovanny HERZOG  DO Mehdi, 07/15/25, 12:25 EDT.  [facility] Hospitalist Team

## 2025-07-15 NOTE — PROGRESS NOTES
Hematology/Oncology Inpatient Progress Note    PATIENT NAME: Phuong Altamirano  : 1955  MRN: [id number]    CHIEF COMPLAINT: Fevers and chills.     HISTORY OF PRESENT ILLNESS:      3/20/2025: Ms. 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 In early , she had a bone marrow biopsy and aspiration that revealed equivocal results, leading to a repeat bone marrow biopsy in . This disclosed a normocellular bone marrow with trilineage hematopoiesis and 20% plasma cells. On flow cytometry, only 1% of the plasma cells revealed monoclonal restriction. Continued observation at [facility] showed no evidence of progression. She was last seen sometime in  without new problems.Today, she reports being largely asymptomatic. She remains active and energetic, has a good appetite, and her weight has been stable. She is not experiencing fevers or diaphoresis. She does not report any chest pain. She does experience dyspnea with exertion, which she attributes to her lack of physical activity. She is not experiencing abdominal pain, diarrhea, or dysuria. On exam she is conversant and oriented. No jaundice and no distress but she appears chronically ill. No oral lesions. Respirations not labored. Lungs clear and heart regular. Abdomen protuberant and soft; the liver and spleen don't seem enlarged. No edema. Reviewed all the records and all laboratory exams. Discussed with her. 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 She might need a bone marrow aspiration and biopsy. She will have additional studies today and will see me with the results.      2025: In the office sooner than scheduled.   Her protein electrophoresis revealed a small increase in the M spike from 1.2 g/dL at the previous measurement available to 1.4 g/dL.  In addition the free lambda light chains increased from 11.1 mg/L to 309 mg/L, changing the ratio significantly.  She feels as well as she did at the time of the last visit.  On exam she is well-oriented and conversant.  She does not seem in any distress and she is not jaundiced.  The lungs are clear bilaterally and the heart regular.  The abdomen is protuberant and soft.  There is no edema.  Reviewed and discussed the laboratory exams with her.  I have asked her to allow me to obtain a bone marrow biopsy and aspiration as well as long bone survey and a 24-hour urine protein quantification and electrophoresis.  She will see me with results.     4/28/2025: She underwent a bone marrow biopsy and aspiration without any complications. The results indicate that her bone marrow is hypercellular, with 65% of nucleated precursors being plasma cells exhibiting an abnormal immunophenotype and lambda light chain restriction. The karyotype reveals a normal female complement; however, the FISH panel indicates a complex picture with gain of chromosome 1q21, deletion of 13q, loss of MAF/16q, and aneuploidy with gain of chromosomes 7, 9, and 15.On the basis of the above, Ms. Altamirano can be considered to have multiple myeloma, despite the relatively lower monoclonal protein in the blood. In addition the gain of chromosome 1q and the loss of MAF/16q ae considered poor prognostic markers. Under the new guidelines her condition can no longer be considered only smoldering myeloma and treatment is well justified. She's to have a PET scan for staging and new immunofixation and electrophoresis as well as light chain quantification. She's to see me with results.      5/19/2025: Ms. Altamirano is an established patient of mine. For some time she had been followed for what had been diagnosed with monoclonal  "gammopathy of undetermined significance. 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 At the time of the first visit with me she had an increase in the monoclonal bland and a decision was made to obtain staging studies again. This time her bone marrow contains at least 60% abnormal plasma cells. There is no suggestion of end organ damage and her condition can still be classified as smoldering myeloma but she is at high risk of developing complications and it is reasonable at this time to start treatment. I had a long discussion with her regarding the options available. On exam she is alert, conversant and oriented. No distress. No jaundice. No oral lesions. Respirations not labored. Lungs diminished and heart regular. Abdomen soft and minimal edema at this time. Reviewed the laboratory exams. To continue treatment for her congestive heart failure and to see me in the office after discharge.      5/27/2025: Was discharged from the hospital.  She was feeling better at the time of discharge.  Today she tells me she has not been feeling as well.  \"I do not have the umpf\".  Also frequently nauseated.  Her blood pressure has been much better controlled.  She has returned to her usual activities, including her work.  On exam she is oriented and conversant.  No distress.  No jaundice.  No edema.  Laboratory exams reviewed with her.  Treatment is well justified and discussed that with her despite the fact that she has no bone lesions and preserved renal function.  Her calcium has been within normal limits.  She does have mild normocytic anemia.  Discussed at length with her.  Explained the options of treatment.  Discussed with her the potential complications if treatment is not started.  She is agreeable to commencement of treatment with the clarification that she could stop at any point if she so decides.  She wants to go on a " vacation and will start after that vacation.     6/30/2025: Had been nauseated before the commencement of the treatment. She became much more nauseated and started vomiting. She was asked to hold the lenalidomide and to use the promethazine for the nausea. She feels much better today. She has been able to eat. No fevers. Denied chest pain or cough and has not had any more dyspnea. No abdominal pain or diarrhea. On exam alert and chronically ill appearing. No distress. No jaundice. No oral lesions and respirations not labored. Lungs diminished bilaterally and heart regular. Abdomen soft. No edema. Reviewed the laboratory exams. Discussed with her. To hold all treatment this week until fully recovered. UA and culture today. Hold the pravastatin for now. See me in 3 weeks.     07/12/25  Ms. Altamirano was initially seen in March 2025 for referral due to longstanding monoclonal gammopathy.  It was first noted around 2015 when she underwent testing to investigate arthralgias.  At the time of the initial consultation she was largely asymptomatic.  It was suspected that she had smoldering myeloma rather than monoclonal gammopathy of undetermined significance.  Her protein electrophoresis revealed an increase in her M spike to 1.4 g/dL and an increase in her free lambda light chains to 309 mg/L changing her ratio significantly.  Due to this it was decided to perform a bone marrow biopsy. Bone marrow biopsy and aspiration indicated that her bone marrow was hypercellular with 65% of nucleated precursors being plasma cells exhibiting an abnormal immunophenotype and lambda light chain restriction.  The karyotype revealed a normal female complement; however, the FISH panel indicated a complex picture with gain of chromosome 1 q. 21, deletion of 13 q., loss of MAF/16 q., and aneuploidy with gain of chromosomes 7, 9, and 15.  Based on the bone marrow results she was considered to have multiple myeloma despite the relatively lower  monoclonal protein in the blood.  In addition her results indicated poor prognostic markers.  Due to this she was considered a candidate for treatment.    Subjective   7/15/2025: Feeling well and better. Was able to sleep and has not had any pain. Has not been able to eat much because her appetite is not very good. Has been constipated.     ROS:  Review of Systems   Constitutional:  Positive for activity change, fatigue and fever. Negative for appetite change, chills, diaphoresis and unexpected weight change.   HENT:  Negative for congestion, dental problem, drooling, ear discharge, ear pain, facial swelling, hearing loss, mouth sores, nosebleeds, postnasal drip, rhinorrhea, sinus pressure, sinus pain, sneezing, sore throat, tinnitus, trouble swallowing and voice change.    Eyes:  Negative for photophobia, pain, discharge, redness, itching and visual disturbance.   Respiratory:  Negative for apnea, cough, choking, chest tightness, shortness of breath, wheezing and stridor.    Cardiovascular:  Negative for chest pain, palpitations and leg swelling.   Gastrointestinal:  Negative for abdominal distention, abdominal pain, anal bleeding, blood in stool, constipation, diarrhea, nausea, rectal pain and vomiting.   Endocrine: Negative for cold intolerance, heat intolerance, polydipsia and polyuria.   Genitourinary:  Negative for decreased urine volume, difficulty urinating, dysuria, flank pain, frequency, genital sores, hematuria and urgency.   Musculoskeletal:  Negative for arthralgias, back pain, gait problem, joint swelling, myalgias, neck pain and neck stiffness.   Skin:  Negative for color change, pallor and rash.   Neurological:  Negative for dizziness, tremors, seizures, syncope, facial asymmetry, speech difficulty, weakness, light-headedness, numbness and headaches.   Hematological:  Negative for adenopathy. Does not bruise/bleed easily.   Psychiatric/Behavioral:  Negative for agitation, behavioral problems,  "confusion, decreased concentration, hallucinations, self-injury, sleep disturbance and suicidal ideas. The patient is not nervous/anxious.       MEDICATIONS:    Scheduled Meds:  acyclovir, 400 mg, Oral, BID  apixaban, 5 mg, Oral, Q12H  cefepime, 2,000 mg, Intravenous, Q8H  digoxin, 250 mcg, Intravenous, Daily  ferrous sulfate, 325 mg, Oral, Daily With Breakfast  filgrastim (NEUPOGEN) injection, 300 mcg, Subcutaneous, Q PM  furosemide, 20 mg, Oral, BID Diuretics  insulin lispro, 2-7 Units, Subcutaneous, 4x Daily AC & at Bedtime  levothyroxine, 100 mcg, Oral, Q AM  pantoprazole, 40 mg, Oral, Q AM  sodium chloride, 10 mL, Intravenous, Q12H  sotalol, 80 mg, Oral, Q12H  sulfamethoxazole-trimethoprim, 1 tablet, Oral, Once per day on Monday Wednesday Friday       Continuous Infusions:  Pharmacy To Dose:,        PRN Meds:    acetaminophen **OR** acetaminophen **OR** acetaminophen    albuterol    senna-docusate sodium **AND** polyethylene glycol **AND** bisacodyl **AND** bisacodyl    calcium carbonate    Calcium Replacement - Follow Nurse / BPA Driven Protocol    dextrose    dextrose    digoxin    glucagon (human recombinant)    hydrOXYzine    Magnesium Standard Dose Replacement - Follow Nurse / BPA Driven Protocol    melatonin    nitroglycerin    Pharmacy To Dose:    Phosphorus Replacement - Follow Nurse / BPA Driven Protocol    Potassium Replacement - Follow Nurse / BPA Driven Protocol    prochlorperazine    promethazine    sodium chloride    sodium chloride     ALLERGIES:  No Known Allergies    Objective    VITALS:   /95 (BP Location: Left arm, Patient Position: Sitting)   Pulse 100   Temp 97.9 °F (36.6 °C) (Oral)   Resp 25   Ht 154.9 cm (61\")   Wt 78.8 kg (173 lb 11.6 oz)   LMP 10/17/2014   SpO2 94%   BMI 32.82 kg/m²     PHYSICAL EXAM: (performed by MD)  Physical Exam  Constitutional:       General: She is not in acute distress.     Appearance: She is ill-appearing. She is not toxic-appearing or " diaphoretic.   HENT:      Head: Normocephalic and atraumatic.      Right Ear: External ear normal.      Left Ear: External ear normal.      Nose: Nose normal.      Mouth/Throat:      Mouth: Mucous membranes are moist.      Pharynx: Oropharynx is clear. No oropharyngeal exudate or posterior oropharyngeal erythema.   Eyes:      General: No scleral icterus.        Right eye: No discharge.         Left eye: No discharge.      Conjunctiva/sclera: Conjunctivae normal.      Pupils: Pupils are equal, round, and reactive to light.   Cardiovascular:      Rate and Rhythm: Normal rate and regular rhythm.      Pulses: Normal pulses.      Heart sounds: No murmur heard.     No friction rub. No gallop.   Pulmonary:      Effort: No respiratory distress.      Breath sounds: No stridor. No wheezing, rhonchi or rales.   Abdominal:      General: Bowel sounds are normal. There is no distension.      Palpations: Abdomen is soft. There is no mass.      Tenderness: There is no abdominal tenderness. There is no right CVA tenderness, left CVA tenderness, guarding or rebound.      Hernia: No hernia is present.   Musculoskeletal:         General: No tenderness, deformity or signs of injury.      Cervical back: No rigidity.      Right lower leg: No edema.      Left lower leg: No edema.   Lymphadenopathy:      Cervical: No cervical adenopathy.   Skin:     Coloration: Skin is not jaundiced or pale.      Findings: No bruising, lesion or rash.   Neurological:      General: No focal deficit present.      Mental Status: She is alert and oriented to person, place, and time.      Cranial Nerves: No cranial nerve deficit.   Psychiatric:         Mood and Affect: Mood normal.         Behavior: Behavior normal.         Thought Content: Thought content normal.         Judgment: Judgment normal.     BRADLEY Barrett MD performed a physical exam on 7/15/2025 as documented above.     RECENT LABS:  Lab Results (last 24 hours)       Procedure Component Value  Units Date/Time    POC Glucose 4x Daily Before Meals & at Bedtime [[id number]]  (Abnormal) Collected: 07/15/25 1106    Specimen: Blood Updated: 07/15/25 1110     Glucose 114 mg/dL      Comment: Serial Number: [id number]:  [id number]       POC Glucose 4x Daily Before Meals & at Bedtime [[id number]]  (Abnormal) Collected: 07/15/25 0747    Specimen: Blood Updated: 07/15/25 0752     Glucose 106 mg/dL      Comment: Serial Number: [id number]:  [id number]       CBC & Differential [[id number]]  (Abnormal) Collected: 07/15/25 0558    Specimen: Blood Updated: 07/15/25 0646    Narrative:      The following orders were created for panel order CBC & Differential.  Procedure                               Abnormality         Status                     ---------                               -----------         ------                     CBC Auto Differential[[id number]]        Abnormal            Final result               Scan Slide[[id number]]                                       Final result                 Please view results for these tests on the individual orders.    CBC Auto Differential [[id number]]  (Abnormal) Collected: 07/15/25 0558    Specimen: Blood Updated: 07/15/25 0646     WBC 4.83 10*3/mm3      RBC 4.01 10*6/mm3      Hemoglobin 11.1 g/dL      Hematocrit 35.3 %      MCV 88.0 fL      MCH 27.7 pg      MCHC 31.4 g/dL      RDW 15.3 %      RDW-SD 49.4 fl      MPV 10.3 fL      Platelets 221 10*3/mm3     Narrative:      Instrument flags present, additional testing may be recommended.  The previously reported component NRBC is no longer being reported. Previous result was 0.0 /100 WBC (Reference Range: 0.0-0.2 /100 WBC) on 7/15/2025 at 0609 EDT.    Scan Slide [[id number]] Collected: 07/15/25 0558    Specimen: Blood Updated: 07/15/25 0646     Scan Slide --     Comment: See Manual Differential Results       Manual Differential [[id number]]  (Abnormal) Collected: 07/15/25 0558    Specimen: Blood Updated: 07/15/25 0646      Neutrophil % 24.0 %      Lymphocyte % 45.0 %      Monocyte % 4.0 %      Bands %  23.0 %      Metamyelocyte % 2.0 %      Myelocyte % 1.0 %      Atypical Lymphocyte % 1.0 %      Neutrophils Absolute 2.27 10*3/mm3      Lymphocytes Absolute 2.22 10*3/mm3      Monocytes Absolute 0.19 10*3/mm3      Hypochromia Slight/1+     Poikilocytes Slight/1+     Dohle Bodies Present     Toxic Granulation Slight/1+     Platelet Morphology Normal    Basic Metabolic Panel [[id number]]  (Abnormal) Collected: 07/15/25 0558    Specimen: Blood Updated: 07/15/25 0629     Glucose 105 mg/dL      BUN 11.4 mg/dL      Creatinine 0.64 mg/dL      Sodium 141 mmol/L      Potassium 3.3 mmol/L      Chloride 104 mmol/L      CO2 28.7 mmol/L      Calcium 9.2 mg/dL      BUN/Creatinine Ratio 17.8     Anion Gap 8.3 mmol/L      eGFR 95.2 mL/min/1.73     Narrative:      GFR Categories in Chronic Kidney Disease (CKD)              GFR Category          GFR (mL/min/1.73)    Interpretation  G1                    90 or greater        Normal or high (1)  G2                    60-89                Mild decrease (1)  G3a                   45-59                Mild to moderate decrease  G3b                   30-44                Moderate to severe decrease  G4                    15-29                Severe decrease  G5                    14 or less           Kidney failure    (1)In the absence of evidence of kidney disease, neither GFR category G1 or G2 fulfill the criteria for CKD.    eGFR calculation 2021 CKD-EPI creatinine equation, which does not include race as a factor    Magnesium [[id number]]  (Normal) Collected: 07/15/25 0558    Specimen: Blood Updated: 07/15/25 0629     Magnesium 1.9 mg/dL     Extra Tubes [[id number]] Collected: 07/15/25 0558    Specimen: Blood, Venous Line Updated: 07/15/25 0615    Narrative:      The following orders were created for panel order Extra Tubes.  Procedure                               Abnormality         Status                      ---------                               -----------         ------                     Green Top (Gel)[[id number]]                                  Final result                 Please view results for these tests on the individual orders.    Green Top (Gel) [[id number]] Collected: 07/15/25 0558    Specimen: Blood Updated: 07/15/25 0615     Extra Tube Hold for add-ons.     Comment: Auto resulted.       POC Glucose Once [[id number]]  (Abnormal) Collected: 07/14/25 2133    Specimen: Blood Updated: 07/14/25 2136     Glucose 114 mg/dL      Comment: Serial Number: [id number]:  [id number]       Blood Culture - Blood, Arm, Left [[id number]]  (Normal) Collected: 07/11/25 2015    Specimen: Blood from Arm, Left Updated: 07/14/25 2030     Blood Culture No growth at 3 days    Narrative:      Less than seven (7) mL's of blood was collected.  Insufficient quantity may yield false negative results.    Blood Culture - Blood, Arm, Right [[id number]]  (Normal) Collected: 07/11/25 2015    Specimen: Blood from Arm, Right Updated: 07/14/25 2030     Blood Culture No growth at 3 days    Narrative:      Less than seven (7) mL's of blood was collected.  Insufficient quantity may yield false negative results.    POC Glucose Once [[id number]]  (Abnormal) Collected: 07/14/25 1633    Specimen: Blood Updated: 07/14/25 1635     Glucose 117 mg/dL      Comment: Serial Number: [id number]:  [id number]             IMAGING REVIEWED:  XR Chest 1 View  Result Date: 7/13/2025  Impression: Cardiomegaly. No active disease. Electronically Signed: Wilmer Mondragon MD  7/13/2025 11:56 PM EDT  Workstation ID: [id number]      Assessment & Plan   ASSESSMENT:  Sepsis: On antibiotics.  Continue same treatment.  The neutropenia is resolved.   Multiple myeloma: Continue to hold the treatment.   Will provide a laxative.     PLAN:  As above.    Neftali Barrett MD on 7/14/2025 at 1610.

## 2025-07-15 NOTE — ANESTHESIA PREPROCEDURE EVALUATION
Anesthesia Evaluation     Patient summary reviewed and Nursing notes reviewed   no history of anesthetic complications:   NPO Solid Status: > 8 hours  NPO Liquid Status: > 2 hours           Airway   Dental      Pulmonary    (+) a smoker Former, asthma,shortness of breath, sleep apnea  Cardiovascular     ECG reviewed  PT is on anticoagulation therapy  Patient on routine beta blocker    (+) hypertension, valvular problems/murmurs MR and TI, dysrhythmias Atrial Fib, CHF Diastolic >=55%, hyperlipidemia      Neuro/Psych  (+) psychiatric history Depression  GI/Hepatic/Renal/Endo    (+) obesity, GERD, diabetes mellitus, thyroid problem hypothyroidism    Musculoskeletal     Abdominal    Substance History      OB/GYN          Other   arthritis, autoimmune disease lupus,   history of cancer    ROS/Med Hx Other: Additional History:  Hypokalemia, Afib/RVR, multiple myeloma, malnutrition    Echo:    Echocardiogram Findings    Left Ventricle Left ventricular systolic function is normal. Estimated left ventricular EF = 60% Left ventricular ejection fraction appears to be 56 - 60%.     Normal left ventricular cavity size noted. Left ventricular wall thickness is consistent with mild to moderate concentric hypertrophy. Left ventricular diastolic function is consistent with (grade I) impaired relaxation. No evidence of left ventricular thrombus or mass present.  Right Ventricle Normal right ventricular cavity size and systolic function noted. Right ventricular wall thickness is consistent with mild hypertrophy.  Left Atrium The left atrial cavity is mild to moderately dilated. No evidence of left atrial thrombus or mass present. There is no spontaneous echo contrast present. No evidence of a patent foramen ovale.  Right Atrium Normal right atrial cavity size noted.  Aortic Valve The aortic valve appears trileaflet. Trace aortic valve regurgitation is present. No aortic valve stenosis is present.  Mitral Valve Abnormal mitral valve  structure consistent with dilated annulus. Mild to moderate mitral valve regurgitation is present with a centrally-directed jet noted.  Tricuspid Valve The tricuspid valve is normal in structure. Mild tricuspid valve regurgitation is present. Estimated right ventricular systolic pressure from tricuspid regurgitation is normal (<35 mmHg).  Pulmonic Valve The pulmonic valve is structurally normal.  Pericardium There is a trivial pericardial effusion.        PSH:  LAPAROSCOPIC TUBAL LIGATION BREAST LUMPECTOMY  OVARY SURGERY DILATATION AND CURETTAGE              Anesthesia Plan    ASA 3     general     (Patient identified; pre-operative vital signs, all relevant labs/studies, complete medical/surgical/anesthetic history, full medication list, full allergy list, and NPO status obtained/reviewed; physical assessment performed; anesthetic options, side effects, potential complications, risks, and benefits discussed; questions answered; verbal and/or written anesthesia consent obtained; patient cleared for procedure; anesthesia machine and equipment checked and functioning)  intravenous induction     Anesthetic plan, risks, benefits, and alternatives have been provided, discussed and informed consent has been obtained with: patient.    Use of blood products discussed with  Consented to blood products.    Plan discussed with CRNA and CAA.    CODE STATUS:    Code Status (Patient has no pulse and is not breathing): CPR (Attempt to Resuscitate)  Medical Interventions (Patient has pulse or is breathing): Full Support  Level Of Support Discussed With: Patient

## 2025-07-15 NOTE — PROGRESS NOTES
CARDIOLOGY FOLLOW-UP PROGRESS NOTE      Reason for follow-up:    Atrial fibrillation      Attending: Giovanny Harding DO      Subjective .     Patient is sitting up in the chair today and denies chest pain, palpitations, shortness of breath. EP seeing patient as well and planning for ablation tomorrow.  No overnight events  Hemodynamics are stable  Pleasant on encounter     ROS  Pertinent items are noted in HPI, all other systems reviewed and negative  Allergies: Patient has no known allergies.    Scheduled Meds:acyclovir, 400 mg, Oral, BID  apixaban, 5 mg, Oral, Q12H  cefepime, 2,000 mg, Intravenous, Q8H  digoxin, 250 mcg, Intravenous, Daily  ferrous sulfate, 325 mg, Oral, Daily With Breakfast  filgrastim (NEUPOGEN) injection, 300 mcg, Subcutaneous, Q PM  furosemide, 20 mg, Oral, BID Diuretics  insulin lispro, 2-7 Units, Subcutaneous, 4x Daily AC & at Bedtime  levothyroxine, 100 mcg, Oral, Q AM  pantoprazole, 40 mg, Oral, Q AM  sodium chloride, 10 mL, Intravenous, Q12H  sotalol, 80 mg, Oral, Q12H  sulfamethoxazole-trimethoprim, 1 tablet, Oral, Once per day on Monday Wednesday Friday        Continuous Infusions:Pharmacy To Dose:,         PRN Meds:.  acetaminophen **OR** acetaminophen **OR** acetaminophen    albuterol    senna-docusate sodium **AND** polyethylene glycol **AND** bisacodyl **AND** bisacodyl    calcium carbonate    Calcium Replacement - Follow Nurse / BPA Driven Protocol    dextrose    dextrose    digoxin    glucagon (human recombinant)    hydrOXYzine    Magnesium Standard Dose Replacement - Follow Nurse / BPA Driven Protocol    melatonin    nitroglycerin    Pharmacy To Dose:    Phosphorus Replacement - Follow Nurse / BPA Driven Protocol    Potassium Replacement - Follow Nurse / BPA Driven Protocol    prochlorperazine    promethazine    sodium chloride    sodium chloride    Objective     VITAL SIGNS  Patient Vitals for the past 24 hrs:   BP Temp Temp src Pulse Resp SpO2 Weight   07/15/25 1100 122/95  "97.9 °F (36.6 °C) Oral 100 25 94 % --   07/15/25 0747 125/69 97.5 °F (36.4 °C) Oral 87 19 96 % --   07/15/25 0530 138/63 97.3 °F (36.3 °C) Oral 78 24 99 % 78.8 kg (173 lb 11.6 oz)   07/15/25 0100 118/68 97.2 °F (36.2 °C) Oral 88 25 93 % --   07/14/25 2126 141/71 98 °F (36.7 °C) Oral (!) 121 26 97 % --   07/14/25 2104 -- -- -- (!) 144 -- (!) 85 % --   07/14/25 1800 -- -- -- 97 -- 96 % --   07/14/25 1749 -- -- -- 119 -- (!) 85 % --   07/14/25 1748 -- -- -- 104 -- (!) 83 % --   07/14/25 1747 -- -- -- 116 -- 94 % --   07/14/25 1745 -- -- -- (!) 143 -- 99 % --   07/14/25 1730 150/72 -- -- 101 -- 98 % --   07/14/25 1726 150/72 -- -- 104 -- -- --   07/14/25 1630 -- -- -- 102 -- 95 % --   07/14/25 1605 136/76 98.9 °F (37.2 °C) Oral 109 25 96 % --   07/14/25 1500 -- -- -- 116 -- 95 % --   07/14/25 1407 -- -- -- (!) 141 -- -- --        Flowsheet Rows      Flowsheet Row First Filed Value   Admission Height 154.9 cm (61\") Documented at 07/11/2025 1852   Admission Weight 79.5 kg (175 lb 4.3 oz) Documented at 07/11/2025 1852            Body mass index is 32.82 kg/m².      Intake/Output Summary (Last 24 hours) at 7/15/2025 1324  Last data filed at 7/14/2025 2126  Gross per 24 hour   Intake 240 ml   Output --   Net 240 ml        TELEMETRY:     Atrial fibrillation rates controlled 80s    Physical Exam:  Physical Exam     Constitutional:       General: she is not in acute distress.     Appearance: Normal appearance. .   HENT:      Head: Normocephalic and atraumatic.   Eyes:      Extraocular Movements: Extraocular movements intact.      Pupils: Pupils are equal, round, and reactive to light.   Cardiovascular:      Rate and Rhythm: Normal rate and irregular rhythm.   Pulmonary:      Effort: Pulmonary effort is normal.      Breath sounds: Normal breath sounds.   Abdominal:      General: Abdomen not distended. Bowel sounds are normal.      Palpations: Abdomen is soft.   Musculoskeletal:      Cervical back: Normal range of motion. "   Skin:     General: Skin is warm and dry.   Neurological:      General: No focal deficit present.      Mental Status: she is alert and oriented to person, place, and time. Mental status is at baseline.   Psychiatric:         Mood and Affect: Mood normal.         Behavior: Behavior normal.         Thought Content: Thought content normal.     Scribe findings above  Results Review:   I reviewed the patient's new clinical results.    CBC    Results from last 7 days   Lab Units 07/15/25  0558 07/14/25  0436 07/13/25  0109 07/11/25  2138 07/10/25  0505 07/09/25  0405   WBC 10*3/mm3 4.83 2.57* 2.19* 1.39* 2.71* 2.90*   HEMOGLOBIN g/dL 11.1* 10.1* 9.8* 10.8* 10.1* 10.0*   PLATELETS 10*3/mm3 221 207 191 212 216 229     BMP   Results from last 7 days   Lab Units 07/15/25  0558 07/14/25  0436 07/13/25  0109 07/12/25  1629 07/11/25  2033 07/11/25  2015 07/10/25  0505 07/09/25  0405   SODIUM mmol/L 141 141 134*  --  139  --  137 139   POTASSIUM mmol/L 3.3* 3.8 4.0 4.6 3.6  --  4.4 4.4   CHLORIDE mmol/L 104 106 100  --  107  --  101 101   CO2 mmol/L 28.7 24.7 24.0  --  21.1*  --  24.1 29.1*   BUN mg/dL 11.4 13.4 16.0  --  15.7  --  11.6 13.8   CREATININE mg/dL 0.64 0.63 0.65  --  0.78  --  0.66 0.66   GLUCOSE mg/dL 105* 100* 109*  --  103*  --  113* 107*   MAGNESIUM mg/dL 1.9 2.1 2.2  --  1.7  --  2.4 2.3   PHOSPHORUS mg/dL  --   --   --   --   --  3.1  --   --      Cr Clearance Estimated Creatinine Clearance: 77.7 mL/min (by C-G formula based on SCr of 0.64 mg/dL).  Coag   Results from last 7 days   Lab Units 07/11/25 2033   INR  1.75*   APTT seconds 32.2     HbA1C   Lab Results   Component Value Date    HGBA1C 6.06 (H) 05/19/2025     Blood Glucose   Glucose   Date/Time Value Ref Range Status   07/15/2025 1106 114 (H) 70 - 105 mg/dL Final     Comment:     Serial Number: [id number]:  [id number]   07/15/2025 0747 106 (H) 70 - 105 mg/dL Final     Comment:     Serial Number: [id number]:  [id number]   07/14/2025 2133  "114 (H) 70 - 105 mg/dL Final     Comment:     Serial Number: [id number]:  [id number]   07/14/2025 1633 117 (H) 70 - 105 mg/dL Final     Comment:     Serial Number: [id number]:  [id number]   07/14/2025 1152 93 70 - 105 mg/dL Final     Comment:     Serial Number: [id number]:  [id number]   07/14/2025 0738 93 70 - 105 mg/dL Final     Comment:     Serial Number: [id number]:  [id number]   07/13/2025 2032 121 (H) 70 - 105 mg/dL Final     Comment:     Serial Number: [id number]:  [id number]   07/13/2025 1723 106 (H) 70 - 105 mg/dL Final     Comment:     Serial Number: [id number]:  [id number]     Infection   Results from last 7 days   Lab Units 07/11/25  2139 07/11/25 2015   BLOODCX   --  No growth at 3 days  No growth at 3 days   PROCALCITONIN ng/mL 1.15*  --      CMP   Results from last 7 days   Lab Units 07/15/25  0558 07/14/25  0436 07/13/25  0109 07/12/25  1629 07/11/25  2033 07/10/25  0505 07/09/25  0405   SODIUM mmol/L 141 141 134*  --  139 137 139   POTASSIUM mmol/L 3.3* 3.8 4.0 4.6 3.6 4.4 4.4   CHLORIDE mmol/L 104 106 100  --  107 101 101   CO2 mmol/L 28.7 24.7 24.0  --  21.1* 24.1 29.1*   BUN mg/dL 11.4 13.4 16.0  --  15.7 11.6 13.8   CREATININE mg/dL 0.64 0.63 0.65  --  0.78 0.66 0.66   GLUCOSE mg/dL 105* 100* 109*  --  103* 113* 107*   ALBUMIN g/dL  --   --   --   --  3.2*  --   --    BILIRUBIN mg/dL  --   --   --   --  0.4  --   --    ALK PHOS U/L  --   --   --   --  83  --   --    AST (SGOT) U/L  --   --   --   --  19  --   --    ALT (SGPT) U/L  --   --   --   --  22  --   --    LIPASE U/L  --   --   --   --  43  --   --      ABG      UA    Results from last 7 days   Lab Units 07/11/25  2251   NITRITE UA  Negative   WBC UA /HPF 0-2   BACTERIA UA /HPF None Seen   SQUAM EPITHEL UA /HPF 0-2     ASTER  No results found for: \"POCMETH\", \"POCAMPHET\", \"POCBARBITUR\", \"POCBENZO\", \"POCCOCAINE\", \"POCOPIATES\", \"POCOXYCODO\", \"POCPHENCYC\", \"POCPROPOXY\", \"POCTHC\", \"POCTRICYC\"  Lysis Labs "   Results from last 7 days   Lab Units 07/15/25  0558 07/14/25  0436 07/13/25  0109 07/11/25  2138 07/11/25  2033 07/10/25  0505 07/09/25  0405   INR   --   --   --   --  1.75*  --   --    APTT seconds  --   --   --   --  32.2  --   --    HEMOGLOBIN g/dL 11.1* 10.1* 9.8* 10.8*  --  10.1* 10.0*   PLATELETS 10*3/mm3 221 207 191 212  --  216 229   CREATININE mg/dL 0.64 0.63 0.65  --  0.78 0.66 0.66     Radiology(recent) XR Chest 1 View  Result Date: 7/13/2025  Impression: Cardiomegaly. No active disease. Electronically Signed: Wilmer Mondragon MD  7/13/2025 11:56 PM EDT  Workstation ID: [id number]      Imaging Results (Last 24 Hours)       ** No results found for the last 24 hours. **            Results from last 7 days   Lab Units 07/11/25 2139   HSTROP T ng/L 50*       EKG         I personally viewed and interpreted the patient's EKG/Telemetry data:        ECHOCARDIOGRAM:     Results for orders placed during the hospital encounter of 07/01/25    Adult Transesophageal Echo (ANN) W/ Cont if Necessary Per Protocol (Cardiology Department) 07/03/2025 6304    Interpretation Summary    Left ventricular systolic function is normal. Estimated left ventricular EF = 60% Left ventricular ejection fraction appears to be 56 - 60%.    Left ventricular wall thickness is consistent with mild to moderate concentric hypertrophy.    Left ventricular diastolic function is consistent with (grade I) impaired relaxation.    The left atrial cavity is mild to moderately dilated.    Abnormal mitral valve structure consistent with dilated annulus.    Estimated right ventricular systolic pressure from tricuspid regurgitation is normal (<35 mmHg).    There is a trivial pericardial effusion.    Procedure indication  Atrial fibrillation with uncontrollable rate    conscious sedation administered by anesthesia  Timeout before procedure    consent obtained before procedure      Procedure note  after obtaining a valid consent patient was sedated by  Anesthesia and a ANN probe was easily placed into esophagus with multiplane imaging with 2D, color and Doppler followed by bubble study with agitated saline without any complications    ANN  Findings    Mild to moderate left atrial enlargement with mild to moderate central MR without any shunt or clot  EF is normal with EF of 60% with mild LVH  Mild RVH with normal RV systolic  Mild TR without pulm hypertension and trivial effusion noted    Plan  Proceed with cardioversion    Procedures done  Transesophageal echocardiogram    Electronically signed by Bull Wiggins MD, 07/03/25, 3:34 PM EDT.                Assessment & Plan            Febrile neutropenia    Persistent atrial fibrillation        ASSESSMENT:    Neutropenic fever with possible bacterial pneumonia, unspecified organism   Atrial fibrillation  Multiple myeloma   Chronic diastolic heart failure   DM type II, controlled   Hypothyroidism      PLAN:    Neutropenia being managed by primary and heme/onc- antibiotics  Atrial fibrillation with failed cardioversion and sotalol trial- will attempt ablation tomorrow with Dr. Jimenez, off anticoagulation  N.p.o. midnight  Treatment for multiple myeloma on hold due to acute infective process  Continue supportive care  Hypokalemic this morning- replacement ongoing, keep potassium greater than 4 magnesium greater than 2    Jose Francisco LAUREN PhD

## 2025-07-16 ENCOUNTER — ANESTHESIA (OUTPATIENT)
Dept: CARDIOLOGY | Facility: HOSPITAL | Age: 70
End: 2025-07-16
Payer: MEDICARE

## 2025-07-16 LAB
ACT BLD: 245 SECONDS (ref 89–137)
ACT BLD: 273 SECONDS (ref 89–137)
ACT BLD: 302 SECONDS (ref 89–137)
ALBUMIN SERPL-MCNC: 3.4 G/DL (ref 3.5–5.2)
ALBUMIN/GLOB SERPL: 1.6 G/DL
ALP SERPL-CCNC: 78 U/L (ref 39–117)
ALT SERPL W P-5'-P-CCNC: 14 U/L (ref 1–33)
ANION GAP SERPL CALCULATED.3IONS-SCNC: 8.3 MMOL/L (ref 5–15)
AST SERPL-CCNC: 14 U/L (ref 1–32)
BACTERIA SPEC AEROBE CULT: NORMAL
BACTERIA SPEC AEROBE CULT: NORMAL
BILIRUB SERPL-MCNC: 0.3 MG/DL (ref 0–1.2)
BUN SERPL-MCNC: 13.3 MG/DL (ref 8–23)
BUN/CREAT SERPL: 21.5 (ref 7–25)
CALCIUM SPEC-SCNC: 9.3 MG/DL (ref 8.6–10.5)
CHLORIDE SERPL-SCNC: 105 MMOL/L (ref 98–107)
CO2 SERPL-SCNC: 26.7 MMOL/L (ref 22–29)
CREAT SERPL-MCNC: 0.62 MG/DL (ref 0.57–1)
DACRYOCYTES BLD QL SMEAR: ABNORMAL
DEPRECATED RDW RBC AUTO: 50.8 FL (ref 37–54)
EGFRCR SERPLBLD CKD-EPI 2021: 95.9 ML/MIN/1.73
ERYTHROCYTE [DISTWIDTH] IN BLOOD BY AUTOMATED COUNT: 15.4 % (ref 12.3–15.4)
GLOBULIN UR ELPH-MCNC: 2.1 GM/DL
GLUCOSE BLDC GLUCOMTR-MCNC: 116 MG/DL (ref 70–105)
GLUCOSE BLDC GLUCOMTR-MCNC: 156 MG/DL (ref 70–105)
GLUCOSE BLDC GLUCOMTR-MCNC: 93 MG/DL (ref 70–105)
GLUCOSE SERPL-MCNC: 114 MG/DL (ref 65–99)
HCT VFR BLD AUTO: 33.9 % (ref 34–46.6)
HGB BLD-MCNC: 10.5 G/DL (ref 12–15.9)
LARGE PLATELETS: ABNORMAL
LYMPHOCYTES # BLD MANUAL: 3.12 10*3/MM3 (ref 0.7–3.1)
LYMPHOCYTES NFR BLD MANUAL: 2 % (ref 5–12)
MCH RBC QN AUTO: 27.9 PG (ref 26.6–33)
MCHC RBC AUTO-ENTMCNC: 31 G/DL (ref 31.5–35.7)
MCV RBC AUTO: 90.2 FL (ref 79–97)
METAMYELOCYTES NFR BLD MANUAL: 1 % (ref 0–0)
MONOCYTES # BLD: 0.21 10*3/MM3 (ref 0.1–0.9)
NEUTROPHILS # BLD AUTO: 6.97 10*3/MM3 (ref 1.7–7)
NEUTROPHILS NFR BLD MANUAL: 39 % (ref 42.7–76)
NEUTS BAND NFR BLD MANUAL: 28 % (ref 0–5)
PLATELET # BLD AUTO: 213 10*3/MM3 (ref 140–450)
PMV BLD AUTO: 10 FL (ref 6–12)
POIKILOCYTOSIS BLD QL SMEAR: ABNORMAL
POLYCHROMASIA BLD QL SMEAR: ABNORMAL
POTASSIUM SERPL-SCNC: 3.9 MMOL/L (ref 3.5–5.2)
PROT SERPL-MCNC: 5.5 G/DL (ref 6–8.5)
RBC # BLD AUTO: 3.76 10*6/MM3 (ref 3.77–5.28)
SCAN SLIDE: NORMAL
SMALL PLATELETS BLD QL SMEAR: ADEQUATE
SODIUM SERPL-SCNC: 140 MMOL/L (ref 136–145)
VARIANT LYMPHS NFR BLD MANUAL: 15 % (ref 0–5)
VARIANT LYMPHS NFR BLD MANUAL: 15 % (ref 19.6–45.3)
WBC MORPH BLD: NORMAL
WBC NRBC COR # BLD AUTO: 10.4 10*3/MM3 (ref 3.4–10.8)

## 2025-07-16 PROCEDURE — 25010000002 PROTAMINE SULFATE PER 10 MG: Performed by: NURSE ANESTHETIST, CERTIFIED REGISTERED

## 2025-07-16 PROCEDURE — C1769 GUIDE WIRE: HCPCS | Performed by: INTERNAL MEDICINE

## 2025-07-16 PROCEDURE — C1733 CATH, EP, OTHR THAN COOL-TIP: HCPCS | Performed by: INTERNAL MEDICINE

## 2025-07-16 PROCEDURE — 85025 COMPLETE CBC W/AUTO DIFF WBC: CPT | Performed by: STUDENT IN AN ORGANIZED HEALTH CARE EDUCATION/TRAINING PROGRAM

## 2025-07-16 PROCEDURE — 02583ZF DESTRUCTION OF CONDUCTION MECHANISM USING IRREVERSIBLE ELECTROPORATION, PERCUTANEOUS APPROACH: ICD-10-PCS | Performed by: INTERNAL MEDICINE

## 2025-07-16 PROCEDURE — 25010000002 PHENYLEPHRINE 10 MG/ML SOLUTION 5 ML VIAL: Performed by: NURSE ANESTHETIST, CERTIFIED REGISTERED

## 2025-07-16 PROCEDURE — 25010000002 CEFEPIME PER 500 MG: Performed by: HOSPITALIST

## 2025-07-16 PROCEDURE — C1730 CATH, EP, 19 OR FEW ELECT: HCPCS | Performed by: INTERNAL MEDICINE

## 2025-07-16 PROCEDURE — 25010000002 SUGAMMADEX 200 MG/2ML SOLUTION: Performed by: NURSE ANESTHETIST, CERTIFIED REGISTERED

## 2025-07-16 PROCEDURE — 25010000002 LIDOCAINE PF 2% 2 % SOLUTION: Performed by: NURSE ANESTHETIST, CERTIFIED REGISTERED

## 2025-07-16 PROCEDURE — 82948 REAGENT STRIP/BLOOD GLUCOSE: CPT

## 2025-07-16 PROCEDURE — 82948 REAGENT STRIP/BLOOD GLUCOSE: CPT | Performed by: HOSPITALIST

## 2025-07-16 PROCEDURE — C1766 INTRO/SHEATH,STRBLE,NON-PEEL: HCPCS | Performed by: INTERNAL MEDICINE

## 2025-07-16 PROCEDURE — 85007 BL SMEAR W/DIFF WBC COUNT: CPT | Performed by: STUDENT IN AN ORGANIZED HEALTH CARE EDUCATION/TRAINING PROGRAM

## 2025-07-16 PROCEDURE — 99232 SBSQ HOSP IP/OBS MODERATE 35: CPT | Performed by: INTERNAL MEDICINE

## 2025-07-16 PROCEDURE — 80053 COMPREHEN METABOLIC PANEL: CPT | Performed by: STUDENT IN AN ORGANIZED HEALTH CARE EDUCATION/TRAINING PROGRAM

## 2025-07-16 PROCEDURE — 25010000002 ONDANSETRON PER 1 MG: Performed by: NURSE ANESTHETIST, CERTIFIED REGISTERED

## 2025-07-16 PROCEDURE — 93656 COMPRE EP EVAL ABLTJ ATR FIB: CPT | Performed by: INTERNAL MEDICINE

## 2025-07-16 PROCEDURE — C1894 INTRO/SHEATH, NON-LASER: HCPCS | Performed by: INTERNAL MEDICINE

## 2025-07-16 PROCEDURE — 4A0234Z MEASUREMENT OF CARDIAC ELECTRICAL ACTIVITY, PERCUTANEOUS APPROACH: ICD-10-PCS | Performed by: INTERNAL MEDICINE

## 2025-07-16 PROCEDURE — 25010000002 CEFEPIME PER 500 MG: Performed by: INTERNAL MEDICINE

## 2025-07-16 PROCEDURE — 25010000002 GLYCOPYRROLATE 0.2 MG/ML SOLUTION: Performed by: NURSE ANESTHETIST, CERTIFIED REGISTERED

## 2025-07-16 PROCEDURE — 99232 SBSQ HOSP IP/OBS MODERATE 35: CPT | Performed by: NURSE PRACTITIONER

## 2025-07-16 PROCEDURE — 25010000002 HEPARIN (PORCINE) PER 1000 UNITS: Performed by: NURSE ANESTHETIST, CERTIFIED REGISTERED

## 2025-07-16 PROCEDURE — 25010000002 FENTANYL CITRATE (PF) 100 MCG/2ML SOLUTION: Performed by: NURSE ANESTHETIST, CERTIFIED REGISTERED

## 2025-07-16 PROCEDURE — 02K83ZZ MAP CONDUCTION MECHANISM, PERCUTANEOUS APPROACH: ICD-10-PCS | Performed by: INTERNAL MEDICINE

## 2025-07-16 PROCEDURE — 4A023FZ MEASUREMENT OF CARDIAC RHYTHM, PERCUTANEOUS APPROACH: ICD-10-PCS | Performed by: INTERNAL MEDICINE

## 2025-07-16 PROCEDURE — 25010000002 DEXAMETHASONE PER 1 MG: Performed by: NURSE ANESTHETIST, CERTIFIED REGISTERED

## 2025-07-16 PROCEDURE — 25010000002 PROPOFOL 10 MG/ML EMULSION: Performed by: NURSE ANESTHETIST, CERTIFIED REGISTERED

## 2025-07-16 PROCEDURE — C1732 CATH, EP, DIAG/ABL, 3D/VECT: HCPCS | Performed by: INTERNAL MEDICINE

## 2025-07-16 PROCEDURE — 25010000002 PHENYLEPHRINE 10 MG/ML SOLUTION: Performed by: NURSE ANESTHETIST, CERTIFIED REGISTERED

## 2025-07-16 PROCEDURE — 63710000001 INSULIN LISPRO (HUMAN) PER 5 UNITS: Performed by: INTERNAL MEDICINE

## 2025-07-16 PROCEDURE — 25010000002 SUCCINYLCHOLINE PER 20 MG: Performed by: NURSE ANESTHETIST, CERTIFIED REGISTERED

## 2025-07-16 PROCEDURE — 25810000003 SODIUM CHLORIDE 0.9 % SOLUTION 250 ML FLEX CONT: Performed by: NURSE ANESTHETIST, CERTIFIED REGISTERED

## 2025-07-16 PROCEDURE — 25810000003 SODIUM CHLORIDE 0.9 % SOLUTION: Performed by: NURSE ANESTHETIST, CERTIFIED REGISTERED

## 2025-07-16 PROCEDURE — 85347 COAGULATION TIME ACTIVATED: CPT

## 2025-07-16 PROCEDURE — 25010000002 HEPARIN (PORCINE) 25000-0.45 UT/250ML-% SOLUTION: Performed by: NURSE ANESTHETIST, CERTIFIED REGISTERED

## 2025-07-16 PROCEDURE — C1759 CATH, INTRA ECHOCARDIOGRAPHY: HCPCS | Performed by: INTERNAL MEDICINE

## 2025-07-16 RX ORDER — SODIUM CHLORIDE 0.9 % (FLUSH) 0.9 %
10 SYRINGE (ML) INJECTION AS NEEDED
Status: DISCONTINUED | OUTPATIENT
Start: 2025-07-16 | End: 2025-07-16 | Stop reason: HOSPADM

## 2025-07-16 RX ORDER — ROCURONIUM BROMIDE 10 MG/ML
INJECTION, SOLUTION INTRAVENOUS AS NEEDED
Status: DISCONTINUED | OUTPATIENT
Start: 2025-07-16 | End: 2025-07-16 | Stop reason: SURG

## 2025-07-16 RX ORDER — DEXAMETHASONE SODIUM PHOSPHATE 4 MG/ML
INJECTION, SOLUTION INTRA-ARTICULAR; INTRALESIONAL; INTRAMUSCULAR; INTRAVENOUS; SOFT TISSUE AS NEEDED
Status: DISCONTINUED | OUTPATIENT
Start: 2025-07-16 | End: 2025-07-16 | Stop reason: SURG

## 2025-07-16 RX ORDER — NITROGLYCERIN 0.4 MG/1
0.4 TABLET SUBLINGUAL
Status: DISCONTINUED | OUTPATIENT
Start: 2025-07-16 | End: 2025-07-17 | Stop reason: HOSPADM

## 2025-07-16 RX ORDER — PROPOFOL 10 MG/ML
VIAL (ML) INTRAVENOUS AS NEEDED
Status: DISCONTINUED | OUTPATIENT
Start: 2025-07-16 | End: 2025-07-16 | Stop reason: SURG

## 2025-07-16 RX ORDER — SODIUM CHLORIDE 0.9 % (FLUSH) 0.9 %
10 SYRINGE (ML) INJECTION EVERY 12 HOURS SCHEDULED
Status: DISCONTINUED | OUTPATIENT
Start: 2025-07-16 | End: 2025-07-16 | Stop reason: HOSPADM

## 2025-07-16 RX ORDER — ALBUTEROL SULFATE 0.83 MG/ML
2.5 SOLUTION RESPIRATORY (INHALATION) ONCE AS NEEDED
Status: DISCONTINUED | OUTPATIENT
Start: 2025-07-16 | End: 2025-07-16 | Stop reason: HOSPADM

## 2025-07-16 RX ORDER — ONDANSETRON 2 MG/ML
4 INJECTION INTRAMUSCULAR; INTRAVENOUS ONCE AS NEEDED
Status: DISCONTINUED | OUTPATIENT
Start: 2025-07-16 | End: 2025-07-16 | Stop reason: HOSPADM

## 2025-07-16 RX ORDER — PROTAMINE SULFATE 10 MG/ML
INJECTION, SOLUTION INTRAVENOUS AS NEEDED
Status: DISCONTINUED | OUTPATIENT
Start: 2025-07-16 | End: 2025-07-16 | Stop reason: SURG

## 2025-07-16 RX ORDER — DIPHENHYDRAMINE HYDROCHLORIDE 50 MG/ML
12.5 INJECTION, SOLUTION INTRAMUSCULAR; INTRAVENOUS
Status: DISCONTINUED | OUTPATIENT
Start: 2025-07-16 | End: 2025-07-16 | Stop reason: HOSPADM

## 2025-07-16 RX ORDER — FENTANYL CITRATE 50 UG/ML
INJECTION, SOLUTION INTRAMUSCULAR; INTRAVENOUS AS NEEDED
Status: DISCONTINUED | OUTPATIENT
Start: 2025-07-16 | End: 2025-07-16 | Stop reason: SURG

## 2025-07-16 RX ORDER — HEPARIN SODIUM 1000 [USP'U]/ML
INJECTION, SOLUTION INTRAVENOUS; SUBCUTANEOUS AS NEEDED
Status: DISCONTINUED | OUTPATIENT
Start: 2025-07-16 | End: 2025-07-16 | Stop reason: SURG

## 2025-07-16 RX ORDER — SODIUM CHLORIDE 9 MG/ML
INJECTION, SOLUTION INTRAVENOUS CONTINUOUS PRN
Status: DISCONTINUED | OUTPATIENT
Start: 2025-07-16 | End: 2025-07-16 | Stop reason: SURG

## 2025-07-16 RX ORDER — ONDANSETRON 2 MG/ML
INJECTION INTRAMUSCULAR; INTRAVENOUS AS NEEDED
Status: DISCONTINUED | OUTPATIENT
Start: 2025-07-16 | End: 2025-07-16 | Stop reason: SURG

## 2025-07-16 RX ORDER — HEPARIN SODIUM 10000 [USP'U]/100ML
INJECTION, SOLUTION INTRAVENOUS CONTINUOUS PRN
Status: DISCONTINUED | OUTPATIENT
Start: 2025-07-16 | End: 2025-07-16 | Stop reason: SURG

## 2025-07-16 RX ORDER — LABETALOL HYDROCHLORIDE 5 MG/ML
5 INJECTION, SOLUTION INTRAVENOUS
Status: DISCONTINUED | OUTPATIENT
Start: 2025-07-16 | End: 2025-07-16 | Stop reason: HOSPADM

## 2025-07-16 RX ORDER — PHENYLEPHRINE HYDROCHLORIDE 10 MG/ML
INJECTION INTRAVENOUS AS NEEDED
Status: DISCONTINUED | OUTPATIENT
Start: 2025-07-16 | End: 2025-07-16 | Stop reason: SURG

## 2025-07-16 RX ORDER — SUCCINYLCHOLINE CHLORIDE 20 MG/ML
INJECTION INTRAMUSCULAR; INTRAVENOUS AS NEEDED
Status: DISCONTINUED | OUTPATIENT
Start: 2025-07-16 | End: 2025-07-16 | Stop reason: SURG

## 2025-07-16 RX ORDER — GLYCOPYRROLATE 0.2 MG/ML
INJECTION INTRAMUSCULAR; INTRAVENOUS AS NEEDED
Status: DISCONTINUED | OUTPATIENT
Start: 2025-07-16 | End: 2025-07-16 | Stop reason: SURG

## 2025-07-16 RX ORDER — SODIUM CHLORIDE 9 MG/ML
9 INJECTION, SOLUTION INTRAVENOUS CONTINUOUS PRN
Status: DISCONTINUED | OUTPATIENT
Start: 2025-07-16 | End: 2025-07-16 | Stop reason: HOSPADM

## 2025-07-16 RX ADMIN — SOTALOL HYDROCHLORIDE 80 MG: 80 TABLET ORAL at 08:13

## 2025-07-16 RX ADMIN — PHENYLEPHRINE HYDROCHLORIDE 100 MCG: 10 INJECTION INTRAVENOUS at 13:06

## 2025-07-16 RX ADMIN — Medication 5 MG: at 22:42

## 2025-07-16 RX ADMIN — FENTANYL CITRATE 50 MCG: 50 INJECTION, SOLUTION INTRAMUSCULAR; INTRAVENOUS at 12:54

## 2025-07-16 RX ADMIN — HEPARIN SODIUM 8470 UNITS: 1000 INJECTION, SOLUTION INTRAVENOUS; SUBCUTANEOUS at 13:14

## 2025-07-16 RX ADMIN — PHENYLEPHRINE HYDROCHLORIDE 0.5 MCG/KG/MIN: 10 INJECTION INTRAVENOUS at 13:05

## 2025-07-16 RX ADMIN — Medication 10 ML: at 08:12

## 2025-07-16 RX ADMIN — FERROUS SULFATE TAB 325 MG (65 MG ELEMENTAL FE) 325 MG: 325 (65 FE) TAB at 08:12

## 2025-07-16 RX ADMIN — LEVOTHYROXINE SODIUM 100 MCG: 0.1 TABLET ORAL at 05:07

## 2025-07-16 RX ADMIN — HEPARIN SODIUM 3000 UNITS: 1000 INJECTION, SOLUTION INTRAVENOUS; SUBCUTANEOUS at 14:00

## 2025-07-16 RX ADMIN — ACETAMINOPHEN 650 MG: 325 TABLET, FILM COATED ORAL at 18:03

## 2025-07-16 RX ADMIN — PANTOPRAZOLE SODIUM 40 MG: 40 TABLET, DELAYED RELEASE ORAL at 05:07

## 2025-07-16 RX ADMIN — SUCCINYLCHOLINE CHLORIDE 140 MG: 20 INJECTION, SOLUTION INTRAMUSCULAR; INTRAVENOUS at 12:54

## 2025-07-16 RX ADMIN — CEFEPIME 2000 MG: 2 INJECTION, POWDER, FOR SOLUTION INTRAVENOUS at 08:11

## 2025-07-16 RX ADMIN — ONDANSETRON 4 MG: 2 INJECTION, SOLUTION INTRAMUSCULAR; INTRAVENOUS at 13:30

## 2025-07-16 RX ADMIN — CEFEPIME 2000 MG: 2 INJECTION, POWDER, FOR SOLUTION INTRAVENOUS at 01:02

## 2025-07-16 RX ADMIN — LIDOCAINE HYDROCHLORIDE 60 MG: 20 INJECTION, SOLUTION EPIDURAL; INFILTRATION; INTRACAUDAL; PERINEURAL at 14:26

## 2025-07-16 RX ADMIN — ROCURONIUM BROMIDE 20 MG: 10 INJECTION INTRAVENOUS at 13:40

## 2025-07-16 RX ADMIN — ACYCLOVIR 400 MG: 200 CAPSULE ORAL at 08:13

## 2025-07-16 RX ADMIN — ROCURONIUM BROMIDE 40 MG: 10 INJECTION INTRAVENOUS at 13:08

## 2025-07-16 RX ADMIN — Medication 10 ML: at 20:12

## 2025-07-16 RX ADMIN — SOTALOL HYDROCHLORIDE 80 MG: 80 TABLET ORAL at 20:11

## 2025-07-16 RX ADMIN — PHENYLEPHRINE HYDROCHLORIDE 100 MCG: 10 INJECTION INTRAVENOUS at 14:00

## 2025-07-16 RX ADMIN — PHENYLEPHRINE HYDROCHLORIDE 100 MCG: 10 INJECTION INTRAVENOUS at 13:41

## 2025-07-16 RX ADMIN — ACYCLOVIR 400 MG: 200 CAPSULE ORAL at 20:10

## 2025-07-16 RX ADMIN — HEPARIN SODIUM 4000 UNITS: 1000 INJECTION, SOLUTION INTRAVENOUS; SUBCUTANEOUS at 13:35

## 2025-07-16 RX ADMIN — PROTAMINE SULFATE 50 MG: 10 INJECTION, SOLUTION INTRAVENOUS at 14:21

## 2025-07-16 RX ADMIN — FUROSEMIDE 20 MG: 20 TABLET ORAL at 08:12

## 2025-07-16 RX ADMIN — SULFAMETHOXAZOLE AND TRIMETHOPRIM 1 TABLET: 800; 160 TABLET ORAL at 08:13

## 2025-07-16 RX ADMIN — PROPOFOL 150 MG: 10 INJECTION, EMULSION INTRAVENOUS at 12:54

## 2025-07-16 RX ADMIN — HEPARIN SODIUM 18 UNITS/KG/HR: 10000 INJECTION, SOLUTION INTRAVENOUS at 13:14

## 2025-07-16 RX ADMIN — DEXAMETHASONE SODIUM PHOSPHATE 4 MG: 4 INJECTION, SOLUTION INTRA-ARTICULAR; INTRALESIONAL; INTRAMUSCULAR; INTRAVENOUS; SOFT TISSUE at 13:30

## 2025-07-16 RX ADMIN — CEFEPIME 2000 MG: 2 INJECTION, POWDER, FOR SOLUTION INTRAVENOUS at 18:17

## 2025-07-16 RX ADMIN — ROCURONIUM BROMIDE 10 MG: 10 INJECTION INTRAVENOUS at 12:54

## 2025-07-16 RX ADMIN — GLYCOPYRROLATE 0.2 MG: 0.2 INJECTION, SOLUTION INTRAMUSCULAR; INTRAVENOUS at 13:04

## 2025-07-16 RX ADMIN — SODIUM CHLORIDE: 9 INJECTION, SOLUTION INTRAVENOUS at 12:42

## 2025-07-16 RX ADMIN — INSULIN LISPRO 2 UNITS: 100 INJECTION, SOLUTION INTRAVENOUS; SUBCUTANEOUS at 20:11

## 2025-07-16 RX ADMIN — SUGAMMADEX 400 MG: 100 INJECTION, SOLUTION INTRAVENOUS at 14:27

## 2025-07-16 NOTE — DISCHARGE INSTRUCTIONS
[facility]  Cardiology  [facility] [place] [address]  [phone]    Ablation After Care    Refer to this sheet in the next few weeks. These instructions provide you with information on caring for yourself after your procedure. Your health care provider may also give you more specific instructions. Your treatment has been planned according to current medical practices, but problems sometimes occur. Call your health care provider if you have any problems or questions after your procedure.      What to Expect After the Procedure:  After your procedure, it is typical to have the following sensations:  Minor discomfort or tenderness and a small bump at the catheter insertion site(s). The bump(s) should usually decrease in size and tenderness within 1 to 2 weeks.  You may take over the counter Tylenol (acetaminophen) for pain control. If you experience severe pain, this is not expected and should be reported to your physician immediately.  Any bruising will usually fade within 2 to 4 weeks.  Home Care Instructions:  Do not apply powder or lotion to the site.  Do not take baths, swim, or use a hot tub until your health care provider approves and the site is completely healed.  Do not bend, squat, or lift anything over 20 lb (9 kg) or as directed by your health care provider for the first 3 days. However, we recommend lifting nothing heavier than a gallon of milk.    You may shower 24 hours after the procedure. Remove the bandage (dressing) and gently wash the site with plain soap and water. Gently pat the site dry. You may apply a band aid daily for 2 days if desired.    Inspect the site at least twice daily.  Limit your activity for the first 48 hours.   Avoid strenuous activity for 1 week or as advised by your physician.    Follow instructions about when you can drive or return to work as directed by your physician.    Hold direct pressure over the site when you cough, sneeze, laugh or change  positions.  Do this for the next 2 days.    Call Your Doctor If:  You have drainage (other than a small amount of blood on the dressing).  You have chills or a fever > 101.  You have redness, warmth, swelling (larger than a walnut), or pain at the insertion   You develop palpitations, chest pain or shortness of breath, feel faint, or pass out.  You develop pain, discoloration, coldness, numbness, tingling, or severe bruising in the leg that held the catheter.  You develop bleeding from any other place, such as the bowels.  You have heavy bleeding from the site. If this happens, lie down on a flat surface, such as a bed or couch, and hold pressure on the site for 20 minutes. If the bleeding stops, apply a fresh bandage and continue to lie down for one hour and notify your physician. If the bleeding continues, keep holding pressure at the site and call 911.

## 2025-07-16 NOTE — PROGRESS NOTES
[facility] MEDICINE SERVICE  DAILY PROGRESS NOTE    NAME: Phuong Altamirano  : 1955  MRN: [id number]      LOS: 4 days     PROVIDER OF SERVICE: Giovanny Harding DO    Chief Complaint: Febrile neutropenia    Subjective:     Interval History:  History taken from: patient    Continues to feel better; denies cough, fevers, SOB, or chest pain         Review of Systems:   Review of Systems    Objective:     Vital Signs  Temp:  [97.4 °F (36.3 °C)-98 °F (36.7 °C)] 98 °F (36.7 °C)  Heart Rate:  [] 88  Resp:  [17-23] 17  BP: (122-149)/(55-66) 140/64   Body mass index is 32.12 kg/m².    Physical Exam  Physical Exam  General: Sitting up in room; NAD  CV: Irregularly irregular rhythm; controlled HR   Lungs: CTA bilaterally; no wheezing or rales   Abdomen: soft, NT, ND  Extremities: No clubbing, cyanosis, or edema        Diagnostic Data    Results from last 7 days   Lab Units 25  0507   WBC 10*3/mm3 10.40   HEMOGLOBIN g/dL 10.5*   HEMATOCRIT % 33.9*   PLATELETS 10*3/mm3 213   GLUCOSE mg/dL 114*   CREATININE mg/dL 0.62   BUN mg/dL 13.3   SODIUM mmol/L 140   POTASSIUM mmol/L 3.9   AST (SGOT) U/L 14   ALT (SGPT) U/L 14   ALK PHOS U/L 78   BILIRUBIN mg/dL 0.3   ANION GAP mmol/L 8.3       No radiology results for the last day        I reviewed the patient's new clinical results.    Assessment/Plan:     Active and Resolved Problems  Active Hospital Problems    Diagnosis  POA    **Febrile neutropenia [D70.9, R50.81]  Yes    Persistent atrial fibrillation [I48.19]  Unknown      Resolved Hospital Problems   No resolved problems to display.       Neutropenic fever with possible bacterial pneumonia, unspecified organism  - Patient presents with fever and neutropenia on admission with initial workup unremarkable, including no evidence of UTI and with normal chest x-ray  - CT of the chest was performed  which does show questionable infiltrate in the lower lung, particular the left lower lung which could be  consistent with pneumonia, especially given patient's recent admission to the hospital  - Initiated on broad-spectrum IV antibiotics with cefepime  - Patient is already on Bactrim for prophylactic treatment  - Elevated procalcitonin is also consistent with bacterial pneumonia  - cultures remain negative to date  - continue IV Cefepime for now; likely stop Abx tomorrow if cultures remain negative   - WBC improving; 10.4 today   s/p 1 dose of G-CSF on admission   -appreciate Oncology's assistance     Multiple myeloma  - Holding treatment in the setting of acute active infection  - Oncology following      Atrial fibrillation  - Patient underwent recent cardioversion 1 week prior to hospitalization here  -remains in A.fib, though rate better on my exam   - Continue sotalol, Eliquis per Cardiology   - Monitor Qtc  - plan for ablation today      DM type II, controlled  - Continue sliding scale insulin     Hypothyroidism  - Continue Synthroid     Chronic diastolic heart failure  - Patient appears to be compensated and slightly volume depleted  - Holding diuretics for now       VTE Prophylaxis:  Pharmacologic VTE prophylaxis orders are present.             Disposition Planning: Likely d/c home tomorrow pending response to ablation and continued improvement of WBC        Code Status and Medical Interventions: CPR (Attempt to Resuscitate); Full Support   Ordered at: 07/12/25 0103     Code Status (Patient has no pulse and is not breathing):    CPR (Attempt to Resuscitate)     Medical Interventions (Patient has pulse or is breathing):    Full Support     Level Of Support Discussed With:    Patient       Signature: Electronically signed by Giovanny Harding DO, 07/16/25, 13:22 EDT.  [facility] Hospitalist Team

## 2025-07-16 NOTE — PLAN OF CARE
Goal Outcome Evaluation:  Plan of Care Reviewed With: patient, child      Alert and oriented. Family stayed at bedside. Ambulated to BR SBA. Heart at rest 80s-90s. With exertion as high as 140s, decreasing at rest. No c/o SOA, CP. No skin injuries or falls. Will continue with plan of care

## 2025-07-16 NOTE — ANESTHESIA PROCEDURE NOTES
Airway  Reason: elective    Date/Time: 7/16/2025 12:57 PM    General Information and Staff    Patient location during procedure: OR  CRNA/CAA: Lizbeth Alfonso CRNA    Indications and Patient Condition  Indications for airway management: airway protection    Preoxygenated: yes    Mask difficulty assessment: 0 - not attempted    Final Airway Details    Final airway type: endotracheal airway      Successful airway: ETT  Cuffed: yes   Successful intubation technique: video laryngoscopy  Adjuncts used in placement: intubating stylet  Endotracheal tube insertion site: oral  Blade: Ivey  Blade size: 3  ETT size (mm): 7.0  Cormack-Lehane Classification: grade I - full view of glottis  Placement verified by: chest auscultation and capnometry   Measured from: lips  ETT/EBT  to lips (cm): 21  Number of attempts at approach: 1  Assessment: lips, teeth, and gum same as pre-op and atraumatic intubation

## 2025-07-16 NOTE — CASE MANAGEMENT/SOCIAL WORK
Continued Stay Note   [facility]     Patient Name: Phuong Altamirano  MRN: [id number]  Today's Date: 7/16/2025    Admit Date: 7/11/2025    Plan: Home   Discharge Plan       Row Name 07/16/25 1607       Plan    Plan Comments DC Barriers: ablation today, cardio following             Waleska Perry RN      [facility]  Office: [phone]  Cell: [phone]  Fax # [phone]

## 2025-07-16 NOTE — PROGRESS NOTES
CARDIOLOGY FOLLOW-UP PROGRESS NOTE      Reason for follow-up:    Atrial fibrillation      Attending: Giovanny Harding DO    Seen and examined agree with narrative as discussed with nurse practitioner after face-to-face encounter with scribe findings below greater than 50% of total encounter time and medical decision making performed by me    Subjective .     Patient is sitting up in the chair today and denies chest pain, palpitations, shortness of breath. EP seeing patient as well and planning for ablation today.     Patient states worsening moments of anxiety but not all the time and requesting an as needed anxiety medication. Nursing aware with communication to primary      I saw the patient second time after her ablation some mild oozing in the right groin manual pressure applied along with Syeda which was sufficient.  Pressures adequate 110 systolic maintaining sinus rhythm in the 90s, family at bedside     Review of Systems   Psychiatric/Behavioral:  The patient is nervous/anxious.      Pertinent items are noted in HPI, all other systems reviewed and negative  Allergies: Patient has no known allergies.    Scheduled Meds:acyclovir, 400 mg, Oral, BID  apixaban, 5 mg, Oral, Q12H  cefepime, 2,000 mg, Intravenous, Q8H  digoxin, 250 mcg, Intravenous, Daily  ferrous sulfate, 325 mg, Oral, Daily With Breakfast  furosemide, 20 mg, Oral, BID Diuretics  insulin lispro, 2-7 Units, Subcutaneous, 4x Daily AC & at Bedtime  levothyroxine, 100 mcg, Oral, Q AM  pantoprazole, 40 mg, Oral, Q AM  sodium chloride, 10 mL, Intravenous, Q12H  sotalol, 80 mg, Oral, Q12H  sulfamethoxazole-trimethoprim, 1 tablet, Oral, Once per day on Monday Wednesday Friday        Continuous Infusions:Pharmacy To Dose:,         PRN Meds:.•  acetaminophen **OR** acetaminophen **OR** acetaminophen  •  albuterol  •  senna-docusate sodium **AND** polyethylene glycol **AND** bisacodyl **AND** bisacodyl  •  calcium carbonate  •  Calcium Replacement - Follow  "Nurse / BPA Driven Protocol  •  dextrose  •  dextrose  •  digoxin  •  glucagon (human recombinant)  •  hydrOXYzine  •  Magnesium Standard Dose Replacement - Follow Nurse / BPA Driven Protocol  •  melatonin  •  nitroglycerin  •  Pharmacy To Dose:  •  Phosphorus Replacement - Follow Nurse / BPA Driven Protocol  •  Potassium Replacement - Follow Nurse / BPA Driven Protocol  •  prochlorperazine  •  promethazine  •  sodium chloride  •  sodium chloride    Objective     VITAL SIGNS  Patient Vitals for the past 24 hrs:   BP Temp Temp src Pulse Resp SpO2 Weight   07/16/25 1115 140/64 98 °F (36.7 °C) -- 88 17 96 % --   07/16/25 0700 149/64 98 °F (36.7 °C) Oral 84 -- 96 % --   07/16/25 0441 134/66 97.5 °F (36.4 °C) Oral 87 -- 96 % 77.1 kg (169 lb 15.6 oz)   07/16/25 0010 123/55 -- -- 96 -- 97 % --   07/16/25 0008 123/55 98 °F (36.7 °C) Oral (!) 129 -- (!) 89 % --   07/15/25 2028 122/58 97.4 °F (36.3 °C) Oral 102 23 97 % --        Flowsheet Rows      Flowsheet Row First Filed Value   Admission Height 154.9 cm (61\") Documented at 07/11/2025 1852   Admission Weight 79.5 kg (175 lb 4.3 oz) Documented at 07/11/2025 1852            Body mass index is 32.12 kg/m².      Intake/Output Summary (Last 24 hours) at 7/16/2025 1145  Last data filed at 7/16/2025 0008  Gross per 24 hour   Intake --   Output 1 ml   Net -1 ml        TELEMETRY:     Atrial fibrillation rates controlled 90s    Physical Exam:  Physical Exam     Constitutional:       General: she is not in acute distress.     Appearance: Normal appearance. .   HENT:      Head: Normocephalic and atraumatic.   Eyes:      Extraocular Movements: Extraocular movements intact.      Pupils: Pupils are equal, round, and reactive to light.   Cardiovascular:      Rate and Rhythm: Normal rate and irregular rhythm.   Pulmonary:      Effort: Pulmonary effort is normal.      Breath sounds: Normal breath sounds.   Abdominal:      General: Abdomen not distended. Bowel sounds are normal.      " Palpations: Abdomen is soft.   Musculoskeletal:      Cervical back: Normal range of motion.   Skin:     General: Skin is warm and dry.   Neurological:      General: No focal deficit present.      Mental Status: she is alert and oriented to person, place, and time. Mental status is at baseline.   Psychiatric:         Mood and Affect: Mood normal.         Behavior: Behavior normal.         Thought Content: Thought content normal.     Scribe findings above, unchanged, right groin stable bandage in place, Syeda applied  Results Review:   I reviewed the patient's new clinical results.        CBC    Results from last 7 days   Lab Units 07/16/25  0507 07/15/25  0558 07/14/25  0436 07/13/25  0109 07/11/25  2138 07/10/25  0505   WBC 10*3/mm3 10.40 4.83 2.57* 2.19* 1.39* 2.71*   HEMOGLOBIN g/dL 10.5* 11.1* 10.1* 9.8* 10.8* 10.1*   PLATELETS 10*3/mm3 213 221 207 191 212 216     BMP   Results from last 7 days   Lab Units 07/16/25  0507 07/15/25  1729 07/15/25  0558 07/14/25  0436 07/13/25  0109 07/12/25  1629 07/11/25  2033 07/11/25  2015 07/10/25  0505   SODIUM mmol/L 140  --  141 141 134*  --  139  --  137   POTASSIUM mmol/L 3.9 4.0 3.3* 3.8 4.0 4.6 3.6  --  4.4   CHLORIDE mmol/L 105  --  104 106 100  --  107  --  101   CO2 mmol/L 26.7  --  28.7 24.7 24.0  --  21.1*  --  24.1   BUN mg/dL 13.3  --  11.4 13.4 16.0  --  15.7  --  11.6   CREATININE mg/dL 0.62  --  0.64 0.63 0.65  --  0.78  --  0.66   GLUCOSE mg/dL 114*  --  105* 100* 109*  --  103*  --  113*   MAGNESIUM mg/dL  --   --  1.9 2.1 2.2  --  1.7  --  2.4   PHOSPHORUS mg/dL  --   --   --   --   --   --   --  3.1  --      Cr Clearance Estimated Creatinine Clearance: 79.3 mL/min (by C-G formula based on SCr of 0.62 mg/dL).  Coag   Results from last 7 days   Lab Units 07/11/25 2033   INR  1.75*   APTT seconds 32.2     HbA1C   Lab Results   Component Value Date    HGBA1C 6.06 (H) 05/19/2025     Blood Glucose   Glucose   Date/Time Value Ref Range Status   07/16/2025 0729  93 70 - 105 mg/dL Final     Comment:     Serial Number: [id number]:  [id number]   07/15/2025 2026 144 (H) 70 - 105 mg/dL Final     Comment:     Serial Number: [id number]:  [id number]   07/15/2025 1612 121 (H) 70 - 105 mg/dL Final     Comment:     Serial Number: [id number]:  [id number]   07/15/2025 1106 114 (H) 70 - 105 mg/dL Final     Comment:     Serial Number: [id number]:  [id number]   07/15/2025 0747 106 (H) 70 - 105 mg/dL Final     Comment:     Serial Number: [id number]:  [id number]   07/14/2025 2133 114 (H) 70 - 105 mg/dL Final     Comment:     Serial Number: [id number]:  [id number]   07/14/2025 1633 117 (H) 70 - 105 mg/dL Final     Comment:     Serial Number: [id number]:  [id number]   07/14/2025 1152 93 70 - 105 mg/dL Final     Comment:     Serial Number: [id number]:  [id number]     Infection   Results from last 7 days   Lab Units 07/11/25 2139 07/11/25 2015   BLOODCX   --  No growth at 4 days  No growth at 4 days   PROCALCITONIN ng/mL 1.15*  --      CMP   Results from last 7 days   Lab Units 07/16/25  0507 07/15/25  1729 07/15/25  0558 07/14/25  0436 07/13/25  0109 07/12/25  1629 07/11/25  2033 07/10/25  0505   SODIUM mmol/L 140  --  141 141 134*  --  139 137   POTASSIUM mmol/L 3.9 4.0 3.3* 3.8 4.0 4.6 3.6 4.4   CHLORIDE mmol/L 105  --  104 106 100  --  107 101   CO2 mmol/L 26.7  --  28.7 24.7 24.0  --  21.1* 24.1   BUN mg/dL 13.3  --  11.4 13.4 16.0  --  15.7 11.6   CREATININE mg/dL 0.62  --  0.64 0.63 0.65  --  0.78 0.66   GLUCOSE mg/dL 114*  --  105* 100* 109*  --  103* 113*   ALBUMIN g/dL 3.4*  --   --   --   --   --  3.2*  --    BILIRUBIN mg/dL 0.3  --   --   --   --   --  0.4  --    ALK PHOS U/L 78  --   --   --   --   --  83  --    AST (SGOT) U/L 14  --   --   --   --   --  19  --    ALT (SGPT) U/L 14  --   --   --   --   --  22  --    LIPASE U/L  --   --   --   --   --   --  43  --      ABG      UA    Results from last 7 days   Lab Units  "07/11/25  2251   NITRITE UA  Negative   WBC UA /HPF 0-2   BACTERIA UA /HPF None Seen   SQUAM EPITHEL UA /HPF 0-2     ASTER  No results found for: \"POCMETH\", \"POCAMPHET\", \"POCBARBITUR\", \"POCBENZO\", \"POCCOCAINE\", \"POCOPIATES\", \"POCOXYCODO\", \"POCPHENCYC\", \"POCPROPOXY\", \"POCTHC\", \"POCTRICYC\"  Lysis Labs   Results from last 7 days   Lab Units 07/16/25  0507 07/15/25  0558 07/14/25  0436 07/13/25  0109 07/11/25  2138 07/11/25  2033 07/10/25  0505   INR   --   --   --   --   --  1.75*  --    APTT seconds  --   --   --   --   --  32.2  --    HEMOGLOBIN g/dL 10.5* 11.1* 10.1* 9.8* 10.8*  --  10.1*   PLATELETS 10*3/mm3 213 221 207 191 212  --  216   CREATININE mg/dL 0.62 0.64 0.63 0.65  --  0.78 0.66     Radiology(recent) No radiology results for the last day      Imaging Results (Last 24 Hours)       ** No results found for the last 24 hours. **            Results from last 7 days   Lab Units 07/11/25  2139   HSTROP T ng/L 50*       EKG         I personally viewed and interpreted the patient's EKG/Telemetry data:        ECHOCARDIOGRAM:     Results for orders placed during the hospital encounter of 07/01/25    Adult Transesophageal Echo (ANN) W/ Cont if Necessary Per Protocol (Cardiology Department) 07/03/2025  3:34 PM    Interpretation Summary  •  Left ventricular systolic function is normal. Estimated left ventricular EF = 60% Left ventricular ejection fraction appears to be 56 - 60%.  •  Left ventricular wall thickness is consistent with mild to moderate concentric hypertrophy.  •  Left ventricular diastolic function is consistent with (grade I) impaired relaxation.  •  The left atrial cavity is mild to moderately dilated.  •  Abnormal mitral valve structure consistent with dilated annulus.  •  Estimated right ventricular systolic pressure from tricuspid regurgitation is normal (<35 mmHg).  •  There is a trivial pericardial effusion.    Procedure indication  Atrial fibrillation with uncontrollable rate    conscious sedation " administered by anesthesia  Timeout before procedure    consent obtained before procedure      Procedure note  after obtaining a valid consent patient was sedated by Anesthesia and a ANN probe was easily placed into esophagus with multiplane imaging with 2D, color and Doppler followed by bubble study with agitated saline without any complications    ANN  Findings    Mild to moderate left atrial enlargement with mild to moderate central MR without any shunt or clot  EF is normal with EF of 60% with mild LVH  Mild RVH with normal RV systolic  Mild TR without pulm hypertension and trivial effusion noted    Plan  Proceed with cardioversion    Procedures done  Transesophageal echocardiogram    Electronically signed by Bull Wiggins MD, 07/03/25, 3:34 PM EDT.                Assessment & Plan            Febrile neutropenia    Persistent atrial fibrillation        ASSESSMENT:    Neutropenic fever with possible bacterial pneumonia, unspecified organism   Atrial fibrillation  Multiple myeloma   Chronic diastolic heart failure   DM type II, controlled   Hypothyroidism  Anxiety      PLAN:    Neutropenia being managed by primary and heme/onc- antibiotics  Atrial fibrillation with failed cardioversion and sotalol trial- will attempt ablation today with Dr. Jimenez, off anticoagulation  Treatment for multiple myeloma on hold due to acute infective process  Continue supportive care  Hypokalemic this morning- replacement ongoing, keep potassium greater than 4 magnesium greater than 2  Education on anxitey treatment may depend or be limited by success of ablation and need for cardiac medications that may affect QT in combination with anxiety medications that could also affect, recommend anxiety medication that would not affect QT    STANLEY Bocanegra

## 2025-07-16 NOTE — ANESTHESIA POSTPROCEDURE EVALUATION
Patient: Phuong Altamirano    Procedure Summary       Date: 07/16/25 Room / Location: [facility] CATH LAB 3 / [facility] CATH INVASIVE LOCATION    Anesthesia Start: 1242 Anesthesia Stop: 1444    Procedure: Ablation atrial fibrillation, pfa Diagnosis:       Persistent atrial fibrillation      (afib)    Providers: Jaswant Jimenez MD Provider: Grzegorz Vazquez MD    Anesthesia Type: general ASA Status: 3            Anesthesia Type: general    Vitals  Vitals Value Taken Time   /64 07/16/25 15:01   Temp 97.8 °F (36.6 °C) 07/16/25 14:40   Pulse 91 07/16/25 15:02   Resp 18 07/16/25 14:55   SpO2 90 % 07/16/25 15:02   Vitals shown include unfiled device data.        Post Anesthesia Care and Evaluation    Patient location during evaluation: PACU  Patient participation: complete - patient participated  Level of consciousness: awake  Pain scale: See nurse's notes for pain score.  Pain management: adequate    Airway patency: patent  Anesthetic complications: No anesthetic complications  PONV Status: none  Cardiovascular status: acceptable  Respiratory status: acceptable and spontaneous ventilation  Hydration status: acceptable    Comments: Patient seen and examined postoperatively; vital signs stable; SpO2 greater than or equal to 90%; cardiopulmonary status stable; nausea/vomiting adequately controlled; pain adequately controlled; no apparent anesthesia complications; patient discharged from anesthesia care when discharge criteria were met

## 2025-07-16 NOTE — PROGRESS NOTES
Hematology/Oncology Inpatient Progress Note    PATIENT NAME: Phuong Altamirano  : 1955  MRN: [id number]    CHIEF COMPLAINT: Fevers and chills.     HISTORY OF PRESENT ILLNESS:      3/20/2025: Ms. 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 In early , she had a bone marrow biopsy and aspiration that revealed equivocal results, leading to a repeat bone marrow biopsy in . This disclosed a normocellular bone marrow with trilineage hematopoiesis and 20% plasma cells. On flow cytometry, only 1% of the plasma cells revealed monoclonal restriction. Continued observation at [facility] showed no evidence of progression. She was last seen sometime in  without new problems.Today, she reports being largely asymptomatic. She remains active and energetic, has a good appetite, and her weight has been stable. She is not experiencing fevers or diaphoresis. She does not report any chest pain. She does experience dyspnea with exertion, which she attributes to her lack of physical activity. She is not experiencing abdominal pain, diarrhea, or dysuria. On exam she is conversant and oriented. No jaundice and no distress but she appears chronically ill. No oral lesions. Respirations not labored. Lungs clear and heart regular. Abdomen protuberant and soft; the liver and spleen don't seem enlarged. No edema. Reviewed all the records and all laboratory exams. Discussed with her. 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 She might need a bone marrow aspiration and biopsy. She will have additional studies today and will see me with the results.      2025: In the office sooner than scheduled.   Her protein electrophoresis revealed a small increase in the M spike from 1.2 g/dL at the previous measurement available to 1.4 g/dL.  In addition the free lambda light chains increased from 11.1 mg/L to 309 mg/L, changing the ratio significantly.  She feels as well as she did at the time of the last visit.  On exam she is well-oriented and conversant.  She does not seem in any distress and she is not jaundiced.  The lungs are clear bilaterally and the heart regular.  The abdomen is protuberant and soft.  There is no edema.  Reviewed and discussed the laboratory exams with her.  I have asked her to allow me to obtain a bone marrow biopsy and aspiration as well as long bone survey and a 24-hour urine protein quantification and electrophoresis.  She will see me with results.     4/28/2025: She underwent a bone marrow biopsy and aspiration without any complications. The results indicate that her bone marrow is hypercellular, with 65% of nucleated precursors being plasma cells exhibiting an abnormal immunophenotype and lambda light chain restriction. The karyotype reveals a normal female complement; however, the FISH panel indicates a complex picture with gain of chromosome 1q21, deletion of 13q, loss of MAF/16q, and aneuploidy with gain of chromosomes 7, 9, and 15.On the basis of the above, Ms. Altamirano can be considered to have multiple myeloma, despite the relatively lower monoclonal protein in the blood. In addition the gain of chromosome 1q and the loss of MAF/16q ae considered poor prognostic markers. Under the new guidelines her condition can no longer be considered only smoldering myeloma and treatment is well justified. She's to have a PET scan for staging and new immunofixation and electrophoresis as well as light chain quantification. She's to see me with results.      5/19/2025: Ms. Altamirano is an established patient of mine. For some time she had been followed for what had been diagnosed with monoclonal  "gammopathy of undetermined significance. 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 At the time of the first visit with me she had an increase in the monoclonal bland and a decision was made to obtain staging studies again. This time her bone marrow contains at least 60% abnormal plasma cells. There is no suggestion of end organ damage and her condition can still be classified as smoldering myeloma but she is at high risk of developing complications and it is reasonable at this time to start treatment. I had a long discussion with her regarding the options available. On exam she is alert, conversant and oriented. No distress. No jaundice. No oral lesions. Respirations not labored. Lungs diminished and heart regular. Abdomen soft and minimal edema at this time. Reviewed the laboratory exams. To continue treatment for her congestive heart failure and to see me in the office after discharge.      5/27/2025: Was discharged from the hospital.  She was feeling better at the time of discharge.  Today she tells me she has not been feeling as well.  \"I do not have the umpf\".  Also frequently nauseated.  Her blood pressure has been much better controlled.  She has returned to her usual activities, including her work.  On exam she is oriented and conversant.  No distress.  No jaundice.  No edema.  Laboratory exams reviewed with her.  Treatment is well justified and discussed that with her despite the fact that she has no bone lesions and preserved renal function.  Her calcium has been within normal limits.  She does have mild normocytic anemia.  Discussed at length with her.  Explained the options of treatment.  Discussed with her the potential complications if treatment is not started.  She is agreeable to commencement of treatment with the clarification that she could stop at any point if she so decides.  She wants to go on a " vacation and will start after that vacation.     6/30/2025: Had been nauseated before the commencement of the treatment. She became much more nauseated and started vomiting. She was asked to hold the lenalidomide and to use the promethazine for the nausea. She feels much better today. She has been able to eat. No fevers. Denied chest pain or cough and has not had any more dyspnea. No abdominal pain or diarrhea. On exam alert and chronically ill appearing. No distress. No jaundice. No oral lesions and respirations not labored. Lungs diminished bilaterally and heart regular. Abdomen soft. No edema. Reviewed the laboratory exams. Discussed with her. To hold all treatment this week until fully recovered. UA and culture today. Hold the pravastatin for now. See me in 3 weeks.     07/12/25  Ms. Altamirano was initially seen in March 2025 for referral due to longstanding monoclonal gammopathy.  It was first noted around 2015 when she underwent testing to investigate arthralgias.  At the time of the initial consultation she was largely asymptomatic.  It was suspected that she had smoldering myeloma rather than monoclonal gammopathy of undetermined significance.  Her protein electrophoresis revealed an increase in her M spike to 1.4 g/dL and an increase in her free lambda light chains to 309 mg/L changing her ratio significantly.  Due to this it was decided to perform a bone marrow biopsy. Bone marrow biopsy and aspiration indicated that her bone marrow was hypercellular with 65% of nucleated precursors being plasma cells exhibiting an abnormal immunophenotype and lambda light chain restriction.  The karyotype revealed a normal female complement; however, the FISH panel indicated a complex picture with gain of chromosome 1 q. 21, deletion of 13 q., loss of MAF/16 q., and aneuploidy with gain of chromosomes 7, 9, and 15.  Based on the bone marrow results she was considered to have multiple myeloma despite the relatively lower  monoclonal protein in the blood.  In addition her results indicated poor prognostic markers.  Due to this she was considered a candidate for treatment.    Subjective   7/16/2025: Feels much better.  Active and eating well, better than before.  Was able to defecate a small amount and feels better.    ROS:  Review of Systems   Constitutional:  Positive for activity change, fatigue and fever. Negative for appetite change, chills, diaphoresis and unexpected weight change.   HENT:  Negative for congestion, dental problem, drooling, ear discharge, ear pain, facial swelling, hearing loss, mouth sores, nosebleeds, postnasal drip, rhinorrhea, sinus pressure, sinus pain, sneezing, sore throat, tinnitus, trouble swallowing and voice change.    Eyes:  Negative for photophobia, pain, discharge, redness, itching and visual disturbance.   Respiratory:  Negative for apnea, cough, choking, chest tightness, shortness of breath, wheezing and stridor.    Cardiovascular:  Negative for chest pain, palpitations and leg swelling.   Gastrointestinal:  Negative for abdominal distention, abdominal pain, anal bleeding, blood in stool, constipation, diarrhea, nausea, rectal pain and vomiting.   Endocrine: Negative for cold intolerance, heat intolerance, polydipsia and polyuria.   Genitourinary:  Negative for decreased urine volume, difficulty urinating, dysuria, flank pain, frequency, genital sores, hematuria and urgency.   Musculoskeletal:  Negative for arthralgias, back pain, gait problem, joint swelling, myalgias, neck pain and neck stiffness.   Skin:  Negative for color change, pallor and rash.   Neurological:  Negative for dizziness, tremors, seizures, syncope, facial asymmetry, speech difficulty, weakness, light-headedness, numbness and headaches.   Hematological:  Negative for adenopathy. Does not bruise/bleed easily.   Psychiatric/Behavioral:  Negative for agitation, behavioral problems, confusion, decreased concentration,  hallucinations, self-injury, sleep disturbance and suicidal ideas. The patient is not nervous/anxious.       MEDICATIONS:    Scheduled Meds:  acyclovir, 400 mg, Oral, BID  [Transfer Hold] apixaban, 5 mg, Oral, Q12H  cefepime, 2,000 mg, Intravenous, Q8H  [Transfer Hold] digoxin, 250 mcg, Intravenous, Daily  [Transfer Hold] ferrous sulfate, 325 mg, Oral, Daily With Breakfast  [Transfer Hold] furosemide, 20 mg, Oral, BID Diuretics  [Transfer Hold] insulin lispro, 2-7 Units, Subcutaneous, 4x Daily AC & at Bedtime  [Transfer Hold] levothyroxine, 100 mcg, Oral, Q AM  [Transfer Hold] pantoprazole, 40 mg, Oral, Q AM  [Transfer Hold] sodium chloride, 10 mL, Intravenous, Q12H  sotalol, 80 mg, Oral, Q12H  sulfamethoxazole-trimethoprim, 1 tablet, Oral, Once per day on Monday Wednesday Friday       Continuous Infusions:  Pharmacy To Dose:,        PRN Meds:    [Transfer Hold] acetaminophen **OR** [Transfer Hold] acetaminophen **OR** [Transfer Hold] acetaminophen    [Transfer Hold] albuterol    [Transfer Hold] senna-docusate sodium **AND** [Transfer Hold] polyethylene glycol **AND** [Transfer Hold] bisacodyl **AND** [Transfer Hold] bisacodyl    [Transfer Hold] calcium carbonate    [Transfer Hold] Calcium Replacement - Follow Nurse / BPA Driven Protocol    [Transfer Hold] dextrose    [Transfer Hold] dextrose    [Transfer Hold] digoxin    [Transfer Hold] glucagon (human recombinant)    [Transfer Hold] hydrOXYzine    [Transfer Hold] Magnesium Standard Dose Replacement - Follow Nurse / BPA Driven Protocol    [Transfer Hold] melatonin    [Transfer Hold] nitroglycerin    Pharmacy To Dose:    [Transfer Hold] Phosphorus Replacement - Follow Nurse / BPA Driven Protocol    Potassium Replacement - Follow Nurse / BPA Driven Protocol    [Transfer Hold] prochlorperazine    [Transfer Hold] promethazine    [Transfer Hold] sodium chloride    [Transfer Hold] sodium chloride     ALLERGIES:  No Known Allergies    Objective    VITALS:   /48    "Pulse 93   Temp 97.8 °F (36.6 °C) (Oral)   Resp 18   Ht 154.9 cm (61\")   Wt 77.1 kg (169 lb 15.6 oz)   LMP 10/17/2014   SpO2 94%   BMI 32.12 kg/m²     PHYSICAL EXAM: (performed by MD)  Physical Exam  Constitutional:       General: She is not in acute distress.     Appearance: She is ill-appearing. She is not toxic-appearing or diaphoretic.   HENT:      Head: Normocephalic and atraumatic.      Right Ear: External ear normal.      Left Ear: External ear normal.      Nose: Nose normal.      Mouth/Throat:      Mouth: Mucous membranes are moist.      Pharynx: Oropharynx is clear. No oropharyngeal exudate or posterior oropharyngeal erythema.   Eyes:      General: No scleral icterus.        Right eye: No discharge.         Left eye: No discharge.      Conjunctiva/sclera: Conjunctivae normal.      Pupils: Pupils are equal, round, and reactive to light.   Cardiovascular:      Rate and Rhythm: Normal rate and regular rhythm.      Pulses: Normal pulses.      Heart sounds: No murmur heard.     No friction rub. No gallop.   Pulmonary:      Effort: No respiratory distress.      Breath sounds: No stridor. No wheezing, rhonchi or rales.   Abdominal:      General: Bowel sounds are normal. There is no distension.      Palpations: Abdomen is soft. There is no mass.      Tenderness: There is no abdominal tenderness. There is no right CVA tenderness, left CVA tenderness, guarding or rebound.      Hernia: No hernia is present.   Musculoskeletal:         General: No tenderness, deformity or signs of injury.      Cervical back: No rigidity.      Right lower leg: No edema.      Left lower leg: No edema.   Lymphadenopathy:      Cervical: No cervical adenopathy.   Skin:     Coloration: Skin is not jaundiced or pale.      Findings: No bruising, lesion or rash.   Neurological:      General: No focal deficit present.      Mental Status: She is alert and oriented to person, place, and time.      Cranial Nerves: No cranial nerve deficit. "   Psychiatric:         Mood and Affect: Mood normal.         Behavior: Behavior normal.         Thought Content: Thought content normal.         Judgment: Judgment normal.     BRADLEY Barrett MD performed a physical exam on 7/16/2025 as documented above.    RECENT LABS:  Lab Results (last 24 hours)       Procedure Component Value Units Date/Time    POC Glucose Once [[id number]]  (Abnormal) Collected: 07/16/25 1611    Specimen: Blood Updated: 07/16/25 1614     Glucose 116 mg/dL      Comment: Serial Number: [id number]:  [id number]       POC Glucose 4x Daily Before Meals & at Bedtime [[id number]]  (Normal) Collected: 07/16/25 0729    Specimen: Blood Updated: 07/16/25 0732     Glucose 93 mg/dL      Comment: Serial Number: [id number]:  [id number]       CBC & Differential [[id number]]  (Abnormal) Collected: 07/16/25 0507    Specimen: Blood from Arm, Right Updated: 07/16/25 0543    Narrative:      The following orders were created for panel order CBC & Differential.  Procedure                               Abnormality         Status                     ---------                               -----------         ------                     CBC Auto Differential[[id number]]        Abnormal            Final result               Scan Slide[[id number]]                                       Final result                 Please view results for these tests on the individual orders.    CBC Auto Differential [[id number]]  (Abnormal) Collected: 07/16/25 0507    Specimen: Blood from Arm, Right Updated: 07/16/25 0543     WBC 10.40 10*3/mm3      RBC 3.76 10*6/mm3      Hemoglobin 10.5 g/dL      Hematocrit 33.9 %      MCV 90.2 fL      MCH 27.9 pg      MCHC 31.0 g/dL      RDW 15.4 %      RDW-SD 50.8 fl      MPV 10.0 fL      Platelets 213 10*3/mm3     Narrative:      Instrument flags present, additional testing may be recommended.    Scan Slide [[id number]] Collected: 07/16/25 0507    Specimen: Blood from Arm, Right Updated: 07/16/25  0543     Scan Slide --     Comment: See Manual Differential Results       Manual Differential [[id number]]  (Abnormal) Collected: 07/16/25 0507    Specimen: Blood from Arm, Right Updated: 07/16/25 0543     Neutrophil % 39.0 %      Lymphocyte % 15.0 %      Monocyte % 2.0 %      Bands %  28.0 %      Metamyelocyte % 1.0 %      Atypical Lymphocyte % 15.0 %      Neutrophils Absolute 6.97 10*3/mm3      Lymphocytes Absolute 3.12 10*3/mm3      Monocytes Absolute 0.21 10*3/mm3      Dacrocytes Slight/1+     Poikilocytes Slight/1+     Polychromasia Slight/1+     WBC Morphology Normal     Platelet Estimate Adequate     Large Platelets Slight/1+    Comprehensive Metabolic Panel [[id number]]  (Abnormal) Collected: 07/16/25 0507    Specimen: Blood from Arm, Right Updated: 07/16/25 0535     Glucose 114 mg/dL      BUN 13.3 mg/dL      Creatinine 0.62 mg/dL      Sodium 140 mmol/L      Potassium 3.9 mmol/L      Chloride 105 mmol/L      CO2 26.7 mmol/L      Calcium 9.3 mg/dL      Total Protein 5.5 g/dL      Albumin 3.4 g/dL      ALT (SGPT) 14 U/L      AST (SGOT) 14 U/L      Alkaline Phosphatase 78 U/L      Total Bilirubin 0.3 mg/dL      Globulin 2.1 gm/dL      A/G Ratio 1.6 g/dL      BUN/Creatinine Ratio 21.5     Anion Gap 8.3 mmol/L      eGFR 95.9 mL/min/1.73     Narrative:      GFR Categories in Chronic Kidney Disease (CKD)              GFR Category          GFR (mL/min/1.73)    Interpretation  G1                    90 or greater        Normal or high (1)  G2                    60-89                Mild decrease (1)  G3a                   45-59                Mild to moderate decrease  G3b                   30-44                Moderate to severe decrease  G4                    15-29                Severe decrease  G5                    14 or less           Kidney failure    (1)In the absence of evidence of kidney disease, neither GFR category G1 or G2 fulfill the criteria for CKD.    eGFR calculation 2021 CKD-EPI creatinine equation,  which does not include race as a factor    Blood Culture - Blood, Arm, Left [[id number]]  (Normal) Collected: 07/11/25 2015    Specimen: Blood from Arm, Left Updated: 07/15/25 2030     Blood Culture No growth at 4 days    Narrative:      Less than seven (7) mL's of blood was collected.  Insufficient quantity may yield false negative results.    Blood Culture - Blood, Arm, Right [[id number]]  (Normal) Collected: 07/11/25 2015    Specimen: Blood from Arm, Right Updated: 07/15/25 2030     Blood Culture No growth at 4 days    Narrative:      Less than seven (7) mL's of blood was collected.  Insufficient quantity may yield false negative results.    POC Glucose Once [[id number]]  (Abnormal) Collected: 07/15/25 2026    Specimen: Blood Updated: 07/15/25 2029     Glucose 144 mg/dL      Comment: Serial Number: [id number]:  [id number]       Potassium [[id number]]  (Normal) Collected: 07/15/25 1729    Specimen: Blood Updated: 07/15/25 1752     Potassium 4.0 mmol/L           IMAGING REVIEWED:  No radiology results for the last day      Assessment & Plan   ASSESSMENT:  Sepsis: Her neutropenia is resolved.  She has been afebrile.  She feels better.  To discontinue the filgrastim.  Multiple myeloma: Hold treatment.  To undergo ablation today.  Addendum: Sepsis was present on admission AC 7/18/2025 at 14:06  PLAN:  As above.    Neftali Barrett MD on 7/16/2025 at 1632.

## 2025-07-17 ENCOUNTER — READMISSION MANAGEMENT (OUTPATIENT)
Dept: CALL CENTER | Facility: HOSPITAL | Age: 70
End: 2025-07-17
Payer: MEDICARE

## 2025-07-17 VITALS
HEART RATE: 74 BPM | DIASTOLIC BLOOD PRESSURE: 66 MMHG | OXYGEN SATURATION: 100 % | BODY MASS INDEX: 32.17 KG/M2 | HEIGHT: 61 IN | WEIGHT: 170.42 LBS | RESPIRATION RATE: 16 BRPM | TEMPERATURE: 98 F | SYSTOLIC BLOOD PRESSURE: 130 MMHG

## 2025-07-17 LAB
GLUCOSE BLDC GLUCOMTR-MCNC: 109 MG/DL (ref 70–105)
GLUCOSE BLDC GLUCOMTR-MCNC: 115 MG/DL (ref 70–105)
Lab: ABNORMAL
Lab: ABNORMAL

## 2025-07-17 PROCEDURE — 25010000002 CEFEPIME PER 500 MG: Performed by: INTERNAL MEDICINE

## 2025-07-17 PROCEDURE — 99232 SBSQ HOSP IP/OBS MODERATE 35: CPT | Performed by: INTERNAL MEDICINE

## 2025-07-17 PROCEDURE — 99232 SBSQ HOSP IP/OBS MODERATE 35: CPT | Performed by: NURSE PRACTITIONER

## 2025-07-17 PROCEDURE — 82948 REAGENT STRIP/BLOOD GLUCOSE: CPT | Performed by: INTERNAL MEDICINE

## 2025-07-17 RX ADMIN — APIXABAN 5 MG: 5 TABLET, FILM COATED ORAL at 09:21

## 2025-07-17 RX ADMIN — CEFEPIME 2000 MG: 2 INJECTION, POWDER, FOR SOLUTION INTRAVENOUS at 00:38

## 2025-07-17 RX ADMIN — PANTOPRAZOLE SODIUM 40 MG: 40 TABLET, DELAYED RELEASE ORAL at 05:36

## 2025-07-17 RX ADMIN — SOTALOL HYDROCHLORIDE 80 MG: 80 TABLET ORAL at 09:21

## 2025-07-17 RX ADMIN — ACYCLOVIR 400 MG: 200 CAPSULE ORAL at 09:21

## 2025-07-17 RX ADMIN — CEFEPIME 2000 MG: 2 INJECTION, POWDER, FOR SOLUTION INTRAVENOUS at 09:21

## 2025-07-17 RX ADMIN — FERROUS SULFATE TAB 325 MG (65 MG ELEMENTAL FE) 325 MG: 325 (65 FE) TAB at 09:21

## 2025-07-17 RX ADMIN — FUROSEMIDE 20 MG: 20 TABLET ORAL at 09:21

## 2025-07-17 RX ADMIN — Medication 10 ML: at 09:21

## 2025-07-17 RX ADMIN — BISACODYL 5 MG: 5 TABLET, COATED ORAL at 10:11

## 2025-07-17 RX ADMIN — POLYETHYLENE GLYCOL 3350 17 G: 17 POWDER, FOR SOLUTION ORAL at 00:38

## 2025-07-17 RX ADMIN — LEVOTHYROXINE SODIUM 100 MCG: 0.1 TABLET ORAL at 05:36

## 2025-07-17 NOTE — PLAN OF CARE
Goal Outcome Evaluation:   Pt femoral site dry and intact and neurovascular assessments WDL so far this shift. Pt having some complaints of constipation, PRN miralax given per order. No other complaints at this time. Call light within reach.

## 2025-07-17 NOTE — PROGRESS NOTES
Reason for follow-up: A-fib     Patient Care Team:  Chula Sanchez APRN as PCP - General (Nurse Practitioner)  Reva Jaeger MD as PCP - Family Medicine  Neftali Barrett MD as Consulting Physician (Hematology and Oncology)    Lucina Cem Altamirano well today     ROS    Patient has no known allergies.    Scheduled Meds:acyclovir, 400 mg, Oral, BID  apixaban, 5 mg, Oral, Q12H  cefepime, 2,000 mg, Intravenous, Q8H  ferrous sulfate, 325 mg, Oral, Daily With Breakfast  furosemide, 20 mg, Oral, BID Diuretics  insulin lispro, 2-7 Units, Subcutaneous, 4x Daily AC & at Bedtime  levothyroxine, 100 mcg, Oral, Q AM  pantoprazole, 40 mg, Oral, Q AM  sodium chloride, 10 mL, Intravenous, Q12H  sotalol, 80 mg, Oral, Q12H  sulfamethoxazole-trimethoprim, 1 tablet, Oral, Once per day on Monday Wednesday Friday      Continuous Infusions:Pharmacy To Dose:,       PRN Meds:.  acetaminophen **OR** acetaminophen **OR** acetaminophen    albuterol    senna-docusate sodium **AND** polyethylene glycol **AND** bisacodyl **AND** bisacodyl    calcium carbonate    Calcium Replacement - Follow Nurse / BPA Driven Protocol    dextrose    dextrose    glucagon (human recombinant)    hydrOXYzine    Magnesium Standard Dose Replacement - Follow Nurse / BPA Driven Protocol    melatonin    nitroglycerin    nitroglycerin    Pharmacy To Dose:    Phosphorus Replacement - Follow Nurse / BPA Driven Protocol    Potassium Replacement - Follow Nurse / BPA Driven Protocol    prochlorperazine    promethazine    sodium chloride    sodium chloride      VITAL SIGNS  Vitals:    07/16/25 2011 07/17/25 0000 07/17/25 0353 07/17/25 0740   BP: 105/57 121/66 115/58 133/68   BP Location:  Left arm Left arm    Patient Position:  Lying Lying    Pulse: 102 88 83 84   Resp:  23 24 24   Temp:  98 °F (36.7 °C) 97.2 °F (36.2 °C) 97.5 °F (36.4 °C)   TempSrc:  Oral Oral Oral   SpO2:  94% 99% 100%   Weight:   77.3 kg (170 lb 6.7 oz)    Height:           Flowsheet  "Rows      Flowsheet Row First Filed Value   Admission Height 154.9 cm (61\") Documented at 07/11/2025 1852   Admission Weight 79.5 kg (175 lb 4.3 oz) Documented at 07/11/2025 1852               Physical Exam  VITALS REVIEWED    General:      well developed, in no acute distress.    Head:      normocephalic and atraumatic.    Eyes:      PERRL/EOM intact, conjunctiva and sclera clear with out nystagmus.    Neck:      no masses, thyromegaly,  trachea central with normal respiratory effort and PMI displaced laterally  Lungs:      Clear  Heart:       Regular rate and rhythm  Msk:      no deformity or scoliosis noted of thoracic or lumbar spine.    Pulses:      pulses normal in all 4 extremities.    Extremities:       No lower extremity edema  Neurologic:      no focal deficits.   alert oriented x3  Skin:      intact without lesions or rashes.    Psych:      alert and cooperative; normal mood and affect; normal attention span and concentration.          LAB RESULTS (LAST 7 DAYS)    CBC  Results from last 7 days   Lab Units 07/16/25  0507 07/15/25  0558 07/14/25 0436 07/13/25 0109 07/11/25 2138   WBC 10*3/mm3 10.40 4.83 2.57* 2.19* 1.39*   RBC 10*6/mm3 3.76* 4.01 3.61* 3.53* 3.91   HEMOGLOBIN g/dL 10.5* 11.1* 10.1* 9.8* 10.8*   HEMATOCRIT % 33.9* 35.3 33.0* 32.6* 35.5   MCV fL 90.2 88.0 91.4 92.4 90.8   PLATELETS 10*3/mm3 213 221 207 191 212       BMP  Results from last 7 days   Lab Units 07/16/25  0507 07/15/25  1729 07/15/25  0558 07/14/25  0436 07/13/25  0109 07/12/25  1629 07/11/25 2033 07/11/25 2015   SODIUM mmol/L 140  --  141 141 134*  --  139  --    POTASSIUM mmol/L 3.9 4.0 3.3* 3.8 4.0 4.6 3.6  --    CHLORIDE mmol/L 105  --  104 106 100  --  107  --    CO2 mmol/L 26.7  --  28.7 24.7 24.0  --  21.1*  --    BUN mg/dL 13.3  --  11.4 13.4 16.0  --  15.7  --    CREATININE mg/dL 0.62  --  0.64 0.63 0.65  --  0.78  --    GLUCOSE mg/dL 114*  --  105* 100* 109*  --  103*  --    MAGNESIUM mg/dL  --   --  1.9 2.1 2.2  -- "  1.7  --    PHOSPHORUS mg/dL  --   --   --   --   --   --   --  3.1       CMP   Results from last 7 days   Lab Units 07/16/25  0507 07/15/25  1729 07/15/25  0558 07/14/25  0436 07/13/25  0109 07/12/25  1629 07/11/25 2033   SODIUM mmol/L 140  --  141 141 134*  --  139   POTASSIUM mmol/L 3.9 4.0 3.3* 3.8 4.0 4.6 3.6   CHLORIDE mmol/L 105  --  104 106 100  --  107   CO2 mmol/L 26.7  --  28.7 24.7 24.0  --  21.1*   BUN mg/dL 13.3  --  11.4 13.4 16.0  --  15.7   CREATININE mg/dL 0.62  --  0.64 0.63 0.65  --  0.78   GLUCOSE mg/dL 114*  --  105* 100* 109*  --  103*   ALBUMIN g/dL 3.4*  --   --   --   --   --  3.2*   BILIRUBIN mg/dL 0.3  --   --   --   --   --  0.4   ALK PHOS U/L 78  --   --   --   --   --  83   AST (SGOT) U/L 14  --   --   --   --   --  19   ALT (SGPT) U/L 14  --   --   --   --   --  22   LIPASE U/L  --   --   --   --   --   --  43         BNP        TROPONIN  Results from last 7 days   Lab Units 07/11/25 2139   HSTROP T ng/L 50*       CoAg  Results from last 7 days   Lab Units 07/11/25 2033   INR  1.75*   APTT seconds 32.2       Creatinine Clearance  Estimated Creatinine Clearance: 79.4 mL/min (by C-G formula based on SCr of 0.62 mg/dL).    ABG          EKG    I personally reviewed the patient's EKG/Telemetry data: Sinus rhythm      Assessment & Plan       Febrile neutropenia    Persistent atrial fibrillation      Phuong Altamirano is a 70-year-old female patient who has multiple myeloma for many years, recently started on chemotherapy, see oncology notes for details.  Patient started developing atrial fibrillation earlier this month, she was first cardioverted on 7/8/2025, A-fib however recurred and sotalol was initiated and self converted to sinus rhythm.  12-week later however arrhythmia recurred, heart rate is reaching 140 bpm, very symptomatic with it.     Since the patient failed antiarrhythmic therapy, I think the next option is to perform ablation.  Although she has multiple myeloma and  chemotherapy is initiated, it is clear that she did have A-fib even before starting chemotherapy and therefore I do not think that A-fib is solely related to her receiving chemotherapy.    7/17/2025    Patient underwent ablation on 7/16/2025 with PVI and posterior wall isolation, in sinus rhythm.  Okay for discharge from my standpoint, she will need to stay on sotalol 80 mg every 12 hours and Eliquis for now.    I discussed the patients findings and my recommendations with patient and plan.    Jaswant Jimenez MD  07/17/25  10:02 EDT        Electronically signed by Jaswant Jimenez MD, 07/17/25, 10:03 AM EDT.

## 2025-07-17 NOTE — CASE MANAGEMENT/SOCIAL WORK
Case Management Discharge Note      Final Note: routine home    Provided Post Acute Provider List?: N/A  Provided Post Acute Provider Quality & Resource List?: N/A    Selected Continued Care - Discharged on 7/17/2025 Admission date: 7/11/2025 - Discharge disposition: Home or Self Care       Transportation Services  Transportation: Private Transportation  Private: Car    Final Discharge Disposition Code: 01 - home or self-care

## 2025-07-17 NOTE — PLAN OF CARE
Goal Outcome Evaluation:               Patient d/c home with post ablation education

## 2025-07-17 NOTE — CASE MANAGEMENT/SOCIAL WORK
Continued Stay Note  [facility]     Patient Name: Phuong Altamirano  MRN: [id number]  Today's Date: 7/17/2025    Admit Date: 7/11/2025     Discharge Plan       Row Name 07/17/25 1332       Plan    Plan Comments Discharge  called and secured a hospital follow up appointment with Aidan Floyd NP in provider's office on 7/22/25 at 2:00pm. AVS was updated with this appointment as well as a cardiology appointment with Dr. Wiggins on 8/4/25 at 11:30am. CM made aware.           Ciro Grijalva, Discharge   [facility]  Care Coordination  [address] [place] [id number]  Office: [phone]  Fax: [phone]

## 2025-07-17 NOTE — OUTREACH NOTE
Prep Survey      Flowsheet Row Responses   [facility]? [facility]   Is LACE score < 7 ? No   Eligibility Readm Mgmt   Discharge diagnosis Febrile neutropenia   Does the patient have one of the following disease processes/diagnoses(primary or secondary)? Other   Does the patient have Home health ordered? No   Is there a DME ordered? No   Prep survey completed? Yes            ANJEL MENDOZA - Registered Nurse

## 2025-07-17 NOTE — PROGRESS NOTES
CARDIOLOGY FOLLOW-UP PROGRESS NOTE      Reason for follow-up:    Atrial fibrillation      Attending: Giovanny Harding DO Subjective .     Patient is sitting up in the chair today in sinus rhythm. Denies chest pain, palpitations and shortness of breath. Her anxiety has much resolved today     ROS  Pertinent items are noted in HPI, all other systems reviewed and negative  Allergies: Patient has no known allergies.    Scheduled Meds:acyclovir, 400 mg, Oral, BID  apixaban, 5 mg, Oral, Q12H  cefepime, 2,000 mg, Intravenous, Q8H  ferrous sulfate, 325 mg, Oral, Daily With Breakfast  furosemide, 20 mg, Oral, BID Diuretics  insulin lispro, 2-7 Units, Subcutaneous, 4x Daily AC & at Bedtime  levothyroxine, 100 mcg, Oral, Q AM  pantoprazole, 40 mg, Oral, Q AM  sodium chloride, 10 mL, Intravenous, Q12H  sotalol, 80 mg, Oral, Q12H  sulfamethoxazole-trimethoprim, 1 tablet, Oral, Once per day on Monday Wednesday Friday        Continuous Infusions:Pharmacy To Dose:,         PRN Meds:.  acetaminophen **OR** acetaminophen **OR** acetaminophen    albuterol    senna-docusate sodium **AND** polyethylene glycol **AND** bisacodyl **AND** bisacodyl    calcium carbonate    Calcium Replacement - Follow Nurse / BPA Driven Protocol    dextrose    dextrose    glucagon (human recombinant)    hydrOXYzine    Magnesium Standard Dose Replacement - Follow Nurse / BPA Driven Protocol    melatonin    nitroglycerin    nitroglycerin    Pharmacy To Dose:    Phosphorus Replacement - Follow Nurse / BPA Driven Protocol    Potassium Replacement - Follow Nurse / BPA Driven Protocol    prochlorperazine    promethazine    sodium chloride    sodium chloride    Objective     VITAL SIGNS  Patient Vitals for the past 24 hrs:   BP Temp Temp src Pulse Resp SpO2 Weight   07/17/25 0740 133/68 97.5 °F (36.4 °C) Oral 84 24 100 % --   07/17/25 0353 115/58 97.2 °F (36.2 °C) Oral 83 24 99 % 77.3 kg (170 lb 6.7 oz)   07/17/25 0000 121/66 98 °F (36.7 °C) Oral 88 23 94 %  "--   07/16/25 2011 105/57 -- -- 102 -- -- --   07/16/25 1945 108/56 98.3 °F (36.8 °C) Oral 102 21 93 % --   07/16/25 1930 108/48 -- -- 104 -- 92 % --   07/16/25 1700 113/58 -- -- 95 -- 94 % --   07/16/25 1623 112/46 98.4 °F (36.9 °C) Oral 93 23 94 % --   07/16/25 1530 112/48 -- -- 91 -- 95 % --   07/16/25 1515 118/62 -- -- 91 -- 92 % --   07/16/25 1500 126/64 -- -- 90 -- 91 % --   07/16/25 1455 131/59 -- -- 88 18 95 % --   07/16/25 1445 132/65 -- -- 86 14 100 % --   07/16/25 1440 148/74 97.8 °F (36.6 °C) Oral 86 23 100 % --        Flowsheet Rows      Flowsheet Row First Filed Value   Admission Height 154.9 cm (61\") Documented at 07/11/2025 1852   Admission Weight 79.5 kg (175 lb 4.3 oz) Documented at 07/11/2025 1852            Body mass index is 32.2 kg/m².      Intake/Output Summary (Last 24 hours) at 7/17/2025 1150  Last data filed at 7/17/2025 0921  Gross per 24 hour   Intake 1100 ml   Output 400 ml   Net 700 ml        TELEMETRY:     Sinus rhythm 70s    Physical Exam:  Physical Exam     Constitutional:       General: she is not in acute distress.     Appearance: Normal appearance. .   HENT:      Head: Normocephalic and atraumatic.   Eyes:      Extraocular Movements: Extraocular movements intact.      Pupils: Pupils are equal, round, and reactive to light.   Cardiovascular:      Rate and Rhythm: Normal rate and regular rhythm.   Pulmonary:      Effort: Pulmonary effort is normal.      Breath sounds: Normal breath sounds.   Abdominal:      General: Abdomen not distended. Bowel sounds are normal.      Palpations: Abdomen is soft.   Musculoskeletal:      Cervical back: Normal range of motion.   Skin:     General: Skin is warm and dry.   Neurological:      General: No focal deficit present.      Mental Status: she is alert and oriented to person, place, and time. Mental status is at baseline.   Psychiatric:         Mood and Affect: Mood normal.         Behavior: Behavior normal.         Thought Content: Thought " content normal.     Scribe findings above  Results Review:   I reviewed the patient's new clinical results.    CBC    Results from last 7 days   Lab Units 07/16/25  0507 07/15/25  0558 07/14/25 0436 07/13/25 0109 07/11/25 2138   WBC 10*3/mm3 10.40 4.83 2.57* 2.19* 1.39*   HEMOGLOBIN g/dL 10.5* 11.1* 10.1* 9.8* 10.8*   PLATELETS 10*3/mm3 213 221 207 191 212     BMP   Results from last 7 days   Lab Units 07/16/25  0507 07/15/25  1729 07/15/25  0558 07/14/25  0436 07/13/25 0109 07/12/25 1629 07/11/25 2033 07/11/25 2015   SODIUM mmol/L 140  --  141 141 134*  --  139  --    POTASSIUM mmol/L 3.9 4.0 3.3* 3.8 4.0 4.6 3.6  --    CHLORIDE mmol/L 105  --  104 106 100  --  107  --    CO2 mmol/L 26.7  --  28.7 24.7 24.0  --  21.1*  --    BUN mg/dL 13.3  --  11.4 13.4 16.0  --  15.7  --    CREATININE mg/dL 0.62  --  0.64 0.63 0.65  --  0.78  --    GLUCOSE mg/dL 114*  --  105* 100* 109*  --  103*  --    MAGNESIUM mg/dL  --   --  1.9 2.1 2.2  --  1.7  --    PHOSPHORUS mg/dL  --   --   --   --   --   --   --  3.1     Cr Clearance Estimated Creatinine Clearance: 79.4 mL/min (by C-G formula based on SCr of 0.62 mg/dL).  Coag   Results from last 7 days   Lab Units 07/11/25 2033   INR  1.75*   APTT seconds 32.2     HbA1C   Lab Results   Component Value Date    HGBA1C 6.06 (H) 05/19/2025     Blood Glucose   Glucose   Date/Time Value Ref Range Status   07/17/2025 1113 115 (H) 70 - 105 mg/dL Final     Comment:     Serial Number: [id number]:  [id number]   07/17/2025 0742 109 (H) 70 - 105 mg/dL Final     Comment:     Serial Number: [id number]:  [id number]   07/16/2025 1955 156 (H) 70 - 105 mg/dL Final     Comment:     Serial Number: [id number]:  [id number]   07/16/2025 1611 116 (H) 70 - 105 mg/dL Final     Comment:     Serial Number: [id number]:  [id number]   07/16/2025 0729 93 70 - 105 mg/dL Final     Comment:     Serial Number: [id number]:  [id number]   07/15/2025 2026 144 (H) 70 - 105 mg/dL Final      "Comment:     Serial Number: [id number]:  [id number]   07/15/2025 1612 121 (H) 70 - 105 mg/dL Final     Comment:     Serial Number: [id number]:  [id number]   07/15/2025 1106 114 (H) 70 - 105 mg/dL Final     Comment:     Serial Number: [id number]:  [id number]     Infection   Results from last 7 days   Lab Units 07/11/25  2139 07/11/25 2015   BLOODCX   --  No growth at 5 days  No growth at 5 days   PROCALCITONIN ng/mL 1.15*  --      CMP   Results from last 7 days   Lab Units 07/16/25  0507 07/15/25  1729 07/15/25  0558 07/14/25  0436 07/13/25  0109 07/12/25  1629 07/11/25  2033   SODIUM mmol/L 140  --  141 141 134*  --  139   POTASSIUM mmol/L 3.9 4.0 3.3* 3.8 4.0 4.6 3.6   CHLORIDE mmol/L 105  --  104 106 100  --  107   CO2 mmol/L 26.7  --  28.7 24.7 24.0  --  21.1*   BUN mg/dL 13.3  --  11.4 13.4 16.0  --  15.7   CREATININE mg/dL 0.62  --  0.64 0.63 0.65  --  0.78   GLUCOSE mg/dL 114*  --  105* 100* 109*  --  103*   ALBUMIN g/dL 3.4*  --   --   --   --   --  3.2*   BILIRUBIN mg/dL 0.3  --   --   --   --   --  0.4   ALK PHOS U/L 78  --   --   --   --   --  83   AST (SGOT) U/L 14  --   --   --   --   --  19   ALT (SGPT) U/L 14  --   --   --   --   --  22   LIPASE U/L  --   --   --   --   --   --  43     ABG      UA    Results from last 7 days   Lab Units 07/11/25  2251   NITRITE UA  Negative   WBC UA /HPF 0-2   BACTERIA UA /HPF None Seen   SQUAM EPITHEL UA /HPF 0-2     ASTER  No results found for: \"POCMETH\", \"POCAMPHET\", \"POCBARBITUR\", \"POCBENZO\", \"POCCOCAINE\", \"POCOPIATES\", \"POCOXYCODO\", \"POCPHENCYC\", \"POCPROPOXY\", \"POCTHC\", \"POCTRICYC\"  Lysis Labs   Results from last 7 days   Lab Units 07/16/25  0507 07/15/25  0558 07/14/25  0436 07/13/25  0109 07/11/25  2138 07/11/25 2033   INR   --   --   --   --   --  1.75*   APTT seconds  --   --   --   --   --  32.2   HEMOGLOBIN g/dL 10.5* 11.1* 10.1* 9.8* 10.8*  --    PLATELETS 10*3/mm3 213 221 207 191 212  --    CREATININE mg/dL 0.62 0.64 0.63 0.65  --  0.78 "     Radiology(recent) No radiology results for the last day      Imaging Results (Last 24 Hours)       ** No results found for the last 24 hours. **            Results from last 7 days   Lab Units 07/11/25  2139   HSTROP T ng/L 50*       EKG         I personally viewed and interpreted the patient's EKG/Telemetry data:        ECHOCARDIOGRAM:     Results for orders placed during the hospital encounter of 07/01/25    Adult Transesophageal Echo (ANN) W/ Cont if Necessary Per Protocol (Cardiology Department) 07/03/2025  3:34 PM    Interpretation Summary    Left ventricular systolic function is normal. Estimated left ventricular EF = 60% Left ventricular ejection fraction appears to be 56 - 60%.    Left ventricular wall thickness is consistent with mild to moderate concentric hypertrophy.    Left ventricular diastolic function is consistent with (grade I) impaired relaxation.    The left atrial cavity is mild to moderately dilated.    Abnormal mitral valve structure consistent with dilated annulus.    Estimated right ventricular systolic pressure from tricuspid regurgitation is normal (<35 mmHg).    There is a trivial pericardial effusion.    Procedure indication  Atrial fibrillation with uncontrollable rate    conscious sedation administered by anesthesia  Timeout before procedure    consent obtained before procedure      Procedure note  after obtaining a valid consent patient was sedated by Anesthesia and a ANN probe was easily placed into esophagus with multiplane imaging with 2D, color and Doppler followed by bubble study with agitated saline without any complications    ANN  Findings    Mild to moderate left atrial enlargement with mild to moderate central MR without any shunt or clot  EF is normal with EF of 60% with mild LVH  Mild RVH with normal RV systolic  Mild TR without pulm hypertension and trivial effusion noted    Plan  Proceed with cardioversion    Procedures done  Transesophageal  echocardiogram    Electronically signed by Bull Wiggins MD, 07/03/25, 3:34 PM EDT.                Assessment & Plan            Febrile neutropenia    Persistent atrial fibrillation        ASSESSMENT:    Neutropenic fever with possible bacterial pneumonia, unspecified organism   Atrial fibrillation  Multiple myeloma   Chronic diastolic heart failure   DM type II, controlled   Hypothyroidism  Anxiety      PLAN:    Neutropenia being managed by primary and heme/onc- antibiotics  Atrial fibrillation with failed cardioversion and sotalol trial  Successful ablation yesterday  Will discharge on eliquis and Sotalol  Treatment for multiple myeloma on hold due to acute infective process  Continue supportive care  Electrolytes stable this morning  Can follow up in approximately 1 month with Dr. Felton's office    STANLEY Bocanegra

## 2025-07-17 NOTE — DISCHARGE SUMMARY
[facility]  Discharge Summary    Date of Service: 2025  Patient Name: Phuong Altamirano  : 1955  MRN: [id number]    Date of Admission: 2025  Discharge Diagnosis: Febrile neutropenia  Date of Discharge: 2025  Primary Care Physician: Chula Sanchez APRN      Presenting Problem:   Hypocalcemia [E83.51]  Tachycardia [R00.0]  Febrile neutropenia [D70.9, R50.81]  Fever, unspecified fever cause [R50.9]  Leukopenia, unspecified type [D72.819]  Dyspnea, unspecified type [R06.00]    Active and Resolved Hospital Problems:  Active Hospital Problems    Diagnosis POA    **Febrile neutropenia [D70.9, R50.81] Yes    Persistent atrial fibrillation [I48.19] Unknown      Resolved Hospital Problems   No resolved problems to display.         Hospital Course     HPI:  Phuong Altamirano is a 70 y.o. female with a CMH of morbid obesity, ENRICO, former smoker, hyperlipidemia, type 2 diabetes mellitus, paroxysmal A-fib on Eliquis, chronic diastolic heart failure, multiple myeloma on Revlimid 8, asthma, lupus on Plaquenil, hypothyroidism, GERD who presented to [facility] on 2025 with chills.     She was discharged yesterday from the hospital after 1 week stay for atrial fibrillation with RVR requiring cardioversion and initiation of sotalol, diuresis for diastolic CHF exacerbation.  She did well until 4 PM when she developed sudden onset of chills, body aches, nausea vomiting and diarrhea and short of breath.     Febrile in the ED temp 101, tachycardic sinus tach heart rate 115, /81, wean down off oxygen  Labs significant for bicytopenia  Chest x-ray showed chronic changes, no acute airspace disease    See below for details of her hospitalization:             Hospital Course:    Neutropenic fever with possible bacterial pneumonia, unspecified organism  - Patient presents with fever and neutropenia on admission with initial workup unremarkable, including no evidence  of UTI and with normal chest x-ray  - CT of the chest was performed 7/12 which does show questionable infiltrate in the lower lung, particular the left lower lung which could be consistent with pneumonia, especially given patient's recent admission to the hospital  - Initiated on broad-spectrum IV antibiotics with cefepime; cultures negative; procalcitonin elevated, consistent with bacterial PNA  - no further Abx needed at this time  - Patient is already on Bactrim for prophylactic treatment; will resume 3x weekly   - WBC up to 10.4 yesterday    s/p 1 dose of G-CSF on admission   -appreciate Oncology's assistance  - follow-up with Oncology as an outpatient to monitor      Multiple myeloma  - Holding treatment in the setting of acute active infection  - follow-up with Oncology outpatient to discuss when to restart treatment      Atrial fibrillation  - Patient underwent recent cardioversion 1 week prior to hospitalization here  -remained in A.fib  - She was seen by [facility] who took patient for an ablation yesterday; she remains in NSR post-ablation   - Continue sotalol 80 mg BID with  Eliquis per Cardiology   - follow-up with Cardiology in 3-4 weeks or per their recs     DM type II, controlled  - treated with SSI while here  - resume metformin at d/c      Hypothyroidism  - Continue Synthroid     Chronic diastolic heart failure  - Patient appears to be compensated and slightly volume depleted  - resume home lasix; monitor volume status/renal function as an outpatient     Anxiety  Continue hydroxyzine  May need to consider low dose benzos if worsens, but will defer to PCP  Would need to monitor Qtc closely if initiated     Severe protein calorie malnutrition  Eating better today; monitor oral intake; consider protein shakes outpatient as needed     Patient is doing well and is medically stable for discharge home today.        Day of Discharge     Vital Signs:  Temp:  [97.2 °F (36.2 °C)-98.4 °F (36.9 °C)] 97.5 °F  (36.4 °C)  Heart Rate:  [] 84  Resp:  [14-24] 24  BP: (105-148)/(46-74) 133/68  Flow (L/min) (Oxygen Therapy):  [2-6] 2    Physical Exam:  Physical Exam   General: Sitting up in bed; NAD  CV: RRR, S1-S2  Lungs: CTA bilaterally  Abdomen: soft, NT, ND       Pertinent  and/or Most Recent Results     LAB RESULTS:      Lab 07/16/25  0507 07/15/25  0558 07/14/25  0436 07/13/25  0109 07/11/25  2139 07/11/25  2138 07/11/25  2038 07/11/25  2033   WBC 10.40 4.83 2.57* 2.19*  --  1.39*  --   --    HEMOGLOBIN 10.5* 11.1* 10.1* 9.8*  --  10.8*  --   --    HEMATOCRIT 33.9* 35.3 33.0* 32.6*  --  35.5  --   --    PLATELETS 213 221 207 191  --  212  --   --    NEUTROS ABS 6.97 2.27 0.57* 0.85*  --  1.06*  --   --    MCV 90.2 88.0 91.4 92.4  --  90.8  --   --    PROCALCITONIN  --   --   --   --  1.15*  --   --   --    LACTATE  --   --   --   --   --   --  0.4  --    PROTIME  --   --   --   --   --   --   --  20.5*   APTT  --   --   --   --   --   --   --  32.2   D DIMER QUANT  --   --   --   --   --   --   --  0.30         Lab 07/16/25  0507 07/15/25  1729 07/15/25  0558 07/14/25  0436 07/13/25  0109 07/12/25  1629 07/11/25  2033 07/11/25  2015   SODIUM 140  --  141 141 134*  --  139  --    POTASSIUM 3.9 4.0 3.3* 3.8 4.0   < > 3.6  --    CHLORIDE 105  --  104 106 100  --  107  --    CO2 26.7  --  28.7 24.7 24.0  --  21.1*  --    ANION GAP 8.3  --  8.3 10.3 10.0  --  10.9  --    BUN 13.3  --  11.4 13.4 16.0  --  15.7  --    CREATININE 0.62  --  0.64 0.63 0.65  --  0.78  --    EGFR 95.9  --  95.2 95.6 94.9  --  81.8  --    GLUCOSE 114*  --  105* 100* 109*  --  103*  --    CALCIUM 9.3  --  9.2 9.1 8.8  --  7.9*  --    MAGNESIUM  --   --  1.9 2.1 2.2  --  1.7  --    PHOSPHORUS  --   --   --   --   --   --   --  3.1   TSH  --   --   --   --   --   --   --  6.220*    < > = values in this interval not displayed.         Lab 07/16/25  0507 07/11/25 2033   TOTAL PROTEIN 5.5* 5.3*   ALBUMIN 3.4* 3.2*   GLOBULIN 2.1 2.1   ALT (SGPT) 14  22   AST (SGOT) 14 19   BILIRUBIN 0.3 0.4   ALK PHOS 78 83   LIPASE  --  43         Lab 07/11/25 2139 07/11/25 2033 07/11/25 2015   PROBNP  --   --  2,906.0*   HSTROP T 50* 46*  --    PROTIME  --  20.5*  --    INR  --  1.75*  --                  Brief Urine Lab Results  (Last result in the past 365 days)        Color   Clarity   Blood   Leuk Est   Nitrite   Protein   CREAT   Urine HCG        07/11/25 2251 Yellow   Clear   Negative   Negative   Negative   30 mg/dL (1+)                 Microbiology Results (last 10 days)       Procedure Component Value - Date/Time    MRSA Screen, PCR (Inpatient) - Swab, Nares [[id number]]  (Normal) Collected: 07/12/25 0131    Lab Status: Final result Specimen: Swab from Nares Updated: 07/12/25 0337     MRSA PCR No MRSA Detected    Narrative:      The negative predictive value of this diagnostic test is high and should only be used to consider de-escalating anti-MRSA therapy. A positive result may indicate colonization with MRSA and must be correlated clinically.    Respiratory Panel PCR w/COVID-19(SARS-CoV-2) [facility]YSSES/[facility]E In-House, NP Swab in UTM/VTM, 2 HR TAT - Swab, Nasopharynx [[id number]]  (Normal) Collected: 07/12/25 0007    Lab Status: Final result Specimen: Swab from Nasopharynx Updated: 07/12/25 0104     ADENOVIRUS, PCR Not Detected     Coronavirus 229E Not Detected     Coronavirus HKU1 Not Detected     Coronavirus NL63 Not Detected     Coronavirus OC43 Not Detected     COVID19 Not Detected     Human Metapneumovirus Not Detected     Human Rhinovirus/Enterovirus Not Detected     Influenza A PCR Not Detected     Influenza B PCR Not Detected     Parainfluenza Virus 1 Not Detected     Parainfluenza Virus 2 Not Detected     Parainfluenza Virus 3 Not Detected     Parainfluenza Virus 4 Not Detected     RSV, PCR Not Detected     Bordetella pertussis pcr Not Detected     Bordetella parapertussis PCR Not Detected     Chlamydophila pneumoniae PCR Not Detected     Mycoplasma  pneumo by PCR Not Detected    Narrative:      In the setting of a positive respiratory panel with a viral infection PLUS a negative procalcitonin without other underlying concern for bacterial infection, consider observing off antibiotics or discontinuation of antibiotics and continue supportive care. If the respiratory panel is positive for atypical bacterial infection (Bordetella pertussis, Chlamydophila pneumoniae, or Mycoplasma pneumoniae), consider antibiotic de-escalation to target atypical bacterial infection.    Blood Culture - Blood, Arm, Left [[id number]]  (Normal) Collected: 07/11/25 2015    Lab Status: Final result Specimen: Blood from Arm, Left Updated: 07/16/25 2030     Blood Culture No growth at 5 days    Narrative:      Less than seven (7) mL's of blood was collected.  Insufficient quantity may yield false negative results.    Blood Culture - Blood, Arm, Right [[id number]]  (Normal) Collected: 07/11/25 2015    Lab Status: Final result Specimen: Blood from Arm, Right Updated: 07/16/25 2030     Blood Culture No growth at 5 days    Narrative:      Less than seven (7) mL's of blood was collected.  Insufficient quantity may yield false negative results.            XR Chest 1 View  Result Date: 7/13/2025  Impression: Impression: Cardiomegaly. No active disease. Electronically Signed: Wilmer Mondragon MD  7/13/2025 11:56 PM EDT  Workstation ID: [id number]    CT Chest Without Contrast Diagnostic  Result Date: 7/12/2025  Impression: Impression: Findings of likely mild interstitial and alveolar pulmonary edema with small bilateral pleural effusions. There is an additional small area of consolidation seen posteriorly within the left lower lobe, possible small area of pneumonia. Electronically Signed: Timur Ledbetter MD  7/12/2025 12:58 PM EDT  Workstation ID: [id number]    XR Chest 1 View  Result Date: 7/11/2025  Impression: Impression: Mild chronic changes appear stable. No active disease is seen. Electronically Signed:  Slava Herman MD  7/11/2025 8:43 PM EDT  Workstation ID: [id number]    CT Angiogram Chest Pulmonary Embolism  Result Date: 7/1/2025  Impression: Impression: 1.No evidence of pulmonary embolism. 2.Mild hazy mosaic attenuation noted throughout the lungs. Additionally there is mild patchy opacities noted within the posterior aspect of the left lower lobe. These findings are nonspecific and may be due to small airway disease. Mild infection is not excluded. 3.Additional findings as above. Electronically Signed: Pierre Cook DO  7/1/2025 4:00 PM EDT  Workstation ID: [id number]    XR Chest 1 View  Result Date: 7/1/2025  Impression: Impression: Mild diffuse hazy attenuation of the lungs could reflect a low-grade infectious/inflammatory process versus chronic change. No focal airspace consolidation. No pleural disease. Electronically Signed: Leeroy Tian MD  7/1/2025 3:10 PM EDT  Workstation ID: [id number]      Results for orders placed during the hospital encounter of 07/01/25    Duplex Venous Lower Extremity - Left CAR 07/02/2025 10:39 AM    Interpretation Summary    Normal left lower extremity venous duplex scan.      Results for orders placed during the hospital encounter of 07/01/25    Duplex Venous Lower Extremity - Left CAR 07/02/2025 10:39 AM    Interpretation Summary    Normal left lower extremity venous duplex scan.      Results for orders placed during the hospital encounter of 07/01/25    Adult Transesophageal Echo (ANN) W/ Cont if Necessary Per Protocol (Cardiology Department) 07/03/2025  3:34 PM    Interpretation Summary    Left ventricular systolic function is normal. Estimated left ventricular EF = 60% Left ventricular ejection fraction appears to be 56 - 60%.    Left ventricular wall thickness is consistent with mild to moderate concentric hypertrophy.    Left ventricular diastolic function is consistent with (grade I) impaired relaxation.    The left atrial cavity is mild to moderately dilated.     Abnormal mitral valve structure consistent with dilated annulus.    Estimated right ventricular systolic pressure from tricuspid regurgitation is normal (<35 mmHg).    There is a trivial pericardial effusion.    Procedure indication  Atrial fibrillation with uncontrollable rate    conscious sedation administered by anesthesia  Timeout before procedure    consent obtained before procedure      Procedure note  after obtaining a valid consent patient was sedated by Anesthesia and a ANN probe was easily placed into esophagus with multiplane imaging with 2D, color and Doppler followed by bubble study with agitated saline without any complications    ANN  Findings    Mild to moderate left atrial enlargement with mild to moderate central MR without any shunt or clot  EF is normal with EF of 60% with mild LVH  Mild RVH with normal RV systolic  Mild TR without pulm hypertension and trivial effusion noted    Plan  Proceed with cardioversion    Procedures done  Transesophageal echocardiogram    Electronically signed by Bull Wiggins MD, 07/03/25, 3:34 PM EDT.      Labs Pending at Discharge:  Pending Results       None            Procedures Performed  Procedure(s):  Ablation atrial fibrillation, pfa         Consults:   Consults       Date and Time Order Name Status Description    7/14/2025  2:17 AM Inpatient Cardiology Consult      7/12/2025  1:03 AM Inpatient Hematology & Oncology Consult Completed     7/11/2025 11:38 PM Hospitalist (on-call MD unless specified)      7/5/2025  2:27 AM Inpatient Cardiology Consult Completed     7/1/2025  6:06 PM Inpatient Cardiology Consult Completed               Discharge Details        Discharge Medications        Continue These Medications        Instructions Start Date   acyclovir 400 MG tablet  Commonly known as: ZOVIRAX   400 mg, Oral, 2 Times Daily      Eliquis 5 MG tablet tablet  Generic drug: apixaban   5 mg, Oral, 2 Times Daily      ferrous sulfate 325 (65 FE) MG tablet   325  mg, Oral, Daily With Breakfast      furosemide 20 MG tablet  Commonly known as: LASIX   20 mg, 2 Times Daily      levothyroxine 100 MCG tablet  Commonly known as: SYNTHROID, LEVOTHROID   100 mcg, Every Morning Before Breakfast      metFORMIN 500 MG tablet  Commonly known as: GLUCOPHAGE   Take 1 tablet by mouth Daily With Dinner.      omeprazole 40 MG capsule  Commonly known as: priLOSEC   40 mg, Daily      ondansetron 8 MG tablet  Commonly known as: ZOFRAN   8 mg, Oral, 3 Times Daily PRN      Plaquenil 200 MG tablet  Generic drug: hydroxychloroquine   Every 12 Hours      promethazine 12.5 MG tablet  Commonly known as: PHENERGAN   12.5 mg, Oral, Every 6 Hours PRN      sotalol 80 MG tablet  Commonly known as: BETAPACE   80 mg, Oral, Every 12 Hours Scheduled      sulfamethoxazole-trimethoprim 800-160 MG per tablet  Commonly known as: BACTRIM DS,SEPTRA DS   1 tablet, Oral, 3 Times Weekly, Take 1 tablet on Mondays, Wednesdays and Fridays.      Tylenol 325 MG tablet  Generic drug: acetaminophen   650 mg, Every 6 Hours PRN      Ventolin  (90 Base) MCG/ACT inhaler  Generic drug: albuterol sulfate HFA   Inhale 1 puff Every 6 (Six) Hours As Needed.             Stop These Medications      amLODIPine 10 MG tablet  Commonly known as: NORVASC     dexAMETHasone 4 MG tablet  Commonly known as: DECADRON     lenalidomide 25 MG capsule  Commonly known as: REVLIMID     metoprolol tartrate 50 MG tablet  Commonly known as: LOPRESSOR     sertraline 25 MG tablet  Commonly known as: ZOLOFT              No Known Allergies      Discharge Disposition:   Home or Self Care    Diet:  Hospital:  Diet Order   Procedures    Diet: Regular/House; Fluid Consistency: Thin (IDDSI 0)         Discharge Activity:   Activity Instructions       Activity as Tolerated                CODE STATUS:  Code Status and Medical Interventions: CPR (Attempt to Resuscitate); Full Support   Ordered at: 07/12/25 0103     Code Status (Patient has no pulse and is not  breathing):    CPR (Attempt to Resuscitate)     Medical Interventions (Patient has pulse or is breathing):    Full Support     Level Of Support Discussed With:    Patient         Future Appointments   Date Time Provider Department Center   7/21/2025  9:30 AM INF ROOM 33A - [facility]   7/24/2025  1:45 PM INF ROOM 28 - [facility]   7/28/2025  8:45 AM INF ROOM 35 - [facility]   8/4/2025 11:30 AM Bull Wiggins MD [facility]   8/5/2025  1:30 PM Gris Mcgee APRN [facility]       Additional Instructions for the Follow-ups that You Need to Schedule       Discharge Follow-up with PCP   As directed       Currently Documented PCP:    Chula Sanchez APRN    PCP Phone Number:    [phone]     Follow Up Details: In 1-2 weeks        Discharge Follow-up with Specified Provider: Follow-up with Cardiology Dr Barbara LAUREN in 3-4 weeks or per his recs; 3 Weeks   As directed      To: Follow-up with Cardiology Dr Barbara LAUREN in 3-4 weeks or per his recs   Follow Up: 3 Weeks        Discharge Follow-up with Specified Provider: Follow-up with primary oncologist next week as previously scheduled; 1 Week   As directed      To: Follow-up with primary oncologist next week as previously scheduled   Follow Up: 1 Week                Time spent on Discharge including face to face service:  36 minutes    Signature: Electronically signed by Giovanny Harding DO, 07/17/25, 10:54 EDT.  [facility]

## 2025-07-21 ENCOUNTER — TELEPHONE (OUTPATIENT)
Dept: CARDIOLOGY | Facility: CLINIC | Age: 70
End: 2025-07-21
Payer: MEDICARE

## 2025-07-21 ENCOUNTER — HOSPITAL ENCOUNTER (OUTPATIENT)
Dept: ONCOLOGY | Facility: HOSPITAL | Age: 70
Discharge: HOME | End: 2025-07-21
Admitting: INTERNAL MEDICINE
Payer: MEDICARE

## 2025-07-21 VITALS
HEART RATE: 81 BPM | DIASTOLIC BLOOD PRESSURE: 72 MMHG | HEIGHT: 61 IN | SYSTOLIC BLOOD PRESSURE: 133 MMHG | TEMPERATURE: 97.5 F | BODY MASS INDEX: 31.78 KG/M2 | OXYGEN SATURATION: 96 % | WEIGHT: 168.3 LBS | RESPIRATION RATE: 18 BRPM

## 2025-07-21 DIAGNOSIS — C90.00 MULTIPLE MYELOMA NOT HAVING ACHIEVED REMISSION: Primary | ICD-10-CM

## 2025-07-21 LAB
ALBUMIN SERPL-MCNC: 3.7 G/DL (ref 3.5–5.2)
ALBUMIN/GLOB SERPL: 1.5 G/DL
ALP SERPL-CCNC: 73 U/L (ref 39–117)
ALT SERPL W P-5'-P-CCNC: 13 U/L (ref 1–33)
ANION GAP SERPL CALCULATED.3IONS-SCNC: 15.9 MMOL/L (ref 5–15)
AST SERPL-CCNC: 16 U/L (ref 1–32)
BASOPHILS # BLD AUTO: 0.08 10*3/MM3 (ref 0–0.2)
BASOPHILS NFR BLD AUTO: 0.8 % (ref 0–1.5)
BILIRUB SERPL-MCNC: 0.3 MG/DL (ref 0–1.2)
BUN SERPL-MCNC: 14.7 MG/DL (ref 8–23)
BUN/CREAT SERPL: 23.7 (ref 7–25)
CALCIUM SPEC-SCNC: 9.4 MG/DL (ref 8.6–10.5)
CHLORIDE SERPL-SCNC: 100 MMOL/L (ref 98–107)
CO2 SERPL-SCNC: 25.1 MMOL/L (ref 22–29)
CREAT SERPL-MCNC: 0.62 MG/DL (ref 0.57–1)
DEPRECATED RDW RBC AUTO: 50.7 FL (ref 37–54)
EGFRCR SERPLBLD CKD-EPI 2021: 95.9 ML/MIN/1.73
EOSINOPHIL # BLD AUTO: 0 10*3/MM3 (ref 0–0.4)
EOSINOPHIL NFR BLD AUTO: 0 % (ref 0.3–6.2)
ERYTHROCYTE [DISTWIDTH] IN BLOOD BY AUTOMATED COUNT: 15.3 % (ref 12.3–15.4)
GLOBULIN UR ELPH-MCNC: 2.4 GM/DL
GLUCOSE SERPL-MCNC: 152 MG/DL (ref 65–99)
HCT VFR BLD AUTO: 31.7 % (ref 34–46.6)
HGB BLD-MCNC: 10.1 G/DL (ref 12–15.9)
IMM GRANULOCYTES # BLD AUTO: 0.06 10*3/MM3 (ref 0–0.05)
IMM GRANULOCYTES NFR BLD AUTO: 0.6 % (ref 0–0.5)
LYMPHOCYTES # BLD AUTO: 1.46 10*3/MM3 (ref 0.7–3.1)
LYMPHOCYTES NFR BLD AUTO: 15.2 % (ref 19.6–45.3)
MAGNESIUM SERPL-MCNC: 1.9 MG/DL (ref 1.6–2.4)
MCH RBC QN AUTO: 29 PG (ref 26.6–33)
MCHC RBC AUTO-ENTMCNC: 31.9 G/DL (ref 31.5–35.7)
MCV RBC AUTO: 91.1 FL (ref 79–97)
MONOCYTES # BLD AUTO: 0.38 10*3/MM3 (ref 0.1–0.9)
MONOCYTES NFR BLD AUTO: 4 % (ref 5–12)
NEUTROPHILS NFR BLD AUTO: 7.62 10*3/MM3 (ref 1.7–7)
NEUTROPHILS NFR BLD AUTO: 79.4 % (ref 42.7–76)
PHOSPHATE SERPL-MCNC: 3.6 MG/DL (ref 2.5–4.5)
PLATELET # BLD AUTO: 198 10*3/MM3 (ref 140–450)
PMV BLD AUTO: 10.1 FL (ref 6–12)
POTASSIUM SERPL-SCNC: 3.5 MMOL/L (ref 3.5–5.2)
PROT SERPL-MCNC: 6.1 G/DL (ref 6–8.5)
RBC # BLD AUTO: 3.48 10*6/MM3 (ref 3.77–5.28)
SODIUM SERPL-SCNC: 141 MMOL/L (ref 136–145)
WBC NRBC COR # BLD AUTO: 9.6 10*3/MM3 (ref 3.4–10.8)

## 2025-07-21 PROCEDURE — 85025 COMPLETE CBC W/AUTO DIFF WBC: CPT | Performed by: INTERNAL MEDICINE

## 2025-07-21 PROCEDURE — 84100 ASSAY OF PHOSPHORUS: CPT | Performed by: INTERNAL MEDICINE

## 2025-07-21 PROCEDURE — 80053 COMPREHEN METABOLIC PANEL: CPT | Performed by: INTERNAL MEDICINE

## 2025-07-21 PROCEDURE — 36415 COLL VENOUS BLD VENIPUNCTURE: CPT

## 2025-07-21 PROCEDURE — 83735 ASSAY OF MAGNESIUM: CPT | Performed by: INTERNAL MEDICINE

## 2025-07-21 PROCEDURE — G0463 HOSPITAL OUTPT CLINIC VISIT: HCPCS

## 2025-07-21 NOTE — PROGRESS NOTES
Hold treatment until next Monday 7/28/25. Draw CBC, CMP, Mag and Phosphorus today per Dr. Barrett. Informed pt and she verbalized understanding. AVS given.

## 2025-07-21 NOTE — TELEPHONE ENCOUNTER
Caller: Phuong Altamirano    Relationship: Self  Best call back number: [phone]      Who is your current provider: DR. AMBROCIO     Is your current provider offboarding? NO    Who would you like your new provider to be: DR. FRANCO     What are your reasons for transferring care: DR. QIU IS WHO PERFORMED HER PROCEDURE     Additional notes:     PT HAD A CARDIOVERSION WITH DR. QIU. HOSPITAL NOTES INSTRUCTED HER TO FOLLOW UP WITH DR. AMBROCIO, SHE IS MORE COMFORTABLE WITH DR. LANDON SINCE HE'S DONE THE PROCEDURE. WOULD LIKE TO TRANSFER TO HIS CARE IF POSSIBLE AND WOULD NEED A FOLLOW UP SCHEDULED. PLEASE CALL.

## 2025-07-22 NOTE — PROGRESS NOTES
Enter Query Response Below      Query Response: Sepsis present on admission.              If applicable, please update the problem list.

## 2025-07-23 NOTE — PROGRESS NOTES
HEMATOLOGY ONCOLOGY OUTPATIENT FOLLOW UP       Patient name: Phuong Altamirano  : 1955  MRN: [id number]  Primary Care Physician: Chula Sanchez APRN  Referring Physician: Chula Sanchez APRN  Reason For Consult: Monoclonal gammopathy.     History of Present Illness:    History of Present Illness  3/20/2025: Ms. 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 In early , she had a bone marrow biopsy and aspiration that revealed equivocal results, leading to a repeat bone marrow biopsy in . This disclosed a normocellular bone marrow with trilineage hematopoiesis and 20% plasma cells. On flow cytometry, only 1% of the plasma cells revealed monoclonal restriction. Continued observation at \[facility]\"" Hematology showed no evidence of progression. She was last seen sometime in  without new problems.Today, she reports being largely asymptomatic. She remains active and energetic, has a good appetite, and her weight has been stable. She is not experiencing fevers or diaphoresis. She does not report any chest pain. She does experience dyspnea with exertion, which she attributes to her lack of physical activity. She is not experiencing abdominal pain, diarrhea, or dysuria. On exam she is conversant and oriented. No jaundice and no distress but she appears chronically ill. No oral lesions. Respirations not labored. Lungs clear and heart regular. Abdomen protuberant and soft; the liver and spleen don't seem enlarged. No edema. Reviewed all the records and all laboratory exams. Discussed with her. 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 She might need a bone marrow  aspiration and biopsy. She will have additional studies today and will see me with the results.     4/4/2025: In the office sooner than scheduled.  Her protein electrophoresis revealed a small increase in the M spike from 1.2 g/dL at the previous measurement available to 1.4 g/dL.  In addition the free lambda light chains increased from 11.1 mg/L to 309 mg/L, changing the ratio significantly.  She feels as well as she did at the time of the last visit.  On exam she is well-oriented and conversant.  She does not seem in any distress and she is not jaundiced.  The lungs are clear bilaterally and the heart regular.  The abdomen is protuberant and soft.  There is no edema.  Reviewed and discussed the laboratory exams with her.  I have asked her to allow me to obtain a bone marrow biopsy and aspiration as well as long bone survey and a 24-hour urine protein quantification and electrophoresis.  She will see me with results.    4/28/2025: She underwent a bone marrow biopsy and aspiration without any complications. The results indicate that her bone marrow is hypercellular, with 65% of nucleated precursors being plasma cells exhibiting an abnormal immunophenotype and lambda light chain restriction. The karyotype reveals a normal female complement; however, the FISH panel indicates a complex picture with gain of chromosome 1q21, deletion of 13q, loss of MAF/16q, and aneuploidy with gain of chromosomes 7, 9, and 15.On the basis of the above, Ms. Altamirano can be considered to have multiple myeloma, despite the relatively lower monoclonal protein in the blood. In addition the gain of chromosome 1q and the loss of MAF/16q ae considered poor prognostic markers. Under the new guidelines her condition can no longer be considered only smoldering myeloma and treatment is well justified. She's to have a PET scan for staging and new immunofixation and electrophoresis as well as light chain quantification. She's to see me with results.  "    5/19/2025: Ms. Altamirano is an established patient of mine. For some time she had been followed for what had been diagnosed with monoclonal gammopathy of undetermined significance. 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 At the time of the first visit with me she had an increase in the monoclonal bland and a decision was made to obtain staging studies again. This time her bone marrow contains at least 60% abnormal plasma cells. There is no suggestion of end organ damage and her condition can still be classified as smoldering myeloma but she is at high risk of developing complications and it is reasonable at this time to start treatment. I had a long discussion with her regarding the options available. On exam she is alert, conversant and oriented. No distress. No jaundice. No oral lesions. Respirations not labored. Lungs diminished and heart regular. Abdomen soft and minimal edema at this time. Reviewed the laboratory exams. To continue treatment for her congestive heart failure and to see me in the office after discharge.     5/27/2025: Was discharged from the hospital.  She was feeling better at the time of discharge.  Today she tells me she has not been feeling as well.  \"I do not have the umpf\".  Also frequently nauseated.  Her blood pressure has been much better controlled.  She has returned to her usual activities, including her work.  On exam she is oriented and conversant.  No distress.  No jaundice.  No edema.  Laboratory exams reviewed with her.  Treatment is well justified and discussed that with her despite the fact that she has no bone lesions and preserved renal function.  Her calcium has been within normal limits.  She does have mild normocytic anemia.  Discussed at length with her.  Explained the options of treatment.  Discussed with her the potential complications if treatment is not started.  " She is agreeable to commencement of treatment with the clarification that she could stop at any point if she so decides.  She wants to go on a vacation and will start after that vacation.    6/30/2025: Had been nauseated before the commencement of the treatment. She became much more nauseated and started vomiting. She was asked to hold the lenalidomide and to use the promethazine for the nausea. She feels much better today. She has been able to eat. No fevers. Denied chest pain or cough and has not had any more dyspnea. No abdominal pain or diarrhea. On exam alert and chronically ill appearing. No distress. No jaundice. No oral lesions and respirations not labored. Lungs diminished bilaterally and heart regular. Abdomen soft. No edema. Reviewed the laboratory exams. Discussed with her. To hold all treatment this week until fully recovered. UA and culture today. Hold the pravastatin for now. See me in 3 weeks.     7/12/2025: Ms. Altamirano was initially seen in March 2025 for referral due to longstanding monoclonal gammopathy.  It was first noted around 2015 when she underwent testing to investigate arthralgias.  At the time of the initial consultation she was largely asymptomatic.  It was suspected that she had smoldering myeloma rather than monoclonal gammopathy of undetermined significance.  Her protein electrophoresis revealed an increase in her M spike to 1.4 g/dL and an increase in her free lambda light chains to 309 mg/L changing her ratio significantly.  Due to this it was decided to perform a bone marrow biopsy. Bone marrow biopsy and aspiration indicated that her bone marrow was hypercellular with 65% of nucleated precursors being plasma cells exhibiting an abnormal immunophenotype and lambda light chain restriction.  The karyotype revealed a normal female complement; however, the FISH panel indicated a complex picture with gain of chromosome 1 q. 21, deletion of 13 q., loss of MAF/16 q., and aneuploidy with  gain of chromosomes 7, 9, and 15.  Based on the bone marrow results she was considered to have multiple myeloma despite the relatively lower monoclonal protein in the blood.  In addition her results indicated poor prognostic markers.  Due to this she was considered a candidate for treatment.  She was admitted to the hospital early in July with atrial fibrillation and rapid ventricular response that required cardioversion.  Following discharge she developed a fever and was readmitted with neutropenia.  Cultures were all negative.  She was treated empirically with antibiotics and received growth factor.  She was eventually discharged after an ablation procedure.    7/24/2025: She is in the office for follow-up.  She feels much better than at the time of the last visit.  She has been more active.  Her appetite continues to be poor and she has lost some weight.  She is also frequently nauseated and the medication that she takes it regularly does result in benefit but she does not take it frequently.  She also complains of epigastric burning type discomfort frequently associated to pyrosis.  She has no chest pains or dyspnea.  She has not noted palpitations anymore.  No abdominal pain, diarrhea or dysuria.  On exam chronically ill-appearing but in no distress.  No jaundice.  Lungs diminished bilaterally and heart regular.  Abdomen protuberant but soft.  There is bilateral edema.  Continue to hold the treatment.  Referral made to gastroenterology and I had a conversation with Dr. Leal to consider upper gastrointestinal endoscopy.  See me again with results in approximately 3 weeks.    Past Medical History:   Diagnosis Date    Arthritis 2010    Asthma 1 month    Cancer 12 years    Multiple mylenoma    Depression 2010    Diabetes mellitus 1.5 years    High blood pressure     High cholesterol 2005    Hypothyroidism     Lupus 2010    Sleep apnea Now     Past Surgical History:   Procedure Laterality Date    BREAST LUMPECTOMY  Right 1983    benign    CARDIAC ELECTROPHYSIOLOGY PROCEDURE N/A 7/16/2025    Procedure: Ablation atrial fibrillation, pfa;  Surgeon: Jaswant Jimenez MD;  Location: [facility] INVASIVE LOCATION;  Service: Cardiovascular;  Laterality: N/A;    DILATATION AND CURETTAGE  1995    LAPAROSCOPIC TUBAL LIGATION  1979    OVARY SURGERY  1985    remoal of left ovary       Current Outpatient Medications:     acetaminophen (TYLENOL) 325 MG tablet, Take 2 tablets by mouth Every 6 (Six) Hours As Needed for Mild Pain., Disp: , Rfl:     acyclovir (ZOVIRAX) 400 MG tablet, Take 1 tablet by mouth 2 (Two) Times a Day., Disp: 60 tablet, Rfl: 3    albuterol sulfate HFA (Ventolin HFA) 108 (90 Base) MCG/ACT inhaler, Inhale 1 puff Every 6 (Six) Hours As Needed., Disp: , Rfl:     apixaban (ELIQUIS) 5 MG tablet tablet, Take 1 tablet by mouth 2 (Two) Times a Day for 30 days., Disp: 60 tablet, Rfl: 0    dexAMETHasone (DECADRON) 4 MG tablet, Take 1 tablet by mouth Daily., Disp: , Rfl:     ferrous sulfate 325 (65 FE) MG tablet, Take 1 tablet by mouth Daily With Breakfast for 30 days., Disp: 30 tablet, Rfl: 0    furosemide (LASIX) 20 MG tablet, Take 1 tablet by mouth 2 (Two) Times a Day., Disp: , Rfl:     hydroxychloroquine (PLAQUENIL) 200 MG tablet, Every 12 (Twelve) Hours., Disp: , Rfl:     levothyroxine (SYNTHROID, LEVOTHROID) 100 MCG tablet, Take 1 tablet by mouth Every Morning Before Breakfast., Disp: , Rfl: 1    metFORMIN (GLUCOPHAGE) 500 MG tablet, Take 1 tablet by mouth Daily With Dinner., Disp: , Rfl:     omeprazole (priLOSEC) 40 MG capsule, Take 1 capsule by mouth Daily., Disp: , Rfl:     ondansetron (ZOFRAN) 8 MG tablet, Take 1 tablet by mouth 3 (Three) Times a Day As Needed for Nausea or Vomiting., Disp: 30 tablet, Rfl: 5    sotalol (BETAPACE) 80 MG tablet, Take 1 tablet by mouth Every 12 (Twelve) Hours., Disp: 60 tablet, Rfl: 0    sulfamethoxazole-trimethoprim (BACTRIM DS,SEPTRA DS) 800-160 MG per tablet, Take 1 tablet by mouth 3  (Three) Times a Week. Take 1 tablet on ,  and ., Disp: 12 tablet, Rfl: 3    lenalidomide (REVLIMID) 25 MG capsule, Take 1 capsule by mouth Daily. (Patient not taking: Reported on 2025), Disp: , Rfl:     promethazine (PHENERGAN) 12.5 MG tablet, Take 1 tablet by mouth Every 6 (Six) Hours As Needed for Nausea or Vomiting. (Patient not taking: Reported on 2025), Disp: 30 tablet, Rfl: 2    No Known Allergies    Family History   Problem Relation Age of Onset    Heart disease Father     Pancreatic cancer Sister 59    Heart disease Sister     Stroke Sister     Pneumonia Brother      Cancer-related family history includes Pancreatic cancer (age of onset: 59) in her sister.    Social History     Tobacco Use    Smoking status: Former     Current packs/day: 0.00     Average packs/day: 1 pack/day for 21.0 years (21.0 ttl pk-yrs)     Types: Cigarettes     Start date:      Quit date:      Years since quittin.5    Smokeless tobacco: Never   Vaping Use    Vaping status: Never Used   Substance Use Topics    Alcohol use: Not Currently     Comment: Very occasionally    Drug use: No     Social History     Social History Narrative    Not on file     ROS:   Review of Systems   Constitutional:  Positive for fatigue. Negative for activity change, appetite change, chills, diaphoresis, fever and unexpected weight change.   HENT:  Negative for congestion, dental problem, drooling, ear discharge, ear pain, facial swelling, hearing loss, mouth sores, nosebleeds, postnasal drip, rhinorrhea, sinus pressure, sinus pain, sneezing, sore throat, tinnitus, trouble swallowing and voice change.    Eyes:  Negative for photophobia, pain, discharge, redness, itching and visual disturbance.   Respiratory:  Positive for shortness of breath. Negative for apnea, cough, choking, chest tightness, wheezing and stridor.    Cardiovascular:  Negative for chest pain, palpitations and leg swelling.   Gastrointestinal:   "Negative for abdominal distention, abdominal pain, anal bleeding, blood in stool, constipation, diarrhea, nausea, rectal pain and vomiting.   Endocrine: Negative for cold intolerance, heat intolerance, polydipsia and polyuria.   Genitourinary:  Negative for decreased urine volume, difficulty urinating, dysuria, flank pain, frequency, genital sores, hematuria and urgency.   Musculoskeletal:  Negative for arthralgias, back pain, gait problem, joint swelling, myalgias, neck pain and neck stiffness.   Skin:  Negative for color change, pallor and rash.   Neurological:  Negative for dizziness, tremors, seizures, syncope, facial asymmetry, speech difficulty, weakness, light-headedness, numbness and headaches.   Hematological:  Negative for adenopathy. Does not bruise/bleed easily.   Psychiatric/Behavioral:  Negative for agitation, behavioral problems, confusion, decreased concentration, hallucinations, self-injury, sleep disturbance and suicidal ideas. The patient is not nervous/anxious.      Objective:    Vital Signs:  Vitals:    07/24/25 1150   BP: 139/75   Pulse: 86   Resp: 16   Temp: 98.6 °F (37 °C)   SpO2: 94%   Weight: 78.4 kg (172 lb 12.8 oz)   Height: 154.9 cm (61\")   PainSc: 0-No pain     Body mass index is 32.65 kg/m².    ECOG  (0) Fully active, able to carry on all predisease performance without restriction    Physical Exam:   Physical Exam  Constitutional:       General: She is not in acute distress.     Appearance: She is ill-appearing. She is not toxic-appearing or diaphoretic.   HENT:      Head: Normocephalic and atraumatic.      Right Ear: External ear normal.      Left Ear: External ear normal.      Nose: Nose normal.      Mouth/Throat:      Mouth: Mucous membranes are moist.      Pharynx: Oropharynx is clear. No oropharyngeal exudate or posterior oropharyngeal erythema.   Eyes:      General: No scleral icterus.        Right eye: No discharge.         Left eye: No discharge.      Conjunctiva/sclera: " Conjunctivae normal.      Pupils: Pupils are equal, round, and reactive to light.   Cardiovascular:      Rate and Rhythm: Normal rate and regular rhythm.      Pulses: Normal pulses.      Heart sounds: No murmur heard.     No friction rub. No gallop.   Pulmonary:      Effort: No respiratory distress.      Breath sounds: No stridor. No wheezing, rhonchi or rales.   Abdominal:      General: Bowel sounds are normal. There is no distension.      Palpations: Abdomen is soft. There is no mass.      Tenderness: There is no abdominal tenderness. There is no right CVA tenderness, left CVA tenderness, guarding or rebound.      Hernia: No hernia is present.      Comments: Protuberant, soft and not tender. No hepatomegaly or splenomegaly.    Musculoskeletal:         General: No tenderness, deformity or signs of injury.      Cervical back: No rigidity.      Right lower leg: No edema.      Left lower leg: No edema.   Lymphadenopathy:      Cervical: No cervical adenopathy.   Skin:     Coloration: Skin is not jaundiced or pale.      Findings: No bruising, lesion or rash.   Neurological:      General: No focal deficit present.      Mental Status: She is alert and oriented to person, place, and time.      Cranial Nerves: No cranial nerve deficit.   Psychiatric:         Mood and Affect: Mood normal.         Behavior: Behavior normal.         Thought Content: Thought content normal.         Judgment: Judgment normal.     BRADLEY Barrett MD performed the physical exam on 7/24/2025 as documented above.    Lab Results - Last 18 Months   Lab Units 07/24/25  1136 07/21/25  1001 07/16/25  0507   WBC 10*3/mm3 5.17 9.60 10.40   HEMOGLOBIN g/dL 10.6* 10.1* 10.5*   HEMATOCRIT % 33.5* 31.7* 33.9*   PLATELETS 10*3/mm3 223 198 213   MCV fL 91.0 91.1 90.2     Lab Results - Last 18 Months   Lab Units 07/21/25  1017 07/16/25  0507 07/15/25  1729 07/15/25  0558 07/12/25  1629 07/11/25  2033   SODIUM mmol/L 141 140  --  141   < > 139   POTASSIUM mmol/L  3.5 3.9 4.0 3.3*   < > 3.6   CHLORIDE mmol/L 100 105  --  104   < > 107   CO2 mmol/L 25.1 26.7  --  28.7   < > 21.1*   BUN mg/dL 14.7 13.3  --  11.4   < > 15.7   CREATININE mg/dL 0.62 0.62  --  0.64   < > 0.78   CALCIUM mg/dL 9.4 9.3  --  9.2   < > 7.9*   BILIRUBIN mg/dL 0.3 0.3  --   --   --  0.4   ALK PHOS U/L 73 78  --   --   --  83   ALT (SGPT) U/L 13 14  --   --   --  22   AST (SGOT) U/L 16 14  --   --   --  19   GLUCOSE mg/dL 152* 114*  --  105*   < > 103*    < > = values in this interval not displayed.     Lab Results   Component Value Date    GLUCOSE 152 (H) 07/21/2025    BUN 14.7 07/21/2025    CREATININE 0.62 07/21/2025    EGFRIFNONA 116 05/20/2019    EGFRIFAFRI 100 05/20/2019    BCR 23.7 07/21/2025    K 3.5 07/21/2025    CO2 25.1 07/21/2025    CALCIUM 9.4 07/21/2025    ALBUMIN 3.7 07/21/2025    AST 16 07/21/2025    ALT 13 07/21/2025     Lab Results   Component Value Date    IRON 54 07/02/2025    TIBC 380 07/02/2025    FERRITIN 118.00 07/02/2025     Lab Results   Component Value Date    RETICCTPCT 2.58 (H) 07/02/2025     Lab Results   Component Value Date    ALEE  06/05/2017     NEGATIVE This result is designed to aid in the diagnosis of many of the    ALEE  06/05/2017     systemic autoimmune disorders and is not diagnostic by itself.  Test results    ALEE  06/05/2017     should be interpreted in conjunction with the clinical evaluation of the    ALEE  06/05/2017     patient.  SLE patients undergoing steroid therapy may have negative results.    SEDRATE 19 02/18/2019     Lab Results   Component Value Date    HAPTOGLOBIN 139 02/06/2018     Lab Results   Component Value Date    SEDRATE 19 02/18/2019      Assessment & Plan     Assessment & Plan  1. Multiple myeloma.  High risk based on gain of chromosome 1q and the loss of MAF/16q: To start a 4 drug regimen with daratumumab/bortezomib/dexamethasone/lenalidomide.  Discussed at length with her.  Received the first cycle with atrial fibrillation with rapid  ventricular response and neutropenia's complications.  Holding the treatment at this time.    2. Nausea and vomiting. Continue to use the promethazine that seems to have provided relief.  Her symptoms are concerning and given the history of a gastric ulcer attention to this is reasonable.  I have asked gastroenterology to do an upper gastrointestinal endoscopy.  3.  Reviewed all laboratory exams and the recent records from the hospital.  Reviewed cultures.  Discussed with her.  4.  To be seen by gastroenterology for upper gastrointestinal endoscopy.  See me in approximately 3 weeks.    Neftali Barrett MD on 7/24/2025 at 1513.

## 2025-07-24 ENCOUNTER — OFFICE VISIT (OUTPATIENT)
Dept: ONCOLOGY | Facility: CLINIC | Age: 70
End: 2025-07-24
Payer: MEDICARE

## 2025-07-24 ENCOUNTER — LAB (OUTPATIENT)
Dept: LAB | Facility: HOSPITAL | Age: 70
End: 2025-07-24
Payer: MEDICARE

## 2025-07-24 VITALS
RESPIRATION RATE: 16 BRPM | HEART RATE: 86 BPM | BODY MASS INDEX: 32.62 KG/M2 | WEIGHT: 172.8 LBS | HEIGHT: 61 IN | TEMPERATURE: 98.6 F | DIASTOLIC BLOOD PRESSURE: 75 MMHG | SYSTOLIC BLOOD PRESSURE: 139 MMHG | OXYGEN SATURATION: 94 %

## 2025-07-24 DIAGNOSIS — C90.00 MULTIPLE MYELOMA NOT HAVING ACHIEVED REMISSION: Primary | ICD-10-CM

## 2025-07-24 DIAGNOSIS — D47.2 MGUS (MONOCLONAL GAMMOPATHY OF UNKNOWN SIGNIFICANCE): ICD-10-CM

## 2025-07-24 DIAGNOSIS — C90.00 MULTIPLE MYELOMA NOT HAVING ACHIEVED REMISSION: ICD-10-CM

## 2025-07-24 DIAGNOSIS — Z01.818: Primary | ICD-10-CM

## 2025-07-24 LAB
ALBUMIN SERPL-MCNC: 4 G/DL (ref 3.5–5.2)
ALBUMIN/GLOB SERPL: 1.6 G/DL
ALP SERPL-CCNC: 71 U/L (ref 39–117)
ALT SERPL W P-5'-P-CCNC: 22 U/L (ref 1–33)
ANION GAP SERPL CALCULATED.3IONS-SCNC: 11.7 MMOL/L (ref 5–15)
AST SERPL-CCNC: 14 U/L (ref 1–32)
BASOPHILS # BLD AUTO: 0.02 10*3/MM3 (ref 0–0.2)
BASOPHILS NFR BLD AUTO: 0.4 % (ref 0–1.5)
BILIRUB SERPL-MCNC: 0.4 MG/DL (ref 0–1.2)
BUN SERPL-MCNC: 18.5 MG/DL (ref 8–23)
BUN/CREAT SERPL: 27.2 (ref 7–25)
CALCIUM SPEC-SCNC: 9.3 MG/DL (ref 8.6–10.5)
CHLORIDE SERPL-SCNC: 99 MMOL/L (ref 98–107)
CO2 SERPL-SCNC: 28.3 MMOL/L (ref 22–29)
CREAT SERPL-MCNC: 0.68 MG/DL (ref 0.57–1)
DEPRECATED RDW RBC AUTO: 52.6 FL (ref 37–54)
EGFRCR SERPLBLD CKD-EPI 2021: 93.8 ML/MIN/1.73
EOSINOPHIL # BLD AUTO: 0 10*3/MM3 (ref 0–0.4)
EOSINOPHIL NFR BLD AUTO: 0 % (ref 0.3–6.2)
ERYTHROCYTE [DISTWIDTH] IN BLOOD BY AUTOMATED COUNT: 15.8 % (ref 12.3–15.4)
GLOBULIN UR ELPH-MCNC: 2.5 GM/DL
GLUCOSE SERPL-MCNC: 126 MG/DL (ref 65–99)
HCT VFR BLD AUTO: 33.5 % (ref 34–46.6)
HGB BLD-MCNC: 10.6 G/DL (ref 12–15.9)
HOLD SPECIMEN: NORMAL
IMM GRANULOCYTES # BLD AUTO: 0.01 10*3/MM3 (ref 0–0.05)
IMM GRANULOCYTES NFR BLD AUTO: 0.2 % (ref 0–0.5)
LYMPHOCYTES # BLD AUTO: 1.28 10*3/MM3 (ref 0.7–3.1)
LYMPHOCYTES NFR BLD AUTO: 24.8 % (ref 19.6–45.3)
MCH RBC QN AUTO: 28.8 PG (ref 26.6–33)
MCHC RBC AUTO-ENTMCNC: 31.6 G/DL (ref 31.5–35.7)
MCV RBC AUTO: 91 FL (ref 79–97)
MONOCYTES # BLD AUTO: 0.3 10*3/MM3 (ref 0.1–0.9)
MONOCYTES NFR BLD AUTO: 5.8 % (ref 5–12)
NEUTROPHILS NFR BLD AUTO: 3.56 10*3/MM3 (ref 1.7–7)
NEUTROPHILS NFR BLD AUTO: 68.8 % (ref 42.7–76)
PLATELET # BLD AUTO: 223 10*3/MM3 (ref 140–450)
PMV BLD AUTO: 10 FL (ref 6–12)
POTASSIUM SERPL-SCNC: 3.4 MMOL/L (ref 3.5–5.2)
PROT SERPL-MCNC: 6.5 G/DL (ref 6–8.5)
RBC # BLD AUTO: 3.68 10*6/MM3 (ref 3.77–5.28)
SODIUM SERPL-SCNC: 139 MMOL/L (ref 136–145)
WBC NRBC COR # BLD AUTO: 5.17 10*3/MM3 (ref 3.4–10.8)

## 2025-07-24 PROCEDURE — 36415 COLL VENOUS BLD VENIPUNCTURE: CPT

## 2025-07-24 PROCEDURE — 85025 COMPLETE CBC W/AUTO DIFF WBC: CPT

## 2025-07-24 PROCEDURE — 80053 COMPREHEN METABOLIC PANEL: CPT | Performed by: INTERNAL MEDICINE

## 2025-07-24 RX ORDER — DEXAMETHASONE 4 MG/1
4 TABLET ORAL DAILY
COMMUNITY

## 2025-07-24 RX ORDER — LENALIDOMIDE 25 MG/1
25 CAPSULE ORAL DAILY
COMMUNITY

## 2025-07-25 ENCOUNTER — READMISSION MANAGEMENT (OUTPATIENT)
Dept: CALL CENTER | Facility: HOSPITAL | Age: 70
End: 2025-07-25
Payer: MEDICARE

## 2025-07-25 ENCOUNTER — TELEPHONE (OUTPATIENT)
Dept: CARDIOLOGY | Facility: CLINIC | Age: 70
End: 2025-07-25
Payer: MEDICARE

## 2025-07-25 LAB
ALBUMIN SERPL ELPH-MCNC: 3.2 G/DL (ref 2.9–4.4)
ALBUMIN/GLOB SERPL: 1 {RATIO} (ref 0.7–1.7)
ALPHA1 GLOB SERPL ELPH-MCNC: 0.3 G/DL (ref 0–0.4)
ALPHA2 GLOB SERPL ELPH-MCNC: 1 G/DL (ref 0.4–1)
B-GLOBULIN SERPL ELPH-MCNC: 1 G/DL (ref 0.7–1.3)
GAMMA GLOB SERPL ELPH-MCNC: 0.9 G/DL (ref 0.4–1.8)
GLOBULIN SER CALC-MCNC: 3.1 G/DL (ref 2.2–3.9)
KAPPA LC FREE SER-MCNC: 6 MG/L (ref 3.3–19.4)
KAPPA LC FREE/LAMBDA FREE SER: 0.08 {RATIO} (ref 0.26–1.65)
LABORATORY COMMENT REPORT: ABNORMAL
LAMBDA LC FREE SERPL-MCNC: 74.6 MG/L (ref 5.7–26.3)
M PROTEIN SERPL ELPH-MCNC: 0.5 G/DL
PROT PATTERN SERPL ELPH-IMP: ABNORMAL
PROT SERPL-MCNC: 6.3 G/DL (ref 6–8.5)

## 2025-07-25 NOTE — OUTREACH NOTE
Medical Week 1 Survey      Flowsheet Row Responses   [facility] patient discharged from? [facility]   Does the patient have one of the following disease processes/diagnoses(primary or secondary)? Other   Week 1 attempt successful? Yes   Call start time 1115   Call end time 1120   Discharge diagnosis Febrile neutropenia   Person spoke with today (if not patient) and relationship Patient   Meds reviewed with patient/caregiver? Yes   Is the patient having any side effects they believe may be caused by any medication additions or changes? No   Does the patient have all medications ordered at discharge? N/A   Prescription comments No new rxs at time of discharge. Discussed stopped medications with patient. She is aware. No questions or concerns.   Is the patient taking all medications as directed (includes completed medication regime)? Yes   Comments regarding appointments Cardiology f/u appt on 8/4/25 at 11:30 AM with Dr. Bull Wiggins.   Does the patient have a primary care provider?  Yes   Does the patient have an appointment with their PCP within 7 days of discharge? Yes   Comments regarding PCP PCP--STANLEY Coronado---saw on 7/22/25 for hospital f/u.   Has the patient kept scheduled appointments due by today? Yes   Has home health visited the patient within 72 hours of discharge? N/A   Psychosocial issues? No   Comments Patient reports doing well. No s/s of A Fib post ablation. Patient states she has had some swelling in her feet. She takes Lasix BID. Advised if worsened after taking Lasix to reach out to PCP or Cardiology. Patient unable to weigh daily at this time her scale is broken and her pulse ox is broken as well.   Did the patient receive a copy of their discharge instructions? Yes   Nursing interventions Reviewed instructions with patient   What is the patient's perception of their health status since discharge? Improving   Is the patient/caregiver able to teach back signs and symptoms related to  disease process for when to call PCP? Yes   Is the patient/caregiver able to teach back signs and symptoms related to disease process for when to call 911? Yes   Is the patient/caregiver able to teach back the hierarchy of who to call/visit for symptoms/problems? PCP, Specialist, Home health nurse, Urgent Care, ED, 911 Yes   If the patient is a current smoker, are they able to teach back resources for cessation? Not a smoker   Week 1 call completed? Yes   Graduated Yes   Would this patient benefit from a Referral to Amb Social Work? No   Is the patient interested in additional calls from an ambulatory ? No   Graduated/Revoked comments Patient denies any needs, questions or concerns.   Call end time 1120            Mai COURTNEY - Registered Nurse

## 2025-07-25 NOTE — TELEPHONE ENCOUNTER
Juliana with [facility]   Phone [phone] Ex [phone]    Faxed clearance yesterday over for EGD. I did confirm with Cindy HERZOG that she has received it.  Patient scheduled for EGD on Tuesday 7/29/25. Patient on Eliquis.  Patient is transferring from Dr. Wiggins to Dr. Jimenez, but has not seen Dr. DE LA CRUZ in office yet. Advised Juliana we may not be able to clear patient. There is also note in her chart from Feb 2025 where patient wanted to transfer from Dr. Sims to Dr. Felton. Juliana was going to call Dr. Felton's/Dr. Wiggins's office.

## 2025-07-25 NOTE — TELEPHONE ENCOUNTER
Called Tram back with [facility] we are unable to clear pt due to not seeing Dr Jimenez but pt is still under the care of Dr Wiggins and they can clear her Tram V/U

## 2025-07-28 ENCOUNTER — TELEPHONE (OUTPATIENT)
Dept: CARDIOLOGY | Facility: CLINIC | Age: 70
End: 2025-07-28

## 2025-07-28 LAB
ALBUMIN SERPL ELPH-MCNC: 3.1 G/DL (ref 2.9–4.4)
ALBUMIN/GLOB SERPL: 1.1 {RATIO} (ref 0.7–1.7)
ALPHA1 GLOB SERPL ELPH-MCNC: 0.3 G/DL (ref 0–0.4)
ALPHA2 GLOB SERPL ELPH-MCNC: 0.9 G/DL (ref 0.4–1)
B-GLOBULIN SERPL ELPH-MCNC: 0.9 G/DL (ref 0.7–1.3)
GAMMA GLOB SERPL ELPH-MCNC: 0.9 G/DL (ref 0.4–1.8)
GLOBULIN SER-MCNC: 3 G/DL (ref 2.2–3.9)
IGA SERPL-MCNC: 41 MG/DL (ref 87–352)
IGG SERPL-MCNC: 951 MG/DL (ref 586–1602)
IGM SERPL-MCNC: 33 MG/DL (ref 26–217)
INTERPRETATION SERPL IEP-IMP: ABNORMAL
LABORATORY COMMENT REPORT: ABNORMAL
M PROTEIN SERPL ELPH-MCNC: 0.5 G/DL
PROT SERPL-MCNC: 6.1 G/DL (ref 6–8.5)

## 2025-07-29 ENCOUNTER — ON CAMPUS - OUTPATIENT (OUTPATIENT)
Dept: URBAN - METROPOLITAN AREA HOSPITAL 77 | Facility: HOSPITAL | Age: 70
End: 2025-07-29
Payer: MEDICARE

## 2025-07-29 DIAGNOSIS — K44.9 DIAPHRAGMATIC HERNIA WITHOUT OBSTRUCTION OR GANGRENE: ICD-10-CM

## 2025-07-29 DIAGNOSIS — K22.2 ESOPHAGEAL OBSTRUCTION: ICD-10-CM

## 2025-07-29 DIAGNOSIS — K29.50 UNSPECIFIED CHRONIC GASTRITIS WITHOUT BLEEDING: ICD-10-CM

## 2025-07-29 DIAGNOSIS — R10.13 EPIGASTRIC PAIN: ICD-10-CM

## 2025-07-29 PROCEDURE — 43239 EGD BIOPSY SINGLE/MULTIPLE: CPT | Performed by: INTERNAL MEDICINE

## 2025-07-30 ENCOUNTER — DOCUMENTATION (OUTPATIENT)
Dept: ONCOLOGY | Facility: CLINIC | Age: 70
End: 2025-07-30
Payer: MEDICARE

## 2025-07-30 ENCOUNTER — SPECIALTY PHARMACY (OUTPATIENT)
Dept: PHARMACY | Facility: HOSPITAL | Age: 70
End: 2025-07-30
Payer: MEDICARE

## 2025-07-30 DIAGNOSIS — F41.8 SITUATIONAL ANXIETY: ICD-10-CM

## 2025-07-30 DIAGNOSIS — C90.00 MULTIPLE MYELOMA NOT HAVING ACHIEVED REMISSION: Primary | ICD-10-CM

## 2025-07-30 RX ORDER — HYDROXYZINE HYDROCHLORIDE 25 MG/1
25 TABLET, FILM COATED ORAL 3 TIMES DAILY PRN
Qty: 60 TABLET | Refills: 3 | Status: SHIPPED | OUTPATIENT
Start: 2025-07-30

## 2025-07-30 NOTE — PROGRESS NOTES
Received a phone call from the patients daughter in regards to symptoms the patient is experiencing. She stated the patient was very anxious last night which resulted in an anxiety attack including SOA and dry heaves. She stated her mothers PCP will not prescribe anything to help with the anxiety attacks due to her oncology and cardiology related issues therefore she is wondering if Dr. Barrett would be willing to prescribe something for her mom. I informed her that I will ask then let her know. She confirmed.     After speaking with Dr. Barrett, I contacted the patients daughter. I informed her that Dr. Barrett has prescribed Hydroxyzine for her mother and she can take 1 tablet PO TID PRN for anxiety. I informed her that it does cause a drowsy effect therefore she will need to be cautious when taking this medication. She v/u and stated she will let her mother know. I advised for her to contact the office if they have any further questions or concerns as well as if this medication does not work for her mother. She confirmed.

## 2025-07-30 NOTE — PROGRESS NOTES
Specialty Pharmacy Patient Management Program  Oncology Reassessment       Specialty Pharmacy Note: Revlimid (lenalidomide) - on HOLD    Phuong Altamirano is a 70 y.o. female followed by  for the treatment of multiple myeloma was seen 7/24/25 for labs.     Labs Review: The CMP and CBC from 7/24/25 have been reviewed. Medication remains on HOLD per Dr. Barrett dictation. Pt to f/u with GI provider. Pt has infusion appt scheduled for 8/4/25 and per provider nurse, patient may be okay to resume by Monday 8/4/25 pending ESOPHAGOGASTRODUODENOSCOPY results (currently scheduled for 8/1/25).    Next follow up with provider scheduled for 8/12/25    Specialty pharmacy will continue to follow patient.    Diane Che, Pharm.D.  7/30/2025  16:12 EDT

## 2025-08-04 ENCOUNTER — HOSPITAL ENCOUNTER (OUTPATIENT)
Dept: ONCOLOGY | Facility: HOSPITAL | Age: 70
Discharge: HOME | End: 2025-08-04
Admitting: INTERNAL MEDICINE
Payer: MEDICARE

## 2025-08-04 ENCOUNTER — SPECIALTY PHARMACY (OUTPATIENT)
Dept: PHARMACY | Facility: HOSPITAL | Age: 70
End: 2025-08-04
Payer: MEDICARE

## 2025-08-04 VITALS
WEIGHT: 173.2 LBS | HEART RATE: 92 BPM | BODY MASS INDEX: 32.7 KG/M2 | SYSTOLIC BLOOD PRESSURE: 145 MMHG | DIASTOLIC BLOOD PRESSURE: 85 MMHG | OXYGEN SATURATION: 94 % | RESPIRATION RATE: 18 BRPM | TEMPERATURE: 97.3 F | HEIGHT: 61 IN

## 2025-08-04 DIAGNOSIS — C90.00 MULTIPLE MYELOMA NOT HAVING ACHIEVED REMISSION: Primary | ICD-10-CM

## 2025-08-04 LAB
ALP BLD-CCNC: 78 U/L (ref 42–141)
BASOPHILS # BLD AUTO: 0.02 10*3/MM3 (ref 0–0.2)
BASOPHILS NFR BLD AUTO: 0.6 % (ref 0–1.5)
BUN BLDA-MCNC: 13 MG/DL (ref 7–22)
CALCIUM BLD QL: 9.8 MG/DL (ref 8–10.3)
CHLORIDE BLDA-SCNC: 97 MMOL/L (ref 98–108)
CO2 BLDA-SCNC: 29 MMOL/L (ref 18–33)
CREAT BLDA-MCNC: 0.7 MG/DL (ref 0.6–1.2)
DEPRECATED RDW RBC AUTO: 51.3 FL (ref 37–54)
EGFRCR SERPLBLD CKD-EPI 2021: 93.2 ML/MIN/1.73
EOSINOPHIL # BLD AUTO: 0 10*3/MM3 (ref 0–0.4)
EOSINOPHIL NFR BLD AUTO: 0 % (ref 0.3–6.2)
ERYTHROCYTE [DISTWIDTH] IN BLOOD BY AUTOMATED COUNT: 15.4 % (ref 12.3–15.4)
GLUCOSE BLDC GLUCOMTR-MCNC: 170 MG/DL (ref 73–118)
HCT VFR BLD AUTO: 32.7 % (ref 34–46.6)
HGB BLD-MCNC: 10.7 G/DL (ref 12–15.9)
IMM GRANULOCYTES # BLD AUTO: 0 10*3/MM3 (ref 0–0.05)
IMM GRANULOCYTES NFR BLD AUTO: 0 % (ref 0–0.5)
LYMPHOCYTES # BLD AUTO: 0.84 10*3/MM3 (ref 0.7–3.1)
LYMPHOCYTES NFR BLD AUTO: 25.5 % (ref 19.6–45.3)
MCH RBC QN AUTO: 29.5 PG (ref 26.6–33)
MCHC RBC AUTO-ENTMCNC: 32.7 G/DL (ref 31.5–35.7)
MCV RBC AUTO: 90.1 FL (ref 79–97)
MONOCYTES # BLD AUTO: 0.1 10*3/MM3 (ref 0.1–0.9)
MONOCYTES NFR BLD AUTO: 3 % (ref 5–12)
NEUTROPHILS NFR BLD AUTO: 2.34 10*3/MM3 (ref 1.7–7)
NEUTROPHILS NFR BLD AUTO: 70.9 % (ref 42.7–76)
PLATELET # BLD AUTO: 249 10*3/MM3 (ref 140–450)
PMV BLD AUTO: 10.4 FL (ref 6–12)
POC ALBUMIN: 3.4 G/L (ref 3.3–5.5)
POC ALT (SGPT): 16 U/L (ref 10–47)
POC AST (SGOT): 36 U/L (ref 11–38)
POC TOTAL BILIRUBIN: 0.4 MG/DL (ref 0.2–1.6)
POC TOTAL PROTEIN: 6.6 G/DL (ref 6.4–8.1)
POTASSIUM BLDA-SCNC: 4.4 MMOL/L (ref 3.6–5.1)
RBC # BLD AUTO: 3.63 10*6/MM3 (ref 3.77–5.28)
SODIUM BLD-SCNC: 145 MMOL/L (ref 128–145)
WBC NRBC COR # BLD AUTO: 3.3 10*3/MM3 (ref 3.4–10.8)

## 2025-08-04 PROCEDURE — 25010000002 BORTEZOMIB PER 0.1 MG: Performed by: INTERNAL MEDICINE

## 2025-08-04 PROCEDURE — 96374 THER/PROPH/DIAG INJ IV PUSH: CPT

## 2025-08-04 PROCEDURE — 80053 COMPREHEN METABOLIC PANEL: CPT

## 2025-08-04 PROCEDURE — 96401 CHEMO ANTI-NEOPL SQ/IM: CPT

## 2025-08-04 PROCEDURE — 25010000002 DIPHENHYDRAMINE PER 50 MG: Performed by: INTERNAL MEDICINE

## 2025-08-04 PROCEDURE — 85025 COMPLETE CBC W/AUTO DIFF WBC: CPT | Performed by: INTERNAL MEDICINE

## 2025-08-04 PROCEDURE — 25010000002 DARATUMUMAB-HYALURONIDASE-FIHJ 1800-30000 MG-UT/15ML SOLUTION: Performed by: INTERNAL MEDICINE

## 2025-08-04 RX ORDER — HYDROCORTISONE SODIUM SUCCINATE 100 MG/2ML
100 INJECTION INTRAMUSCULAR; INTRAVENOUS AS NEEDED
OUTPATIENT
Start: 2025-08-18

## 2025-08-04 RX ORDER — DIPHENHYDRAMINE HYDROCHLORIDE 50 MG/ML
50 INJECTION, SOLUTION INTRAMUSCULAR; INTRAVENOUS AS NEEDED
OUTPATIENT
Start: 2025-08-11

## 2025-08-04 RX ORDER — METHYLPREDNISOLONE SODIUM SUCCINATE 125 MG/2ML
60 INJECTION INTRAMUSCULAR; INTRAVENOUS ONCE
Status: DISCONTINUED | OUTPATIENT
Start: 2025-08-04 | End: 2025-08-04

## 2025-08-04 RX ORDER — SODIUM CHLORIDE 9 MG/ML
20 INJECTION, SOLUTION INTRAVENOUS ONCE
Status: DISCONTINUED | OUTPATIENT
Start: 2025-08-04 | End: 2025-08-05 | Stop reason: HOSPADM

## 2025-08-04 RX ORDER — HYDROCORTISONE SODIUM SUCCINATE 100 MG/2ML
100 INJECTION INTRAMUSCULAR; INTRAVENOUS AS NEEDED
OUTPATIENT
Start: 2025-08-11

## 2025-08-04 RX ORDER — DIPHENHYDRAMINE HYDROCHLORIDE 50 MG/ML
25 INJECTION, SOLUTION INTRAMUSCULAR; INTRAVENOUS ONCE
Status: COMPLETED | OUTPATIENT
Start: 2025-08-04 | End: 2025-08-04

## 2025-08-04 RX ORDER — ACETAMINOPHEN 500 MG
1000 TABLET ORAL ONCE
Status: CANCELLED | OUTPATIENT
Start: 2025-08-04

## 2025-08-04 RX ORDER — SODIUM CHLORIDE 9 MG/ML
20 INJECTION, SOLUTION INTRAVENOUS ONCE
OUTPATIENT
Start: 2025-08-11

## 2025-08-04 RX ORDER — HYDROCORTISONE SODIUM SUCCINATE 100 MG/2ML
100 INJECTION INTRAMUSCULAR; INTRAVENOUS AS NEEDED
Status: CANCELLED | OUTPATIENT
Start: 2025-08-04

## 2025-08-04 RX ORDER — DIPHENHYDRAMINE HYDROCHLORIDE 50 MG/ML
50 INJECTION, SOLUTION INTRAMUSCULAR; INTRAVENOUS AS NEEDED
OUTPATIENT
Start: 2025-08-18

## 2025-08-04 RX ORDER — MEPERIDINE HYDROCHLORIDE 25 MG/ML
25 INJECTION INTRAMUSCULAR; INTRAVENOUS; SUBCUTANEOUS
OUTPATIENT
Start: 2025-08-18

## 2025-08-04 RX ORDER — BORTEZOMIB 3.5 MG/1
1.3 INJECTION, POWDER, LYOPHILIZED, FOR SOLUTION INTRAVENOUS; SUBCUTANEOUS ONCE
Status: CANCELLED | OUTPATIENT
Start: 2025-08-11

## 2025-08-04 RX ORDER — BORTEZOMIB 3.5 MG/1
2.3 INJECTION, POWDER, LYOPHILIZED, FOR SOLUTION INTRAVENOUS; SUBCUTANEOUS ONCE
Status: COMPLETED | OUTPATIENT
Start: 2025-08-04 | End: 2025-08-04

## 2025-08-04 RX ORDER — BORTEZOMIB 3.5 MG/1
1.3 INJECTION, POWDER, LYOPHILIZED, FOR SOLUTION INTRAVENOUS; SUBCUTANEOUS ONCE
Status: CANCELLED | OUTPATIENT
Start: 2025-08-04

## 2025-08-04 RX ORDER — METHYLPREDNISOLONE SODIUM SUCCINATE 125 MG/2ML
60 INJECTION INTRAMUSCULAR; INTRAVENOUS ONCE
Status: CANCELLED | OUTPATIENT
Start: 2025-08-04

## 2025-08-04 RX ORDER — MEPERIDINE HYDROCHLORIDE 25 MG/ML
25 INJECTION INTRAMUSCULAR; INTRAVENOUS; SUBCUTANEOUS
Status: CANCELLED | OUTPATIENT
Start: 2025-08-04

## 2025-08-04 RX ORDER — METHYLPREDNISOLONE SODIUM SUCCINATE 125 MG/2ML
60 INJECTION INTRAMUSCULAR; INTRAVENOUS ONCE
Status: CANCELLED | OUTPATIENT
Start: 2025-08-18

## 2025-08-04 RX ORDER — ACETAMINOPHEN 500 MG
1000 TABLET ORAL ONCE
OUTPATIENT
Start: 2025-08-18

## 2025-08-04 RX ORDER — ACETAMINOPHEN 500 MG
1000 TABLET ORAL ONCE
Status: COMPLETED | OUTPATIENT
Start: 2025-08-04 | End: 2025-08-04

## 2025-08-04 RX ORDER — BORTEZOMIB 3.5 MG/1
1.3 INJECTION, POWDER, LYOPHILIZED, FOR SOLUTION INTRAVENOUS; SUBCUTANEOUS ONCE
Status: CANCELLED | OUTPATIENT
Start: 2025-08-07

## 2025-08-04 RX ORDER — SODIUM CHLORIDE 9 MG/ML
20 INJECTION, SOLUTION INTRAVENOUS ONCE
Status: CANCELLED | OUTPATIENT
Start: 2025-08-04

## 2025-08-04 RX ORDER — BORTEZOMIB 3.5 MG/1
1.3 INJECTION, POWDER, LYOPHILIZED, FOR SOLUTION INTRAVENOUS; SUBCUTANEOUS ONCE
Status: CANCELLED | OUTPATIENT
Start: 2025-08-14

## 2025-08-04 RX ORDER — SODIUM CHLORIDE 9 MG/ML
20 INJECTION, SOLUTION INTRAVENOUS ONCE
OUTPATIENT
Start: 2025-08-18

## 2025-08-04 RX ORDER — DIPHENHYDRAMINE HYDROCHLORIDE 50 MG/ML
50 INJECTION, SOLUTION INTRAMUSCULAR; INTRAVENOUS AS NEEDED
Status: CANCELLED | OUTPATIENT
Start: 2025-08-04

## 2025-08-04 RX ORDER — LENALIDOMIDE 10 MG/1
10 CAPSULE ORAL SEE ADMIN INSTRUCTIONS
Qty: 14 CAPSULE | Refills: 0 | Status: SHIPPED | OUTPATIENT
Start: 2025-08-04

## 2025-08-04 RX ORDER — ACETAMINOPHEN 500 MG
1000 TABLET ORAL ONCE
OUTPATIENT
Start: 2025-08-11

## 2025-08-04 RX ORDER — FAMOTIDINE 10 MG/ML
20 INJECTION, SOLUTION INTRAVENOUS AS NEEDED
OUTPATIENT
Start: 2025-08-11

## 2025-08-04 RX ORDER — FAMOTIDINE 10 MG/ML
20 INJECTION, SOLUTION INTRAVENOUS AS NEEDED
Status: CANCELLED | OUTPATIENT
Start: 2025-08-04

## 2025-08-04 RX ORDER — FAMOTIDINE 10 MG/ML
20 INJECTION, SOLUTION INTRAVENOUS AS NEEDED
OUTPATIENT
Start: 2025-08-18

## 2025-08-04 RX ORDER — MEPERIDINE HYDROCHLORIDE 25 MG/ML
25 INJECTION INTRAMUSCULAR; INTRAVENOUS; SUBCUTANEOUS
OUTPATIENT
Start: 2025-08-11

## 2025-08-04 RX ORDER — METHYLPREDNISOLONE SODIUM SUCCINATE 125 MG/2ML
60 INJECTION INTRAMUSCULAR; INTRAVENOUS ONCE
Status: CANCELLED | OUTPATIENT
Start: 2025-08-11

## 2025-08-04 RX ADMIN — BORTEXOMIB 2.3 MG: 3.5 INJECTION, POWDER, LYOPHILIZED, FOR SOLUTION INTRAVENOUS; SUBCUTANEOUS at 14:42

## 2025-08-04 RX ADMIN — DARATUMUMAB AND HYALURONIDASE-FIHJ (HUMAN RECOMBINANT) 1800 MG: 1800; 30000 INJECTION SUBCUTANEOUS at 15:23

## 2025-08-04 RX ADMIN — ACETAMINOPHEN 1000 MG: 500 TABLET ORAL at 14:20

## 2025-08-04 RX ADMIN — DIPHENHYDRAMINE HYDROCHLORIDE 25 MG: 50 INJECTION INTRAMUSCULAR; INTRAVENOUS at 14:24

## 2025-08-06 ENCOUNTER — APPOINTMENT (OUTPATIENT)
Dept: OTHER | Facility: HOSPITAL | Age: 70
DRG: 291 | End: 2025-08-06
Payer: MEDICARE

## 2025-08-06 ENCOUNTER — OFFICE VISIT (OUTPATIENT)
Dept: CARDIOLOGY | Facility: CLINIC | Age: 70
End: 2025-08-06
Payer: MEDICARE

## 2025-08-06 ENCOUNTER — HOSPITAL ENCOUNTER (INPATIENT)
Facility: HOSPITAL | Age: 70
LOS: 2 days | Discharge: HOME | DRG: 291 | End: 2025-08-10
Attending: FAMILY MEDICINE | Admitting: FAMILY MEDICINE
Payer: MEDICARE

## 2025-08-06 VITALS
HEART RATE: 83 BPM | HEIGHT: 61 IN | SYSTOLIC BLOOD PRESSURE: 154 MMHG | OXYGEN SATURATION: 98 % | WEIGHT: 174.6 LBS | DIASTOLIC BLOOD PRESSURE: 81 MMHG | BODY MASS INDEX: 32.97 KG/M2

## 2025-08-06 DIAGNOSIS — I48.19 PERSISTENT ATRIAL FIBRILLATION: Primary | ICD-10-CM

## 2025-08-06 DIAGNOSIS — C90.00 MULTIPLE MYELOMA NOT HAVING ACHIEVED REMISSION: ICD-10-CM

## 2025-08-06 DIAGNOSIS — I10 ESSENTIAL (PRIMARY) HYPERTENSION: ICD-10-CM

## 2025-08-06 PROBLEM — J44.1 COPD WITH EXACERBATION: Status: ACTIVE | Noted: 2025-08-06

## 2025-08-06 LAB
ARTERIAL PATENCY WRIST A: POSITIVE
ATMOSPHERIC PRESS: ABNORMAL MM[HG]
BASE EXCESS BLDA CALC-SCNC: 8.3 MMOL/L (ref 0–3)
BDY SITE: ABNORMAL
CO2 BLDA-SCNC: 33.6 MMOL/L (ref 22–29)
HCO3 BLDA-SCNC: 32.3 MMOL/L (ref 21–28)
HEMODILUTION: NO
INHALED O2 CONCENTRATION: 21 %
MODALITY: ABNORMAL
PCO2 BLDA: 41.4 MM HG (ref 35–48)
PEEP RESPIRATORY: 5 CM[H2O]
PH BLDA: 7.5 PH UNITS (ref 7.35–7.45)
PO2 BLD: 357 MM[HG] (ref 0–500)
PO2 BLDA: 74.9 MM HG (ref 83–108)
RESPIRATORY RATE: 14
SAO2 % BLDCOA: 96 % (ref 94–98)

## 2025-08-06 PROCEDURE — 82803 BLOOD GASES ANY COMBINATION: CPT | Performed by: FAMILY MEDICINE

## 2025-08-06 PROCEDURE — G0378 HOSPITAL OBSERVATION PER HR: HCPCS

## 2025-08-06 PROCEDURE — 94799 UNLISTED PULMONARY SVC/PX: CPT

## 2025-08-06 PROCEDURE — 94640 AIRWAY INHALATION TREATMENT: CPT

## 2025-08-06 PROCEDURE — 36600 WITHDRAWAL OF ARTERIAL BLOOD: CPT | Performed by: FAMILY MEDICINE

## 2025-08-06 PROCEDURE — 94660 CPAP INITIATION&MGMT: CPT

## 2025-08-06 RX ORDER — POTASSIUM CHLORIDE 1500 MG/1
20 TABLET, EXTENDED RELEASE ORAL 3 TIMES WEEKLY
Status: DISCONTINUED | OUTPATIENT
Start: 2025-08-07 | End: 2025-08-09

## 2025-08-06 RX ORDER — LEVOTHYROXINE SODIUM 100 UG/1
100 TABLET ORAL
Status: DISCONTINUED | OUTPATIENT
Start: 2025-08-07 | End: 2025-08-10 | Stop reason: HOSPADM

## 2025-08-06 RX ORDER — AMOXICILLIN 250 MG
2 CAPSULE ORAL 2 TIMES DAILY
Status: DISCONTINUED | OUTPATIENT
Start: 2025-08-07 | End: 2025-08-10 | Stop reason: HOSPADM

## 2025-08-06 RX ORDER — POLYETHYLENE GLYCOL 3350 17 G/17G
17 POWDER, FOR SOLUTION ORAL DAILY PRN
Status: DISCONTINUED | OUTPATIENT
Start: 2025-08-06 | End: 2025-08-10 | Stop reason: HOSPADM

## 2025-08-06 RX ORDER — METOLAZONE 2.5 MG/1
2.5 TABLET ORAL 3 TIMES WEEKLY
Qty: 38 TABLET | Refills: 1 | Status: SHIPPED | OUTPATIENT
Start: 2025-08-06

## 2025-08-06 RX ORDER — POTASSIUM CHLORIDE 1500 MG/1
20 TABLET, EXTENDED RELEASE ORAL 3 TIMES WEEKLY
Qty: 38 TABLET | Refills: 1 | Status: SHIPPED | OUTPATIENT
Start: 2025-08-06

## 2025-08-06 RX ORDER — SUCRALFATE 1 G/1
1 TABLET ORAL 3 TIMES DAILY
COMMUNITY
Start: 2025-07-28

## 2025-08-06 RX ORDER — FUROSEMIDE 20 MG/1
20 TABLET ORAL
Status: DISCONTINUED | OUTPATIENT
Start: 2025-08-07 | End: 2025-08-07

## 2025-08-06 RX ORDER — FERROUS SULFATE 325(65) MG
325 TABLET ORAL DAILY
Status: DISCONTINUED | OUTPATIENT
Start: 2025-08-07 | End: 2025-08-10 | Stop reason: HOSPADM

## 2025-08-06 RX ORDER — SULFAMETHOXAZOLE AND TRIMETHOPRIM 800; 160 MG/1; MG/1
1 TABLET ORAL 2 TIMES DAILY
COMMUNITY

## 2025-08-06 RX ORDER — METOLAZONE 2.5 MG/1
2.5 TABLET ORAL 3 TIMES WEEKLY
Qty: 30 TABLET | Refills: 1 | Status: SHIPPED | OUTPATIENT
Start: 2025-08-06 | End: 2025-08-06

## 2025-08-06 RX ORDER — POTASSIUM CHLORIDE 1500 MG/1
20 TABLET, EXTENDED RELEASE ORAL 3 TIMES WEEKLY
Qty: 30 TABLET | Refills: 1 | Status: SHIPPED | OUTPATIENT
Start: 2025-08-06 | End: 2025-08-06

## 2025-08-06 RX ORDER — BISACODYL 10 MG
10 SUPPOSITORY, RECTAL RECTAL DAILY PRN
Status: DISCONTINUED | OUTPATIENT
Start: 2025-08-06 | End: 2025-08-10 | Stop reason: HOSPADM

## 2025-08-06 RX ORDER — SOTALOL HYDROCHLORIDE 80 MG/1
80 TABLET ORAL EVERY 12 HOURS SCHEDULED
Status: DISCONTINUED | OUTPATIENT
Start: 2025-08-07 | End: 2025-08-08

## 2025-08-06 RX ORDER — BISACODYL 5 MG/1
5 TABLET, DELAYED RELEASE ORAL DAILY PRN
Status: DISCONTINUED | OUTPATIENT
Start: 2025-08-06 | End: 2025-08-10 | Stop reason: HOSPADM

## 2025-08-06 RX ORDER — METHYLPREDNISOLONE SODIUM SUCCINATE 125 MG/2ML
60 INJECTION, POWDER, LYOPHILIZED, FOR SOLUTION INTRAMUSCULAR; INTRAVENOUS EVERY 8 HOURS
Status: DISCONTINUED | OUTPATIENT
Start: 2025-08-06 | End: 2025-08-07

## 2025-08-06 RX ORDER — METOLAZONE 2.5 MG/1
2.5 TABLET ORAL 3 TIMES WEEKLY
Status: DISCONTINUED | OUTPATIENT
Start: 2025-08-07 | End: 2025-08-10 | Stop reason: HOSPADM

## 2025-08-06 RX ORDER — SOTALOL HYDROCHLORIDE 80 MG/1
80 TABLET ORAL EVERY 12 HOURS SCHEDULED
Qty: 180 TABLET | Refills: 1 | Status: SHIPPED | OUTPATIENT
Start: 2025-08-06

## 2025-08-06 RX ORDER — SODIUM CHLORIDE 0.9 % (FLUSH) 0.9 %
10 SYRINGE (ML) INJECTION AS NEEDED
Status: DISCONTINUED | OUTPATIENT
Start: 2025-08-06 | End: 2025-08-10 | Stop reason: HOSPADM

## 2025-08-06 RX ORDER — ACETAMINOPHEN 325 MG/1
650 TABLET ORAL EVERY 6 HOURS PRN
Status: DISCONTINUED | OUTPATIENT
Start: 2025-08-06 | End: 2025-08-10 | Stop reason: HOSPADM

## 2025-08-06 RX ORDER — NITROGLYCERIN 0.4 MG/1
0.4 TABLET SUBLINGUAL
Status: DISCONTINUED | OUTPATIENT
Start: 2025-08-06 | End: 2025-08-10 | Stop reason: HOSPADM

## 2025-08-06 RX ORDER — IPRATROPIUM BROMIDE AND ALBUTEROL SULFATE 2.5; .5 MG/3ML; MG/3ML
3 SOLUTION RESPIRATORY (INHALATION)
Status: DISCONTINUED | OUTPATIENT
Start: 2025-08-06 | End: 2025-08-10 | Stop reason: HOSPADM

## 2025-08-06 RX ORDER — SODIUM CHLORIDE 0.9 % (FLUSH) 0.9 %
10 SYRINGE (ML) INJECTION EVERY 12 HOURS SCHEDULED
Status: DISCONTINUED | OUTPATIENT
Start: 2025-08-06 | End: 2025-08-10 | Stop reason: HOSPADM

## 2025-08-06 RX ORDER — FAMOTIDINE 20 MG/1
40 TABLET, FILM COATED ORAL DAILY
Status: DISCONTINUED | OUTPATIENT
Start: 2025-08-07 | End: 2025-08-10 | Stop reason: HOSPADM

## 2025-08-06 RX ORDER — ALUMINA, MAGNESIA, AND SIMETHICONE 2400; 2400; 240 MG/30ML; MG/30ML; MG/30ML
15 SUSPENSION ORAL EVERY 6 HOURS PRN
Status: DISCONTINUED | OUTPATIENT
Start: 2025-08-06 | End: 2025-08-10 | Stop reason: HOSPADM

## 2025-08-06 RX ORDER — SUCRALFATE 1 G/1
1 TABLET ORAL
Status: DISCONTINUED | OUTPATIENT
Start: 2025-08-07 | End: 2025-08-10 | Stop reason: HOSPADM

## 2025-08-06 RX ORDER — HYDROXYZINE HYDROCHLORIDE 25 MG/1
25 TABLET, FILM COATED ORAL 3 TIMES DAILY PRN
Status: DISCONTINUED | OUTPATIENT
Start: 2025-08-06 | End: 2025-08-10 | Stop reason: HOSPADM

## 2025-08-06 RX ORDER — HYDROXYCHLOROQUINE SULFATE 200 MG/1
200 TABLET, FILM COATED ORAL EVERY 12 HOURS SCHEDULED
Status: DISCONTINUED | OUTPATIENT
Start: 2025-08-07 | End: 2025-08-10 | Stop reason: HOSPADM

## 2025-08-06 RX ORDER — ONDANSETRON 2 MG/ML
4 INJECTION INTRAMUSCULAR; INTRAVENOUS EVERY 6 HOURS PRN
Status: DISCONTINUED | OUTPATIENT
Start: 2025-08-06 | End: 2025-08-10 | Stop reason: HOSPADM

## 2025-08-06 RX ORDER — SODIUM CHLORIDE 9 MG/ML
40 INJECTION, SOLUTION INTRAVENOUS AS NEEDED
Status: DISCONTINUED | OUTPATIENT
Start: 2025-08-06 | End: 2025-08-10 | Stop reason: HOSPADM

## 2025-08-06 RX ORDER — FERROUS SULFATE 325(65) MG
TABLET ORAL DAILY
COMMUNITY
Start: 2025-07-04

## 2025-08-06 RX ADMIN — IPRATROPIUM BROMIDE AND ALBUTEROL SULFATE 3 ML: .5; 3 SOLUTION RESPIRATORY (INHALATION) at 22:10

## 2025-08-07 ENCOUNTER — APPOINTMENT (OUTPATIENT)
Dept: CARDIOLOGY | Facility: HOSPITAL | Age: 70
DRG: 291 | End: 2025-08-07
Payer: MEDICARE

## 2025-08-07 LAB
ALBUMIN SERPL-MCNC: 3.5 G/DL (ref 3.5–5.2)
ALBUMIN SERPL-MCNC: 3.8 G/DL (ref 3.5–5.2)
ALBUMIN/GLOB SERPL: 1.4 G/DL
ALBUMIN/GLOB SERPL: 1.6 G/DL
ALP SERPL-CCNC: 69 U/L (ref 39–117)
ALP SERPL-CCNC: 72 U/L (ref 39–117)
ALT SERPL W P-5'-P-CCNC: 11 U/L (ref 1–33)
ALT SERPL W P-5'-P-CCNC: 13 U/L (ref 1–33)
ANION GAP SERPL CALCULATED.3IONS-SCNC: 11.5 MMOL/L (ref 5–15)
ANION GAP SERPL CALCULATED.3IONS-SCNC: 15 MMOL/L (ref 5–15)
AST SERPL-CCNC: 11 U/L (ref 1–32)
AST SERPL-CCNC: 9 U/L (ref 1–32)
BACTERIA UR QL AUTO: NORMAL /HPF
BASOPHILS # BLD AUTO: 0 10*3/MM3 (ref 0–0.2)
BASOPHILS NFR BLD AUTO: 0 % (ref 0–1.5)
BH CV ECHO MEAS - EDV(CUBED): 81.1 ML
BH CV ECHO MEAS - EDV(MOD-SP4): 98.9 ML
BH CV ECHO MEAS - EF(MOD-SP4): 57.5 %
BH CV ECHO MEAS - ESV(CUBED): 26.1 ML
BH CV ECHO MEAS - ESV(MOD-SP4): 42 ML
BH CV ECHO MEAS - FS: 31.5 %
BH CV ECHO MEAS - IVS/LVPW: 1.06 CM
BH CV ECHO MEAS - IVSD: 1.16 CM
BH CV ECHO MEAS - LA DIMENSION: 3.7 CM
BH CV ECHO MEAS - LV DIASTOLIC VOL/BSA (35-75): 55.5 CM2
BH CV ECHO MEAS - LV MASS(C)D: 170.2 GRAMS
BH CV ECHO MEAS - LV SYSTOLIC VOL/BSA (12-30): 23.6 CM2
BH CV ECHO MEAS - LVIDD: 4.3 CM
BH CV ECHO MEAS - LVIDS: 3 CM
BH CV ECHO MEAS - LVPWD: 1.1 CM
BH CV ECHO MEAS - MR MAX PG: 104.4 MMHG
BH CV ECHO MEAS - MR MAX VEL: 510.8 CM/SEC
BH CV ECHO MEAS - MR MEAN PG: 61.5 MMHG
BH CV ECHO MEAS - MR MEAN VEL: 366.7 CM/SEC
BH CV ECHO MEAS - MR VTI: 159.4 CM
BH CV ECHO MEAS - SV(MOD-SP4): 56.9 ML
BH CV ECHO MEAS - SVI(MOD-SP4): 32 ML/M2
BH CV ECHO MEAS - TR MAX PG: 36.9 MMHG
BH CV ECHO MEAS - TR MAX VEL: 303.7 CM/SEC
BILIRUB SERPL-MCNC: 0.4 MG/DL (ref 0–1.2)
BILIRUB SERPL-MCNC: 0.4 MG/DL (ref 0–1.2)
BILIRUB UR QL STRIP: NEGATIVE
BUN SERPL-MCNC: 20.9 MG/DL (ref 8–23)
BUN SERPL-MCNC: 21.3 MG/DL (ref 8–23)
BUN/CREAT SERPL: 33.2 (ref 7–25)
BUN/CREAT SERPL: 38.7 (ref 7–25)
CALCIUM SPEC-SCNC: 9.2 MG/DL (ref 8.6–10.5)
CALCIUM SPEC-SCNC: 9.4 MG/DL (ref 8.6–10.5)
CHLORIDE SERPL-SCNC: 100 MMOL/L (ref 98–107)
CHLORIDE SERPL-SCNC: 98 MMOL/L (ref 98–107)
CLARITY UR: CLEAR
CO2 SERPL-SCNC: 27 MMOL/L (ref 22–29)
CO2 SERPL-SCNC: 27.5 MMOL/L (ref 22–29)
COLOR UR: YELLOW
CREAT SERPL-MCNC: 0.55 MG/DL (ref 0.57–1)
CREAT SERPL-MCNC: 0.63 MG/DL (ref 0.57–1)
D DIMER PPP FEU-MCNC: 0.29 MCGFEU/ML (ref 0–0.7)
DEPRECATED RDW RBC AUTO: 52.3 FL (ref 37–54)
EGFRCR SERPLBLD CKD-EPI 2021: 95.6 ML/MIN/1.73
EGFRCR SERPLBLD CKD-EPI 2021: 98.7 ML/MIN/1.73
EOSINOPHIL # BLD AUTO: 0 10*3/MM3 (ref 0–0.4)
EOSINOPHIL NFR BLD AUTO: 0 % (ref 0.3–6.2)
ERYTHROCYTE [DISTWIDTH] IN BLOOD BY AUTOMATED COUNT: 15.6 % (ref 12.3–15.4)
GLOBULIN UR ELPH-MCNC: 2.4 GM/DL
GLOBULIN UR ELPH-MCNC: 2.5 GM/DL
GLUCOSE BLDC GLUCOMTR-MCNC: 157 MG/DL (ref 70–105)
GLUCOSE BLDC GLUCOMTR-MCNC: 181 MG/DL (ref 70–105)
GLUCOSE SERPL-MCNC: 184 MG/DL (ref 65–99)
GLUCOSE SERPL-MCNC: 196 MG/DL (ref 65–99)
GLUCOSE UR STRIP-MCNC: NEGATIVE MG/DL
HCT VFR BLD AUTO: 31.6 % (ref 34–46.6)
HGB BLD-MCNC: 10 G/DL (ref 12–15.9)
HGB UR QL STRIP.AUTO: NEGATIVE
HYALINE CASTS UR QL AUTO: NORMAL /LPF
IMM GRANULOCYTES # BLD AUTO: 0.03 10*3/MM3 (ref 0–0.05)
IMM GRANULOCYTES NFR BLD AUTO: 0.7 % (ref 0–0.5)
KETONES UR QL STRIP: NEGATIVE
LEUKOCYTE ESTERASE UR QL STRIP.AUTO: NEGATIVE
LV EF BIPLANE MOD: 58 %
LYMPHOCYTES # BLD AUTO: 0.75 10*3/MM3 (ref 0.7–3.1)
LYMPHOCYTES NFR BLD AUTO: 16.5 % (ref 19.6–45.3)
MCH RBC QN AUTO: 29.1 PG (ref 26.6–33)
MCHC RBC AUTO-ENTMCNC: 31.6 G/DL (ref 31.5–35.7)
MCV RBC AUTO: 91.9 FL (ref 79–97)
MONOCYTES # BLD AUTO: 0.08 10*3/MM3 (ref 0.1–0.9)
MONOCYTES NFR BLD AUTO: 1.8 % (ref 5–12)
NEUTROPHILS NFR BLD AUTO: 3.69 10*3/MM3 (ref 1.7–7)
NEUTROPHILS NFR BLD AUTO: 81 % (ref 42.7–76)
NITRITE UR QL STRIP: NEGATIVE
NRBC BLD AUTO-RTO: 0 /100 WBC (ref 0–0.2)
NT-PROBNP SERPL-MCNC: 8571 PG/ML (ref 0–900)
PH UR STRIP.AUTO: 7 [PH] (ref 5–8)
PLATELET # BLD AUTO: 218 10*3/MM3 (ref 140–450)
PMV BLD AUTO: 10.1 FL (ref 6–12)
POTASSIUM SERPL-SCNC: 3.9 MMOL/L (ref 3.5–5.2)
POTASSIUM SERPL-SCNC: 4 MMOL/L (ref 3.5–5.2)
PROT SERPL-MCNC: 6 G/DL (ref 6–8.5)
PROT SERPL-MCNC: 6.2 G/DL (ref 6–8.5)
PROT UR QL STRIP: ABNORMAL
RBC # BLD AUTO: 3.44 10*6/MM3 (ref 3.77–5.28)
RBC # UR STRIP: NORMAL /HPF
REF LAB TEST METHOD: NORMAL
SODIUM SERPL-SCNC: 139 MMOL/L (ref 136–145)
SODIUM SERPL-SCNC: 140 MMOL/L (ref 136–145)
SP GR UR STRIP: 1.02 (ref 1–1.03)
SQUAMOUS #/AREA URNS HPF: NORMAL /HPF
UROBILINOGEN UR QL STRIP: ABNORMAL
WBC # UR STRIP: NORMAL /HPF
WBC NRBC COR # BLD AUTO: 4.55 10*3/MM3 (ref 3.4–10.8)

## 2025-08-07 PROCEDURE — 99222 1ST HOSP IP/OBS MODERATE 55: CPT | Performed by: INTERNAL MEDICINE

## 2025-08-07 PROCEDURE — 82948 REAGENT STRIP/BLOOD GLUCOSE: CPT

## 2025-08-07 PROCEDURE — 93321 DOPPLER ECHO F-UP/LMTD STD: CPT | Performed by: INTERNAL MEDICINE

## 2025-08-07 PROCEDURE — 93005 ELECTROCARDIOGRAM TRACING: CPT | Performed by: INTERNAL MEDICINE

## 2025-08-07 PROCEDURE — 80053 COMPREHEN METABOLIC PANEL: CPT | Performed by: FAMILY MEDICINE

## 2025-08-07 PROCEDURE — 99214 OFFICE O/P EST MOD 30 MIN: CPT | Performed by: INTERNAL MEDICINE

## 2025-08-07 PROCEDURE — 93321 DOPPLER ECHO F-UP/LMTD STD: CPT

## 2025-08-07 PROCEDURE — 93325 DOPPLER ECHO COLOR FLOW MAPG: CPT | Performed by: INTERNAL MEDICINE

## 2025-08-07 PROCEDURE — 94664 DEMO&/EVAL PT USE INHALER: CPT

## 2025-08-07 PROCEDURE — 93010 ELECTROCARDIOGRAM REPORT: CPT | Performed by: INTERNAL MEDICINE

## 2025-08-07 PROCEDURE — 94799 UNLISTED PULMONARY SVC/PX: CPT

## 2025-08-07 PROCEDURE — G0378 HOSPITAL OBSERVATION PER HR: HCPCS

## 2025-08-07 PROCEDURE — 81001 URINALYSIS AUTO W/SCOPE: CPT | Performed by: FAMILY MEDICINE

## 2025-08-07 PROCEDURE — 93308 TTE F-UP OR LMTD: CPT | Performed by: INTERNAL MEDICINE

## 2025-08-07 PROCEDURE — 82948 REAGENT STRIP/BLOOD GLUCOSE: CPT | Performed by: FAMILY MEDICINE

## 2025-08-07 PROCEDURE — 85025 COMPLETE CBC W/AUTO DIFF WBC: CPT | Performed by: FAMILY MEDICINE

## 2025-08-07 PROCEDURE — 85379 FIBRIN DEGRADATION QUANT: CPT | Performed by: FAMILY MEDICINE

## 2025-08-07 PROCEDURE — 25010000002 METHYLPREDNISOLONE PER 125 MG: Performed by: FAMILY MEDICINE

## 2025-08-07 PROCEDURE — 25010000002 METHYLPREDNISOLONE PER 40 MG: Performed by: INTERNAL MEDICINE

## 2025-08-07 PROCEDURE — 94660 CPAP INITIATION&MGMT: CPT

## 2025-08-07 PROCEDURE — 94761 N-INVAS EAR/PLS OXIMETRY MLT: CPT

## 2025-08-07 PROCEDURE — 93308 TTE F-UP OR LMTD: CPT

## 2025-08-07 PROCEDURE — 83880 ASSAY OF NATRIURETIC PEPTIDE: CPT | Performed by: FAMILY MEDICINE

## 2025-08-07 PROCEDURE — 93325 DOPPLER ECHO COLOR FLOW MAPG: CPT

## 2025-08-07 RX ORDER — METHYLPREDNISOLONE SODIUM SUCCINATE 40 MG/ML
40 INJECTION, POWDER, LYOPHILIZED, FOR SOLUTION INTRAMUSCULAR; INTRAVENOUS EVERY 12 HOURS
Status: DISCONTINUED | OUTPATIENT
Start: 2025-08-07 | End: 2025-08-08

## 2025-08-07 RX ORDER — BUMETANIDE 1 MG/1
1 TABLET ORAL
Status: DISCONTINUED | OUTPATIENT
Start: 2025-08-07 | End: 2025-08-10

## 2025-08-07 RX ORDER — BUDESONIDE 0.5 MG/2ML
0.5 INHALANT ORAL
Status: DISCONTINUED | OUTPATIENT
Start: 2025-08-07 | End: 2025-08-10 | Stop reason: HOSPADM

## 2025-08-07 RX ORDER — FUROSEMIDE 10 MG/ML
20 INJECTION INTRAMUSCULAR; INTRAVENOUS 2 TIMES DAILY
Status: DISCONTINUED | OUTPATIENT
Start: 2025-08-07 | End: 2025-08-07

## 2025-08-07 RX ORDER — FUROSEMIDE 10 MG/ML
20 INJECTION INTRAMUSCULAR; INTRAVENOUS EVERY 12 HOURS
Status: DISCONTINUED | OUTPATIENT
Start: 2025-08-07 | End: 2025-08-07

## 2025-08-07 RX ADMIN — IPRATROPIUM BROMIDE AND ALBUTEROL SULFATE 3 ML: .5; 3 SOLUTION RESPIRATORY (INHALATION) at 20:02

## 2025-08-07 RX ADMIN — METFORMIN HYDROCHLORIDE 500 MG: 500 TABLET ORAL at 17:54

## 2025-08-07 RX ADMIN — APIXABAN 5 MG: 5 TABLET, FILM COATED ORAL at 21:23

## 2025-08-07 RX ADMIN — DOCUSATE SODIUM AND SENNOSIDES 2 TABLET: 8.6; 5 TABLET, FILM COATED ORAL at 21:24

## 2025-08-07 RX ADMIN — IPRATROPIUM BROMIDE AND ALBUTEROL SULFATE 3 ML: .5; 3 SOLUTION RESPIRATORY (INHALATION) at 15:51

## 2025-08-07 RX ADMIN — BUMETANIDE 1 MG: 1 TABLET ORAL at 17:54

## 2025-08-07 RX ADMIN — METHYLPREDNISOLONE SODIUM SUCCINATE 40 MG: 40 INJECTION, POWDER, FOR SOLUTION INTRAMUSCULAR; INTRAVENOUS at 17:54

## 2025-08-07 RX ADMIN — SOTALOL HYDROCHLORIDE 80 MG: 80 TABLET ORAL at 21:23

## 2025-08-07 RX ADMIN — IPRATROPIUM BROMIDE AND ALBUTEROL SULFATE 3 ML: .5; 3 SOLUTION RESPIRATORY (INHALATION) at 07:59

## 2025-08-07 RX ADMIN — FAMOTIDINE 40 MG: 20 TABLET, FILM COATED ORAL at 08:21

## 2025-08-07 RX ADMIN — APIXABAN 5 MG: 5 TABLET, FILM COATED ORAL at 09:44

## 2025-08-07 RX ADMIN — DOCUSATE SODIUM AND SENNOSIDES 2 TABLET: 8.6; 5 TABLET, FILM COATED ORAL at 08:21

## 2025-08-07 RX ADMIN — SUCRALFATE 1 G: 1 TABLET ORAL at 13:15

## 2025-08-07 RX ADMIN — METHYLPREDNISOLONE SODIUM SUCCINATE 60 MG: 125 INJECTION, POWDER, FOR SOLUTION INTRAMUSCULAR; INTRAVENOUS at 00:27

## 2025-08-07 RX ADMIN — Medication 10 ML: at 00:26

## 2025-08-07 RX ADMIN — SOTALOL HYDROCHLORIDE 80 MG: 80 TABLET ORAL at 09:44

## 2025-08-07 RX ADMIN — METHYLPREDNISOLONE SODIUM SUCCINATE 60 MG: 125 INJECTION, POWDER, FOR SOLUTION INTRAMUSCULAR; INTRAVENOUS at 05:43

## 2025-08-07 RX ADMIN — LEVOTHYROXINE SODIUM 100 MCG: 0.1 TABLET ORAL at 05:43

## 2025-08-07 RX ADMIN — HYDROXYCHLOROQUINE SULFATE 200 MG: 200 TABLET, FILM COATED ORAL at 21:23

## 2025-08-07 RX ADMIN — Medication 10 ML: at 21:24

## 2025-08-07 RX ADMIN — HYDROXYCHLOROQUINE SULFATE 200 MG: 200 TABLET, FILM COATED ORAL at 09:44

## 2025-08-07 RX ADMIN — SOTALOL HYDROCHLORIDE 80 MG: 80 TABLET ORAL at 00:26

## 2025-08-07 RX ADMIN — Medication 10 ML: at 08:22

## 2025-08-07 RX ADMIN — APIXABAN 5 MG: 5 TABLET, FILM COATED ORAL at 00:26

## 2025-08-07 RX ADMIN — SUCRALFATE 1 G: 1 TABLET ORAL at 17:54

## 2025-08-07 RX ADMIN — FERROUS SULFATE TAB 325 MG (65 MG ELEMENTAL FE) 325 MG: 325 (65 FE) TAB at 08:21

## 2025-08-07 RX ADMIN — HYDROXYCHLOROQUINE SULFATE 200 MG: 200 TABLET, FILM COATED ORAL at 00:26

## 2025-08-07 RX ADMIN — FUROSEMIDE 20 MG: 20 TABLET ORAL at 08:21

## 2025-08-07 RX ADMIN — SUCRALFATE 1 G: 1 TABLET ORAL at 08:21

## 2025-08-07 RX ADMIN — BUDESONIDE 0.5 MG: 0.5 SUSPENSION RESPIRATORY (INHALATION) at 20:01

## 2025-08-08 ENCOUNTER — APPOINTMENT (OUTPATIENT)
Dept: GENERAL RADIOLOGY | Facility: HOSPITAL | Age: 70
DRG: 291 | End: 2025-08-08
Payer: MEDICARE

## 2025-08-08 PROBLEM — J44.1 COPD EXACERBATION: Status: ACTIVE | Noted: 2025-08-08

## 2025-08-08 LAB
ALBUMIN SERPL-MCNC: 3.7 G/DL (ref 3.5–5.2)
ALBUMIN SERPL-MCNC: 3.7 G/DL (ref 3.5–5.2)
ALBUMIN/GLOB SERPL: 1.4 G/DL
ALBUMIN/GLOB SERPL: 1.6 G/DL
ALP SERPL-CCNC: 72 U/L (ref 39–117)
ALP SERPL-CCNC: 75 U/L (ref 39–117)
ALT SERPL W P-5'-P-CCNC: 12 U/L (ref 1–33)
ALT SERPL W P-5'-P-CCNC: 32 U/L (ref 1–33)
ANION GAP SERPL CALCULATED.3IONS-SCNC: 13.9 MMOL/L (ref 5–15)
ANION GAP SERPL CALCULATED.3IONS-SCNC: 14.8 MMOL/L (ref 5–15)
AST SERPL-CCNC: 13 U/L (ref 1–32)
AST SERPL-CCNC: 39 U/L (ref 1–32)
BASOPHILS # BLD AUTO: 0 10*3/MM3 (ref 0–0.2)
BASOPHILS # BLD AUTO: 0.01 10*3/MM3 (ref 0–0.2)
BASOPHILS NFR BLD AUTO: 0 % (ref 0–1.5)
BASOPHILS NFR BLD AUTO: 0.1 % (ref 0–1.5)
BILIRUB SERPL-MCNC: 0.3 MG/DL (ref 0–1.2)
BILIRUB SERPL-MCNC: 0.4 MG/DL (ref 0–1.2)
BUN SERPL-MCNC: 25.2 MG/DL (ref 8–23)
BUN SERPL-MCNC: 27.4 MG/DL (ref 8–23)
BUN/CREAT SERPL: 40 (ref 7–25)
BUN/CREAT SERPL: 40.9 (ref 7–25)
CALCIUM SPEC-SCNC: 9.2 MG/DL (ref 8.6–10.5)
CALCIUM SPEC-SCNC: 9.5 MG/DL (ref 8.6–10.5)
CHLORIDE SERPL-SCNC: 93 MMOL/L (ref 98–107)
CHLORIDE SERPL-SCNC: 98 MMOL/L (ref 98–107)
CO2 SERPL-SCNC: 25.1 MMOL/L (ref 22–29)
CO2 SERPL-SCNC: 28.2 MMOL/L (ref 22–29)
CREAT SERPL-MCNC: 0.63 MG/DL (ref 0.57–1)
CREAT SERPL-MCNC: 0.67 MG/DL (ref 0.57–1)
DEPRECATED RDW RBC AUTO: 48.8 FL (ref 37–54)
DEPRECATED RDW RBC AUTO: 50.2 FL (ref 37–54)
EGFRCR SERPLBLD CKD-EPI 2021: 94.2 ML/MIN/1.73
EGFRCR SERPLBLD CKD-EPI 2021: 95.6 ML/MIN/1.73
EOSINOPHIL # BLD AUTO: 0 10*3/MM3 (ref 0–0.4)
EOSINOPHIL # BLD AUTO: 0 10*3/MM3 (ref 0–0.4)
EOSINOPHIL NFR BLD AUTO: 0 % (ref 0.3–6.2)
EOSINOPHIL NFR BLD AUTO: 0 % (ref 0.3–6.2)
ERYTHROCYTE [DISTWIDTH] IN BLOOD BY AUTOMATED COUNT: 15 % (ref 12.3–15.4)
ERYTHROCYTE [DISTWIDTH] IN BLOOD BY AUTOMATED COUNT: 15.2 % (ref 12.3–15.4)
GLOBULIN UR ELPH-MCNC: 2.3 GM/DL
GLOBULIN UR ELPH-MCNC: 2.6 GM/DL
GLUCOSE BLDC GLUCOMTR-MCNC: 193 MG/DL (ref 70–105)
GLUCOSE BLDC GLUCOMTR-MCNC: 196 MG/DL (ref 70–105)
GLUCOSE BLDC GLUCOMTR-MCNC: 203 MG/DL (ref 70–105)
GLUCOSE BLDC GLUCOMTR-MCNC: 212 MG/DL (ref 70–105)
GLUCOSE BLDC GLUCOMTR-MCNC: 257 MG/DL (ref 70–105)
GLUCOSE BLDC GLUCOMTR-MCNC: 273 MG/DL (ref 70–105)
GLUCOSE SERPL-MCNC: 196 MG/DL (ref 65–99)
GLUCOSE SERPL-MCNC: 239 MG/DL (ref 65–99)
HCT VFR BLD AUTO: 33.4 % (ref 34–46.6)
HCT VFR BLD AUTO: 38 % (ref 34–46.6)
HGB BLD-MCNC: 10.5 G/DL (ref 12–15.9)
HGB BLD-MCNC: 12.4 G/DL (ref 12–15.9)
IMM GRANULOCYTES # BLD AUTO: 0.01 10*3/MM3 (ref 0–0.05)
IMM GRANULOCYTES # BLD AUTO: 0.02 10*3/MM3 (ref 0–0.05)
IMM GRANULOCYTES NFR BLD AUTO: 0.1 % (ref 0–0.5)
IMM GRANULOCYTES NFR BLD AUTO: 0.3 % (ref 0–0.5)
LYMPHOCYTES # BLD AUTO: 0.81 10*3/MM3 (ref 0.7–3.1)
LYMPHOCYTES # BLD AUTO: 1.39 10*3/MM3 (ref 0.7–3.1)
LYMPHOCYTES NFR BLD AUTO: 11.2 % (ref 19.6–45.3)
LYMPHOCYTES NFR BLD AUTO: 17 % (ref 19.6–45.3)
MCH RBC QN AUTO: 28.2 PG (ref 26.6–33)
MCH RBC QN AUTO: 28.9 PG (ref 26.6–33)
MCHC RBC AUTO-ENTMCNC: 31.4 G/DL (ref 31.5–35.7)
MCHC RBC AUTO-ENTMCNC: 32.6 G/DL (ref 31.5–35.7)
MCV RBC AUTO: 88.6 FL (ref 79–97)
MCV RBC AUTO: 89.8 FL (ref 79–97)
MONOCYTES # BLD AUTO: 0.36 10*3/MM3 (ref 0.1–0.9)
MONOCYTES # BLD AUTO: 0.77 10*3/MM3 (ref 0.1–0.9)
MONOCYTES NFR BLD AUTO: 5 % (ref 5–12)
MONOCYTES NFR BLD AUTO: 9.4 % (ref 5–12)
NEUTROPHILS NFR BLD AUTO: 6.02 10*3/MM3 (ref 1.7–7)
NEUTROPHILS NFR BLD AUTO: 6.03 10*3/MM3 (ref 1.7–7)
NEUTROPHILS NFR BLD AUTO: 73.4 % (ref 42.7–76)
NEUTROPHILS NFR BLD AUTO: 83.5 % (ref 42.7–76)
NRBC BLD AUTO-RTO: 0 /100 WBC (ref 0–0.2)
NRBC BLD AUTO-RTO: 0 /100 WBC (ref 0–0.2)
PLATELET # BLD AUTO: 232 10*3/MM3 (ref 140–450)
PLATELET # BLD AUTO: 292 10*3/MM3 (ref 140–450)
PMV BLD AUTO: 10.1 FL (ref 6–12)
PMV BLD AUTO: 10.4 FL (ref 6–12)
POTASSIUM SERPL-SCNC: 3.7 MMOL/L (ref 3.5–5.2)
POTASSIUM SERPL-SCNC: 4 MMOL/L (ref 3.5–5.2)
PROT SERPL-MCNC: 6 G/DL (ref 6–8.5)
PROT SERPL-MCNC: 6.3 G/DL (ref 6–8.5)
RBC # BLD AUTO: 3.72 10*6/MM3 (ref 3.77–5.28)
RBC # BLD AUTO: 4.29 10*6/MM3 (ref 3.77–5.28)
SODIUM SERPL-SCNC: 136 MMOL/L (ref 136–145)
SODIUM SERPL-SCNC: 137 MMOL/L (ref 136–145)
WBC NRBC COR # BLD AUTO: 7.22 10*3/MM3 (ref 3.4–10.8)
WBC NRBC COR # BLD AUTO: 8.2 10*3/MM3 (ref 3.4–10.8)

## 2025-08-08 PROCEDURE — 94761 N-INVAS EAR/PLS OXIMETRY MLT: CPT

## 2025-08-08 PROCEDURE — 99232 SBSQ HOSP IP/OBS MODERATE 35: CPT | Performed by: INTERNAL MEDICINE

## 2025-08-08 PROCEDURE — 85025 COMPLETE CBC W/AUTO DIFF WBC: CPT | Performed by: FAMILY MEDICINE

## 2025-08-08 PROCEDURE — 94660 CPAP INITIATION&MGMT: CPT

## 2025-08-08 PROCEDURE — 63710000001 PREDNISONE PER 1 MG: Performed by: FAMILY MEDICINE

## 2025-08-08 PROCEDURE — 94799 UNLISTED PULMONARY SVC/PX: CPT

## 2025-08-08 PROCEDURE — 25010000002 MAGNESIUM SULFATE 2 GM/50ML SOLUTION: Performed by: INTERNAL MEDICINE

## 2025-08-08 PROCEDURE — 80053 COMPREHEN METABOLIC PANEL: CPT | Performed by: FAMILY MEDICINE

## 2025-08-08 PROCEDURE — 25010000002 METHYLPREDNISOLONE PER 40 MG: Performed by: INTERNAL MEDICINE

## 2025-08-08 PROCEDURE — 71046 X-RAY EXAM CHEST 2 VIEWS: CPT

## 2025-08-08 PROCEDURE — 99233 SBSQ HOSP IP/OBS HIGH 50: CPT | Performed by: INTERNAL MEDICINE

## 2025-08-08 PROCEDURE — 94664 DEMO&/EVAL PT USE INHALER: CPT

## 2025-08-08 PROCEDURE — 82948 REAGENT STRIP/BLOOD GLUCOSE: CPT

## 2025-08-08 RX ORDER — PREDNISONE 20 MG/1
20 TABLET ORAL
Status: DISCONTINUED | OUTPATIENT
Start: 2025-08-08 | End: 2025-08-09

## 2025-08-08 RX ORDER — MAGNESIUM SULFATE HEPTAHYDRATE 40 MG/ML
2 INJECTION, SOLUTION INTRAVENOUS ONCE
Status: COMPLETED | OUTPATIENT
Start: 2025-08-08 | End: 2025-08-08

## 2025-08-08 RX ADMIN — LEVOTHYROXINE SODIUM 100 MCG: 0.1 TABLET ORAL at 05:51

## 2025-08-08 RX ADMIN — SOTALOL HYDROCHLORIDE 80 MG: 80 TABLET ORAL at 08:13

## 2025-08-08 RX ADMIN — SUCRALFATE 1 G: 1 TABLET ORAL at 08:13

## 2025-08-08 RX ADMIN — IPRATROPIUM BROMIDE AND ALBUTEROL SULFATE 3 ML: .5; 3 SOLUTION RESPIRATORY (INHALATION) at 06:48

## 2025-08-08 RX ADMIN — IPRATROPIUM BROMIDE AND ALBUTEROL SULFATE 3 ML: .5; 3 SOLUTION RESPIRATORY (INHALATION) at 11:30

## 2025-08-08 RX ADMIN — MAGNESIUM SULFATE HEPTAHYDRATE 2 G: 40 INJECTION, SOLUTION INTRAVENOUS at 17:26

## 2025-08-08 RX ADMIN — PREDNISONE 20 MG: 20 TABLET ORAL at 10:03

## 2025-08-08 RX ADMIN — ACETAMINOPHEN 650 MG: 325 TABLET ORAL at 10:03

## 2025-08-08 RX ADMIN — BUMETANIDE 1 MG: 1 TABLET ORAL at 17:25

## 2025-08-08 RX ADMIN — SUCRALFATE 1 G: 1 TABLET ORAL at 17:25

## 2025-08-08 RX ADMIN — HYDROXYCHLOROQUINE SULFATE 200 MG: 200 TABLET, FILM COATED ORAL at 21:25

## 2025-08-08 RX ADMIN — Medication 10 ML: at 21:26

## 2025-08-08 RX ADMIN — BUMETANIDE 1 MG: 1 TABLET ORAL at 08:13

## 2025-08-08 RX ADMIN — IPRATROPIUM BROMIDE AND ALBUTEROL SULFATE 3 ML: .5; 3 SOLUTION RESPIRATORY (INHALATION) at 20:15

## 2025-08-08 RX ADMIN — HYDROXYCHLOROQUINE SULFATE 200 MG: 200 TABLET, FILM COATED ORAL at 08:13

## 2025-08-08 RX ADMIN — FERROUS SULFATE TAB 325 MG (65 MG ELEMENTAL FE) 325 MG: 325 (65 FE) TAB at 08:13

## 2025-08-08 RX ADMIN — BUDESONIDE 0.5 MG: 0.5 SUSPENSION RESPIRATORY (INHALATION) at 20:18

## 2025-08-08 RX ADMIN — METOLAZONE 2.5 MG: 2.5 TABLET ORAL at 08:13

## 2025-08-08 RX ADMIN — POTASSIUM CHLORIDE 20 MEQ: 1500 TABLET, EXTENDED RELEASE ORAL at 08:13

## 2025-08-08 RX ADMIN — APIXABAN 5 MG: 5 TABLET, FILM COATED ORAL at 08:13

## 2025-08-08 RX ADMIN — BUDESONIDE 0.5 MG: 0.5 SUSPENSION RESPIRATORY (INHALATION) at 06:48

## 2025-08-08 RX ADMIN — APIXABAN 5 MG: 5 TABLET, FILM COATED ORAL at 21:25

## 2025-08-08 RX ADMIN — HYDROXYZINE HYDROCHLORIDE 25 MG: 25 TABLET ORAL at 17:04

## 2025-08-08 RX ADMIN — DOCUSATE SODIUM AND SENNOSIDES 2 TABLET: 8.6; 5 TABLET, FILM COATED ORAL at 21:25

## 2025-08-08 RX ADMIN — FAMOTIDINE 40 MG: 20 TABLET, FILM COATED ORAL at 08:13

## 2025-08-08 RX ADMIN — METHYLPREDNISOLONE SODIUM SUCCINATE 40 MG: 40 INJECTION, POWDER, FOR SOLUTION INTRAMUSCULAR; INTRAVENOUS at 05:51

## 2025-08-08 RX ADMIN — METFORMIN HYDROCHLORIDE 500 MG: 500 TABLET ORAL at 17:25

## 2025-08-08 RX ADMIN — Medication 10 ML: at 08:18

## 2025-08-08 RX ADMIN — SUCRALFATE 1 G: 1 TABLET ORAL at 13:13

## 2025-08-09 PROBLEM — R80.9 PROTEINURIA: Status: ACTIVE | Noted: 2025-08-09

## 2025-08-09 LAB
CREAT UR-MCNC: 21.7 MG/DL
GLUCOSE BLDC GLUCOMTR-MCNC: 103 MG/DL (ref 70–105)
GLUCOSE BLDC GLUCOMTR-MCNC: 139 MG/DL (ref 70–105)
GLUCOSE BLDC GLUCOMTR-MCNC: 166 MG/DL (ref 70–105)
GLUCOSE BLDC GLUCOMTR-MCNC: 174 MG/DL (ref 70–105)
GLUCOSE BLDC GLUCOMTR-MCNC: 174 MG/DL (ref 70–105)
PROT ?TM UR-MCNC: 14.5 MG/DL
PROT/CREAT UR: 668.2 MG/G CREA (ref 0–200)
QT INTERVAL: 433 MS
QT INTERVAL: 496 MS
QTC INTERVAL: 487 MS
QTC INTERVAL: 591 MS

## 2025-08-09 PROCEDURE — 93010 ELECTROCARDIOGRAM REPORT: CPT | Performed by: INTERNAL MEDICINE

## 2025-08-09 PROCEDURE — 94664 DEMO&/EVAL PT USE INHALER: CPT

## 2025-08-09 PROCEDURE — 82570 ASSAY OF URINE CREATININE: CPT | Performed by: HOSPITALIST

## 2025-08-09 PROCEDURE — 94799 UNLISTED PULMONARY SVC/PX: CPT

## 2025-08-09 PROCEDURE — 93005 ELECTROCARDIOGRAM TRACING: CPT | Performed by: NURSE PRACTITIONER

## 2025-08-09 PROCEDURE — 94660 CPAP INITIATION&MGMT: CPT

## 2025-08-09 PROCEDURE — 63710000001 INSULIN LISPRO (HUMAN) PER 5 UNITS: Performed by: FAMILY MEDICINE

## 2025-08-09 PROCEDURE — 99232 SBSQ HOSP IP/OBS MODERATE 35: CPT | Performed by: INTERNAL MEDICINE

## 2025-08-09 PROCEDURE — 94761 N-INVAS EAR/PLS OXIMETRY MLT: CPT

## 2025-08-09 PROCEDURE — 82948 REAGENT STRIP/BLOOD GLUCOSE: CPT

## 2025-08-09 PROCEDURE — 25010000002 ONDANSETRON PER 1 MG: Performed by: FAMILY MEDICINE

## 2025-08-09 PROCEDURE — 63710000001 PREDNISONE PER 1 MG: Performed by: FAMILY MEDICINE

## 2025-08-09 PROCEDURE — 82948 REAGENT STRIP/BLOOD GLUCOSE: CPT | Performed by: FAMILY MEDICINE

## 2025-08-09 PROCEDURE — 84156 ASSAY OF PROTEIN URINE: CPT | Performed by: HOSPITALIST

## 2025-08-09 RX ORDER — LOSARTAN POTASSIUM 25 MG/1
25 TABLET ORAL
Status: DISCONTINUED | OUTPATIENT
Start: 2025-08-09 | End: 2025-08-10 | Stop reason: HOSPADM

## 2025-08-09 RX ORDER — PREDNISONE 10 MG/1
10 TABLET ORAL
Status: DISCONTINUED | OUTPATIENT
Start: 2025-08-10 | End: 2025-08-10 | Stop reason: HOSPADM

## 2025-08-09 RX ORDER — POTASSIUM CHLORIDE 1500 MG/1
20 TABLET, EXTENDED RELEASE ORAL DAILY
Status: DISCONTINUED | OUTPATIENT
Start: 2025-08-10 | End: 2025-08-10

## 2025-08-09 RX ORDER — IBUPROFEN 600 MG/1
1 TABLET ORAL
Status: DISCONTINUED | OUTPATIENT
Start: 2025-08-09 | End: 2025-08-10 | Stop reason: HOSPADM

## 2025-08-09 RX ORDER — DEXTROSE MONOHYDRATE 25 G/50ML
25 INJECTION, SOLUTION INTRAVENOUS
Status: DISCONTINUED | OUTPATIENT
Start: 2025-08-09 | End: 2025-08-10 | Stop reason: HOSPADM

## 2025-08-09 RX ORDER — INSULIN LISPRO 100 [IU]/ML
2-7 INJECTION, SOLUTION INTRAVENOUS; SUBCUTANEOUS
Status: DISCONTINUED | OUTPATIENT
Start: 2025-08-09 | End: 2025-08-10 | Stop reason: HOSPADM

## 2025-08-09 RX ORDER — NICOTINE POLACRILEX 4 MG
15 LOZENGE BUCCAL
Status: DISCONTINUED | OUTPATIENT
Start: 2025-08-09 | End: 2025-08-10 | Stop reason: HOSPADM

## 2025-08-09 RX ORDER — SOTALOL HYDROCHLORIDE 80 MG/1
80 TABLET ORAL EVERY 12 HOURS SCHEDULED
Status: DISCONTINUED | OUTPATIENT
Start: 2025-08-09 | End: 2025-08-10 | Stop reason: HOSPADM

## 2025-08-09 RX ADMIN — FERROUS SULFATE TAB 325 MG (65 MG ELEMENTAL FE) 325 MG: 325 (65 FE) TAB at 08:05

## 2025-08-09 RX ADMIN — ONDANSETRON 4 MG: 2 INJECTION INTRAMUSCULAR; INTRAVENOUS at 10:40

## 2025-08-09 RX ADMIN — PREDNISONE 20 MG: 20 TABLET ORAL at 08:05

## 2025-08-09 RX ADMIN — BUDESONIDE 0.5 MG: 0.5 SUSPENSION RESPIRATORY (INHALATION) at 07:30

## 2025-08-09 RX ADMIN — INSULIN LISPRO 2 UNITS: 100 INJECTION, SOLUTION INTRAVENOUS; SUBCUTANEOUS at 13:00

## 2025-08-09 RX ADMIN — IPRATROPIUM BROMIDE AND ALBUTEROL SULFATE 3 ML: .5; 3 SOLUTION RESPIRATORY (INHALATION) at 07:30

## 2025-08-09 RX ADMIN — DOCUSATE SODIUM AND SENNOSIDES 2 TABLET: 8.6; 5 TABLET, FILM COATED ORAL at 08:05

## 2025-08-09 RX ADMIN — BUDESONIDE 0.5 MG: 0.5 SUSPENSION RESPIRATORY (INHALATION) at 20:39

## 2025-08-09 RX ADMIN — BUMETANIDE 1 MG: 1 TABLET ORAL at 08:05

## 2025-08-09 RX ADMIN — METFORMIN HYDROCHLORIDE 500 MG: 500 TABLET ORAL at 17:09

## 2025-08-09 RX ADMIN — Medication 10 ML: at 08:05

## 2025-08-09 RX ADMIN — DOCUSATE SODIUM AND SENNOSIDES 2 TABLET: 8.6; 5 TABLET, FILM COATED ORAL at 20:35

## 2025-08-09 RX ADMIN — APIXABAN 5 MG: 5 TABLET, FILM COATED ORAL at 08:05

## 2025-08-09 RX ADMIN — IPRATROPIUM BROMIDE AND ALBUTEROL SULFATE 3 ML: .5; 3 SOLUTION RESPIRATORY (INHALATION) at 20:38

## 2025-08-09 RX ADMIN — APIXABAN 5 MG: 5 TABLET, FILM COATED ORAL at 20:35

## 2025-08-09 RX ADMIN — FAMOTIDINE 40 MG: 20 TABLET, FILM COATED ORAL at 08:05

## 2025-08-09 RX ADMIN — IPRATROPIUM BROMIDE AND ALBUTEROL SULFATE 3 ML: .5; 3 SOLUTION RESPIRATORY (INHALATION) at 15:22

## 2025-08-09 RX ADMIN — ONDANSETRON 4 MG: 2 INJECTION INTRAMUSCULAR; INTRAVENOUS at 20:35

## 2025-08-09 RX ADMIN — BUMETANIDE 1 MG: 1 TABLET ORAL at 17:09

## 2025-08-09 RX ADMIN — IPRATROPIUM BROMIDE AND ALBUTEROL SULFATE 3 ML: .5; 3 SOLUTION RESPIRATORY (INHALATION) at 11:27

## 2025-08-09 RX ADMIN — SUCRALFATE 1 G: 1 TABLET ORAL at 10:40

## 2025-08-09 RX ADMIN — INSULIN LISPRO 2 UNITS: 100 INJECTION, SOLUTION INTRAVENOUS; SUBCUTANEOUS at 17:09

## 2025-08-09 RX ADMIN — SUCRALFATE 1 G: 1 TABLET ORAL at 08:05

## 2025-08-09 RX ADMIN — LEVOTHYROXINE SODIUM 100 MCG: 0.1 TABLET ORAL at 05:35

## 2025-08-09 RX ADMIN — LOSARTAN POTASSIUM 25 MG: 25 TABLET, FILM COATED ORAL at 17:09

## 2025-08-09 RX ADMIN — SOTALOL HYDROCHLORIDE 80 MG: 80 TABLET ORAL at 14:16

## 2025-08-09 RX ADMIN — HYDROXYCHLOROQUINE SULFATE 200 MG: 200 TABLET, FILM COATED ORAL at 20:35

## 2025-08-09 RX ADMIN — SUCRALFATE 1 G: 1 TABLET ORAL at 17:09

## 2025-08-09 RX ADMIN — HYDROXYCHLOROQUINE SULFATE 200 MG: 200 TABLET, FILM COATED ORAL at 08:05

## 2025-08-10 VITALS
WEIGHT: 171.08 LBS | RESPIRATION RATE: 21 BRPM | BODY MASS INDEX: 32.3 KG/M2 | HEART RATE: 118 BPM | OXYGEN SATURATION: 94 % | HEIGHT: 61 IN | DIASTOLIC BLOOD PRESSURE: 60 MMHG | TEMPERATURE: 97.9 F | SYSTOLIC BLOOD PRESSURE: 108 MMHG

## 2025-08-10 PROBLEM — J44.1 COPD WITH EXACERBATION: Status: RESOLVED | Noted: 2025-08-06 | Resolved: 2025-08-10

## 2025-08-10 PROBLEM — J44.1 COPD EXACERBATION: Status: RESOLVED | Noted: 2025-08-08 | Resolved: 2025-08-10

## 2025-08-10 LAB
ALBUMIN SERPL-MCNC: 3.6 G/DL (ref 3.5–5.2)
ALBUMIN/GLOB SERPL: 1.6 G/DL
ALP SERPL-CCNC: 65 U/L (ref 39–117)
ALT SERPL W P-5'-P-CCNC: 25 U/L (ref 1–33)
ANION GAP SERPL CALCULATED.3IONS-SCNC: 14.3 MMOL/L (ref 5–15)
AST SERPL-CCNC: 19 U/L (ref 1–32)
BASOPHILS # BLD AUTO: 0.01 10*3/MM3 (ref 0–0.2)
BASOPHILS NFR BLD AUTO: 0.1 % (ref 0–1.5)
BILIRUB SERPL-MCNC: 0.2 MG/DL (ref 0–1.2)
BUN SERPL-MCNC: 32.3 MG/DL (ref 8–23)
BUN/CREAT SERPL: 46.8 (ref 7–25)
CALCIUM SPEC-SCNC: 9.3 MG/DL (ref 8.6–10.5)
CHLORIDE SERPL-SCNC: 93 MMOL/L (ref 98–107)
CO2 SERPL-SCNC: 31.7 MMOL/L (ref 22–29)
CREAT SERPL-MCNC: 0.69 MG/DL (ref 0.57–1)
DEPRECATED RDW RBC AUTO: 49.6 FL (ref 37–54)
EGFRCR SERPLBLD CKD-EPI 2021: 93.5 ML/MIN/1.73
EOSINOPHIL # BLD AUTO: 0 10*3/MM3 (ref 0–0.4)
EOSINOPHIL NFR BLD AUTO: 0 % (ref 0.3–6.2)
ERYTHROCYTE [DISTWIDTH] IN BLOOD BY AUTOMATED COUNT: 14.9 % (ref 12.3–15.4)
GLOBULIN UR ELPH-MCNC: 2.2 GM/DL
GLUCOSE BLDC GLUCOMTR-MCNC: 134 MG/DL (ref 70–105)
GLUCOSE BLDC GLUCOMTR-MCNC: 155 MG/DL (ref 70–105)
GLUCOSE SERPL-MCNC: 128 MG/DL (ref 65–99)
HCT VFR BLD AUTO: 41.1 % (ref 34–46.6)
HGB BLD-MCNC: 13 G/DL (ref 12–15.9)
IMM GRANULOCYTES # BLD AUTO: 0.01 10*3/MM3 (ref 0–0.05)
IMM GRANULOCYTES NFR BLD AUTO: 0.1 % (ref 0–0.5)
LYMPHOCYTES # BLD AUTO: 2.08 10*3/MM3 (ref 0.7–3.1)
LYMPHOCYTES NFR BLD AUTO: 26 % (ref 19.6–45.3)
MCH RBC QN AUTO: 28.7 PG (ref 26.6–33)
MCHC RBC AUTO-ENTMCNC: 31.6 G/DL (ref 31.5–35.7)
MCV RBC AUTO: 90.7 FL (ref 79–97)
MONOCYTES # BLD AUTO: 0.97 10*3/MM3 (ref 0.1–0.9)
MONOCYTES NFR BLD AUTO: 12.1 % (ref 5–12)
NEUTROPHILS NFR BLD AUTO: 4.93 10*3/MM3 (ref 1.7–7)
NEUTROPHILS NFR BLD AUTO: 61.7 % (ref 42.7–76)
NRBC BLD AUTO-RTO: 0 /100 WBC (ref 0–0.2)
PLATELET # BLD AUTO: 307 10*3/MM3 (ref 140–450)
PMV BLD AUTO: 10.1 FL (ref 6–12)
POTASSIUM SERPL-SCNC: 3.2 MMOL/L (ref 3.5–5.2)
POTASSIUM SERPL-SCNC: 4.3 MMOL/L (ref 3.5–5.2)
PROT SERPL-MCNC: 5.8 G/DL (ref 6–8.5)
RBC # BLD AUTO: 4.53 10*6/MM3 (ref 3.77–5.28)
SODIUM SERPL-SCNC: 139 MMOL/L (ref 136–145)
WBC NRBC COR # BLD AUTO: 8 10*3/MM3 (ref 3.4–10.8)

## 2025-08-10 PROCEDURE — 85025 COMPLETE CBC W/AUTO DIFF WBC: CPT | Performed by: FAMILY MEDICINE

## 2025-08-10 PROCEDURE — 25010000002 ONDANSETRON PER 1 MG: Performed by: FAMILY MEDICINE

## 2025-08-10 PROCEDURE — 82948 REAGENT STRIP/BLOOD GLUCOSE: CPT | Performed by: FAMILY MEDICINE

## 2025-08-10 PROCEDURE — 63710000001 INSULIN LISPRO (HUMAN) PER 5 UNITS: Performed by: FAMILY MEDICINE

## 2025-08-10 PROCEDURE — 82948 REAGENT STRIP/BLOOD GLUCOSE: CPT

## 2025-08-10 PROCEDURE — 84132 ASSAY OF SERUM POTASSIUM: CPT | Performed by: FAMILY MEDICINE

## 2025-08-10 PROCEDURE — 63710000001 PREDNISONE PER 5 MG: Performed by: INTERNAL MEDICINE

## 2025-08-10 PROCEDURE — 94799 UNLISTED PULMONARY SVC/PX: CPT

## 2025-08-10 PROCEDURE — 94761 N-INVAS EAR/PLS OXIMETRY MLT: CPT

## 2025-08-10 PROCEDURE — 80053 COMPREHEN METABOLIC PANEL: CPT | Performed by: FAMILY MEDICINE

## 2025-08-10 PROCEDURE — 99232 SBSQ HOSP IP/OBS MODERATE 35: CPT | Performed by: INTERNAL MEDICINE

## 2025-08-10 RX ORDER — DAPAGLIFLOZIN 10 MG/1
10 TABLET, FILM COATED ORAL DAILY
Qty: 30 TABLET | Refills: 0 | Status: SHIPPED | OUTPATIENT
Start: 2025-08-10 | End: 2025-08-11 | Stop reason: SDUPTHER

## 2025-08-10 RX ORDER — POTASSIUM CHLORIDE 1500 MG/1
40 TABLET, EXTENDED RELEASE ORAL EVERY 4 HOURS
Status: COMPLETED | OUTPATIENT
Start: 2025-08-10 | End: 2025-08-10

## 2025-08-10 RX ORDER — BUMETANIDE 1 MG/1
1 TABLET ORAL DAILY
Qty: 30 TABLET | Refills: 0 | Status: SHIPPED | OUTPATIENT
Start: 2025-08-11 | End: 2025-08-11 | Stop reason: SDUPTHER

## 2025-08-10 RX ORDER — POTASSIUM CHLORIDE 1500 MG/1
20 TABLET, EXTENDED RELEASE ORAL DAILY
Status: DISCONTINUED | OUTPATIENT
Start: 2025-08-11 | End: 2025-08-10 | Stop reason: HOSPADM

## 2025-08-10 RX ORDER — BUMETANIDE 1 MG/1
1 TABLET ORAL DAILY
Status: DISCONTINUED | OUTPATIENT
Start: 2025-08-11 | End: 2025-08-10 | Stop reason: HOSPADM

## 2025-08-10 RX ORDER — LOSARTAN POTASSIUM 25 MG/1
25 TABLET ORAL
Qty: 30 TABLET | Refills: 0 | Status: SHIPPED | OUTPATIENT
Start: 2025-08-11 | End: 2025-08-11 | Stop reason: SDUPTHER

## 2025-08-10 RX ADMIN — IPRATROPIUM BROMIDE AND ALBUTEROL SULFATE 3 ML: .5; 3 SOLUTION RESPIRATORY (INHALATION) at 14:58

## 2025-08-10 RX ADMIN — HYDROXYZINE HYDROCHLORIDE 25 MG: 25 TABLET ORAL at 09:39

## 2025-08-10 RX ADMIN — PREDNISONE 10 MG: 10 TABLET ORAL at 08:05

## 2025-08-10 RX ADMIN — DOCUSATE SODIUM AND SENNOSIDES 2 TABLET: 8.6; 5 TABLET, FILM COATED ORAL at 08:03

## 2025-08-10 RX ADMIN — APIXABAN 5 MG: 5 TABLET, FILM COATED ORAL at 08:04

## 2025-08-10 RX ADMIN — ONDANSETRON 4 MG: 2 INJECTION INTRAMUSCULAR; INTRAVENOUS at 08:03

## 2025-08-10 RX ADMIN — IPRATROPIUM BROMIDE AND ALBUTEROL SULFATE 3 ML: .5; 3 SOLUTION RESPIRATORY (INHALATION) at 11:22

## 2025-08-10 RX ADMIN — Medication 10 ML: at 08:06

## 2025-08-10 RX ADMIN — SUCRALFATE 1 G: 1 TABLET ORAL at 08:04

## 2025-08-10 RX ADMIN — LEVOTHYROXINE SODIUM 100 MCG: 0.1 TABLET ORAL at 05:02

## 2025-08-10 RX ADMIN — SOTALOL HYDROCHLORIDE 80 MG: 80 TABLET ORAL at 08:04

## 2025-08-10 RX ADMIN — FERROUS SULFATE TAB 325 MG (65 MG ELEMENTAL FE) 325 MG: 325 (65 FE) TAB at 08:04

## 2025-08-10 RX ADMIN — FAMOTIDINE 40 MG: 20 TABLET, FILM COATED ORAL at 08:05

## 2025-08-10 RX ADMIN — IPRATROPIUM BROMIDE AND ALBUTEROL SULFATE 3 ML: .5; 3 SOLUTION RESPIRATORY (INHALATION) at 06:42

## 2025-08-10 RX ADMIN — LOSARTAN POTASSIUM 25 MG: 25 TABLET, FILM COATED ORAL at 08:03

## 2025-08-10 RX ADMIN — BUDESONIDE 0.5 MG: 0.5 SUSPENSION RESPIRATORY (INHALATION) at 06:38

## 2025-08-10 RX ADMIN — Medication 10 ML: at 00:45

## 2025-08-10 RX ADMIN — POTASSIUM CHLORIDE 40 MEQ: 1500 TABLET, EXTENDED RELEASE ORAL at 10:46

## 2025-08-10 RX ADMIN — POTASSIUM CHLORIDE 40 MEQ: 1500 TABLET, EXTENDED RELEASE ORAL at 08:02

## 2025-08-10 RX ADMIN — HYDROXYCHLOROQUINE SULFATE 200 MG: 200 TABLET, FILM COATED ORAL at 08:04

## 2025-08-10 RX ADMIN — SUCRALFATE 1 G: 1 TABLET ORAL at 10:46

## 2025-08-10 RX ADMIN — INSULIN LISPRO 2 UNITS: 100 INJECTION, SOLUTION INTRAVENOUS; SUBCUTANEOUS at 12:31

## 2025-08-10 RX ADMIN — BUMETANIDE 1 MG: 1 TABLET ORAL at 08:05

## 2025-08-10 RX ADMIN — ONDANSETRON 4 MG: 2 INJECTION INTRAMUSCULAR; INTRAVENOUS at 15:08

## 2025-08-11 ENCOUNTER — READMISSION MANAGEMENT (OUTPATIENT)
Dept: CALL CENTER | Facility: HOSPITAL | Age: 70
End: 2025-08-11
Payer: MEDICARE

## 2025-08-11 ENCOUNTER — NURSE TRIAGE (OUTPATIENT)
Dept: CALL CENTER | Facility: HOSPITAL | Age: 70
End: 2025-08-11
Payer: MEDICARE

## 2025-08-11 RX ORDER — DAPAGLIFLOZIN 10 MG/1
10 TABLET, FILM COATED ORAL DAILY
Qty: 30 TABLET | Refills: 0 | Status: SHIPPED | OUTPATIENT
Start: 2025-08-11 | End: 2025-09-10

## 2025-08-11 RX ORDER — LOSARTAN POTASSIUM 25 MG/1
25 TABLET ORAL
Qty: 30 TABLET | Refills: 0 | Status: SHIPPED | OUTPATIENT
Start: 2025-08-11 | End: 2025-09-10

## 2025-08-11 RX ORDER — BUMETANIDE 1 MG/1
1 TABLET ORAL DAILY
Qty: 30 TABLET | Refills: 0 | Status: SHIPPED | OUTPATIENT
Start: 2025-08-11 | End: 2025-09-10

## 2025-08-12 ENCOUNTER — OFFICE VISIT (OUTPATIENT)
Dept: ONCOLOGY | Facility: CLINIC | Age: 70
End: 2025-08-12
Payer: MEDICARE

## 2025-08-12 VITALS
BODY MASS INDEX: 30.58 KG/M2 | WEIGHT: 162 LBS | TEMPERATURE: 97.3 F | OXYGEN SATURATION: 99 % | HEART RATE: 78 BPM | HEIGHT: 61 IN

## 2025-08-12 DIAGNOSIS — C90.00 MULTIPLE MYELOMA NOT HAVING ACHIEVED REMISSION: Primary | ICD-10-CM

## 2025-08-12 RX ORDER — ONDANSETRON 4 MG/1
TABLET, ORALLY DISINTEGRATING ORAL
COMMUNITY
Start: 2025-07-23

## 2025-08-14 LAB
ALBUMIN SERPL ELPH-MCNC: 3.3 G/DL (ref 2.9–4.4)
ALBUMIN SERPL ELPH-MCNC: NORMAL G/DL
ALBUMIN SERPL-MCNC: 4.2 G/DL (ref 3.9–4.9)
ALBUMIN/GLOB SERPL: 1 {RATIO} (ref 0.7–1.7)
ALP SERPL-CCNC: 83 IU/L (ref 44–121)
ALPHA1 GLOB SERPL ELPH-MCNC: 0.3 G/DL (ref 0–0.4)
ALPHA1 GLOB SERPL ELPH-MCNC: NORMAL G/DL
ALPHA2 GLOB SERPL ELPH-MCNC: 1.1 G/DL (ref 0.4–1)
ALPHA2 GLOB SERPL ELPH-MCNC: NORMAL G/DL
ALT SERPL-CCNC: 24 IU/L (ref 0–32)
AST SERPL-CCNC: 22 IU/L (ref 0–40)
B-GLOBULIN SERPL ELPH-MCNC: 1.1 G/DL (ref 0.7–1.3)
B-GLOBULIN SERPL ELPH-MCNC: NORMAL G/DL
BASOPHILS # BLD AUTO: 0 X10E3/UL (ref 0–0.2)
BASOPHILS NFR BLD AUTO: 0 %
BILIRUB SERPL-MCNC: 0.4 MG/DL (ref 0–1.2)
BUN SERPL-MCNC: 29 MG/DL (ref 8–27)
BUN/CREAT SERPL: 30 (ref 12–28)
CALCIUM SERPL-MCNC: 10.4 MG/DL (ref 8.7–10.3)
CHLORIDE SERPL-SCNC: 91 MMOL/L (ref 96–106)
CO2 SERPL-SCNC: 28 MMOL/L (ref 20–29)
CREAT SERPL-MCNC: 0.97 MG/DL (ref 0.57–1)
EGFRCR SERPLBLD CKD-EPI 2021: 63 ML/MIN/1.73
EOSINOPHIL # BLD AUTO: 0 X10E3/UL (ref 0–0.4)
EOSINOPHIL NFR BLD AUTO: 0 %
ERYTHROCYTE [DISTWIDTH] IN BLOOD BY AUTOMATED COUNT: 15.2 % (ref 11.7–15.4)
GAMMA GLOB SERPL ELPH-MCNC: 0.9 G/DL (ref 0.4–1.8)
GAMMA GLOB SERPL ELPH-MCNC: NORMAL G/DL
GLOBULIN SER CALC-MCNC: 2.5 G/DL (ref 1.5–4.5)
GLOBULIN SER-MCNC: 3.4 G/DL (ref 2.2–3.9)
GLUCOSE SERPL-MCNC: 119 MG/DL (ref 70–99)
HCT VFR BLD AUTO: 43.6 % (ref 34–46.6)
HGB BLD-MCNC: 14 G/DL (ref 11.1–15.9)
IGA SERPL-MCNC: 40 MG/DL (ref 87–352)
IGG SERPL-MCNC: 965 MG/DL (ref 586–1602)
IGM SERPL-MCNC: 27 MG/DL (ref 26–217)
IMM GRANULOCYTES # BLD AUTO: 0 X10E3/UL (ref 0–0.1)
IMM GRANULOCYTES NFR BLD AUTO: 0 %
INTERPRETATION SERPL IEP-IMP: ABNORMAL
KAPPA LC FREE SER-MCNC: 7.5 MG/L (ref 3.3–19.4)
KAPPA LC FREE/LAMBDA FREE SER: 0.11 {RATIO} (ref 0.26–1.65)
LABORATORY COMMENT REPORT: ABNORMAL
LABORATORY COMMENT REPORT: NORMAL
LAMBDA LC FREE SERPL-MCNC: 70.8 MG/L (ref 5.7–26.3)
LYMPHOCYTES # BLD AUTO: 1.9 X10E3/UL (ref 0.7–3.1)
LYMPHOCYTES NFR BLD AUTO: 29 %
M PROTEIN SERPL ELPH-MCNC: ABNORMAL G/DL
MCH RBC QN AUTO: 29.5 PG (ref 26.6–33)
MCHC RBC AUTO-ENTMCNC: 32.1 G/DL (ref 31.5–35.7)
MCV RBC AUTO: 92 FL (ref 79–97)
MONOCYTES # BLD AUTO: 0.5 X10E3/UL (ref 0.1–0.9)
MONOCYTES NFR BLD AUTO: 8 %
NEUTROPHILS # BLD AUTO: 4 X10E3/UL (ref 1.4–7)
NEUTROPHILS NFR BLD AUTO: 63 %
PLATELET # BLD AUTO: 371 X10E3/UL (ref 150–450)
POTASSIUM SERPL-SCNC: 4.5 MMOL/L (ref 3.5–5.2)
PROT SERPL-MCNC: 6.7 G/DL (ref 6–8.5)
RBC # BLD AUTO: 4.74 X10E6/UL (ref 3.77–5.28)
SODIUM SERPL-SCNC: 138 MMOL/L (ref 134–144)
WBC # BLD AUTO: 6.4 X10E3/UL (ref 3.4–10.8)

## 2025-08-15 ENCOUNTER — SPECIALTY PHARMACY (OUTPATIENT)
Dept: PHARMACY | Facility: HOSPITAL | Age: 70
End: 2025-08-15
Payer: MEDICARE

## 2025-08-19 ENCOUNTER — READMISSION MANAGEMENT (OUTPATIENT)
Dept: CALL CENTER | Facility: HOSPITAL | Age: 70
End: 2025-08-19
Payer: MEDICARE

## 2025-08-28 ENCOUNTER — TELEPHONE (OUTPATIENT)
Dept: CARDIOLOGY | Facility: CLINIC | Age: 70
End: 2025-08-28
Payer: MEDICARE

## (undated) DEVICE — OCTA,PERSEID,2-2-2-2-2,F-CURVE: Brand: OCTARAY MAPPING CATHETER

## (undated) DEVICE — PULSED FIELD ABLATION CATHETER: Brand: FARAWAVE™

## (undated) DEVICE — Device

## (undated) DEVICE — LN INJ CONTRST FLXCIL HP F/M LL 1200PSI72

## (undated) DEVICE — NDL PERC 1PRT THNWALL W/BASEPLT 18G 7CM

## (undated) DEVICE — PINNACLE INTRODUCER SHEATH: Brand: PINNACLE

## (undated) DEVICE — Device: Brand: WEBSTER CS

## (undated) DEVICE — SOUNDSTAR ECO 8F G CATHETER: Brand: SOUNDSTAR

## (undated) DEVICE — BLANKT WARM UNDER/BDY FUL/ACC A/ 90X206CM

## (undated) DEVICE — INTERFACE CABLE: Brand: CARTO 3 SYSTEM ECO INTERFACE CABLE

## (undated) DEVICE — ELECTRD DEFIB M/FUNC PROPADZ RADIOL 2PK

## (undated) DEVICE — INTRO PERFORMER CHECKFLO/LG RAD/BND NO/GW 18F .038IN 30CM

## (undated) DEVICE — PK TRY HEART CATH 50

## (undated) DEVICE — GW XCHG AMPLTZ XSTIF PTFE CRV .035IN 3X180CM

## (undated) DEVICE — TBG IV DRIP CHAMBER MACRO SGL 72IN

## (undated) DEVICE — SYS COMPR FEMOSTOP GLD W/PUMP LF

## (undated) DEVICE — Device: Brand: REFERENCE PATCH CARTO 3

## (undated) DEVICE — 1 X VERSACROSS CONNECT TRANSSEPTAL DILATOR;  1 X VERSACROSS RF WIRE (INCLUDING 1 X CONNECTOR CABLE (SINGLE USE)): Brand: VERSACROSS CONNECT ACCESS SOLUTION FOR FARADRIVE

## (undated) DEVICE — PAD E/S GRND SGL/FOIL 9FT/CORD DISP

## (undated) DEVICE — STEERABLE SHEATH CLEAR: Brand: FARADRIVE™

## (undated) DEVICE — CATHETER CONNECTION CABLE: Brand: FARASTAR™

## (undated) DEVICE — PROVE COVER: Brand: UNBRANDED